# Patient Record
Sex: FEMALE | Race: WHITE | NOT HISPANIC OR LATINO | Employment: OTHER | URBAN - METROPOLITAN AREA
[De-identification: names, ages, dates, MRNs, and addresses within clinical notes are randomized per-mention and may not be internally consistent; named-entity substitution may affect disease eponyms.]

---

## 2017-02-10 ENCOUNTER — LAB (OUTPATIENT)
Dept: LAB | Facility: HOSPITAL | Age: 52
End: 2017-02-10
Attending: OTOLARYNGOLOGY

## 2017-02-10 ENCOUNTER — TRANSCRIBE ORDERS (OUTPATIENT)
Dept: LAB | Facility: HOSPITAL | Age: 52
End: 2017-02-10

## 2017-02-10 DIAGNOSIS — E03.9 HYPOTHYROIDISM, UNSPECIFIED TYPE: ICD-10-CM

## 2017-02-10 DIAGNOSIS — E03.9 HYPOTHYROIDISM, UNSPECIFIED TYPE: Primary | ICD-10-CM

## 2017-02-10 LAB — TSH SERPL DL<=0.05 MIU/L-ACNC: 1.94 MIU/ML (ref 0.27–4.2)

## 2017-02-10 PROCEDURE — 36415 COLL VENOUS BLD VENIPUNCTURE: CPT

## 2017-02-10 PROCEDURE — 84443 ASSAY THYROID STIM HORMONE: CPT

## 2017-07-07 ENCOUNTER — LAB (OUTPATIENT)
Dept: LAB | Facility: HOSPITAL | Age: 52
End: 2017-07-07
Attending: SURGERY

## 2017-07-07 ENCOUNTER — TRANSCRIBE ORDERS (OUTPATIENT)
Dept: ADMINISTRATIVE | Facility: HOSPITAL | Age: 52
End: 2017-07-07

## 2017-07-07 DIAGNOSIS — E03.9 HYPOTHYROIDISM, UNSPECIFIED TYPE: ICD-10-CM

## 2017-07-07 DIAGNOSIS — E03.9 HYPOTHYROIDISM, UNSPECIFIED TYPE: Primary | ICD-10-CM

## 2017-07-07 LAB — TSH SERPL DL<=0.05 MIU/L-ACNC: 0.69 MIU/ML (ref 0.27–4.2)

## 2017-07-07 PROCEDURE — 36415 COLL VENOUS BLD VENIPUNCTURE: CPT

## 2017-07-07 PROCEDURE — 84443 ASSAY THYROID STIM HORMONE: CPT

## 2017-08-28 ENCOUNTER — TRANSCRIBE ORDERS (OUTPATIENT)
Dept: ADMINISTRATIVE | Facility: HOSPITAL | Age: 52
End: 2017-08-28

## 2017-08-28 ENCOUNTER — LAB (OUTPATIENT)
Dept: LAB | Facility: HOSPITAL | Age: 52
End: 2017-08-28
Attending: OTOLARYNGOLOGY

## 2017-08-28 DIAGNOSIS — Z80.8 FHX: THYROID CANCER: Primary | ICD-10-CM

## 2017-08-28 DIAGNOSIS — Z80.8 FHX: THYROID CANCER: ICD-10-CM

## 2017-08-28 LAB — TSH SERPL DL<=0.05 MIU/L-ACNC: 1.14 MIU/ML (ref 0.27–4.2)

## 2017-08-28 PROCEDURE — 86800 THYROGLOBULIN ANTIBODY: CPT

## 2017-08-28 PROCEDURE — 84443 ASSAY THYROID STIM HORMONE: CPT

## 2017-08-28 PROCEDURE — 36415 COLL VENOUS BLD VENIPUNCTURE: CPT

## 2017-09-05 LAB
SPECIMEN STATUS: NORMAL
THYROGLOB AB SERPL-ACNC: <1 IU/ML (ref 0–0.9)

## 2017-09-15 ENCOUNTER — LAB (OUTPATIENT)
Dept: LAB | Facility: HOSPITAL | Age: 52
End: 2017-09-15
Attending: OTOLARYNGOLOGY

## 2017-09-15 ENCOUNTER — TRANSCRIBE ORDERS (OUTPATIENT)
Dept: ADMINISTRATIVE | Facility: HOSPITAL | Age: 52
End: 2017-09-15

## 2017-09-15 DIAGNOSIS — C73 THYROID CANCER (HCC): Primary | ICD-10-CM

## 2017-09-15 DIAGNOSIS — C73 THYROID CANCER (HCC): ICD-10-CM

## 2017-09-15 PROCEDURE — 36415 COLL VENOUS BLD VENIPUNCTURE: CPT

## 2017-09-15 PROCEDURE — 84432 ASSAY OF THYROGLOBULIN: CPT

## 2017-09-20 LAB — THYROGLOBULIN (TG-RIA): <2 NG/ML

## 2017-10-24 ENCOUNTER — OFFICE VISIT (OUTPATIENT)
Dept: ENDOCRINOLOGY | Age: 52
End: 2017-10-24

## 2017-10-24 VITALS
BODY MASS INDEX: 38.22 KG/M2 | DIASTOLIC BLOOD PRESSURE: 82 MMHG | HEART RATE: 98 BPM | SYSTOLIC BLOOD PRESSURE: 128 MMHG | OXYGEN SATURATION: 96 % | WEIGHT: 244 LBS

## 2017-10-24 DIAGNOSIS — C73 THYROID CANCER (HCC): Primary | ICD-10-CM

## 2017-10-24 DIAGNOSIS — E21.0 PRIMARY HYPERPARATHYROIDISM (HCC): ICD-10-CM

## 2017-10-24 DIAGNOSIS — E89.0 POSTSURGICAL HYPOTHYROIDISM: ICD-10-CM

## 2017-10-24 PROCEDURE — 99205 OFFICE O/P NEW HI 60 MIN: CPT | Performed by: INTERNAL MEDICINE

## 2017-10-24 RX ORDER — PRAZOSIN HYDROCHLORIDE 2 MG/1
CAPSULE ORAL
Refills: 0 | COMMUNITY
Start: 2017-10-10 | End: 2020-07-17 | Stop reason: HOSPADM

## 2017-10-24 RX ORDER — SUMATRIPTAN SUCC/NAPROXEN SOD 85MG-500MG
1 TABLET ORAL ONCE AS NEEDED
Refills: 3 | COMMUNITY
Start: 2017-09-26 | End: 2020-07-17 | Stop reason: HOSPADM

## 2017-10-24 RX ORDER — TOPIRAMATE 100 MG/1
TABLET, FILM COATED ORAL
Refills: 1 | COMMUNITY
Start: 2017-10-09 | End: 2019-07-29

## 2017-10-24 NOTE — PROGRESS NOTES
Chief Complaint   Patient presents with   • Thyroid Problem   New Patient Appointment/ H/O Thyroid Cancer Thyroidectomy    HPI  Urvashi Ornelas,52 y.o. white female is here as a new patient for the management of metastatic papillary thyroid cancer status post total thyroidectomy and radioactive iodine ablation.  Consulted by Dr. Ashby.    Most of the information is obtained from prior medical records and also from the patient.According to the patient she was diagnosed with hyperparathyroidism about 9 years ago due to the diagnosis of elevated calcium levels on her routine blood work up.  She underwent right inferior parathyroidectomy.  Work up during that time also revealed a left thyroid lobe she subsequently underwent thyroid biopsy which revealed papillary thyroid cancer.  On 2/5/2016 patient underwent total thyroidectomy with the resection of the paratracheal lymph nodes and parathyroid exploration.  The surgical pathology was consistent with multifocal papillary carcinoma follicular variant with infiltration.  The largest nodule on the left measured 13 x 8 x 9 mm.  Carcinoma was present at the peripheral margin.  There was no evidence of angiolymphatic invasion.  3 lymph nodes were identified which had metastasis less than one millimeters in size.  Along with right superior parathyroid adenoma.  Postoperative ultrasound showed residual level III lymph nodes in the left neck measuring 2.2 x 0.8 x 1.3 cm in size.  Ultrasound-guided biopsy showed pathology consistent with papillary thyroid cancer.  Subsequently patient underwent left neck dissection in April 2016 by Dr. Ashby.  Surgical pathology was consistent with metastatic papillary carcinoma involving 6 out of the 19 lymph nodes.  There was no evidence of extracapsular extension.  Patient was being followed by Dr. Carney from radiation oncology for radioactive iodine ablation and whole-body scan.    In June 2016 patient received Thyrogen stimulated 125 mCi of I  131.  She had whole-body scan the week after and then another whole-body scan on January 27, 2017.     WBS -   06/15/2016 - Residual activity of the thyroid resection bed represents residual functional thyroid tissue or malignancy   1/27/17 -    1. Unremarkable whole-body thyroid scintigraphy. No evidence of residual or recurrent disease.    2. Activity on both sides of the neck base likely representing contamination.    Labs at the time of the scans - Tg levels - 0.8 and less than 0.1    Today in the clinic patient also reports that she had another thyroid ultrasound performed at [facility].    Patient is currently on levothyroxine 200 µg oral daily, dose was changed in the last few months from 175 µg oral daily.  She does complain of feeling tired at times, weight has been relatively stable, complains of dry skin, complains of cold intolerance, no tremors, racing of heart.  No history of radiation to the neck as a kid and no family history of thyroid cancer.      Hyperparathyroidism-patient had right superior and inferior parathyroidectomy.  Currently not on any calcium supplementation.      I have reviewed the patient's allergies, medicines, past medical hx, family hx and social hx in detail.    Past Medical History:   Diagnosis Date   • Anxiety    • Arthritis    • Asthma    • Cancer     THYROID   • Depression    • Disease of thyroid gland     CANCER   • H/O degenerative disc disease    • History of hallucinations    • Hypertension    • Joint pain    • Lymph node cancer    • Migraine    • Postmenopausal        No family history on file.    Social History     Social History   • Marital status:      Spouse name: N/A   • Number of children: N/A   • Years of education: N/A     Occupational History   • Not on file.     Social History Main Topics   • Smoking status: Former Smoker   • Smokeless tobacco: Never Used   • Alcohol use No   • Drug use: No   • Sexual activity: Not on file     Other Topics Concern    • Not on file     Social History Narrative       Allergies   Allergen Reactions   • Depakote Er [Divalproex Sodium Er] Other (See Comments)     HAIR FALLS OUT   • Lamictal [Lamotrigine] Other (See Comments)     TONGUE SWELLS AND PEELS   • Risperidone And Related Other (See Comments)     PREGNANCY SYMPTOMS   • Erythromycin Rash   • Penicillins Rash   • Toradol [Ketorolac Tromethamine] Rash         Current Outpatient Prescriptions:   •  albuterol (PROVENTIL HFA;VENTOLIN HFA) 108 (90 BASE) MCG/ACT inhaler, Inhale 2 puffs every 4 (four) hours as needed for wheezing., Disp: , Rfl:   •  atorvastatin (LIPITOR) 20 MG tablet, Take 20 mg by mouth every morning., Disp: , Rfl:   •  BREO ELLIPTA 200-25 MCG/INH aerosol powder , INHALE 1 PUFF INTO THE LUNGS D, Disp: , Rfl: 2  •  clozapine (CLOZARIL) 100 MG tablet, Take 100 mg by mouth every evening., Disp: , Rfl:   •  FLUoxetine (PROzac) 40 MG capsule, Take 40 mg by mouth every morning., Disp: , Rfl:   •  levothyroxine (SYNTHROID, LEVOTHROID) 112 MCG tablet, Take 112 mcg by mouth every morning., Disp: , Rfl:   •  ondansetron (ZOFRAN) 8 MG tablet, Take  by mouth every 8 (eight) hours as needed for nausea or vomiting., Disp: , Rfl:   •  prazosin (MINIPRESS) 2 MG capsule, TK TWO CAPSULES PO EVERY NIGHT AT BEDTIME, Disp: , Rfl: 0  •  topiramate (TOPAMAX) 100 MG tablet, TK 1 TABLET PO BID, Disp: , Rfl: 1  •  TREXIMET  MG per tablet, , Disp: , Rfl: 3     Review of Systems   Constitutional: Positive for fatigue. Negative for fever.   HENT: Positive for voice change. Negative for facial swelling, nosebleeds and trouble swallowing.    Eyes: Negative for pain and redness.   Respiratory: Positive for shortness of breath. Negative for wheezing.    Cardiovascular: Negative for palpitations and leg swelling.   Gastrointestinal: Negative for abdominal pain, diarrhea and vomiting.   Endocrine: Negative for polydipsia and polyuria.   Genitourinary: Negative for decreased urine volume and  frequency.   Musculoskeletal: Negative for joint swelling and neck pain.   Skin: Negative for rash.   Allergic/Immunologic: Negative for immunocompromised state.   Neurological: Positive for headaches. Negative for dizziness, seizures, facial asymmetry and light-headedness.   Hematological: Does not bruise/bleed easily.   Psychiatric/Behavioral: Positive for agitation. Negative for confusion.       Objective:    /82  Pulse 98  Wt 244 lb (111 kg)  SpO2 96%  BMI 38.22 kg/m2    Physical Exam   Constitutional: She is oriented to person, place, and time. She appears well-nourished.   Obese female   HENT:   Head: Normocephalic and atraumatic.   No chovsteks   Eyes: Conjunctivae and EOM are normal. No scleral icterus.   Neck: Normal range of motion. Neck supple.   Thyroid bed empty   Cardiovascular: Normal rate and normal heart sounds.  Exam reveals no friction rub.    No murmur heard.  Pulmonary/Chest: Effort normal and breath sounds normal. No stridor. She has no wheezes. She has no rales.   Abdominal: Soft. Bowel sounds are normal. She exhibits no distension. There is no tenderness.   Musculoskeletal: She exhibits no edema or tenderness.   Lymphadenopathy:     She has no cervical adenopathy.   Neurological: She is alert and oriented to person, place, and time.   No tetany   Skin: Skin is warm and dry. She is not diaphoretic.   Psychiatric: She has a normal mood and affect.   Vitals reviewed.      Results Review:    I reviewed the patient's new clinical results.    Lab on 09/15/2017   Component Date Value Ref Range Status   • Thyroglobulin (TG-JOSE DAVID) 09/15/2017 <2.0  ng/mL Final    Reference Range:  Pubertal Children  and Adults: <40  According to the National Academy of Clinical Biochemistry,  the reference interval for Thyroglobulin (TG) should be  related to euthyroid patients and not for patients who  underwent thyroidectomy.  TG reference intervals for these  patients depend on the residual mass of the  thyroid tissue  left after surgery.  Establishing a post-operative baseline  is recommended.  The assay quantitation limit is 2.0 ng/mL.       Urvashi was seen today for thyroid problem.    Diagnoses and all orders for this visit:    Thyroid cancer  -     TSH  -     T4, Free  -     Thyroid Peroxidase Antibody  -     Vitamin D 25 Hydroxy  -     PTH, Intact & Calcium  -     Comprehensive Metabolic Panel  -     TSH; Future  -     T4, Free; Future  -     Lipid Panel; Future  -     TSH; Future  -     Vitamin B12 & Folate; Future  -     Vitamin D 25 Hydroxy; Future    Primary hyperparathyroidism  -     TSH  -     T4, Free  -     Thyroid Peroxidase Antibody  -     Vitamin D 25 Hydroxy  -     PTH, Intact & Calcium  -     Comprehensive Metabolic Panel  -     TSH; Future  -     T4, Free; Future  -     Lipid Panel; Future  -     TSH; Future  -     Vitamin B12 & Folate; Future  -     Vitamin D 25 Hydroxy; Future    Postsurgical hypothyroidism  -     TSH  -     T4, Free  -     Thyroid Peroxidase Antibody  -     Vitamin D 25 Hydroxy  -     PTH, Intact & Calcium  -     Comprehensive Metabolic Panel  -     TSH; Future  -     T4, Free; Future  -     Lipid Panel; Future  -     TSH; Future  -     Vitamin B12 & Folate; Future  -     Vitamin D 25 Hydroxy; Future    Metastatic papillary thyroid cancer- Stage 3  Status post radioactive iodine ablation and body scan which was negative for residual or recurrence of thyroid malignancy.  Recent thyroid ultrasound performed at [facility], will try to obtain those records.  Will continue to monitor with serial thyroid ultrasound's at least once a year.    Hypothyroidism secondary to total thyroidectomy  Due to the cancer history will try to keep the TSH between 0.5-1  Continue levothyroxine 200 µg oral daily.  Will adjust the dosage of the levothyroxine based on her blood work up today    History of hyperparathyroidism status post right superior and inferior parathyroidectomy  Will check  PTH, calcium, vitamin D levels.  During next visit would consider DEXA scan.    Thank you for asking me to see your patient Urvashi Ornelas in consultation.    40 minutes out of 80 minutes face to face spent in counseling the patient extensively on medical records reviewed from [facility], obtaining the data in discussing with the patient about treatment plan.    Bakari Mora MD  10/24/17

## 2017-10-25 LAB
25(OH)D3+25(OH)D2 SERPL-MCNC: 19.4 NG/ML (ref 30–100)
ALBUMIN SERPL-MCNC: 4.4 G/DL (ref 3.5–5.2)
ALBUMIN/GLOB SERPL: 1.6 G/DL
ALP SERPL-CCNC: 99 U/L (ref 39–117)
ALT SERPL-CCNC: 30 U/L (ref 1–33)
AST SERPL-CCNC: 21 U/L (ref 1–32)
BILIRUB SERPL-MCNC: 0.2 MG/DL (ref 0.1–1.2)
BUN SERPL-MCNC: 11 MG/DL (ref 6–20)
BUN/CREAT SERPL: 10.5 (ref 7–25)
CALCIUM SERPL-MCNC: 9.2 MG/DL (ref 8.6–10.5)
CHLORIDE SERPL-SCNC: 104 MMOL/L (ref 98–107)
CO2 SERPL-SCNC: 20.3 MMOL/L (ref 22–29)
CREAT SERPL-MCNC: 1.05 MG/DL (ref 0.57–1)
GFR SERPLBLD CREATININE-BSD FMLA CKD-EPI: 55 ML/MIN/1.73
GFR SERPLBLD CREATININE-BSD FMLA CKD-EPI: 67 ML/MIN/1.73
GLOBULIN SER CALC-MCNC: 2.8 GM/DL
GLUCOSE SERPL-MCNC: 117 MG/DL (ref 65–99)
INTACT PTH: NORMAL
POTASSIUM SERPL-SCNC: 4.2 MMOL/L (ref 3.5–5.2)
PROT SERPL-MCNC: 7.2 G/DL (ref 6–8.5)
PTH-INTACT SERPL-MCNC: 20 PG/ML (ref 15–65)
SODIUM SERPL-SCNC: 141 MMOL/L (ref 136–145)
T4 FREE SERPL-MCNC: 1.49 NG/DL (ref 0.93–1.7)
THYROPEROXIDASE AB SERPL-ACNC: 17 IU/ML (ref 0–34)
TSH SERPL DL<=0.005 MIU/L-ACNC: 0.98 MIU/ML (ref 0.27–4.2)

## 2017-10-25 RX ORDER — ERGOCALCIFEROL 1.25 MG/1
50000 CAPSULE ORAL WEEKLY
Qty: 30 CAPSULE | Refills: 11 | Status: SHIPPED | OUTPATIENT
Start: 2017-10-25 | End: 2020-03-05

## 2017-11-29 ENCOUNTER — LAB REQUISITION (OUTPATIENT)
Dept: LAB | Facility: HOSPITAL | Age: 52
End: 2017-11-29

## 2017-11-29 DIAGNOSIS — Z00.00 ENCOUNTER FOR GENERAL ADULT MEDICAL EXAMINATION WITHOUT ABNORMAL FINDINGS: ICD-10-CM

## 2017-11-29 PROCEDURE — 87070 CULTURE OTHR SPECIMN AEROBIC: CPT | Performed by: OTOLARYNGOLOGY

## 2017-11-29 PROCEDURE — 87147 CULTURE TYPE IMMUNOLOGIC: CPT | Performed by: OTOLARYNGOLOGY

## 2017-11-29 PROCEDURE — 87205 SMEAR GRAM STAIN: CPT | Performed by: OTOLARYNGOLOGY

## 2017-12-01 LAB
BACTERIA SPEC AEROBE CULT: ABNORMAL
BACTERIA SPEC AEROBE CULT: ABNORMAL
GRAM STN SPEC: ABNORMAL
GRAM STN SPEC: ABNORMAL

## 2018-01-20 LAB
25(OH)D3+25(OH)D2 SERPL-MCNC: 32 NG/ML (ref 30–100)
CHOLEST SERPL-MCNC: 155 MG/DL (ref 0–200)
FOLATE SERPL-MCNC: 3.09 NG/ML (ref 4.78–24.2)
HDLC SERPL-MCNC: 48 MG/DL (ref 40–60)
INTERPRETATION: NORMAL
LDLC SERPL CALC-MCNC: 59 MG/DL (ref 0–100)
T4 FREE SERPL-MCNC: 1.54 NG/DL (ref 0.93–1.7)
TRIGL SERPL-MCNC: 241 MG/DL (ref 0–150)
TSH SERPL DL<=0.005 MIU/L-ACNC: 1.18 MIU/ML (ref 0.27–4.2)
VIT B12 SERPL-MCNC: 471 PG/ML (ref 211–946)
VLDLC SERPL CALC-MCNC: 48.2 MG/DL (ref 5–40)

## 2018-01-22 ENCOUNTER — RESULTS ENCOUNTER (OUTPATIENT)
Dept: ENDOCRINOLOGY | Age: 53
End: 2018-01-22

## 2018-01-22 DIAGNOSIS — E89.0 POSTSURGICAL HYPOTHYROIDISM: ICD-10-CM

## 2018-01-22 DIAGNOSIS — E21.0 PRIMARY HYPERPARATHYROIDISM (HCC): ICD-10-CM

## 2018-01-22 DIAGNOSIS — C73 THYROID CANCER (HCC): ICD-10-CM

## 2018-01-29 ENCOUNTER — OFFICE VISIT (OUTPATIENT)
Dept: ENDOCRINOLOGY | Age: 53
End: 2018-01-29

## 2018-01-29 VITALS
WEIGHT: 258 LBS | SYSTOLIC BLOOD PRESSURE: 120 MMHG | DIASTOLIC BLOOD PRESSURE: 72 MMHG | HEART RATE: 105 BPM | OXYGEN SATURATION: 97 % | HEIGHT: 67 IN | BODY MASS INDEX: 40.49 KG/M2

## 2018-01-29 DIAGNOSIS — E89.0 POSTSURGICAL HYPOTHYROIDISM: ICD-10-CM

## 2018-01-29 DIAGNOSIS — C73 THYROID CANCER (HCC): Primary | ICD-10-CM

## 2018-01-29 PROCEDURE — 99214 OFFICE O/P EST MOD 30 MIN: CPT | Performed by: INTERNAL MEDICINE

## 2018-01-29 RX ORDER — LITHIUM CARBONATE 450 MG
TABLET, EXTENDED RELEASE ORAL
Refills: 1 | COMMUNITY
Start: 2018-01-19 | End: 2018-05-14 | Stop reason: ALTCHOICE

## 2018-01-29 RX ORDER — MONTELUKAST SODIUM 10 MG/1
TABLET ORAL
Refills: 5 | COMMUNITY
Start: 2018-01-26 | End: 2019-11-20 | Stop reason: SDUPTHER

## 2018-01-29 RX ORDER — FOLIC ACID 1 MG/1
1 TABLET ORAL DAILY
Qty: 30 TABLET | Refills: 11 | Status: SHIPPED | OUTPATIENT
Start: 2018-01-29 | End: 2018-05-14 | Stop reason: SDUPTHER

## 2018-01-29 RX ORDER — LEVOTHYROXINE SODIUM 0.03 MG/1
25 TABLET ORAL DAILY
Qty: 30 TABLET | Refills: 11 | Status: SHIPPED | OUTPATIENT
Start: 2018-01-29 | End: 2018-04-26 | Stop reason: SDUPTHER

## 2018-01-29 RX ORDER — BUSPIRONE HYDROCHLORIDE 30 MG/1
30 TABLET ORAL 2 TIMES DAILY PRN
Refills: 0 | COMMUNITY
Start: 2018-01-03

## 2018-01-29 RX ORDER — LEVOTHYROXINE SODIUM 0.2 MG/1
200 TABLET ORAL EVERY MORNING
Qty: 30 TABLET | Refills: 11 | Status: SHIPPED | OUTPATIENT
Start: 2018-01-29 | End: 2019-01-08 | Stop reason: SDUPTHER

## 2018-01-29 RX ORDER — BUPROPION HYDROCHLORIDE 300 MG/1
TABLET ORAL
Refills: 1 | COMMUNITY
Start: 2018-01-23 | End: 2020-07-17 | Stop reason: HOSPADM

## 2018-01-29 NOTE — PROGRESS NOTES
52 y.o.    Patient Care Team:  LALO Syed as PCP - General (Family Medicine)    Chief Complaint:    3 MONTH FOLLOW UP/THYROID CANCER  Subjective     HPI    Urvashi Ornelas,52 y.o. white female is here as a new patient for the management of metastatic papillary thyroid cancer status post total thyroidectomy and radioactive iodine ablation.  Consulted by Dr. Ashby.     Most of the information is obtained from prior medical records and also from the patient. According to the patient she was diagnosed with hyperparathyroidism about 9 years ago due to the diagnosis of elevated calcium levels on her routine blood work up.  She underwent right inferior parathyroidectomy.  Work up during that time also revealed a left thyroid lobe she subsequently underwent thyroid biopsy which revealed papillary thyroid cancer.  On 2/5/2016 patient underwent total thyroidectomy with the resection of the paratracheal lymph nodes and parathyroid exploration.  The surgical pathology was consistent with multifocal papillary carcinoma follicular variant with infiltration.  The largest nodule on the left measured 13 x 8 x 9 mm.  Carcinoma was present at the peripheral margin.  There was no evidence of angiolymphatic invasion.  3 lymph nodes were identified which had metastasis less than one millimeters in size.  Along with right superior parathyroid adenoma.  Postoperative ultrasound showed residual level III lymph nodes in the left neck measuring 2.2 x 0.8 x 1.3 cm in size.  Ultrasound-guided biopsy showed pathology consistent with papillary thyroid cancer. Subsequently patient underwent left neck dissection in April 2016 by Dr. Ashby.  Surgical pathology was consistent with metastatic papillary carcinoma involving 6 out of the 19 lymph nodes.  There was no evidence of extracapsular extension.  Patient was being followed by Dr. Carney from radiation oncology for radioactive iodine ablation and whole-body scan.     In June 2016 patient  received Thyrogen stimulated 125 mCi of I 131.  She had whole-body scan the week after and then another whole-body scan on January 27, 2017.      WBS -   06/15/2016 - Residual activity of the thyroid resection bed represents residual functional thyroid tissue or malignancy   1/27/17 -    1. Unremarkable whole-body thyroid scintigraphy. No evidence of residual or recurrent disease.    2. Activity on both sides of the neck base likely representing contamination.     Labs at the time of the scans - Tg levels - 0.8 and less than 0.1    Today in the clinic patient reports that she could not get the thyroid ultrasound report from [facility] but she was communicated by them that they did not find anything concerning features.  She is currently on levothyroxine 200 µg oral daily.  She does report of feeling tired and that reports that this is due to her bipolar disease and is also extremely concerned about her father's health status, she reports that her weight is stable, complains of cold intolerance, normal bowel movements, sleep is disturbed due to various thoughts.  Complains of dry skin and some hair loss.  No tremors, racing of heart or eye symptoms.  No shortness of breath, difficulty in swallowing or change in voice.       Hyperparathyroidism-patient had right superior and inferior parathyroidectomy.  Currently not on any calcium supplementation.  Thyroid levels in calcium levels during last blood work up has been within normal limits.    Interval History      The following portions of the patient's history were reviewed and updated as appropriate: allergies, current medications, past family history, past medical history, past social history, past surgical history and problem list.    Past Medical History:   Diagnosis Date   • Anxiety    • Arthritis    • Asthma    • Cancer     THYROID   • Depression    • Disease of thyroid gland     CANCER   • H/O degenerative disc disease    • History of hallucinations    •  Hypertension    • Joint pain    • Lymph node cancer    • Migraine    • Postmenopausal      No family history on file.  Social History     Social History   • Marital status:      Spouse name: N/A   • Number of children: N/A   • Years of education: N/A     Occupational History   • Not on file.     Social History Main Topics   • Smoking status: Former Smoker   • Smokeless tobacco: Never Used   • Alcohol use No   • Drug use: No   • Sexual activity: Not on file     Other Topics Concern   • Not on file     Social History Narrative     Allergies   Allergen Reactions   • Depakote Er [Divalproex Sodium Er] Other (See Comments)     HAIR FALLS OUT   • Lamictal [Lamotrigine] Other (See Comments)     TONGUE SWELLS AND PEELS   • Risperidone And Related Other (See Comments)     PREGNANCY SYMPTOMS   • Erythromycin Rash   • Penicillins Rash   • Toradol [Ketorolac Tromethamine] Rash       Current Outpatient Prescriptions:   •  albuterol (PROVENTIL HFA;VENTOLIN HFA) 108 (90 BASE) MCG/ACT inhaler, Inhale 2 puffs every 4 (four) hours as needed for wheezing., Disp: , Rfl:   •  atorvastatin (LIPITOR) 20 MG tablet, Take 40 mg by mouth Every Morning., Disp: , Rfl:   •  BREO ELLIPTA 200-25 MCG/INH aerosol powder , INHALE 1 PUFF INTO THE LUNGS D, Disp: , Rfl: 2  •  buPROPion XL (WELLBUTRIN XL) 300 MG 24 hr tablet, TK 1 T PO  QAM, Disp: , Rfl: 1  •  busPIRone (BUSPAR) 10 MG tablet, TK 1 T PO  TID, Disp: , Rfl: 0  •  clozapine (CLOZARIL) 100 MG tablet, Take 100 mg by mouth every evening., Disp: , Rfl:   •  levothyroxine (SYNTHROID, LEVOTHROID) 200 MCG tablet, Take 1 tablet by mouth Every Morning., Disp: 30 tablet, Rfl: 11  •  lithium (ESKALITH) 450 MG CR tablet, TK 2 TS PO HS, Disp: , Rfl: 1  •  montelukast (SINGULAIR) 10 MG tablet, TK 1 T PO  QHS, Disp: , Rfl: 5  •  prazosin (MINIPRESS) 2 MG capsule, TK TWO CAPSULES PO EVERY NIGHT AT BEDTIME, Disp: , Rfl: 0  •  topiramate (TOPAMAX) 100 MG tablet, TK 1 TABLET PO BID, Disp: , Rfl: 1  •   "TREXIMET  MG per tablet, , Disp: , Rfl: 3  •  vitamin D (ERGOCALCIFEROL) 23664 units capsule capsule, Take 1 capsule by mouth 1 (One) Time Per Week., Disp: 30 capsule, Rfl: 11  •  folic acid (FOLVITE) 1 MG tablet, Take 1 tablet by mouth Daily., Disp: 30 tablet, Rfl: 11  •  levothyroxine (SYNTHROID, LEVOTHROID) 25 MCG tablet, Take 1 tablet by mouth Daily., Disp: 30 tablet, Rfl: 11  •  ondansetron (ZOFRAN) 8 MG tablet, Take  by mouth every 8 (eight) hours as needed for nausea or vomiting., Disp: , Rfl:         Review of Systems   Constitutional: Negative for fever.   HENT: Positive for voice change. Negative for facial swelling, nosebleeds and trouble swallowing.    Eyes: Negative for pain and redness.   Respiratory: Negative for shortness of breath and wheezing.    Cardiovascular: Negative for palpitations and leg swelling.   Gastrointestinal: Negative for abdominal pain, diarrhea and vomiting.   Endocrine: Negative for cold intolerance, heat intolerance, polydipsia and polyuria.   Genitourinary: Negative for decreased urine volume and frequency.   Musculoskeletal: Negative for joint swelling and neck pain.   Skin: Negative for rash.   Allergic/Immunologic: Negative for immunocompromised state.   Neurological: Positive for headaches. Negative for seizures and facial asymmetry.   Hematological: Does not bruise/bleed easily.   Psychiatric/Behavioral: Positive for agitation. Negative for confusion.       Objective       Vitals:    01/29/18 1303   BP: 120/72   Pulse: 105   SpO2: 97%   Weight: 117 kg (258 lb)   Height: 170.2 cm (67\")     Body mass index is 40.41 kg/(m^2).      Physical Exam   Gen exam - alert and oriented x 3, obese female, not in distress.   HEENT - thyroid bed empty  Resp - Clear to auscultation.   CVS - S1,S2 heard and no murmurs.   Abd - Non tender, BS heard.   Ext - No edema and intact pin prick and proprioception.     Results Review:     I reviewed the patient's new clinical " results.    Medical records reviewed  Summary: done      Results Encounter on 01/22/2018   Component Date Value Ref Range Status   • TSH 01/19/2018 1.180  0.270 - 4.200 mIU/mL Final   • Free T4 01/19/2018 1.54  0.93 - 1.70 ng/dL Final   • Total Cholesterol 01/19/2018 155  0 - 200 mg/dL Final   • Triglycerides 01/19/2018 241* 0 - 150 mg/dL Final   • HDL Cholesterol 01/19/2018 48  40 - 60 mg/dL Final   • VLDL Cholesterol 01/19/2018 48.2* 5 - 40 mg/dL Final   • LDL Cholesterol  01/19/2018 59  0 - 100 mg/dL Final   • Vitamin B-12 01/19/2018 471  211 - 946 pg/mL Final   • Folate 01/19/2018 3.09* 4.78 - 24.20 ng/mL Final   • 25 Hydroxy, Vitamin D 01/19/2018 32.0  30.0 - 100.0 ng/mL Final    Comment: Reference Range for Total Vitamin D 25(OH)  Deficiency    <20.0 ng/mL  Insufficiency 21-29 ng/mL  Sufficiency    ng/mL  Toxicity      >100 ng/ml        • Interpretation 01/19/2018 Note   Final    Supplemental report is available.     No results found for: HGBA1C  Lab Results   Component Value Date    CREATININE 1.05 (H) 10/24/2017     Imaging Results (most recent)     None                Assessment and Plan:    Urvashi was seen today for thyroid problem.    Diagnoses and all orders for this visit:    Thyroid cancer  -     T4, Free; Future  -     TSH; Future  -     Basic Metabolic Panel; Future  -     Vitamin B12 & Folate; Future  -     Lipid Panel; Future  -     Phosphorus; Future  -     PTH, Intact & Calcium; Future  -     US Thyroid; Future    Postsurgical hypothyroidism  -     T4, Free; Future  -     TSH; Future  -     Basic Metabolic Panel; Future  -     Vitamin B12 & Folate; Future  -     Lipid Panel; Future  -     Phosphorus; Future  -     PTH, Intact & Calcium; Future  -     US Thyroid; Future    Other orders  -     levothyroxine (SYNTHROID, LEVOTHROID) 200 MCG tablet; Take 1 tablet by mouth Every Morning.  -     levothyroxine (SYNTHROID, LEVOTHROID) 25 MCG tablet; Take 1 tablet by mouth Daily.  -     folic acid  (FOLVITE) 1 MG tablet; Take 1 tablet by mouth Daily.    Metastatic papillary thyroid cancer-stage III  Whole-body scan was negative after the radioactive iodine ablation.  Will repeat a thyroid ultrasound with lymph node mapping.  Will try to suppress the TSH is at around 0.5-1.  For at least the next 5 years.    Postsurgical hypothyroidism  Will increase levothyroxin to 225 µg oral daily.  Advised the patient to take the medication on empty stomach every single day.    Hyperparathyroidism status post parathyroidectomy  Will continue to monitor PTH, calcium levels  Will check phosphorus levels.    Reviewed Lab results with the patient.     14 minutes out of 25 minutes face to face spent in counseling the patient extensively on diagnostic imaging necessity and dosage changes.

## 2018-02-12 ENCOUNTER — APPOINTMENT (OUTPATIENT)
Dept: ULTRASOUND IMAGING | Facility: HOSPITAL | Age: 53
End: 2018-02-12
Attending: INTERNAL MEDICINE

## 2018-04-26 RX ORDER — LEVOTHYROXINE SODIUM 0.03 MG/1
TABLET ORAL
Qty: 90 TABLET | Refills: 3 | Status: SHIPPED | OUTPATIENT
Start: 2018-04-26 | End: 2019-02-23 | Stop reason: SDUPTHER

## 2018-05-01 ENCOUNTER — LAB (OUTPATIENT)
Dept: ENDOCRINOLOGY | Age: 53
End: 2018-05-01

## 2018-05-01 DIAGNOSIS — C73 THYROID CANCER (HCC): ICD-10-CM

## 2018-05-01 DIAGNOSIS — E89.0 POSTSURGICAL HYPOTHYROIDISM: ICD-10-CM

## 2018-05-02 LAB
BUN SERPL-MCNC: 9 MG/DL (ref 6–20)
BUN/CREAT SERPL: 8.5 (ref 7–25)
CALCIUM SERPL-MCNC: 8.8 MG/DL (ref 8.6–10.5)
CHLORIDE SERPL-SCNC: 105 MMOL/L (ref 98–107)
CHOLEST SERPL-MCNC: 165 MG/DL (ref 0–200)
CO2 SERPL-SCNC: 24.2 MMOL/L (ref 22–29)
CREAT SERPL-MCNC: 1.06 MG/DL (ref 0.57–1)
FOLATE SERPL-MCNC: 4.63 NG/ML (ref 4.78–24.2)
GFR SERPLBLD CREATININE-BSD FMLA CKD-EPI: 55 ML/MIN/1.73
GFR SERPLBLD CREATININE-BSD FMLA CKD-EPI: 67 ML/MIN/1.73
GLUCOSE SERPL-MCNC: 89 MG/DL (ref 65–99)
HDLC SERPL-MCNC: 47 MG/DL (ref 40–60)
INTACT PTH: NORMAL
INTERPRETATION: NORMAL
LDLC SERPL CALC-MCNC: 72 MG/DL (ref 0–100)
PHOSPHATE SERPL-MCNC: 3.5 MG/DL (ref 2.5–4.5)
POTASSIUM SERPL-SCNC: 4.5 MMOL/L (ref 3.5–5.2)
PTH-INTACT SERPL-MCNC: 30 PG/ML (ref 15–65)
SODIUM SERPL-SCNC: 142 MMOL/L (ref 136–145)
T4 FREE SERPL-MCNC: 1.58 NG/DL (ref 0.93–1.7)
TRIGL SERPL-MCNC: 230 MG/DL (ref 0–150)
TSH SERPL DL<=0.005 MIU/L-ACNC: 0.34 MIU/ML (ref 0.27–4.2)
VIT B12 SERPL-MCNC: 379 PG/ML (ref 211–946)
VLDLC SERPL CALC-MCNC: 46 MG/DL (ref 5–40)

## 2018-05-14 ENCOUNTER — OFFICE VISIT (OUTPATIENT)
Dept: ENDOCRINOLOGY | Age: 53
End: 2018-05-14

## 2018-05-14 VITALS
WEIGHT: 259 LBS | OXYGEN SATURATION: 96 % | HEART RATE: 82 BPM | BODY MASS INDEX: 40.65 KG/M2 | SYSTOLIC BLOOD PRESSURE: 124 MMHG | HEIGHT: 67 IN | DIASTOLIC BLOOD PRESSURE: 67 MMHG

## 2018-05-14 DIAGNOSIS — C73 THYROID CANCER (HCC): Primary | ICD-10-CM

## 2018-05-14 DIAGNOSIS — R73.03 PRE-DIABETES: ICD-10-CM

## 2018-05-14 DIAGNOSIS — E21.0 PRIMARY HYPERPARATHYROIDISM (HCC): ICD-10-CM

## 2018-05-14 DIAGNOSIS — E89.0 POSTSURGICAL HYPOTHYROIDISM: ICD-10-CM

## 2018-05-14 PROCEDURE — 99214 OFFICE O/P EST MOD 30 MIN: CPT | Performed by: INTERNAL MEDICINE

## 2018-05-14 RX ORDER — FOLIC ACID 1 MG/1
1 TABLET ORAL DAILY
Qty: 30 TABLET | Refills: 11 | Status: SHIPPED | OUTPATIENT
Start: 2018-05-14 | End: 2018-09-17 | Stop reason: SDUPTHER

## 2018-05-14 NOTE — PROGRESS NOTES
52 y.o.    Patient Care Team:  LALO Syed as PCP - General (Family Medicine)    Chief Complaint:    4 MONTH FOLLOW UP/ THYROID CANCER  Subjective     HPI    Urvashi Ornelas,52 y.o. white female is here as a new patient for the management of metastatic papillary thyroid cancer status post total thyroidectomy and radioactive iodine ablation.  Consulted by Dr. Ashby.     Most of the information is obtained from prior medical records and also from the patient. According to the patient she was diagnosed with hyperparathyroidism about 9 years ago due to the diagnosis of elevated calcium levels on her routine blood work up.  She underwent right inferior parathyroidectomy.  Work up during that time also revealed a left thyroid lobe she subsequently underwent thyroid biopsy which revealed papillary thyroid cancer.  On 2/5/2016 patient underwent total thyroidectomy with the resection of the paratracheal lymph nodes and parathyroid exploration.  The surgical pathology was consistent with multifocal papillary carcinoma follicular variant with infiltration.  The largest nodule on the left measured 13 x 8 x 9 mm.  Carcinoma was present at the peripheral margin.  There was no evidence of angiolymphatic invasion.  3 lymph nodes were identified which had metastasis less than one millimeters in size.  Along with right superior parathyroid adenoma.  Postoperative ultrasound showed residual level III lymph nodes in the left neck measuring 2.2 x 0.8 x 1.3 cm in size.  Ultrasound-guided biopsy showed pathology consistent with papillary thyroid cancer. Subsequently patient underwent left neck dissection in April 2016 by Dr. Ashby.  Surgical pathology was consistent with metastatic papillary carcinoma involving 6 out of the 19 lymph nodes.  There was no evidence of extracapsular extension.  Patient was being followed by Dr. Carney from radiation oncology for radioactive iodine ablation and whole-body scan.     In June 2016 patient  received Thyrogen stimulated 125 mCi of I 131.  She had whole-body scan the week after and then another whole-body scan on January 27, 2017.      WBS -   06/15/2016 - Residual activity of the thyroid resection bed represents residual functional thyroid tissue or malignancy   1/27/17 -    1. Unremarkable whole-body thyroid scintigraphy. No evidence of residual or recurrent disease.    2. Activity on both sides of the neck base likely representing contamination.  Labs at the time of the scans - Tg levels - 0.8 and less than 0.1    Last thyroid ultrasound from October 2017 performed at [facility] was within normal limits with no concerning features.  I do not have the physical report and we tried to get the report from [facility] and it was not sent to us.    Today in the clinic patient reports that she is on levothyroxin to 25 µg oral daily.  She is compliant with the medication and takes it on empty stomach every single day.  She does report that she is tired at times but this has significantly improved over the last few months, weight has been stable, complains of cold intolerance, normal bowel movements, sleep is disturbed at times and that is secondary to her bipolar disorder.  Complains of dry skin at times and some hair loss.  No tremors, racing of heart or eye symptoms.  No shortness of breath, difficulty in swallowing or change in voice.    Hyperparathyroidism-patient had right superior and inferior parathyroidectomy.  Currently not on any calcium supplementation.      The following portions of the patient's history were reviewed and updated as appropriate: allergies, current medications, past family history, past medical history, past social history, past surgical history and problem list.    Past Medical History:   Diagnosis Date   • Anxiety    • Arthritis    • Asthma    • Cancer     THYROID   • Depression    • Disease of thyroid gland     CANCER   • H/O degenerative disc disease    • History of  hallucinations    • Hypertension    • Joint pain    • Lymph node cancer    • Migraine    • Postmenopausal      No family history on file.  Social History     Social History   • Marital status:      Spouse name: N/A   • Number of children: N/A   • Years of education: N/A     Occupational History   • Not on file.     Social History Main Topics   • Smoking status: Former Smoker   • Smokeless tobacco: Never Used   • Alcohol use No   • Drug use: No   • Sexual activity: Not on file     Other Topics Concern   • Not on file     Social History Narrative   • No narrative on file     Allergies   Allergen Reactions   • Depakote Er [Divalproex Sodium Er] Other (See Comments)     HAIR FALLS OUT   • Divalproex Sodium Unknown (See Comments)   • Lamictal [Lamotrigine] Other (See Comments) and Swelling     TONGUE SWELLS AND PEELS   • Risperidone Unknown (See Comments)   • Risperidone And Related Other (See Comments)     PREGNANCY SYMPTOMS   • Erythromycin Rash   • Penicillins Rash   • Toradol [Ketorolac Tromethamine] Rash       Current Outpatient Prescriptions:   •  albuterol (PROVENTIL HFA;VENTOLIN HFA) 108 (90 BASE) MCG/ACT inhaler, Inhale 2 puffs every 4 (four) hours as needed for wheezing., Disp: , Rfl:   •  atorvastatin (LIPITOR) 20 MG tablet, Take 40 mg by mouth Every Morning., Disp: , Rfl:   •  buPROPion XL (WELLBUTRIN XL) 300 MG 24 hr tablet, TK 1 T PO  QAM, Disp: , Rfl: 1  •  busPIRone (BUSPAR) 10 MG tablet, TK 1 T PO  TID, Disp: , Rfl: 0  •  clozapine (CLOZARIL) 100 MG tablet, Take 100 mg by mouth every evening., Disp: , Rfl:   •  folic acid (FOLVITE) 1 MG tablet, Take 1 tablet by mouth Daily., Disp: 30 tablet, Rfl: 11  •  levothyroxine (SYNTHROID, LEVOTHROID) 200 MCG tablet, Take 1 tablet by mouth Every Morning., Disp: 30 tablet, Rfl: 11  •  levothyroxine (SYNTHROID, LEVOTHROID) 25 MCG tablet, TAKE 1 TABLET BY MOUTH EVERY DAY, Disp: 90 tablet, Rfl: 3  •  montelukast (SINGULAIR) 10 MG tablet, TK 1 T PO  QHS, Disp: ,  "Rfl: 5  •  ondansetron (ZOFRAN) 8 MG tablet, Take  by mouth every 8 (eight) hours as needed for nausea or vomiting., Disp: , Rfl:   •  prazosin (MINIPRESS) 2 MG capsule, TK TWO CAPSULES PO EVERY NIGHT AT BEDTIME, Disp: , Rfl: 0  •  topiramate (TOPAMAX) 100 MG tablet, TK 1 TABLET PO BID, Disp: , Rfl: 1  •  TREXIMET  MG per tablet, , Disp: , Rfl: 3  •  vitamin D (ERGOCALCIFEROL) 49874 units capsule capsule, Take 1 capsule by mouth 1 (One) Time Per Week., Disp: 30 capsule, Rfl: 11        Review of Systems   Constitutional: Positive for fatigue. Negative for appetite change and fever.   Eyes: Negative for visual disturbance.   Respiratory: Positive for shortness of breath.    Cardiovascular: Negative for palpitations and leg swelling.   Gastrointestinal: Negative for abdominal pain and vomiting.   Endocrine: Negative for polydipsia and polyuria.   Musculoskeletal: Negative for joint swelling and neck pain.   Skin: Negative for rash.   Neurological: Negative for weakness and numbness.   Psychiatric/Behavioral: Negative for behavioral problems.       Objective       Vitals:    05/14/18 1234   BP: 124/67   Pulse: 82   SpO2: 96%   Weight: 117 kg (259 lb)   Height: 170.2 cm (67\")     Body mass index is 40.57 kg/m².      Physical Exam   Constitutional: She is oriented to person, place, and time. She appears well-nourished.   HENT:   Head: Normocephalic and atraumatic.   Eyes: Conjunctivae and EOM are normal. No scleral icterus.   Neck: Normal range of motion. Neck supple.   Empty thyroid bed   Cardiovascular: Normal rate and normal heart sounds.  Exam reveals no friction rub.    No murmur heard.  Pulmonary/Chest: Effort normal and breath sounds normal. No stridor. She has no wheezes. She has no rales.   Abdominal: Soft. Bowel sounds are normal. She exhibits no distension. There is no tenderness.   Central obesity   Musculoskeletal: She exhibits no edema or tenderness.   Lymphadenopathy:     She has no cervical " adenopathy.   Neurological: She is alert and oriented to person, place, and time.   Skin: Skin is warm and dry. She is not diaphoretic.   Psychiatric: She has a normal mood and affect.   Vitals reviewed.    Results Review:     I reviewed the patient's new clinical results.    Medical records reviewed  Summary: done      Lab on 05/01/2018   Component Date Value Ref Range Status   • Free T4 05/02/2018 1.58  0.93 - 1.70 ng/dL Final   • TSH 05/02/2018 0.342  0.270 - 4.200 mIU/mL Final   • Glucose 05/02/2018 89  65 - 99 mg/dL Final   • BUN 05/02/2018 9  6 - 20 mg/dL Final   • Creatinine 05/02/2018 1.06* 0.57 - 1.00 mg/dL Final   • eGFR Non African Am 05/02/2018 55* >60 mL/min/1.73 Final   • eGFR African Am 05/02/2018 67  >60 mL/min/1.73 Final   • BUN/Creatinine Ratio 05/02/2018 8.5  7.0 - 25.0 Final   • Sodium 05/02/2018 142  136 - 145 mmol/L Final   • Potassium 05/02/2018 4.5  3.5 - 5.2 mmol/L Final   • Chloride 05/02/2018 105  98 - 107 mmol/L Final   • Total CO2 05/02/2018 24.2  22.0 - 29.0 mmol/L Final   • Calcium 05/02/2018 8.8  8.6 - 10.5 mg/dL Final   • Vitamin B-12 05/02/2018 379  211 - 946 pg/mL Final   • Folate 05/02/2018 4.63* 4.78 - 24.20 ng/mL Final   • Total Cholesterol 05/02/2018 165  0 - 200 mg/dL Final   • Triglycerides 05/02/2018 230* 0 - 150 mg/dL Final   • HDL Cholesterol 05/02/2018 47  40 - 60 mg/dL Final   • VLDL Cholesterol 05/02/2018 46* 5 - 40 mg/dL Final   • LDL Cholesterol  05/02/2018 72  0 - 100 mg/dL Final   • Phosphorus 05/02/2018 3.5  2.5 - 4.5 mg/dL Final   • PTH, Intact 05/02/2018 30  15 - 65 pg/mL Final   • PTH, Intact 05/02/2018 Comment   Final    Comment: Interpretation                 Intact PTH    Calcium                                  (pg/mL)      (mg/dL)  Normal                          15 - 65     8.6 - 10.2  Primary Hyperparathyroidism         >65          >10.2  Secondary Hyperparathyroidism       >65          <10.2  Non-Parathyroid Hypercalcemia       <65           ">10.2  Hypoparathyroidism                  <15          < 8.6  Non-Parathyroid Hypocalcemia    15 - 65          < 8.6     • Interpretation 05/02/2018 Note   Final     No results found for: HGBA1C  Lab Results   Component Value Date    CREATININE 1.06 (H) 05/01/2018     Imaging Results (most recent)     None                Assessment and Plan:    Urvashi was seen today for thyroid problem.    Diagnoses and all orders for this visit:    Thyroid cancer  -     TSH; Future  -     T4, Free; Future  -     Hemoglobin A1c; Future    Postsurgical hypothyroidism  -     TSH; Future  -     T4, Free; Future  -     Hemoglobin A1c; Future    Primary hyperparathyroidism  -     TSH; Future  -     T4, Free; Future  -     Hemoglobin A1c; Future    Pre-diabetes  -     Hemoglobin A1c; Future    Pre-diabetes   -     Hemoglobin A1c; Future    Other orders  -     folic acid (FOLVITE) 1 MG tablet; Take 1 tablet by mouth Daily.        Papillary thyroid cancer- stage III  Status post radioactive iodine therapy  Whole-body scans have been within normal limits with no residual disease recurrence.  Patient is getting repeat thyroid ultrasound's under the care of Dr. Ashby.  She is scheduled to get another thyroid ultrasound in October 2018.    Postsurgical hypothyroidism  Goal TSH-0.5-1 and the current TSH is at this range.  Continue levothyroxine 225 µg oral daily.    Prediabetes  Counseled the patient on diet restrictions and exercise.    Hyperparathyroidism status post parathyroidectomy  Calcium levels are within normal limits    Hyperlipidemia  Continue Lipitor 20 mg oral daily.    Folate deficiency  Will start the patient on folic acid supplementation.    Reviewed Lab results with the patient.           Bakari Mora MD  05/14/18    EMR Dragon / transcription disclaimer:     \"Dictated utilizing Dragon dictation\".  "

## 2018-05-31 ENCOUNTER — TRANSCRIBE ORDERS (OUTPATIENT)
Dept: ADMINISTRATIVE | Facility: HOSPITAL | Age: 53
End: 2018-05-31

## 2018-05-31 DIAGNOSIS — R59.9 ADENOPATHY: Primary | ICD-10-CM

## 2018-05-31 DIAGNOSIS — J98.6 PARALYZED HEMIDIAPHRAGM: ICD-10-CM

## 2018-07-23 ENCOUNTER — HOSPITAL ENCOUNTER (OUTPATIENT)
Dept: CT IMAGING | Facility: HOSPITAL | Age: 53
Discharge: HOME | End: 2018-07-23
Attending: INTERNAL MEDICINE

## 2018-07-23 ENCOUNTER — HOSPITAL ENCOUNTER (OUTPATIENT)
Dept: GENERAL RADIOLOGY | Facility: HOSPITAL | Age: 53
Discharge: HOME | End: 2018-07-23
Attending: INTERNAL MEDICINE | Admitting: INTERNAL MEDICINE

## 2018-07-23 DIAGNOSIS — J98.6 PARALYZED HEMIDIAPHRAGM: ICD-10-CM

## 2018-07-23 DIAGNOSIS — R59.9 ADENOPATHY: ICD-10-CM

## 2018-07-23 LAB — CREAT BLDA-MCNC: 1.1 MG/DL (ref 0.6–1.3)

## 2018-07-23 PROCEDURE — 82565 ASSAY OF CREATININE: CPT

## 2018-07-23 PROCEDURE — 25010000002 IOPAMIDOL 61 % SOLUTION: Performed by: INTERNAL MEDICINE

## 2018-07-23 PROCEDURE — 70491 CT SOFT TISSUE NECK W/DYE: CPT

## 2018-07-23 PROCEDURE — 76000 FLUOROSCOPY <1 HR PHYS/QHP: CPT

## 2018-07-23 PROCEDURE — 71260 CT THORAX DX C+: CPT

## 2018-07-23 RX ADMIN — IOPAMIDOL 75 ML: 612 INJECTION, SOLUTION INTRAVENOUS at 10:43

## 2018-08-12 ENCOUNTER — RESULTS ENCOUNTER (OUTPATIENT)
Dept: ENDOCRINOLOGY | Age: 53
End: 2018-08-12

## 2018-08-12 DIAGNOSIS — E21.0 PRIMARY HYPERPARATHYROIDISM (HCC): ICD-10-CM

## 2018-08-12 DIAGNOSIS — C73 THYROID CANCER (HCC): ICD-10-CM

## 2018-08-12 DIAGNOSIS — E89.0 POSTSURGICAL HYPOTHYROIDISM: ICD-10-CM

## 2018-08-12 DIAGNOSIS — R73.03 PRE-DIABETES: ICD-10-CM

## 2018-09-04 ENCOUNTER — TELEPHONE (OUTPATIENT)
Dept: ENDOCRINOLOGY | Age: 53
End: 2018-09-04

## 2018-09-04 ENCOUNTER — LAB (OUTPATIENT)
Dept: ENDOCRINOLOGY | Age: 53
End: 2018-09-04

## 2018-09-04 ENCOUNTER — RESULTS ENCOUNTER (OUTPATIENT)
Dept: ENDOCRINOLOGY | Age: 53
End: 2018-09-04

## 2018-09-04 DIAGNOSIS — R73.03 PRE-DIABETES: ICD-10-CM

## 2018-09-04 DIAGNOSIS — R94.6 NONSPECIFIC ABNORMAL RESULTS OF THYROID FUNCTION STUDY: ICD-10-CM

## 2018-09-04 DIAGNOSIS — R94.6 NONSPECIFIC ABNORMAL RESULTS OF THYROID FUNCTION STUDY: Primary | ICD-10-CM

## 2018-09-04 DIAGNOSIS — C73 THYROID CANCER (HCC): ICD-10-CM

## 2018-09-04 DIAGNOSIS — E89.0 POSTSURGICAL HYPOTHYROIDISM: ICD-10-CM

## 2018-09-04 NOTE — TELEPHONE ENCOUNTER
SPOKE WITH  LALO ANGELES . PATIENT IS COMING IN FOR LABS TODAY MS ANGELES WANTED TO KNOW IF WE COULD ADD A BMP FOR PATIENT SINCE SHE WAS COMING IN FOR LABS SHE WAS CONCERN ABOUT HER SODIUM AND CREATINE THEY WERE ELEVATED. PLACED ORDER FOR BMP WILL SEND RESULTS  TO MAUREEN MAY        ----- Message from Irene Armenta sent at 9/4/2018  9:16 AM EDT -----  Contact: [employer] IS REQUESTING THAT BMP BE ADDED TO TODAY;S LABS,    Sodium 150  Creatinine 1.16  ON 8/31/2018    PLEASE CALL MAUREEN ANGELES -[phone], TO LET HER KNOW IF THIS CAN BE DONE OR IF YOU PREFER SHE RUN THESE LABS.

## 2018-09-05 LAB
BUN SERPL-MCNC: 11 MG/DL (ref 6–20)
BUN/CREAT SERPL: 10.1 (ref 7–25)
CALCIUM SERPL-MCNC: 8.8 MG/DL (ref 8.6–10.5)
CHLORIDE SERPL-SCNC: 107 MMOL/L (ref 98–107)
CO2 SERPL-SCNC: 27.3 MMOL/L (ref 22–29)
CREAT SERPL-MCNC: 1.09 MG/DL (ref 0.57–1)
GLUCOSE SERPL-MCNC: 90 MG/DL (ref 65–99)
HBA1C MFR BLD: 6.1 % (ref 4.8–5.6)
POTASSIUM SERPL-SCNC: 4.1 MMOL/L (ref 3.5–5.2)
SODIUM SERPL-SCNC: 143 MMOL/L (ref 136–145)
T4 FREE SERPL-MCNC: 1.36 NG/DL (ref 0.93–1.7)
TSH SERPL DL<=0.005 MIU/L-ACNC: 0.44 MIU/ML (ref 0.27–4.2)

## 2018-09-17 ENCOUNTER — OFFICE VISIT (OUTPATIENT)
Dept: ENDOCRINOLOGY | Age: 53
End: 2018-09-17

## 2018-09-17 VITALS
DIASTOLIC BLOOD PRESSURE: 70 MMHG | OXYGEN SATURATION: 96 % | BODY MASS INDEX: 41.28 KG/M2 | HEART RATE: 95 BPM | HEIGHT: 67 IN | WEIGHT: 263 LBS | SYSTOLIC BLOOD PRESSURE: 122 MMHG

## 2018-09-17 DIAGNOSIS — R73.03 PRE-DIABETES: ICD-10-CM

## 2018-09-17 DIAGNOSIS — C73 THYROID CANCER (HCC): Primary | ICD-10-CM

## 2018-09-17 DIAGNOSIS — E89.0 POSTSURGICAL HYPOTHYROIDISM: ICD-10-CM

## 2018-09-17 PROCEDURE — 99214 OFFICE O/P EST MOD 30 MIN: CPT | Performed by: INTERNAL MEDICINE

## 2018-09-17 RX ORDER — FOLIC ACID 1 MG/1
1 TABLET ORAL DAILY
Qty: 30 TABLET | Refills: 11 | Status: SHIPPED | OUTPATIENT
Start: 2018-09-17 | End: 2020-03-05

## 2018-09-17 NOTE — PROGRESS NOTES
53 y.o.    Patient Care Team:  Rosio Keith APRN as PCP - General (Family Medicine)  Rosio Keith APRN as PCP - Claims Attributed    Chief Complaint:    4 MONTH FOLLOW UP/ THYROID CANCER  Subjective     HPI     Urvashi Ornelas,52 y.o. white female is here as a new patient for the management of metastatic papillary thyroid cancer status post total thyroidectomy and radioactive iodine ablation.  Consulted by Dr. Ashby.     Most of the information is obtained from prior medical records and also from the patient. According to the patient she was diagnosed with hyperparathyroidism about 9 years ago due to the diagnosis of elevated calcium levels on her routine blood work up.  She underwent right inferior parathyroidectomy.  Work up during that time also revealed a left thyroid lobe she subsequently underwent thyroid biopsy which revealed papillary thyroid cancer.  On 2/5/2016 patient underwent total thyroidectomy with the resection of the paratracheal lymph nodes and parathyroid exploration.  The surgical pathology was consistent with multifocal papillary carcinoma follicular variant with infiltration.  The largest nodule on the left measured 13 x 8 x 9 mm.  Carcinoma was present at the peripheral margin.  There was no evidence of angiolymphatic invasion.  3 lymph nodes were identified which had metastasis less than one millimeters in size.  Along with right superior parathyroid adenoma.  Postoperative ultrasound showed residual level III lymph nodes in the left neck measuring 2.2 x 0.8 x 1.3 cm in size.  Ultrasound-guided biopsy showed pathology consistent with papillary thyroid cancer. Subsequently patient underwent left neck dissection in April 2016 by Dr. Ashby.  Surgical pathology was consistent with metastatic papillary carcinoma involving 6 out of the 19 lymph nodes.  There was no evidence of extracapsular extension.  Patient was being followed by Dr. Carney from radiation oncology for radioactive iodine  ablation and whole-body scan.     In June 2016 patient received Thyrogen stimulated 125 mCi of I 131.  She had whole-body scan the week after and then another whole-body scan on January 27, 2017.      WBS -   06/15/2016 - Residual activity of the thyroid resection bed represents residual functional thyroid tissue or malignancy   1/27/17 -    1. Unremarkable whole-body thyroid scintigraphy. No evidence of residual or recurrent disease.    2. Activity on both sides of the neck base likely representing contamination.  Labs at the time of the scans - Tg levels - 0.8 and less than 0.1     Last thyroid ultrasound from October 2017 performed at [facility] was within normal limits with no concerning features.  I do not have the physical report and we tried to get the report from [facility] and it was not sent to us.  She did get an neck CT scan - 07/2018 - no concerning lymph node noted in her neck.   She reports that she is scheduled for another Thyroid US through  in the next few months.     Today in clinic she is on levothyroxine 225 mcg oral daily. Compliant with her medication.   She c/o feeling tired, her therapist leaving, and she is under some depression.   Weight has been stable. Normal BM, sleep is disturbed at times and that is due to the CPAP adjustments.   She does have sleep apnea - she is on CPAP machine and she did figure out the adjustments. No c/o cold intolerance.   No dry skin and some hair loss. She does report hot flashes.   No tremors, racing of heart or eye symptoms.  No shortness of breath, difficulty in swallowing or change in voice.     Hyperparathyroidism-patient had right superior and inferior parathyroidectomy.  Currently not on any calcium supplementation.       Reviewed primary care physician's/consulting physician documentation and lab results :     Interval History      The following portions of the patient's history were reviewed and updated as appropriate: allergies,  current medications, past family history, past medical history, past social history, past surgical history and problem list.    Past Medical History:   Diagnosis Date   • Anxiety    • Arthritis    • Asthma    • Cancer (CMS/HCC)     THYROID   • Depression    • Disease of thyroid gland     CANCER   • H/O degenerative disc disease    • History of hallucinations    • Hypertension    • Joint pain    • Lymph node cancer (CMS/HCC)    • Migraine    • Postmenopausal    • Sleep apnea      No family history on file.  Social History     Social History   • Marital status:      Spouse name: N/A   • Number of children: N/A   • Years of education: N/A     Occupational History   • Not on file.     Social History Main Topics   • Smoking status: Former Smoker   • Smokeless tobacco: Never Used   • Alcohol use No   • Drug use: No   • Sexual activity: Not on file     Other Topics Concern   • Not on file     Social History Narrative   • No narrative on file     Allergies   Allergen Reactions   • Depakote Er [Divalproex Sodium Er] Other (See Comments)     HAIR FALLS OUT   • Divalproex Sodium Unknown (See Comments)   • Lamictal [Lamotrigine] Other (See Comments) and Swelling     TONGUE SWELLS AND PEELS   • Risperidone Unknown (See Comments)   • Risperidone And Related Other (See Comments)     PREGNANCY SYMPTOMS   • Erythromycin Rash   • Penicillins Rash   • Toradol [Ketorolac Tromethamine] Rash       Current Outpatient Prescriptions:   •  albuterol (PROVENTIL HFA;VENTOLIN HFA) 108 (90 BASE) MCG/ACT inhaler, Inhale 2 puffs every 4 (four) hours as needed for wheezing., Disp: , Rfl:   •  atorvastatin (LIPITOR) 20 MG tablet, Take 40 mg by mouth Every Morning., Disp: , Rfl:   •  buPROPion XL (WELLBUTRIN XL) 300 MG 24 hr tablet, TK 1 T PO  QAM, Disp: , Rfl: 1  •  busPIRone (BUSPAR) 10 MG tablet, TK 1 T PO  TID, Disp: , Rfl: 0  •  clozapine (CLOZARIL) 100 MG tablet, Take 100 mg by mouth every evening., Disp: , Rfl:   •  levothyroxine  "(SYNTHROID, LEVOTHROID) 200 MCG tablet, Take 1 tablet by mouth Every Morning., Disp: 30 tablet, Rfl: 11  •  levothyroxine (SYNTHROID, LEVOTHROID) 25 MCG tablet, TAKE 1 TABLET BY MOUTH EVERY DAY, Disp: 90 tablet, Rfl: 3  •  montelukast (SINGULAIR) 10 MG tablet, TK 1 T PO  QHS, Disp: , Rfl: 5  •  ondansetron (ZOFRAN) 8 MG tablet, Take  by mouth every 8 (eight) hours as needed for nausea or vomiting., Disp: , Rfl:   •  prazosin (MINIPRESS) 2 MG capsule, TK TWO CAPSULES PO EVERY NIGHT AT BEDTIME, Disp: , Rfl: 0  •  topiramate (TOPAMAX) 100 MG tablet, TK 1 TABLET PO BID, Disp: , Rfl: 1  •  TREXIMET  MG per tablet, , Disp: , Rfl: 3  •  vitamin D (ERGOCALCIFEROL) 36494 units capsule capsule, Take 1 capsule by mouth 1 (One) Time Per Week., Disp: 30 capsule, Rfl: 11  •  folic acid (FOLVITE) 1 MG tablet, Take 1 tablet by mouth Daily., Disp: 30 tablet, Rfl: 11        Review of Systems   Constitutional: Positive for appetite change (decrease) and fatigue. Negative for fever.   HENT: Negative for trouble swallowing.    Eyes: Negative for visual disturbance.   Respiratory: Negative for shortness of breath.    Cardiovascular: Negative for palpitations and leg swelling.   Gastrointestinal: Negative for abdominal pain and vomiting.   Endocrine: Negative for polydipsia and polyuria.   Musculoskeletal: Negative for joint swelling and neck pain.   Skin: Negative for rash.   Neurological: Negative for weakness and numbness.   Psychiatric/Behavioral: Negative for behavioral problems.       Objective       Vitals:    09/17/18 1243   BP: 122/70   Pulse: 95   SpO2: 96%   Weight: 119 kg (263 lb)   Height: 170.2 cm (67\")     Body mass index is 41.19 kg/m².      Physical Exam   Constitutional: She is oriented to person, place, and time. She appears well-nourished.   HENT:   Head: Normocephalic and atraumatic.   Eyes: Conjunctivae and EOM are normal. No scleral icterus.   Neck: Normal range of motion. Neck supple.   Empty thyroid bed "   Cardiovascular: Normal rate and normal heart sounds.  Exam reveals no friction rub.    No murmur heard.  Pulmonary/Chest: Effort normal and breath sounds normal. No stridor. She has no wheezes. She has no rales.   Abdominal: Soft. Bowel sounds are normal. She exhibits no distension. There is no tenderness.   Musculoskeletal: She exhibits no edema or tenderness.   Lymphadenopathy:     She has no cervical adenopathy.   Neurological: She is alert and oriented to person, place, and time.   Skin: Skin is warm and dry. She is not diaphoretic.   Psychiatric: She has a normal mood and affect.   Vitals reviewed.    Results Review:     I reviewed the patient's new clinical results and mentioned them above in HPI and in plan as well.    Medical records reviewed  Summary: done      Results Encounter on 08/12/2018   Component Date Value Ref Range Status   • TSH 09/04/2018 0.442  0.270 - 4.200 mIU/mL Final   • Free T4 09/04/2018 1.36  0.93 - 1.70 ng/dL Final   • Hemoglobin A1C 09/04/2018 6.10* 4.80 - 5.60 % Final    Comment: Hemoglobin A1C Ranges:  Increased Risk for Diabetes  5.7% to 6.4%  Diabetes                     >= 6.5%  Diabetic Goal                < 7.0%     • Glucose 09/04/2018 90  65 - 99 mg/dL Final   • BUN 09/04/2018 11  6 - 20 mg/dL Final   • Creatinine 09/04/2018 1.09* 0.57 - 1.00 mg/dL Final   • eGFR Non African Am 09/04/2018 53* >60 mL/min/1.73 Final   • eGFR African Am 09/04/2018 64  >60 mL/min/1.73 Final   • BUN/Creatinine Ratio 09/04/2018 10.1  7.0 - 25.0 Final   • Sodium 09/04/2018 143  136 - 145 mmol/L Final   • Potassium 09/04/2018 4.1  3.5 - 5.2 mmol/L Final   • Chloride 09/04/2018 107  98 - 107 mmol/L Final   • Total CO2 09/04/2018 27.3  22.0 - 29.0 mmol/L Final   • Calcium 09/04/2018 8.8  8.6 - 10.5 mg/dL Final     Lab Results   Component Value Date    HGBA1C 6.10 (H) 09/04/2018     Lab Results   Component Value Date    CREATININE 1.09 (H) 09/04/2018     Imaging Results (most recent)     None     "            Assessment and Plan:    Urvashi was seen today for thyroid problem.    Diagnoses and all orders for this visit:    Thyroid cancer (CMS/HCC)  -     TSH; Future  -     T4, Free; Future  -     Hemoglobin A1c; Future  -     Basic Metabolic Panel; Future  -     Vitamin B12 & Folate; Future  -     Lipid Panel; Future    Postsurgical hypothyroidism  -     TSH; Future  -     T4, Free; Future  -     Hemoglobin A1c; Future  -     Basic Metabolic Panel; Future  -     Vitamin B12 & Folate; Future  -     Lipid Panel; Future    Pre-diabetes  -     TSH; Future  -     T4, Free; Future  -     Hemoglobin A1c; Future  -     Basic Metabolic Panel; Future  -     Vitamin B12 & Folate; Future  -     Lipid Panel; Future    Other orders  -     folic acid (FOLVITE) 1 MG tablet; Take 1 tablet by mouth Daily.      multifocal papillary carcinoma follicular variant with infiltration.   Status post radioactive iodine radiation, whole-body scan showed no residual thyroid activity.  Repeat thyroid ultrasound from October 2017 showed no residual deficits.  Patient reports that Dr. Ashby is planning to schedule another thyroid ultrasound in the next few months.    Postsurgical hypothyroidism - active issue  Thyroid levels are suppressed  Since patient is within the 5 year window of thyroid cancer recurrence Will continue to suppress her TSH levels.  Continue levothyroxine 225 µg oral daily.    Pre-diabetes  Counseled the patient on diet and exercise.    Reviewed Lab results with the patient.             Bakari Mora MD  09/17/18    EMR Dragon / transcription disclaimer:     \"Dictated utilizing Dragon dictation\".  "

## 2018-12-16 ENCOUNTER — RESULTS ENCOUNTER (OUTPATIENT)
Dept: ENDOCRINOLOGY | Age: 53
End: 2018-12-16

## 2018-12-16 DIAGNOSIS — R73.03 PRE-DIABETES: ICD-10-CM

## 2018-12-16 DIAGNOSIS — E89.0 POSTSURGICAL HYPOTHYROIDISM: ICD-10-CM

## 2018-12-16 DIAGNOSIS — C73 THYROID CANCER (HCC): ICD-10-CM

## 2019-01-10 RX ORDER — LEVOTHYROXINE SODIUM 0.2 MG/1
200 TABLET ORAL EVERY MORNING
Qty: 90 TABLET | Refills: 3 | Status: SHIPPED | OUTPATIENT
Start: 2019-01-10 | End: 2019-07-29

## 2019-02-05 LAB
BUN SERPL-MCNC: 11 MG/DL (ref 6–20)
BUN/CREAT SERPL: 10.5 (ref 7–25)
CALCIUM SERPL-MCNC: 8.8 MG/DL (ref 8.6–10.5)
CHLORIDE SERPL-SCNC: 109 MMOL/L (ref 98–107)
CHOLEST SERPL-MCNC: 156 MG/DL (ref 0–200)
CO2 SERPL-SCNC: 22.7 MMOL/L (ref 22–29)
CREAT SERPL-MCNC: 1.05 MG/DL (ref 0.57–1)
FOLATE SERPL-MCNC: 19.09 NG/ML (ref 4.78–24.2)
GLUCOSE SERPL-MCNC: 96 MG/DL (ref 65–99)
HBA1C MFR BLD: 6.1 % (ref 4.8–5.6)
HDLC SERPL-MCNC: 42 MG/DL (ref 40–60)
INTERPRETATION: NORMAL
LDLC SERPL CALC-MCNC: 82 MG/DL (ref 0–100)
Lab: NORMAL
POTASSIUM SERPL-SCNC: 4.2 MMOL/L (ref 3.5–5.2)
SODIUM SERPL-SCNC: 141 MMOL/L (ref 136–145)
T4 FREE SERPL-MCNC: 1.56 NG/DL (ref 0.93–1.7)
TRIGL SERPL-MCNC: 162 MG/DL (ref 0–150)
TSH SERPL DL<=0.005 MIU/L-ACNC: 0.16 MIU/ML (ref 0.27–4.2)
VIT B12 SERPL-MCNC: 470 PG/ML (ref 211–946)
VLDLC SERPL CALC-MCNC: 32.4 MG/DL (ref 5–40)

## 2019-02-18 ENCOUNTER — OFFICE VISIT (OUTPATIENT)
Dept: ENDOCRINOLOGY | Age: 54
End: 2019-02-18

## 2019-02-18 VITALS
SYSTOLIC BLOOD PRESSURE: 134 MMHG | BODY MASS INDEX: 42.12 KG/M2 | HEIGHT: 67 IN | DIASTOLIC BLOOD PRESSURE: 80 MMHG | WEIGHT: 268.4 LBS | HEART RATE: 103 BPM

## 2019-02-18 DIAGNOSIS — C73 THYROID CANCER (HCC): Primary | ICD-10-CM

## 2019-02-18 DIAGNOSIS — R73.03 PRE-DIABETES: ICD-10-CM

## 2019-02-18 DIAGNOSIS — E89.0 POSTSURGICAL HYPOTHYROIDISM: ICD-10-CM

## 2019-02-18 PROCEDURE — 99214 OFFICE O/P EST MOD 30 MIN: CPT | Performed by: INTERNAL MEDICINE

## 2019-02-18 RX ORDER — HEPATITIS A VACCINE 1440 [IU]/ML
INJECTION, SUSPENSION INTRAMUSCULAR
Refills: 0 | COMMUNITY
Start: 2018-11-15 | End: 2019-07-29

## 2019-02-18 RX ORDER — ERENUMAB-AOOE 70 MG/ML
INJECTION SUBCUTANEOUS
Refills: 5 | COMMUNITY
Start: 2019-02-03 | End: 2020-07-17 | Stop reason: HOSPADM

## 2019-02-18 RX ORDER — INDOMETHACIN 25 MG/1
CAPSULE ORAL
Refills: 5 | COMMUNITY
Start: 2019-01-25 | End: 2020-07-17 | Stop reason: HOSPADM

## 2019-02-18 NOTE — PROGRESS NOTES
53 y.o.    Patient Care Team:  Rosio Keith APRN as PCP - General (Family Medicine)  Jessika Burr APRN as PCP - Claims Attributed    Chief Complaint:        4 MONTH FOLLOW UP/ THYROID CANCER  HERE TO DISCUSS LAB RESULTS.  Subjective     HPI     Urvashi Ornelas,52 y.o. white female is here as a new patient for the management of metastatic papillary thyroid cancer status post total thyroidectomy and radioactive iodine ablation.  Consulted by Dr. Ashby.     Most of the information is obtained from prior medical records and also from the patient. According to the patient she was diagnosed with hyperparathyroidism about 9 years ago due to the diagnosis of elevated calcium levels on her routine blood work up.  She underwent right inferior parathyroidectomy.  Work up during that time also revealed a left thyroid lobe she subsequently underwent thyroid biopsy which revealed papillary thyroid cancer.  On 2/5/2016 patient underwent total thyroidectomy with the resection of the paratracheal lymph nodes and parathyroid exploration.  The surgical pathology was consistent with multifocal papillary carcinoma follicular variant with infiltration.  The largest nodule on the left measured 13 x 8 x 9 mm.  Carcinoma was present at the peripheral margin.  There was no evidence of angiolymphatic invasion.  3 lymph nodes were identified which had metastasis less than one millimeters in size.  Along with right superior parathyroid adenoma.  Postoperative ultrasound showed residual level III lymph nodes in the left neck measuring 2.2 x 0.8 x 1.3 cm in size.  Ultrasound-guided biopsy showed pathology consistent with papillary thyroid cancer. Subsequently patient underwent left neck dissection in April 2016 by Dr. Ashby.  Surgical pathology was consistent with metastatic papillary carcinoma involving 6 out of the 19 lymph nodes.  There was no evidence of extracapsular extension.  Patient was being followed by Dr. Carney from radiation  oncology for radioactive iodine ablation and whole-body scan.     In June 2016 patient received Thyrogen stimulated 125 mCi of I 131.  She had whole-body scan the week after and then another whole-body scan on January 27, 2017.      WBS -   06/15/2016 - Residual activity of the thyroid resection bed represents residual functional thyroid tissue or malignancy   1/27/17 -    1. Unremarkable whole-body thyroid scintigraphy. No evidence of residual or recurrent disease.    2. Activity on both sides of the neck base likely representing contamination.  Labs at the time of the scans - Tg levels - 0.8 and less than 0.1     Last thyroid ultrasound from October 2017 performed at [facility] was within normal limits with no concerning features.  I do not have the physical report and we tried to get the report from [facility] and it was not sent to us.  She did get an neck CT scan - 07/2018 - no concerning lymph node noted in her neck.   She had repeat thyroid U/S on Oct 2018, through  and was told that her thyroid bed was empty with no concerning lymph nodes.   ( I dont have this report)    Today in clinic she is on levothyroxine 225 mcg oral daily. Compliant with her medication.     She still c/o feeing tired, weight has been stable, normal bm, sleep is better now on the CPAP but even that her energy didn't improve.   No dry skin, no hair loss. Improved hot flashes.   No tremors, racing of heart or eye symptoms.  No shortness of breath, difficulty in swallowing or change in voice.     Hyperparathyroidism-patient had right superior and inferior parathyroidectomy.  Currently not on any calcium supplementation.          Reviewed primary care physician's/consulting physician documentation and lab results :     Interval History      The following portions of the patient's history were reviewed and updated as appropriate: allergies, current medications, past family history, past medical history, past social  history, past surgical history and problem list.    Past Medical History:   Diagnosis Date   • Anxiety    • Arthritis    • Asthma    • Cancer (CMS/HCC)     THYROID   • Depression    • Disease of thyroid gland     CANCER   • H/O degenerative disc disease    • History of hallucinations    • Hypertension    • Joint pain    • Lymph node cancer (CMS/HCC)    • Migraine    • Postmenopausal    • Sleep apnea      History reviewed. No pertinent family history.  Social History     Socioeconomic History   • Marital status:      Spouse name: Not on file   • Number of children: Not on file   • Years of education: Not on file   • Highest education level: Not on file   Social Needs   • Financial resource strain: Not on file   • Food insecurity - worry: Not on file   • Food insecurity - inability: Not on file   • Transportation needs - medical: Not on file   • Transportation needs - non-medical: Not on file   Occupational History   • Not on file   Tobacco Use   • Smoking status: Former Smoker   • Smokeless tobacco: Never Used   Substance and Sexual Activity   • Alcohol use: No   • Drug use: No   • Sexual activity: Not on file   Other Topics Concern   • Not on file   Social History Narrative   • Not on file     Allergies   Allergen Reactions   • Depakote Er [Divalproex Sodium Er] Other (See Comments)     HAIR FALLS OUT   • Divalproex Sodium Unknown (See Comments)   • Lamictal [Lamotrigine] Other (See Comments) and Swelling     TONGUE SWELLS AND PEELS   • Risperidone Unknown (See Comments)   • Risperidone And Related Other (See Comments)     PREGNANCY SYMPTOMS   • Valproic Acid Unknown (See Comments)     Other reaction(s): Unknown (See Comments)     • Erythromycin Rash   • Penicillins Rash   • Toradol [Ketorolac Tromethamine] Rash       Current Outpatient Medications:   •  albuterol (PROVENTIL HFA;VENTOLIN HFA) 108 (90 BASE) MCG/ACT inhaler, Inhale 2 puffs every 4 (four) hours as needed for wheezing., Disp: , Rfl:   •   "atorvastatin (LIPITOR) 20 MG tablet, Take 40 mg by mouth Every Morning., Disp: , Rfl:   •  buPROPion XL (WELLBUTRIN XL) 300 MG 24 hr tablet, TK 1 T PO  QAM, Disp: , Rfl: 1  •  busPIRone (BUSPAR) 10 MG tablet, TK 1 T PO  TID, Disp: , Rfl: 0  •  clozapine (CLOZARIL) 100 MG tablet, Take 100 mg by mouth every evening., Disp: , Rfl:   •  folic acid (FOLVITE) 1 MG tablet, Take 1 tablet by mouth Daily., Disp: 30 tablet, Rfl: 11  •  levothyroxine (SYNTHROID, LEVOTHROID) 200 MCG tablet, TAKE 1 TABLET BY MOUTH EVERY MORNING, Disp: 90 tablet, Rfl: 3  •  levothyroxine (SYNTHROID, LEVOTHROID) 25 MCG tablet, TAKE 1 TABLET BY MOUTH EVERY DAY, Disp: 90 tablet, Rfl: 3  •  montelukast (SINGULAIR) 10 MG tablet, TK 1 T PO  QHS, Disp: , Rfl: 5  •  ondansetron (ZOFRAN) 8 MG tablet, Take  by mouth every 8 (eight) hours as needed for nausea or vomiting., Disp: , Rfl:   •  prazosin (MINIPRESS) 2 MG capsule, TK TWO CAPSULES PO EVERY NIGHT AT BEDTIME, Disp: , Rfl: 0  •  topiramate (TOPAMAX) 100 MG tablet, TK 1 TABLET PO BID, Disp: , Rfl: 1  •  TREXIMET  MG per tablet, , Disp: , Rfl: 3  •  vitamin D (ERGOCALCIFEROL) 01943 units capsule capsule, Take 1 capsule by mouth 1 (One) Time Per Week., Disp: 30 capsule, Rfl: 11  •  AIMOVIG 70 MG/ML prefilled syringe, INJECT 1 ML INTO THE SKIN Q 30 DAYS UTD, Disp: , Rfl: 5  •  HAVRIX 1440 EL U/ML vaccine, ADM 1ML IM UTD, Disp: , Rfl: 0  •  indomethacin (INDOCIN) 25 MG capsule, TK ONE C PO  TID WITH MEALS ONLY WHEN HAVING EXCISIONAL HEMICRANIA, Disp: , Rfl: 5        Review of Systems   Constitutional: Positive for fatigue. Negative for chills and fever.   Cardiovascular: Negative for chest pain and palpitations.   Gastrointestinal: Negative for abdominal pain, constipation, diarrhea, nausea and vomiting.   Endocrine: Negative for cold intolerance and heat intolerance.       Objective       Vitals:    02/18/19 1254   BP: 134/80   Pulse: 103   Weight: 122 kg (268 lb 6.4 oz)   Height: 170.2 cm (67\") "     Body mass index is 42.04 kg/m².      Physical Exam   Constitutional: She is oriented to person, place, and time. She appears well-nourished.   HENT:   Head: Normocephalic and atraumatic.   Eyes: Conjunctivae and EOM are normal. No scleral icterus.   Neck: Normal range of motion. Neck supple.   Thyroid bed empty.   Cardiovascular: Normal rate and normal heart sounds. Exam reveals no friction rub.   No murmur heard.  Pulmonary/Chest: Effort normal and breath sounds normal. No stridor. She has no wheezes. She has no rales.   Abdominal: Soft. Bowel sounds are normal. She exhibits no distension. There is no tenderness.   Central obesity.   Musculoskeletal: She exhibits no edema or tenderness.   Lymphadenopathy:     She has no cervical adenopathy.   Neurological: She is alert and oriented to person, place, and time.   Skin: Skin is warm and dry. She is not diaphoretic.   Psychiatric: She has a normal mood and affect.   Vitals reviewed.    Results Review:     I reviewed the patient's new clinical results and mentioned them above in HPI and in plan as well.    Medical records reviewed  Summary:done        Results Encounter on 12/16/2018   Component Date Value Ref Range Status   • TSH 02/04/2019 0.160* 0.270 - 4.200 mIU/mL Final   • Free T4 02/04/2019 1.56  0.93 - 1.70 ng/dL Final   • Hemoglobin A1C 02/04/2019 6.10* 4.80 - 5.60 % Final    Comment: Hemoglobin A1C Ranges:  Increased Risk for Diabetes  5.7% to 6.4%  Diabetes                     >= 6.5%  Diabetic Goal                < 7.0%     • Glucose 02/04/2019 96  65 - 99 mg/dL Final   • BUN 02/04/2019 11  6 - 20 mg/dL Final   • Creatinine 02/04/2019 1.05* 0.57 - 1.00 mg/dL Final   • eGFR Non African Am 02/04/2019 55* >60 mL/min/1.73 Final   • eGFR African Am 02/04/2019 67  >60 mL/min/1.73 Final   • BUN/Creatinine Ratio 02/04/2019 10.5  7.0 - 25.0 Final   • Sodium 02/04/2019 141  136 - 145 mmol/L Final   • Potassium 02/04/2019 4.2  3.5 - 5.2 mmol/L Final   • Chloride  02/04/2019 109* 98 - 107 mmol/L Final   • Total CO2 02/04/2019 22.7  22.0 - 29.0 mmol/L Final   • Calcium 02/04/2019 8.8  8.6 - 10.5 mg/dL Final   • Vitamin B-12 02/04/2019 470  211 - 946 pg/mL Final   • Folate 02/04/2019 19.09  4.78 - 24.20 ng/mL Final   • Total Cholesterol 02/04/2019 156  0 - 200 mg/dL Final   • Triglycerides 02/04/2019 162* 0 - 150 mg/dL Final   • HDL Cholesterol 02/04/2019 42  40 - 60 mg/dL Final   • VLDL Cholesterol 02/04/2019 32.4  5 - 40 mg/dL Final   • LDL Cholesterol  02/04/2019 82  0 - 100 mg/dL Final   • Interpretation 02/04/2019 Note   Final    Supplemental report is available.   • PDF Image 02/04/2019 Not applicable   Final     Lab Results   Component Value Date    HGBA1C 6.10 (H) 02/04/2019    HGBA1C 6.10 (H) 09/04/2018     Lab Results   Component Value Date    CREATININE 1.05 (H) 02/04/2019     Imaging Results (most recent)     None                Assessment and Plan:    Urvashi was seen today for hypothyroidism and thyroid cancer.    Diagnoses and all orders for this visit:    Thyroid cancer (CMS/HCC)  -     TSH; Future  -     T4, Free; Future  -     T4, Free; Future  -     Hemoglobin A1c; Future  -     Basic Metabolic Panel; Future  -     Lipid Panel; Future  -     TSH; Future  -     Vitamin B12 & Folate; Future    Postsurgical hypothyroidism  -     TSH; Future  -     T4, Free; Future  -     T4, Free; Future  -     Hemoglobin A1c; Future  -     Basic Metabolic Panel; Future  -     Lipid Panel; Future  -     TSH; Future  -     Vitamin B12 & Folate; Future    Pre-diabetes  -     TSH; Future  -     T4, Free; Future  -     T4, Free; Future  -     Hemoglobin A1c; Future  -     Basic Metabolic Panel; Future  -     Lipid Panel; Future  -     TSH; Future  -     Vitamin B12 & Folate; Future      Metastatic papillary thyroid cancer status post total thyroidectomy  We will proceed with thyroid ultrasound-neck ultrasound along with lymph node mapping in August 2019.  Will get the records from  "patient's ENT-Dr. Flynnor about her latest ultrasound which was performed in October.    Hypothyroidism  Will want to have her TSH levels between 0.5-1 within the 5 years of the cancer diagnosis.  We will have to adjust the dosage of the levothyroxine as her thyroid levels are slightly overactive.  Continue levothyroxine 200+12.5 mcg Monday to Friday and 225 mcg Saturday and Sunday  We will repeat the thyroid panel in 6 weeks from now.    Prediabetes  Advised the patient to follow diet restrictions and exercise.    Reviewed Lab results with the patient.             Bakari Mora MD  02/18/19    EMR Dragon / transcription disclaimer:     \"Dictated utilizing Dragon dictation\".  "

## 2019-03-01 RX ORDER — LEVOTHYROXINE SODIUM 0.2 MG/1
200 TABLET ORAL EVERY MORNING
Qty: 30 TABLET | Refills: 0 | Status: SHIPPED | OUTPATIENT
Start: 2019-03-01 | End: 2019-07-29

## 2019-03-01 RX ORDER — LEVOTHYROXINE SODIUM 0.03 MG/1
TABLET ORAL
Qty: 90 TABLET | Refills: 0 | Status: SHIPPED | OUTPATIENT
Start: 2019-03-01 | End: 2019-07-29

## 2019-03-04 ENCOUNTER — TELEPHONE (OUTPATIENT)
Dept: ENDOCRINOLOGY | Age: 54
End: 2019-03-04

## 2019-03-04 RX ORDER — LEVOTHYROXINE SODIUM 0.2 MG/1
200 TABLET ORAL EVERY MORNING
Qty: 90 TABLET | Refills: 0 | Status: SHIPPED | OUTPATIENT
Start: 2019-03-04 | End: 2020-04-23

## 2019-03-04 RX ORDER — LEVOTHYROXINE SODIUM 0.03 MG/1
TABLET ORAL
Qty: 90 TABLET | Refills: 0 | Status: SHIPPED | OUTPATIENT
Start: 2019-03-04 | End: 2020-02-03

## 2019-04-01 ENCOUNTER — RESULTS ENCOUNTER (OUTPATIENT)
Dept: ENDOCRINOLOGY | Age: 54
End: 2019-04-01

## 2019-04-01 DIAGNOSIS — R73.03 PRE-DIABETES: ICD-10-CM

## 2019-04-01 DIAGNOSIS — E89.0 POSTSURGICAL HYPOTHYROIDISM: ICD-10-CM

## 2019-04-01 DIAGNOSIS — C73 THYROID CANCER (HCC): ICD-10-CM

## 2019-04-02 LAB
T4 FREE SERPL-MCNC: 1.54 NG/DL (ref 0.93–1.7)
TSH SERPL DL<=0.005 MIU/L-ACNC: 0.45 MIU/ML (ref 0.27–4.2)

## 2019-04-04 NOTE — PROGRESS NOTES
Mail results to pt, no treatment changes at this time. Thyroid levels normalized, no changes at this time

## 2019-04-12 ENCOUNTER — TELEPHONE (OUTPATIENT)
Dept: ENDOCRINOLOGY | Age: 54
End: 2019-04-12

## 2019-04-12 NOTE — TELEPHONE ENCOUNTER
Spoke with patient about medication claification  Patient voice understanding    ----- Message from Bakari Mora MD sent at 4/12/2019 12:50 PM EDT -----  Contact: PT  Continue levothyroxine 200+12.5 mcg Monday to Friday and 225 mcg Saturday and Sunday  ----- Message -----  From: Laila Douglas MA  Sent: 4/12/2019  11:50 AM  To: Bakari Mora MD        ----- Message -----  From: Lanie Sagastume  Sent: 4/12/2019  11:22 AM  To: Laila Douglas MA    Ordered blood work on 4.1 and had labs drawn. Question: Should she keep taking 1/2 of the ncg levothyroxineon. She was taking half of that during week and the entire amount on the weekend.     She needs clarification of how she is supposed to take the medicine.  [phone]    Ok to leave voicemail

## 2019-07-10 ENCOUNTER — OFFICE VISIT (OUTPATIENT)
Dept: FAMILY MEDICINE CLINIC | Facility: CLINIC | Age: 54
End: 2019-07-10

## 2019-07-10 VITALS
SYSTOLIC BLOOD PRESSURE: 144 MMHG | WEIGHT: 271 LBS | OXYGEN SATURATION: 98 % | TEMPERATURE: 99.3 F | HEIGHT: 67 IN | BODY MASS INDEX: 42.53 KG/M2 | HEART RATE: 91 BPM | DIASTOLIC BLOOD PRESSURE: 80 MMHG

## 2019-07-10 DIAGNOSIS — M25.562 ACUTE PAIN OF LEFT KNEE: Primary | ICD-10-CM

## 2019-07-10 PROCEDURE — 99213 OFFICE O/P EST LOW 20 MIN: CPT | Performed by: NURSE PRACTITIONER

## 2019-07-10 RX ORDER — LEVOCETIRIZINE DIHYDROCHLORIDE 5 MG/1
5 TABLET, FILM COATED ORAL DAILY
COMMUNITY
End: 2019-08-19 | Stop reason: SDUPTHER

## 2019-07-10 RX ORDER — AZELASTINE HYDROCHLORIDE 0.5 MG/ML
SOLUTION/ DROPS OPHTHALMIC
Refills: 2 | COMMUNITY
Start: 2019-06-14 | End: 2019-08-19

## 2019-07-10 NOTE — PROGRESS NOTES
Subjective   Urvashi Ornelas is a 54 y.o. female.     Chief Complaint   Patient presents with   • Knee Pain     hospital follow up       Knee Pain    There was no injury mechanism. The pain is present in the left knee. The quality of the pain is described as aching. The pain is at a severity of 7/10. Pertinent negatives include no inability to bear weight. Exacerbated by: walking    Patient was evaluated at [facility] on 19. Patient requests ortho referral at this time.     The following portions of the patient's history were reviewed and updated as appropriate: allergies, current medications, past family history, past medical history, past social history, past surgical history and problem list.    Past Medical History:   Diagnosis Date   • Anxiety    • Arthritis    • Asthma    • Cancer (CMS/HCC)     THYROID   • Depression    • Disease of thyroid gland     CANCER   • H/O degenerative disc disease    • History of hallucinations    • Hypertension    • Joint pain    • Lymph node cancer (CMS/HCC)    • Migraine    • Postmenopausal    • Sleep apnea        Past Surgical History:   Procedure Laterality Date   •  SECTION      X2   • CHOLECYSTECTOMY     • HAND SURGERY Bilateral     WRISTS PLATE PLACEMENT   • HEMATOMA EVACUATION HEAD/NECK Left 2016    Procedure: HEMATOMA EVACUATION NECK;  Surgeon: Dayo Ashby MD;  [place] [facility] [place] [facility] [place]:    • HYSTERECTOMY     • KNEE SURGERY Bilateral     6YO PIGEON TOE SX   • LUNG SURGERY Left     LEFT LOWER LOBE   • NECK DISSECTION Left 2016    Procedure: LEFT MODIFIED RADICAL NECK DISSECTION;  Surgeon: Dayo Ashby MD;  [place] [facility] [place] [facility] [place]  Service:    • NEPHRECTOMY PARTIAL Left    • PARATHYROID GLAND SURGERY         • TOTAL THYROIDECTOMY      PARATHYROID 2016       History reviewed. No pertinent family history.    Social History     Socioeconomic History   • Marital status:      Spouse name: Not on file   • Number of  children: Not on file   • Years of education: Not on file   • Highest education level: Not on file   Tobacco Use   • Smoking status: Former Smoker   • Smokeless tobacco: Never Used   Substance and Sexual Activity   • Alcohol use: No   • Drug use: No       Review of Systems   Constitutional: Negative for fever.   Musculoskeletal:        Left knee pain        Objective   Vitals:    07/10/19 1510   BP: 144/80   Pulse: 91   Temp: 99.3 °F (37.4 °C)   SpO2: 98%     Body mass index is 42.44 kg/m².  Physical Exam   Constitutional: She appears well-developed and well-nourished.   Cardiovascular: Normal rate, regular rhythm and normal heart sounds.   Pulmonary/Chest: Effort normal and breath sounds normal.   Musculoskeletal: She exhibits no edema.        Left knee: She exhibits normal range of motion and no swelling. Tenderness found. Lateral joint line tenderness noted.   Psychiatric: She has a normal mood and affect.   Nursing note and vitals reviewed.        Assessment/Plan   Urvashi was seen today for knee pain.    Diagnoses and all orders for this visit:    Acute pain of left knee  -     Ambulatory Referral to Orthopedic Surgery

## 2019-07-29 ENCOUNTER — OFFICE VISIT (OUTPATIENT)
Dept: ORTHOPEDIC SURGERY | Facility: CLINIC | Age: 54
End: 2019-07-29

## 2019-07-29 VITALS — BODY MASS INDEX: 42.94 KG/M2 | WEIGHT: 273.6 LBS | HEIGHT: 67 IN | TEMPERATURE: 99.5 F

## 2019-07-29 DIAGNOSIS — M17.12 ARTHRITIS OF LEFT KNEE: ICD-10-CM

## 2019-07-29 DIAGNOSIS — M25.562 ACUTE PAIN OF LEFT KNEE: Primary | ICD-10-CM

## 2019-07-29 PROCEDURE — 99204 OFFICE O/P NEW MOD 45 MIN: CPT | Performed by: ORTHOPAEDIC SURGERY

## 2019-07-29 PROCEDURE — 73562 X-RAY EXAM OF KNEE 3: CPT | Performed by: ORTHOPAEDIC SURGERY

## 2019-07-29 RX ORDER — BUDESONIDE AND FORMOTEROL FUMARATE DIHYDRATE 160; 4.5 UG/1; UG/1
AEROSOL RESPIRATORY (INHALATION)
Refills: 5 | COMMUNITY
Start: 2019-07-14 | End: 2019-09-25 | Stop reason: CLARIF

## 2019-07-29 RX ORDER — CARIPRAZINE 3 MG/1
CAPSULE, GELATIN COATED ORAL
Refills: 2 | COMMUNITY
Start: 2019-07-24 | End: 2020-03-05

## 2019-07-29 RX ORDER — GABAPENTIN 100 MG/1
CAPSULE ORAL 3 TIMES DAILY
Refills: 2 | COMMUNITY
Start: 2019-07-17 | End: 2020-07-17 | Stop reason: HOSPADM

## 2019-07-29 NOTE — PROGRESS NOTES
"Patient Name: Urvashi Ornelas   YOB: 1965  Referring Primary Care Physician: Rosio Palmer APRN  BMI: Body mass index is 42.85 kg/m².    Chief Complaint:    Chief Complaint   Patient presents with   • Left Knee - Establish Care, Pain        HPI:     Urvashi Ornelas is a 54 y.o. female who presents today for evaluation of   Chief Complaint   Patient presents with   • Left Knee - Establish Care, Pain   .  Patient is seen today complaining of about a month history of atraumatic left knee pain.  It swelling it bothers her posteriorly.  She was seen at [facility] where apparently they did ultrasounds and told her there were \"no clots\" its intermittent and the severity can range from mild to fairly significant depending on what she is doing it radiates above and below the knee but not down the leg.  She is been taking Aleve up to 2 every 4 hours.  She is told not to take that many explained proper dosing etc. and I told her this multiple times.  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    This problem is new to this examiner.     Subjective   Medications:   Home Medications:  Current Outpatient Medications on File Prior to Visit   Medication Sig   • AIMOVIG 70 MG/ML prefilled syringe INJECT 1 ML INTO THE SKIN Q 30 DAYS UTD   • albuterol (PROVENTIL HFA;VENTOLIN HFA) 108 (90 BASE) MCG/ACT inhaler Inhale 2 puffs every 4 (four) hours as needed for wheezing.   • atorvastatin (LIPITOR) 20 MG tablet Take 40 mg by mouth Every Morning.   • azelastine (OPTIVAR) 0.05 % ophthalmic solution INT 1 GTT IN EACH EYE BID PRN   • buPROPion XL (WELLBUTRIN XL) 300 MG 24 hr tablet TK 1 T PO  QAM   • busPIRone (BUSPAR) 15 MG tablet TK 1 T PO  TID   • clozapine (CLOZARIL) 100 MG tablet Take 100 mg by mouth every evening.   • folic acid (FOLVITE) 1 MG tablet Take 1 tablet by mouth Daily.   • gabapentin (NEURONTIN) 300 MG capsule TK 1 TO 2 CS PO HS   • indomethacin (INDOCIN) 25 MG capsule TK ONE C PO "  TID WITH MEALS ONLY WHEN HAVING EXCISIONAL HEMICRANIA   • levocetirizine (XYZAL) 5 MG tablet Take 5 mg by mouth Daily.   • levothyroxine (SYNTHROID, LEVOTHROID) 200 MCG tablet TAKE 1 TABLET BY MOUTH EVERY MORNING   • levothyroxine (SYNTHROID, LEVOTHROID) 25 MCG tablet TAKE 1 TABLET BY MOUTH DAILY   • montelukast (SINGULAIR) 10 MG tablet TK 1 T PO  QHS   • ondansetron (ZOFRAN) 8 MG tablet Take  by mouth every 8 (eight) hours as needed for nausea or vomiting.   • prazosin (MINIPRESS) 2 MG capsule TK TWO CAPSULES PO EVERY NIGHT AT BEDTIME   • SYMBICORT 160-4.5 MCG/ACT inhaler INHALE 2 PUFFS PO BID   • topiramate (TOPAMAX) 200 MG tablet Take 200 mg by mouth 2 (Two) Times a Day.   • TREXIMET  MG per tablet    • vitamin D (ERGOCALCIFEROL) 37404 units capsule capsule Take 1 capsule by mouth 1 (One) Time Per Week.   • VRAYLAR 3 MG capsule TK 1 C PO QAM   • [DISCONTINUED] HAVRIX 1440 EL U/ML vaccine ADM 1ML IM UTD   • [DISCONTINUED] levothyroxine (SYNTHROID, LEVOTHROID) 200 MCG tablet TAKE 1 TABLET BY MOUTH EVERY MORNING   • [DISCONTINUED] levothyroxine (SYNTHROID, LEVOTHROID) 200 MCG tablet TAKE 1 TABLET BY MOUTH EVERY MORNING   • [DISCONTINUED] levothyroxine (SYNTHROID, LEVOTHROID) 25 MCG tablet TAKE 1 TABLET BY MOUTH DAILY   • [DISCONTINUED] topiramate (TOPAMAX) 100 MG tablet TK 1 TABLET PO BID     No current facility-administered medications on file prior to visit.      Current Medications:  Scheduled Meds:  Continuous Infusions:  No current facility-administered medications for this visit.   PRN Meds:.    I have reviewed the patient's medical history in detail and updated the computerized patient record.  Review and summarization of old records includes:    Past Medical History:   Diagnosis Date   • Anxiety    • Arthritis    • Asthma    • Cancer (CMS/HCC)     THYROID   • Depression    • Disease of thyroid gland     CANCER   • H/O degenerative disc disease    • History of hallucinations    • Hypertension    •  Joint pain    • Lymph node cancer (CMS/HCC)    • Migraine    • Postmenopausal    • Sleep apnea         Past Surgical History:   Procedure Laterality Date   •  SECTION      X2   • CHOLECYSTECTOMY     • HAND SURGERY Bilateral     WRISTS PLATE PLACEMENT   • HEMATOMA EVACUATION HEAD/NECK Left 2016    Procedure: HEMATOMA EVACUATION NECK;  Surgeon: Dayo Ashby MD;  [place] [facility] [place] [facility] [place]  Service:    • HYSTERECTOMY     • KNEE SURGERY Bilateral     6YO PIGEON TOE SX   • LUNG SURGERY Left     LEFT LOWER LOBE   • NECK DISSECTION Left 2016    Procedure: LEFT MODIFIED RADICAL NECK DISSECTION;  Surgeon: Dayo Ashby MD;  [place] [facility] [place] [facility] [place]  Service:    • NEPHRECTOMY PARTIAL Left    • PARATHYROID GLAND SURGERY         • TOTAL THYROIDECTOMY      PARATHYROID 2016        Social History     Occupational History   • Not on file   Tobacco Use   • Smoking status: Former Smoker   • Smokeless tobacco: Never Used   Substance and Sexual Activity   • Alcohol use: No   • Drug use: No   • Sexual activity: Not on file      Social History     Social History Narrative   • Not on file      History reviewed. No pertinent family history.    ROS: 14 point review of systems was performed and all other systems were reviewed and are negative except for documented findings in HPI and today's encounter.     Allergies:   Allergies   Allergen Reactions   • Depakote Er [Divalproex Sodium Er] Other (See Comments)     HAIR FALLS OUT   • Divalproex Sodium Unknown (See Comments)   • Lamictal [Lamotrigine] Other (See Comments) and Swelling     TONGUE SWELLS AND PEELS   • Risperidone Unknown (See Comments)   • Risperidone And Related Other (See Comments)     PREGNANCY SYMPTOMS   • Valproic Acid Unknown (See Comments)     Other reaction(s): Unknown (See Comments)     • Erythromycin Rash   • Penicillins Rash   • Toradol [Ketorolac Tromethamine] Rash     Constitutional:  Denies fever, shaking or chills   Eyes:  Denies  "change in visual acuity   HENT:  Denies nasal congestion or sore throat   Respiratory:  Denies cough or shortness of breath   Cardiovascular:  Denies chest pain or severe LE edema   GI:  Denies abdominal pain, nausea, vomiting, bloody stools or diarrhea   Musculoskeletal:  Numbness, tingling, pain, or loss of motor function only as noted above in history of present illness.  : Denies painful urination or hematuria  Integument:  Denies rash, lesion or ulceration   Neurologic:  Denies headache or focal weakness  Endocrine:  Denies lymphadenopathy  Psych:  Denies confusion or change in mental status   Hem:  Denies active bleeding    OBJECTIVE:  Physical Exam:   Temp 99.5 °F (37.5 °C) (Temporal)   Ht 170.2 cm (67\")   Wt 124 kg (273 lb 9.6 oz)   BMI 42.85 kg/m²     General Appearance:    Alert, cooperative, in no acute distress                  Eyes: conjunctiva clear  ENT: external ears and nose atraumatic  CV: no peripheral edema  Resp: normal respiratory effort  Skin: no rashes or wounds; normal turgor  Psych: mood and affect appropriate  Lymph: no nodes appreciated  Neuro: gross sensation intact  Vascular:  Palpable peripheral pulse in noted extremity  Musculoskeletal Extremities: Examination today shows some effusion in the knee a Baker's cyst suprapatellar effusion as well joint line tenderness medially and laterally and diffusely posteriorly and she has a slight start up limp when she walks.  Richa's is negative although she has crepitation in the knee    Radiology:   AP lateral 40 degree PA left knee taken the office today for pain without comparison views available show arthritis of the knee    Assessment:     ICD-10-CM ICD-9-CM   1. Acute pain of left knee M25.562 719.46   2. Arthritis of left knee M17.12 716.96   3. BMI 40.0-44.9, adult (CMS/[facility]) Z68.41 V85.41        Procedures       Plan: Biomechanics of pertinent body area discussed.  Risks, benefits, alternatives, comparisons, and complications of " accepted medicines, injections, recommendations, surgical procedures, and therapies explained and education provided in laymen's terms. Natural history and expected course of this patient's diagnosis discussed along with evaluation of therapies. Questions answered. When appropriate I also discussed proper use of cane, walker, trekking poles.   BMI:  The concept of BMI body mass index and its importance and implications discussed.  BMI suggested to be < 40 or as low as possible. Lifestyle measures for weight loss and how this affects orthopedic condition.  EXERCISES:  Advice on benefits of, and types of regular/moderate exercise including biomechanical forces involved as it pertains to this complaint.  MEDICATIONS:  Prescription, OTC and Monitoring of Medications per orders to address ortho complaints; Evaluation and discussion of safety, precautions, side effects, and warnings given especially of long term NSAID or steroid therapy.    RICE: Rest, ice, compression, and elevation therapy, Cryotherapy/brachy therapy, and or OTC linaments as indicated with instructions.   Cortisone Injection for DIAGNOSTIC and THERAPUTIC purposes.  PT referral offered and declined.      7/29/2019    Much of this encounter note is an electronic transcription/translation of spoken language to printed text. The electronic translation of spoken language may permit erroneous, or at times, nonsensical words or phrases to be inadvertently transcribed; Although I have reviewed the note for such errors, some may still exist

## 2019-08-05 ENCOUNTER — LAB (OUTPATIENT)
Dept: ENDOCRINOLOGY | Age: 54
End: 2019-08-05

## 2019-08-05 DIAGNOSIS — R73.03 PRE-DIABETES: ICD-10-CM

## 2019-08-05 DIAGNOSIS — E89.0 POSTSURGICAL HYPOTHYROIDISM: ICD-10-CM

## 2019-08-05 DIAGNOSIS — C73 THYROID CANCER (HCC): ICD-10-CM

## 2019-08-05 LAB
BUN SERPL-MCNC: 13 MG/DL (ref 6–20)
BUN/CREAT SERPL: 14.3 (ref 7–25)
CALCIUM SERPL-MCNC: 8.6 MG/DL (ref 8.6–10.5)
CHLORIDE SERPL-SCNC: 107 MMOL/L (ref 98–107)
CHOLEST SERPL-MCNC: 142 MG/DL (ref 0–200)
CO2 SERPL-SCNC: 22.1 MMOL/L (ref 22–29)
CREAT SERPL-MCNC: 0.91 MG/DL (ref 0.57–1)
FOLATE SERPL-MCNC: >20 NG/ML (ref 4.78–24.2)
GLUCOSE SERPL-MCNC: 93 MG/DL (ref 65–99)
HBA1C MFR BLD: 6 % (ref 4.8–5.6)
HDLC SERPL-MCNC: 41 MG/DL (ref 40–60)
INTERPRETATION: NORMAL
LDLC SERPL CALC-MCNC: 66 MG/DL (ref 0–100)
Lab: NORMAL
POTASSIUM SERPL-SCNC: 4.1 MMOL/L (ref 3.5–5.2)
SODIUM SERPL-SCNC: 140 MMOL/L (ref 136–145)
T4 FREE SERPL-MCNC: 1.64 NG/DL (ref 0.93–1.7)
TRIGL SERPL-MCNC: 177 MG/DL (ref 0–150)
TSH SERPL DL<=0.005 MIU/L-ACNC: 0.71 MIU/ML (ref 0.27–4.2)
VIT B12 SERPL-MCNC: 713 PG/ML (ref 211–946)
VLDLC SERPL CALC-MCNC: 35.4 MG/DL

## 2019-08-09 ENCOUNTER — LAB (OUTPATIENT)
Dept: LAB | Facility: HOSPITAL | Age: 54
End: 2019-08-09

## 2019-08-09 ENCOUNTER — TRANSCRIBE ORDERS (OUTPATIENT)
Dept: ADMINISTRATIVE | Facility: HOSPITAL | Age: 54
End: 2019-08-09

## 2019-08-09 DIAGNOSIS — Z08 ENCOUNTER FOR FOLLOW-UP SURVEILLANCE OF THYROID CANCER: Primary | ICD-10-CM

## 2019-08-09 DIAGNOSIS — Z85.850 ENCOUNTER FOR FOLLOW-UP SURVEILLANCE OF THYROID CANCER: ICD-10-CM

## 2019-08-09 DIAGNOSIS — Z08 ENCOUNTER FOR FOLLOW-UP SURVEILLANCE OF THYROID CANCER: ICD-10-CM

## 2019-08-09 DIAGNOSIS — Z85.850 ENCOUNTER FOR FOLLOW-UP SURVEILLANCE OF THYROID CANCER: Primary | ICD-10-CM

## 2019-08-09 PROCEDURE — 36415 COLL VENOUS BLD VENIPUNCTURE: CPT

## 2019-08-09 PROCEDURE — 84432 ASSAY OF THYROGLOBULIN: CPT

## 2019-08-15 LAB — THYROGLOBULIN (TG-RIA): <2 NG/ML

## 2019-08-19 ENCOUNTER — OFFICE VISIT (OUTPATIENT)
Dept: ENDOCRINOLOGY | Age: 54
End: 2019-08-19

## 2019-08-19 VITALS
SYSTOLIC BLOOD PRESSURE: 126 MMHG | BODY MASS INDEX: 41.75 KG/M2 | WEIGHT: 266 LBS | DIASTOLIC BLOOD PRESSURE: 82 MMHG | OXYGEN SATURATION: 97 % | HEART RATE: 79 BPM | HEIGHT: 67 IN

## 2019-08-19 DIAGNOSIS — E55.9 VITAMIN D DEFICIENCY: ICD-10-CM

## 2019-08-19 DIAGNOSIS — R73.03 PRE-DIABETES: ICD-10-CM

## 2019-08-19 DIAGNOSIS — E89.0 POSTSURGICAL HYPOTHYROIDISM: ICD-10-CM

## 2019-08-19 DIAGNOSIS — C73 THYROID CANCER (HCC): Primary | ICD-10-CM

## 2019-08-19 PROCEDURE — 99214 OFFICE O/P EST MOD 30 MIN: CPT | Performed by: INTERNAL MEDICINE

## 2019-08-19 RX ORDER — LEVOCETIRIZINE DIHYDROCHLORIDE 5 MG/1
5 TABLET, FILM COATED ORAL DAILY
Qty: 30 TABLET | Refills: 2 | Status: SHIPPED | OUTPATIENT
Start: 2019-08-19 | End: 2019-11-20 | Stop reason: SDUPTHER

## 2019-08-19 NOTE — PROGRESS NOTES
54 y.o.    Patient Care Team:  Rosio Palmer APRN as PCP - General (Family Medicine)  Jessika Burr APRN as PCP - Claims Attributed    Chief Complaint:    FOLLOW UP/ THYROID CANCER  Subjective     HPI       Urvashi Ornelas,52 y.o. white female is here as a new patient for the management of metastatic papillary thyroid cancer status post total thyroidectomy and radioactive iodine ablation.  Consulted by Dr. Ashby.     Most of the information is obtained from prior medical records and also from the patient. According to the patient she was diagnosed with hyperparathyroidism about 9 years ago due to the diagnosis of elevated calcium levels on her routine blood work up.  She underwent right inferior parathyroidectomy.  Work up during that time also revealed a left thyroid lobe she subsequently underwent thyroid biopsy which revealed papillary thyroid cancer.  On 2/5/2016 patient underwent total thyroidectomy with the resection of the paratracheal lymph nodes and parathyroid exploration.  The surgical pathology was consistent with multifocal papillary carcinoma follicular variant with infiltration.  The largest nodule on the left measured 13 x 8 x 9 mm.  Carcinoma was present at the peripheral margin.  There was no evidence of angiolymphatic invasion.  3 lymph nodes were identified which had metastasis less than one millimeters in size.  Along with right superior parathyroid adenoma.  Postoperative ultrasound showed residual level III lymph nodes in the left neck measuring 2.2 x 0.8 x 1.3 cm in size.  Ultrasound-guided biopsy showed pathology consistent with papillary thyroid cancer. Subsequently patient underwent left neck dissection in April 2016 by Dr. Ashby.  Surgical pathology was consistent with metastatic papillary carcinoma involving 6 out of the 19 lymph nodes.  There was no evidence of extracapsular extension.  Patient was being followed by Dr. Carney from radiation oncology for radioactive iodine ablation  and whole-body scan.     In June 2016 patient received Thyrogen stimulated 125 mCi of I 131.  She had whole-body scan the week after and then another whole-body scan on January 27, 2017.      WBS -   06/15/2016 - Residual activity of the thyroid resection bed represents residual functional thyroid tissue or malignancy   1/27/17 -    1. Unremarkable whole-body thyroid scintigraphy. No evidence of residual or recurrent disease.    2. Activity on both sides of the neck base likely representing contamination.  Labs at the time of the scans - Tg levels - 0.8 and less than 0.1     Last thyroid ultrasound from October 2017 performed at [facility] was within normal limits with no concerning features.  I do not have the physical report and we tried to get the report from [facility] and it was not sent to us.  She did get an neck CT scan - 07/2018 - no concerning lymph node noted in her neck.   She had repeat thyroid U/S on Oct 2018, through  and was told that her thyroid bed was empty with no concerning lymph nodes.   ( I dont have this report)  Patient is supposed to get a repeat thyroid ultrasound in October 2019 through Dr. Ashby.    Today in the clinic patient reports that she is on levothyroxine 212.5 mcg oral daily Monday to Friday and on Saturday and Sunday she is on 225 mcg.  She is compliant with this regimen and her TSH has been at goal given her recent history of thyroid cancer.    She complains that her energy levels are fair, weight has been stable, normal bowel movements and sleep is relatively okay on CPAP.  No complaints of dry skin, hair loss.  No hyperthyroid symptoms.  No shortness of breath, difficulty in swallowing or change in voice.     Hyperparathyroidism-patient had right superior and inferior parathyroidectomy.  Currently not on any calcium supplementation.      Prediabetes  Currently on diabetic diet.    Reviewed primary care physician's/consulting physician documentation and lab  results :     Interval History      The following portions of the patient's history were reviewed and updated as appropriate: allergies, current medications, past family history, past medical history, past social history, past surgical history and problem list.    Past Medical History:   Diagnosis Date   • Anxiety    • Arthritis    • Asthma    • Cancer (CMS/HCC)     THYROID   • Depression    • Disease of thyroid gland     CANCER   • H/O degenerative disc disease    • History of hallucinations    • Hypertension    • Joint pain    • Lymph node cancer (CMS/HCC)    • Migraine    • Postmenopausal    • Sleep apnea      History reviewed. No pertinent family history.  Social History     Socioeconomic History   • Marital status:      Spouse name: Not on file   • Number of children: Not on file   • Years of education: Not on file   • Highest education level: Not on file   Tobacco Use   • Smoking status: Former Smoker   • Smokeless tobacco: Never Used   Substance and Sexual Activity   • Alcohol use: No   • Drug use: No     Allergies   Allergen Reactions   • Depakote Er [Divalproex Sodium Er] Other (See Comments)     HAIR FALLS OUT   • Divalproex Sodium Unknown (See Comments)   • Lamictal [Lamotrigine] Other (See Comments) and Swelling     TONGUE SWELLS AND PEELS   • Risperidone Unknown (See Comments)   • Risperidone And Related Other (See Comments)     PREGNANCY SYMPTOMS   • Valproic Acid Unknown (See Comments)     Other reaction(s): Unknown (See Comments)     • Erythromycin Rash   • Penicillins Rash   • Toradol [Ketorolac Tromethamine] Rash       Current Outpatient Medications:   •  AIMOVIG 70 MG/ML prefilled syringe, INJECT 1 ML INTO THE SKIN Q 30 DAYS UTD, Disp: , Rfl: 5  •  albuterol (PROVENTIL HFA;VENTOLIN HFA) 108 (90 BASE) MCG/ACT inhaler, Inhale 2 puffs every 4 (four) hours as needed for wheezing., Disp: , Rfl:   •  atorvastatin (LIPITOR) 20 MG tablet, Take 40 mg by mouth Every Morning., Disp: , Rfl:   •   buPROPion XL (WELLBUTRIN XL) 150 MG 24 hr tablet, TK 1 T PO  QAM, Disp: , Rfl: 1  •  busPIRone (BUSPAR) 30 MG tablet, TK 1 T PO  bid, Disp: , Rfl: 0  •  gabapentin (NEURONTIN) 300 MG capsule, TK 1 TO 2 CS PO HS, Disp: , Rfl: 2  •  levocetirizine (XYZAL) 5 MG tablet, Take 5 mg by mouth Daily., Disp: , Rfl:   •  levothyroxine (SYNTHROID, LEVOTHROID) 200 MCG tablet, TAKE 1 TABLET BY MOUTH EVERY MORNING, Disp: 90 tablet, Rfl: 0  •  levothyroxine (SYNTHROID, LEVOTHROID) 25 MCG tablet, TAKE 1 TABLET BY MOUTH DAILY, Disp: 90 tablet, Rfl: 0  •  montelukast (SINGULAIR) 10 MG tablet, TK 1 T PO  QHS, Disp: , Rfl: 5  •  ondansetron (ZOFRAN) 8 MG tablet, Take  by mouth every 8 (eight) hours as needed for nausea or vomiting., Disp: , Rfl:   •  prazosin (MINIPRESS) 2 MG capsule, TK TWO CAPSULES PO EVERY NIGHT AT BEDTIME, Disp: , Rfl: 0  •  SYMBICORT 160-4.5 MCG/ACT inhaler, INHALE 2 PUFFS PO BID, Disp: , Rfl: 5  •  topiramate (TOPAMAX) 200 MG tablet, Take 200 mg by mouth 2 (Two) Times a Day., Disp: , Rfl: 2  •  TREXIMET  MG per tablet, , Disp: , Rfl: 3  •  VRAYLAR 3 MG capsule, TK 1 C PO QAM, Disp: , Rfl: 2  •  folic acid (FOLVITE) 1 MG tablet, Take 1 tablet by mouth Daily., Disp: 30 tablet, Rfl: 11  •  indomethacin (INDOCIN) 25 MG capsule, TK ONE C PO  TID WITH MEALS ONLY WHEN HAVING EXCISIONAL HEMICRANIA, Disp: , Rfl: 5  •  vitamin D (ERGOCALCIFEROL) 56145 units capsule capsule, Take 1 capsule by mouth 1 (One) Time Per Week., Disp: 30 capsule, Rfl: 11        Review of Systems   Constitutional: Negative for appetite change, fatigue and fever.   Eyes: Negative for visual disturbance.   Respiratory: Negative for shortness of breath.    Cardiovascular: Negative for palpitations and leg swelling.   Gastrointestinal: Negative for abdominal pain and vomiting.   Endocrine: Negative for polydipsia and polyuria.   Musculoskeletal: Negative for joint swelling and neck pain.   Skin: Negative for rash.   Neurological: Negative for  "weakness and numbness.   Psychiatric/Behavioral: Negative for behavioral problems.       Objective       Vitals:    08/19/19 1143   BP: 126/82   Pulse: 79   SpO2: 97%   Weight: 121 kg (266 lb)   Height: 170.2 cm (67\")     Body mass index is 41.66 kg/m².      Physical Exam   Constitutional: She is oriented to person, place, and time. She appears well-nourished.   Obese     HENT:   Head: Normocephalic and atraumatic.   Wide neck   Eyes: Conjunctivae and EOM are normal. No scleral icterus.   Neck: Normal range of motion. Neck supple. No thyromegaly present.   Empty thyroid bed, scar noted   Cardiovascular: Normal rate and normal heart sounds.   Pulmonary/Chest: Effort normal and breath sounds normal. No stridor. She has no wheezes.   Abdominal: Soft. Bowel sounds are normal. She exhibits no distension. There is no tenderness.   Central obesity   Musculoskeletal: She exhibits no edema or tenderness.   Neurological: She is alert and oriented to person, place, and time.   Skin: Skin is warm and dry. She is not diaphoretic.   Psychiatric: She has a normal mood and affect.   Vitals reviewed.    Results Review:     I reviewed the patient's new clinical results and mentioned them above in HPI and in plan as well.    Medical records reviewed  Summary:DONE      Lab on 08/09/2019   Component Date Value Ref Range Status   • Thyroglobulin (TG-JOSE DAVID) 08/09/2019 <2.0  ng/mL Final    Reference Range:  Pubertal Children  and Adults: <40  According to the National Academy of Clinical Biochemistry,  the reference interval for Thyroglobulin (TG) should be  related to euthyroid patients and not for patients who  underwent thyroidectomy.  TG reference intervals for these  patients depend on the residual mass of the thyroid tissue  left after surgery.  Establishing a post-operative baseline  is recommended.  The assay quantitation limit is 2.0 ng/mL.     Lab Results   Component Value Date    HGBA1C 6.00 (H) 08/05/2019    HGBA1C 6.10 (H) " 02/04/2019    HGBA1C 6.10 (H) 09/04/2018     Lab Results   Component Value Date    CREATININE 0.91 08/05/2019     Imaging Results (most recent)     None                Assessment and Plan:    Urvashi was seen today for thyroid cancer.    Diagnoses and all orders for this visit:    Thyroid cancer (CMS/HCC)  -     TSH; Future  -     T4, Free; Future  -     Thyroglobulin; Future  -     Thyroglobulin Antibody; Future  -     Hemoglobin A1c; Future  -     Comprehensive Metabolic Panel; Future  -     Lipid Panel; Future  -     Microalbumin / Creatinine Urine Ratio - Urine, Clean Catch; Future  -     Vitamin B12 & Folate; Future  -     Vitamin D 25 Hydroxy; Future    Postsurgical hypothyroidism  -     TSH; Future  -     T4, Free; Future  -     Thyroglobulin; Future  -     Thyroglobulin Antibody; Future  -     Hemoglobin A1c; Future  -     Comprehensive Metabolic Panel; Future  -     Lipid Panel; Future  -     Microalbumin / Creatinine Urine Ratio - Urine, Clean Catch; Future  -     Vitamin B12 & Folate; Future  -     Vitamin D 25 Hydroxy; Future    Pre-diabetes  -     TSH; Future  -     T4, Free; Future  -     Thyroglobulin; Future  -     Thyroglobulin Antibody; Future  -     Hemoglobin A1c; Future  -     Comprehensive Metabolic Panel; Future  -     Lipid Panel; Future  -     Microalbumin / Creatinine Urine Ratio - Urine, Clean Catch; Future  -     Vitamin B12 & Folate; Future  -     Vitamin D 25 Hydroxy; Future    Vitamin D deficiency   -     Vitamin D 25 Hydroxy; Future        History of thyroid cancer  Pending thyroid ultrasound for lymph node mapping in October 2019 performed through Dr. Ashby  Explained to the patient that her thyroglobulin and thyroglobulin antibody levels are within normal limits for somebody who had a total thyroidectomy  However these levels cannot be taken entirely into consideration as patient did not receive thyrotropin injections prior to the measurement of these levels or she has not been  "withdrawn for levothyroxine for 4 to 6 weeks prior to the assessment of these levels.    Hypothyroidism  Would have her TSH levels suppressed between 0.5-1 given the high risk of her thyroid cancer  Continue levothyroxine at 212.5 for 5 days a week and to 25 for 2 days a week.    Prediabetes  Emphasized on the diet and exercise for the management of her prediabetes.    Hyperlipidemia  Continue Lipitor 20 mg oral daily.    Reviewed Lab results with the patient.             Bakari Mora MD  08/19/19    EM[facility] Dragon / transcription disclaimer:     \"Dictated utilizing Dragon dictation\".  "

## 2019-08-23 RX ORDER — ATORVASTATIN CALCIUM 40 MG/1
40 TABLET, FILM COATED ORAL EVERY MORNING
Qty: 30 TABLET | Refills: 1 | Status: SHIPPED | OUTPATIENT
Start: 2019-08-23 | End: 2019-10-15 | Stop reason: SDUPTHER

## 2019-09-19 ENCOUNTER — OFFICE VISIT (OUTPATIENT)
Dept: FAMILY MEDICINE CLINIC | Facility: CLINIC | Age: 54
End: 2019-09-19

## 2019-09-19 VITALS
DIASTOLIC BLOOD PRESSURE: 82 MMHG | WEIGHT: 263 LBS | HEART RATE: 88 BPM | SYSTOLIC BLOOD PRESSURE: 134 MMHG | BODY MASS INDEX: 41.28 KG/M2 | HEIGHT: 67 IN | TEMPERATURE: 98.3 F | OXYGEN SATURATION: 98 %

## 2019-09-19 DIAGNOSIS — J20.9 ACUTE BRONCHITIS, UNSPECIFIED ORGANISM: Primary | ICD-10-CM

## 2019-09-19 DIAGNOSIS — M25.512 ACUTE PAIN OF LEFT SHOULDER: ICD-10-CM

## 2019-09-19 DIAGNOSIS — J45.901 ASTHMA WITH ACUTE EXACERBATION, UNSPECIFIED ASTHMA SEVERITY, UNSPECIFIED WHETHER PERSISTENT: ICD-10-CM

## 2019-09-19 PROBLEM — J45.909 ASTHMA: Status: ACTIVE | Noted: 2019-09-19

## 2019-09-19 PROCEDURE — 99214 OFFICE O/P EST MOD 30 MIN: CPT | Performed by: NURSE PRACTITIONER

## 2019-09-19 RX ORDER — PREDNISONE 20 MG/1
TABLET ORAL
Qty: 23 TABLET | Refills: 0 | Status: SHIPPED | OUTPATIENT
Start: 2019-09-19 | End: 2019-11-20

## 2019-09-19 RX ORDER — DOXYCYCLINE 100 MG/1
100 CAPSULE ORAL 2 TIMES DAILY
Qty: 14 CAPSULE | Refills: 0 | Status: SHIPPED | OUTPATIENT
Start: 2019-09-19 | End: 2019-09-26

## 2019-09-19 NOTE — PROGRESS NOTES
Subjective   Urvashi Ornelas is a 54 y.o. female.     Chief Complaint   Patient presents with   • Shoulder Pain     left   • URI       Cough   The current episode started in the past 7 days. The cough is productive of sputum. Associated symptoms include ear pain (bilateral), postnasal drip, rhinorrhea and shortness of breath. Pertinent negatives include no fever or sore throat. She has tried a beta-agonist inhaler and leukotriene antagonists for the symptoms. The treatment provided no relief. Her past medical history is significant for asthma.      Shoulder pain: Patient complains of left shoulder pain starting about 1 week ago. Patient describes pain as aching, 8/10, that radiates to upper arm. Pain is worse with movement. Patient denies any weakness. Patient has tried ice, heat and ibuprofen with only mild relief.    The following portions of the patient's history were reviewed and updated as appropriate: allergies, current medications, past family history, past medical history, past social history, past surgical history and problem list.    Past Medical History:   Diagnosis Date   • Abnormal urinalysis    • Acute bronchitis    • Acute frontal sinusitis    • Acute maxillary sinusitis    • Acute pain of right shoulder    • Acute sinusitis    • Allergic rhinitis    • Anemia    • Anxiety    • Arthritis    • Asthma    • Asthma exacerbation    • BMI 40.0-44.9, adult (CMS/[facility])    • Cancer (CMS/HCC)     THYROID   • Constipation    • Cough    • Depression    • Diaphragm paralysis    • Disease of thyroid gland     CANCER   • Dizziness    • Drug-induced nausea and vomiting    • Dyspnea    • Dysuria    • Fatigue    • Fatty liver    • H/O degenerative disc disease    • History of hallucinations    • Hypercalcemia    • Hyperlipidemia    • Hypertension    • Hypertensive kidney disease with chronic kidney disease stage III (CMS/[facility])    • Hypertrophic toenail    • Hypokalemia    • Hypothyroidism    • Joint pain    • Lumbago    •  Lymph node cancer (CMS/HCC)    • Manic episode without psychotic symptoms (CMS/HCC)    • Medicare annual wellness visit, subsequent    • Migraine    • Migraine with aura    • Nausea    • Osteopenia of lumbar spine    • Overweight    • Pleurisy    • Pneumonia    • Post traumatic stress disorder    • Postmenopausal    • Rheumatoid arthritis (CMS/HCC)    • Right knee pain    • Shortness of breath    • Shoulder tendinitis    • Sleep apnea    • Thyroid disease    • Urinary frequency        Past Surgical History:   Procedure Laterality Date   •  SECTION      X2   • CHOLECYSTECTOMY     • HAND SURGERY Bilateral     WRISTS PLATE PLACEMENT   • HEMATOMA EVACUATION HEAD/NECK Left 2016    Procedure: HEMATOMA EVACUATION NECK;  Surgeon: Dayo Ashby MD;  Location: [facility] [place] [facility];  Service:    • HYSTERECTOMY     • KNEE SURGERY Bilateral     6YO PIGEON TOE SX   • LUNG SURGERY Left     LEFT LOWER LOBE   • NECK DISSECTION Left 2016    Procedure: LEFT MODIFIED RADICAL NECK DISSECTION;  Surgeon: Dayo Ashby MD;  Location: [facility] OR [facility];  Service:    • NEPHRECTOMY PARTIAL Left    • PARATHYROID GLAND SURGERY         • TOTAL THYROIDECTOMY      PARATHYROID 2016       Family History   Problem Relation Age of Onset   • Hyperlipidemia Mother    • Hypertension Mother    • Osteoarthritis Mother    • Arthritis Mother         rheumatoid   • Other Mother         acute cutaneous lupus erythematosus   • Bipolar disorder Father    • Hypertension Father    • Bipolar disorder Brother    • HIV Brother    • Alcohol abuse Paternal Grandfather    • Hypertension Paternal Grandfather        Social History     Socioeconomic History   • Marital status:      Spouse name: Not on file   • Number of children: Not on file   • Years of education: Not on file   • Highest education level: Not on file   Tobacco Use   • Smoking status: Former Smoker   • Smokeless tobacco: Never Used   Substance and Sexual Activity   • Alcohol  "use: No   • Drug use: No       Review of Systems   Constitutional: Negative for fever.   HENT: Positive for ear pain (bilateral), postnasal drip and rhinorrhea. Negative for sore throat.    Respiratory: Positive for cough and shortness of breath.    Musculoskeletal:        See HPI        Objective   Vitals:    09/19/19 1602   BP: 134/82   BP Location: Left arm   Patient Position: Sitting   Pulse: 88   Temp: 98.3 °F (36.8 °C)   SpO2: 98%   Weight: 119 kg (263 lb)   Height: 170.2 cm (67.01\")      Body mass index is 41.18 kg/m².  Physical Exam   Constitutional: She appears well-developed and well-nourished.   HENT:   Head: Normocephalic and atraumatic.   Right Ear: Tympanic membrane and ear canal normal.   Left Ear: Tympanic membrane and ear canal normal.   Nose: Nose normal.   Mouth/Throat: Oropharynx is clear and moist.   Cardiovascular: Normal rate, regular rhythm and normal heart sounds.   Pulmonary/Chest: Effort normal and breath sounds normal.   Musculoskeletal:        Left shoulder: She exhibits decreased range of motion and pain. She exhibits normal strength.   Psychiatric: She has a normal mood and affect.   Nursing note and vitals reviewed.        Assessment/Plan   Urvashi was seen today for shoulder pain and uri.    Diagnoses and all orders for this visit:    Acute bronchitis, unspecified organism  -     doxycycline (MONODOX) 100 MG capsule; Take 1 capsule by mouth 2 (Two) Times a Day for 7 days.    Asthma with acute exacerbation, unspecified asthma severity, unspecified whether persistent  -     predniSONE (DELTASONE) 20 MG tablet; TAKE 3 TABLETS DAILY X 5 DAYS, THEN TAKE 2 TABLETS DAILY X 3 DAYS, THEN TAKE 1 TABLET DAILY X 2 DAYS    Acute pain of left shoulder  -     predniSONE (DELTASONE) 20 MG tablet; TAKE 3 TABLETS DAILY X 5 DAYS, THEN TAKE 2 TABLETS DAILY X 3 DAYS, THEN TAKE 1 TABLET DAILY X 2 DAYS    Follow-up if symptoms persist or worsen.            "

## 2019-09-24 ENCOUNTER — TELEPHONE (OUTPATIENT)
Dept: FAMILY MEDICINE CLINIC | Facility: CLINIC | Age: 54
End: 2019-09-24

## 2019-09-24 NOTE — TELEPHONE ENCOUNTER
Please let patient know that I can send in Tessalon Perles, but if she has worsened then would actually recommend a follow-up visit so chest xray can be completed.

## 2019-09-24 NOTE — TELEPHONE ENCOUNTER
Pt called and requested you please send in a cough medicine to her pharm in [facility], also she wanted you to know that she can not lay on her left side, when she does she is wheezing really bad.

## 2019-09-25 ENCOUNTER — OFFICE VISIT (OUTPATIENT)
Dept: FAMILY MEDICINE CLINIC | Facility: CLINIC | Age: 54
End: 2019-09-25

## 2019-09-25 VITALS
WEIGHT: 263 LBS | BODY MASS INDEX: 41.28 KG/M2 | TEMPERATURE: 95.5 F | OXYGEN SATURATION: 95 % | DIASTOLIC BLOOD PRESSURE: 80 MMHG | HEIGHT: 67 IN | HEART RATE: 109 BPM | SYSTOLIC BLOOD PRESSURE: 132 MMHG

## 2019-09-25 DIAGNOSIS — J45.901 ASTHMA WITH ACUTE EXACERBATION, UNSPECIFIED ASTHMA SEVERITY, UNSPECIFIED WHETHER PERSISTENT: ICD-10-CM

## 2019-09-25 DIAGNOSIS — J20.9 ACUTE BRONCHITIS, UNSPECIFIED ORGANISM: Primary | ICD-10-CM

## 2019-09-25 PROCEDURE — 99213 OFFICE O/P EST LOW 20 MIN: CPT | Performed by: NURSE PRACTITIONER

## 2019-09-25 PROCEDURE — 71046 X-RAY EXAM CHEST 2 VIEWS: CPT | Performed by: NURSE PRACTITIONER

## 2019-09-25 RX ORDER — BENZONATATE 200 MG/1
200 CAPSULE ORAL 3 TIMES DAILY PRN
Qty: 90 CAPSULE | Refills: 0 | Status: SHIPPED | OUTPATIENT
Start: 2019-09-25 | End: 2021-03-25

## 2019-09-25 NOTE — PROGRESS NOTES
Subjective   Urvashi Ornelas is a 54 y.o. female.     Chief Complaint   Patient presents with   • Cough       History of Present Illness   Acute bronchitis: Last seen on 9/19/2019 by me.  Treatment included doxycycline and prednisone.  Patient reports symptoms are unchanged.    The following portions of the patient's history were reviewed and updated as appropriate: allergies, current medications, past family history, past medical history, past social history, past surgical history and problem list.    Past Medical History:   Diagnosis Date   • Abnormal urinalysis    • Acute bronchitis    • Acute frontal sinusitis    • Acute maxillary sinusitis    • Acute pain of right shoulder    • Acute sinusitis    • Allergic rhinitis    • Anemia    • Anxiety    • Arthritis    • Asthma    • Asthma exacerbation    • BMI 40.0-44.9, adult (CMS/[facility])    • Cancer (CMS/[facility])     THYROID   • Constipation    • Cough    • Depression    • Diaphragm paralysis    • Disease of thyroid gland     CANCER   • Dizziness    • Drug-induced nausea and vomiting    • Dyspnea    • Dysuria    • Fatigue    • Fatty liver    • H/O degenerative disc disease    • History of hallucinations    • Hypercalcemia    • Hyperlipidemia    • Hypertension    • Hypertensive kidney disease with chronic kidney disease stage III (CMS/[facility])    • Hypertrophic toenail    • Hypokalemia    • Hypothyroidism    • Joint pain    • Lumbago    • Lymph node cancer (CMS/HCC)    • Manic episode without psychotic symptoms (CMS/[facility])    • Medicare annual wellness visit, subsequent    • Migraine    • Migraine with aura    • Nausea    • Osteopenia of lumbar spine    • Overweight    • Pleurisy    • Pneumonia    • Post traumatic stress disorder    • Postmenopausal    • Rheumatoid arthritis (CMS/HCC)    • Right knee pain    • Shortness of breath    • Shoulder tendinitis    • Sleep apnea    • Thyroid disease    • Urinary frequency        Past Surgical History:   Procedure Laterality Date   •  " SECTION      X2   • CHOLECYSTECTOMY     • HAND SURGERY Bilateral     WRISTS PLATE PLACEMENT   • HEMATOMA EVACUATION HEAD/NECK Left 2016    Procedure: HEMATOMA EVACUATION NECK;  Surgeon: Dayo Ashby MD;  Location: [facility];  Service:    • HYSTERECTOMY     • KNEE SURGERY Bilateral     4YO PIGEON TOE SX   • LUNG SURGERY Left     LEFT LOWER LOBE   • NECK DISSECTION Left 2016    Procedure: LEFT MODIFIED RADICAL NECK DISSECTION;  Surgeon: Dayo Ashby MD;  Location: [facility];  Service:    • NEPHRECTOMY PARTIAL Left    • PARATHYROID GLAND SURGERY         • TOTAL THYROIDECTOMY      PARATHYROID 2016       Family History   Problem Relation Age of Onset   • Hyperlipidemia Mother    • Hypertension Mother    • Osteoarthritis Mother    • Arthritis Mother         rheumatoid   • Other Mother         acute cutaneous lupus erythematosus   • Bipolar disorder Father    • Hypertension Father    • Bipolar disorder Brother    • HIV Brother    • Alcohol abuse Paternal Grandfather    • Hypertension Paternal Grandfather        Social History     Socioeconomic History   • Marital status:      Spouse name: Not on file   • Number of children: Not on file   • Years of education: Not on file   • Highest education level: Not on file   Tobacco Use   • Smoking status: Former Smoker   • Smokeless tobacco: Never Used   Substance and Sexual Activity   • Alcohol use: No   • Drug use: No       Review of Systems   Constitutional: Negative for fever.   Respiratory: Positive for cough and shortness of breath.        Objective   Vitals:    19 1428   BP: 132/80   BP Location: Right arm   Patient Position: Sitting   Pulse: 109   Temp: 95.5 °F (35.3 °C)   SpO2: 95%   Weight: 119 kg (263 lb)   Height: 170.2 cm (67.01\")      Body mass index is 41.18 kg/m².  Physical Exam   Constitutional: She appears well-developed and well-nourished.   HENT:   Head: Normocephalic and atraumatic.   Right Ear: Tympanic " membrane and ear canal normal.   Left Ear: Tympanic membrane and ear canal normal.   Nose: Nose normal. Right sinus exhibits no maxillary sinus tenderness and no frontal sinus tenderness. Left sinus exhibits no maxillary sinus tenderness and no frontal sinus tenderness.   Mouth/Throat: Oropharynx is clear and moist.   Cardiovascular: Normal rate, regular rhythm and normal heart sounds.   Pulmonary/Chest: Effort normal and breath sounds normal.   Psychiatric: She has a normal mood and affect.   Nursing note and vitals reviewed.        Assessment/Plan   Urvashi was seen today for cough.    Diagnoses and all orders for this visit:    Acute bronchitis, unspecified organism  -     XR Chest PA & Lateral (In Office)  -     benzonatate (TESSALON) 200 MG capsule; Take 1 capsule by mouth 3 (Three) Times a Day As Needed for Cough.    Asthma with acute exacerbation, unspecified asthma severity, unspecified whether persistent  -     Fluticasone Furoate-Vilanterol (BREO ELLIPTA) 200-25 MCG/INH inhaler; Inhale 1 puff Daily.    Asthma with acute exacerbation:  - Will discontinue Symbicort and switch to Breo.

## 2019-09-27 ENCOUNTER — TELEPHONE (OUTPATIENT)
Dept: FAMILY MEDICINE CLINIC | Facility: CLINIC | Age: 54
End: 2019-09-27

## 2019-09-27 RX ORDER — ALBUTEROL SULFATE 2.5 MG/3ML
2.5 SOLUTION RESPIRATORY (INHALATION) EVERY 6 HOURS PRN
Qty: 25 VIAL | Refills: 0 | Status: SHIPPED | OUTPATIENT
Start: 2019-09-27 | End: 2023-01-10

## 2019-09-27 NOTE — TELEPHONE ENCOUNTER
Patient aware of results. Patient instructed to use nebulizer. Albuterol has been sent to local pharmacy. Seek immediate medical attention if symptoms worsen.

## 2019-09-27 NOTE — TELEPHONE ENCOUNTER
Patient would like the results of her X-Ray that was done Wednesday here in the office. Patient is feeling no better

## 2019-10-15 RX ORDER — ATORVASTATIN CALCIUM 40 MG/1
40 TABLET, FILM COATED ORAL EVERY MORNING
Qty: 30 TABLET | Refills: 5 | Status: SHIPPED | OUTPATIENT
Start: 2019-10-15 | End: 2020-04-06 | Stop reason: SDUPTHER

## 2019-11-20 ENCOUNTER — OFFICE VISIT (OUTPATIENT)
Dept: FAMILY MEDICINE CLINIC | Facility: CLINIC | Age: 54
End: 2019-11-20

## 2019-11-20 VITALS
OXYGEN SATURATION: 96 % | WEIGHT: 269 LBS | HEIGHT: 67 IN | HEART RATE: 101 BPM | DIASTOLIC BLOOD PRESSURE: 80 MMHG | SYSTOLIC BLOOD PRESSURE: 124 MMHG | TEMPERATURE: 98.6 F | BODY MASS INDEX: 42.22 KG/M2

## 2019-11-20 DIAGNOSIS — S39.012A STRAIN OF LUMBAR REGION, INITIAL ENCOUNTER: Primary | ICD-10-CM

## 2019-11-20 LAB
BILIRUB BLD-MCNC: NEGATIVE MG/DL
CLARITY, POC: CLEAR
COLOR UR: YELLOW
GLUCOSE UR STRIP-MCNC: NEGATIVE MG/DL
KETONES UR QL: NEGATIVE
LEUKOCYTE EST, POC: NEGATIVE
NITRITE UR-MCNC: NEGATIVE MG/ML
PH UR: 6 [PH] (ref 5–8)
PROT UR STRIP-MCNC: NEGATIVE MG/DL
RBC # UR STRIP: NEGATIVE /UL
SP GR UR: 1.01 (ref 1–1.03)
UROBILINOGEN UR QL: NORMAL

## 2019-11-20 PROCEDURE — 99214 OFFICE O/P EST MOD 30 MIN: CPT | Performed by: NURSE PRACTITIONER

## 2019-11-20 PROCEDURE — 81003 URINALYSIS AUTO W/O SCOPE: CPT | Performed by: NURSE PRACTITIONER

## 2019-11-20 RX ORDER — BUPROPION HYDROCHLORIDE 150 MG/1
150 TABLET ORAL DAILY
COMMUNITY
End: 2021-07-23

## 2019-11-20 RX ORDER — PREDNISONE 20 MG/1
40 TABLET ORAL DAILY
Qty: 10 TABLET | Refills: 0 | Status: SHIPPED | OUTPATIENT
Start: 2019-11-20 | End: 2019-11-25

## 2019-11-20 RX ORDER — MONTELUKAST SODIUM 10 MG/1
10 TABLET ORAL NIGHTLY
Qty: 30 TABLET | Refills: 5 | Status: SHIPPED | OUTPATIENT
Start: 2019-11-20 | End: 2020-05-08 | Stop reason: SDUPTHER

## 2019-11-20 RX ORDER — LEVOCETIRIZINE DIHYDROCHLORIDE 5 MG/1
5 TABLET, FILM COATED ORAL DAILY
Qty: 30 TABLET | Refills: 5 | Status: SHIPPED | OUTPATIENT
Start: 2019-11-20 | End: 2020-05-11 | Stop reason: SDUPTHER

## 2019-11-20 NOTE — PROGRESS NOTES
Subjective   Urvashi Ornelas is a 54 y.o. female.     Chief Complaint   Patient presents with   • Back Pain       Back Pain   The current episode started in the past 7 days. The pain is present in the lumbar spine. The quality of the pain is described as aching. The pain does not radiate. The pain is at a severity of 8/10. The symptoms are aggravated by sitting. Pertinent negatives include no dysuria, fever, numbness or weakness. She has tried NSAIDs for the symptoms. The treatment provided mild relief.          The following portions of the patient's history were reviewed and updated as appropriate: allergies, current medications, past family history, past medical history, past social history, past surgical history and problem list.    Past Medical History:   Diagnosis Date   • Abnormal urinalysis    • Acute bronchitis    • Acute frontal sinusitis    • Acute maxillary sinusitis    • Acute pain of right shoulder    • Acute sinusitis    • Allergic rhinitis    • Anemia    • Anxiety    • Arthritis    • Asthma    • Asthma exacerbation    • BMI 40.0-44.9, adult (CMS/[facility])    • Cancer (CMS/[facility])     THYROID   • Constipation    • Cough    • Depression    • Diaphragm paralysis    • Disease of thyroid gland     CANCER   • Dizziness    • Drug-induced nausea and vomiting    • Dyspnea    • Dysuria    • Fatigue    • Fatty liver    • H/O degenerative disc disease    • History of hallucinations    • Hypercalcemia    • Hyperlipidemia    • Hypertension    • Hypertensive kidney disease with chronic kidney disease stage III (CMS/[facility])    • Hypertrophic toenail    • Hypokalemia    • Hypothyroidism    • Joint pain    • Lumbago    • Lymph node cancer (CMS/[facility])    • Manic episode without psychotic symptoms (CMS/[facility])    • Medicare annual wellness visit, subsequent    • Migraine    • Migraine with aura    • Nausea    • Osteopenia of lumbar spine    • Overweight    • Pleurisy    • Pneumonia    • Post traumatic stress disorder    •  Postmenopausal    • Rheumatoid arthritis (CMS/HCC)    • Right knee pain    • Shortness of breath    • Shoulder tendinitis    • Sleep apnea    • Thyroid disease    • Urinary frequency        Past Surgical History:   Procedure Laterality Date   •  SECTION      X2   • CHOLECYSTECTOMY     • HAND SURGERY Bilateral     WRISTS PLATE PLACEMENT   • HEMATOMA EVACUATION HEAD/NECK Left 2016    Procedure: HEMATOMA EVACUATION NECK;  Surgeon: Dayo Ashby MD;  Location: [facility];  Service:    • HYSTERECTOMY     • KNEE SURGERY Bilateral     6YO PIGEON TOE SX   • LUNG SURGERY Left     LEFT LOWER LOBE   • NECK DISSECTION Left 2016    Procedure: LEFT MODIFIED RADICAL NECK DISSECTION;  Surgeon: Dayo Ashby MD;  Location: [facility];  Service:    • NEPHRECTOMY PARTIAL Left    • PARATHYROID GLAND SURGERY         • TOTAL THYROIDECTOMY      PARATHYROID 2016       Family History   Problem Relation Age of Onset   • Hyperlipidemia Mother    • Hypertension Mother    • Osteoarthritis Mother    • Arthritis Mother         rheumatoid   • Other Mother         acute cutaneous lupus erythematosus   • Bipolar disorder Father    • Hypertension Father    • Bipolar disorder Brother    • HIV Brother    • Alcohol abuse Paternal Grandfather    • Hypertension Paternal Grandfather        Social History     Socioeconomic History   • Marital status:      Spouse name: Not on file   • Number of children: Not on file   • Years of education: Not on file   • Highest education level: Not on file   Tobacco Use   • Smoking status: Former Smoker   • Smokeless tobacco: Never Used   Substance and Sexual Activity   • Alcohol use: No   • Drug use: No       Review of Systems   Constitutional: Negative for fever.   Genitourinary: Negative for dysuria.   Musculoskeletal: Positive for back pain.   Neurological: Negative for weakness and numbness.       Objective   Vitals:    19 1411   BP: 124/80   BP Location: Right arm  "  Patient Position: Sitting   Pulse: 101   Temp: 98.6 °F (37 °C)   SpO2: 96%   Weight: 122 kg (269 lb)   Height: 170.2 cm (67.01\")      Body mass index is 42.12 kg/m².  Physical Exam   Constitutional: She appears well-developed and well-nourished.   Cardiovascular: Normal rate, regular rhythm and normal heart sounds.   Pulmonary/Chest: Effort normal and breath sounds normal.   Abdominal: Soft. Bowel sounds are normal. There is no tenderness.   Musculoskeletal:        Lumbar back: She exhibits decreased range of motion and pain.   Neurological: She has normal strength.   Skin: Skin is warm and dry.   Psychiatric: She has a normal mood and affect.   Nursing note and vitals reviewed.        Assessment/Plan   Urvashi was seen today for back pain.    Diagnoses and all orders for this visit:    Strain of lumbar region, initial encounter  -     predniSONE (DELTASONE) 20 MG tablet; Take 2 tablets by mouth Daily for 5 days.  -     POCT urinalysis dipstick, automated    Other orders  -     levocetirizine (XYZAL) 5 MG tablet; Take 1 tablet by mouth Daily.  -     montelukast (SINGULAIR) 10 MG tablet; Take 1 tablet by mouth Every Night.               "

## 2019-12-18 ENCOUNTER — OFFICE VISIT (OUTPATIENT)
Dept: FAMILY MEDICINE CLINIC | Facility: CLINIC | Age: 54
End: 2019-12-18

## 2019-12-18 VITALS
HEIGHT: 67 IN | BODY MASS INDEX: 41.28 KG/M2 | SYSTOLIC BLOOD PRESSURE: 134 MMHG | DIASTOLIC BLOOD PRESSURE: 80 MMHG | HEART RATE: 99 BPM | WEIGHT: 263 LBS | OXYGEN SATURATION: 97 % | TEMPERATURE: 97.9 F

## 2019-12-18 DIAGNOSIS — Z12.12 SCREENING FOR COLORECTAL CANCER: ICD-10-CM

## 2019-12-18 DIAGNOSIS — Z00.00 MEDICARE ANNUAL WELLNESS VISIT, SUBSEQUENT: Primary | ICD-10-CM

## 2019-12-18 DIAGNOSIS — Z12.11 SCREENING FOR COLORECTAL CANCER: ICD-10-CM

## 2019-12-18 DIAGNOSIS — Z13.820 SCREENING FOR OSTEOPOROSIS: ICD-10-CM

## 2019-12-18 DIAGNOSIS — M85.80 OSTEOPENIA, UNSPECIFIED LOCATION: ICD-10-CM

## 2019-12-18 PROBLEM — G44.039 EPISODIC PAROXYSMAL HEMICRANIA, NOT INTRACTABLE: Status: ACTIVE | Noted: 2018-10-31

## 2019-12-18 PROBLEM — E03.9 HYPOTHYROIDISM: Status: ACTIVE | Noted: 2019-12-18

## 2019-12-18 PROCEDURE — G0439 PPPS, SUBSEQ VISIT: HCPCS | Performed by: NURSE PRACTITIONER

## 2019-12-18 RX ORDER — BUDESONIDE AND FORMOTEROL FUMARATE DIHYDRATE 80; 4.5 UG/1; UG/1
2 AEROSOL RESPIRATORY (INHALATION)
COMMUNITY
End: 2022-03-03

## 2019-12-18 RX ORDER — CLOZAPINE 100 MG/1
150 TABLET ORAL NIGHTLY
COMMUNITY
End: 2021-05-04

## 2019-12-18 NOTE — PROGRESS NOTES
The ABCs of the Annual Wellness Visit  Subsequent Medicare Wellness Visit    Chief Complaint   Patient presents with   • Medicare Wellness-subsequent       Subjective   History of Present Illness:  Urvashi Ornelas is a 54 y.o. female who presents for a Subsequent Medicare Wellness Visit.    HEALTH RISK ASSESSMENT    Recent Hospitalizations:  No hospitalization(s) within the last year.    Current Medical Providers:  Patient Care Team:  Rosio Palmer APRN as PCP - General (Family Medicine)  Jessika Burr APRN as PCP - Claims Attributed    Smoking Status:  Social History     Tobacco Use   Smoking Status Former Smoker   Smokeless Tobacco Never Used       Alcohol Consumption:  Social History     Substance and Sexual Activity   Alcohol Use No       Depression Screen:   PHQ-2/PHQ-9 Depression Screening 12/18/2019   Little interest or pleasure in doing things 1   Feeling down, depressed, or hopeless 1   Trouble falling or staying asleep, or sleeping too much 0   Feeling tired or having little energy 2   Poor appetite or overeating 2   Feeling bad about yourself - or that you are a failure or have let yourself or your family down 0   Trouble concentrating on things, such as reading the newspaper or watching television 1   Moving or speaking so slowly that other people could have noticed. Or the opposite - being so fidgety or restless that you have been moving around a lot more than usual 0   Thoughts that you would be better off dead, or of hurting yourself in some way 0   Total Score 7   If you checked off any problems, how difficult have these problems made it for you to do your work, take care of things at home, or get along with other people? Somewhat difficult       Fall Risk Screen:  STEADI Fall Risk Assessment has not been completed.    Health Habits and Functional and Cognitive Screening:  Functional & Cognitive Status 12/18/2019   Do you have difficulty preparing food and eating? No   Do you have difficulty  bathing yourself, getting dressed or grooming yourself? No   Do you have difficulty using the toilet? No   Do you have difficulty moving around from place to place? No   Do you have trouble with steps or getting out of a bed or a chair? No   Current Diet Well Balanced Diet   Dental Exam Up to date   Eye Exam Up to date   Exercise (times per week) 2 times per week   Current Exercise Activities Include Walking   Do you need help using the phone?  No   Are you deaf or do you have serious difficulty hearing?  Yes   Do you need help with transportation? No   Do you need help shopping? No   Do you need help preparing meals?  No   Do you need help with housework?  No   Do you need help with laundry? No   Do you need help taking your medications? No   Do you need help managing money? No   Do you ever drive or ride in a car without wearing a seat belt? No   Have you felt unusual stress, anger or loneliness in the last month? Yes   Who do you live with? Alone   If you need help, do you have trouble finding someone available to you? Yes   Have you been bothered in the last four weeks by sexual problems? No   Do you have difficulty concentrating, remembering or making decisions? Yes         Does the patient have evidence of cognitive impairment? No    Asprin use counseling:Does not need ASA (and currently is not on it)    Age-appropriate Screening Schedule:  Refer to the list below for future screening recommendations based on patient's age, sex and/or medical conditions. Orders for these recommended tests are listed in the plan section. The patient has been provided with a written plan.    Health Maintenance   Topic Date Due   • COLONOSCOPY  09/20/2017   • DXA SCAN  12/18/2019   • ZOSTER VACCINE (1 of 2) 11/28/2020 (Originally 6/27/2017)   • LIPID PANEL  08/05/2020   • MAMMOGRAM  04/19/2021   • TDAP/TD VACCINES (2 - Td) 05/20/2029   • INFLUENZA VACCINE  Completed   • PAP SMEAR  Discontinued          The following portions of  the patient's history were reviewed and updated as appropriate: allergies, current medications, past family history, past medical history, past social history, past surgical history and problem list.    Outpatient Medications Prior to Visit   Medication Sig Dispense Refill   • AIMOVIG 70 MG/ML prefilled syringe INJECT 1 ML INTO THE SKIN Q 30 DAYS UTD  5   • albuterol (PROVENTIL HFA;VENTOLIN HFA) 108 (90 BASE) MCG/ACT inhaler Inhale 2 puffs every 4 (four) hours as needed for wheezing.     • albuterol (PROVENTIL) (2.5 MG/3ML) 0.083% nebulizer solution Take 2.5 mg by nebulization Every 6 (Six) Hours As Needed for Wheezing or Shortness of Air. 25 vial 0   • atorvastatin (LIPITOR) 40 MG tablet Take 1 tablet by mouth Every Morning. 30 tablet 5   • benzonatate (TESSALON) 200 MG capsule Take 1 capsule by mouth 3 (Three) Times a Day As Needed for Cough. 90 capsule 0   • budesonide-formoterol (SYMBICORT) 80-4.5 MCG/ACT inhaler Inhale 2 puffs 2 (Two) Times a Day.     • buPROPion XL (WELLBUTRIN XL) 150 MG 24 hr tablet Take 150 mg by mouth Daily.     • buPROPion XL (WELLBUTRIN XL) 300 MG 24 hr tablet TK 1 T PO  QAM  1   • busPIRone (BUSPAR) 30 MG tablet TK 1 T PO  bid  0   • cloZAPine (CLOZARIL) 100 MG tablet Take 100 mg by mouth Every Night.     • folic acid (FOLVITE) 1 MG tablet Take 1 tablet by mouth Daily. 30 tablet 11   • gabapentin (NEURONTIN) 100 MG capsule 3 (Three) Times a Day.  2   • indomethacin (INDOCIN) 25 MG capsule TK ONE C PO  TID WITH MEALS ONLY WHEN HAVING EXCISIONAL HEMICRANIA  5   • levocetirizine (XYZAL) 5 MG tablet Take 1 tablet by mouth Daily. 30 tablet 5   • levothyroxine (SYNTHROID, LEVOTHROID) 200 MCG tablet TAKE 1 TABLET BY MOUTH EVERY MORNING 90 tablet 0   • levothyroxine (SYNTHROID, LEVOTHROID) 25 MCG tablet TAKE 1 TABLET BY MOUTH DAILY 90 tablet 0   • montelukast (SINGULAIR) 10 MG tablet Take 1 tablet by mouth Every Night. 30 tablet 5   • ondansetron (ZOFRAN) 8 MG tablet Take  by mouth every 8  (eight) hours as needed for nausea or vomiting.     • prazosin (MINIPRESS) 2 MG capsule TK TWO CAPSULES PO EVERY NIGHT AT BEDTIME  0   • topiramate (TOPAMAX) 200 MG tablet Take 200 mg by mouth 2 (Two) Times a Day.  2   • TREXIMET  MG per tablet 1 tablet 1 (One) Time As Needed.  3   • vitamin D (ERGOCALCIFEROL) 94369 units capsule capsule Take 1 capsule by mouth 1 (One) Time Per Week. 30 capsule 11   • VRAYLAR 3 MG capsule   2   • Fluticasone Furoate-Vilanterol (BREO ELLIPTA) 200-25 MCG/INH inhaler Inhale 1 puff Daily. 1 inhaler 1     No facility-administered medications prior to visit.        Patient Active Problem List   Diagnosis   • Thyroid cancer (CMS/HCC)   • Asthma   • Acute bronchitis   • Vitamin D deficiency   • Trigger thumb of left hand   • Tardive dyskinesia   • Syrinx of spinal cord (CMS/HCC)   • Schizoaffective disorder (CMS/[facility])   • PTSD (post-traumatic stress disorder)   • Osteopenia   • Hypothyroidism   • Episodic paroxysmal hemicrania, not intractable   • Medicare annual wellness visit, subsequent       Advanced Care Planning:  Patient does not have an advance directive - information provided to the patient today    Review of Systems   Constitutional: Negative for fever.   HENT: Negative for ear pain, rhinorrhea and sore throat.    Eyes: Negative for visual disturbance.   Respiratory: Negative for cough and shortness of breath.    Cardiovascular: Negative for chest pain and leg swelling.   Gastrointestinal: Negative for abdominal pain, diarrhea, nausea and vomiting.   Genitourinary: Negative.    Musculoskeletal: Positive for back pain.   Skin: Negative for rash.   Neurological: Negative for dizziness and headaches.   Psychiatric/Behavioral: Negative for confusion.       Compared to one year ago, the patient feels her physical health is the same.  Compared to one year ago, the patient feels her mental health is the same.    Reviewed chart for potential of high risk medication in the elderly:  "yes  Reviewed chart for potential of harmful drug interactions in the elderly:yes    Objective         Vitals:    12/18/19 1350   BP: 134/80   BP Location: Left arm   Patient Position: Sitting   Pulse: 99   Temp: 97.9 °F (36.6 °C)   SpO2: 97%   Weight: 119 kg (263 lb)   Height: 170.2 cm (67.01\")       Body mass index is 41.18 kg/m².  Discussed the patient's BMI with her. The BMI is above average; BMI management plan is completed.    Physical Exam   Constitutional: She is oriented to person, place, and time. She appears well-developed and well-nourished.   HENT:   Head: Normocephalic and atraumatic.   Right Ear: Tympanic membrane and ear canal normal.   Left Ear: Tympanic membrane and ear canal normal.   Nose: Nose normal.   Mouth/Throat: Oropharynx is clear and moist.   Eyes: Pupils are equal, round, and reactive to light. Conjunctivae are normal.   Neck: Neck supple.   Cardiovascular: Normal rate, regular rhythm and normal heart sounds.   Pulmonary/Chest: Effort normal and breath sounds normal.   Abdominal: Soft. Bowel sounds are normal.   Genitourinary:   Genitourinary Comments: Declined    Musculoskeletal: Normal range of motion.   Neurological: She is alert and oriented to person, place, and time.   Skin: Skin is warm and dry.   Psychiatric: She has a normal mood and affect.   Nursing note and vitals reviewed.            Assessment/Plan   Medicare Risks and Personalized Health Plan  CMS Preventative Services Quick Reference  Advance Directive Discussion  Colon Cancer Screening  Obesity/Overweight   Osteoprorosis Risk    The above risks/problems have been discussed with the patient.  Pertinent information has been shared with the patient in the After Visit Summary.  Follow up plans and orders are seen below in the Assessment/Plan Section.    Diagnoses and all orders for this visit:    1. Medicare annual wellness visit, subsequent (Primary)    2. Screening for colorectal cancer  -     Ambulatory Referral to " Gastroenterology    3. BMI 40.0-44.9, adult (CMS/[facility])    4. Screening for osteoporosis  -     DEXA Bone Density Axial; Future    5. Osteopenia, unspecified location  -     DEXA Bone Density Axial; Future      Follow Up:  Return in about 1 year (around 12/18/2020) for Medicare Wellness.     An After Visit Summary and PPPS were given to the patient.     Patient's Body mass index is 41.18 kg/m². BMI is above normal parameters. Recommendations include: educational material and exercise counseling.    Advance Care Planning   Advance Care Planning Discussion: Patient given [facility] living will packet.

## 2020-02-03 RX ORDER — LEVOTHYROXINE SODIUM 0.03 MG/1
TABLET ORAL
Qty: 90 TABLET | Refills: 0 | Status: SHIPPED | OUTPATIENT
Start: 2020-02-03 | End: 2020-04-23

## 2020-02-07 ENCOUNTER — LAB (OUTPATIENT)
Dept: ENDOCRINOLOGY | Age: 55
End: 2020-02-07

## 2020-02-07 DIAGNOSIS — E89.0 POSTSURGICAL HYPOTHYROIDISM: ICD-10-CM

## 2020-02-07 DIAGNOSIS — C73 THYROID CANCER (HCC): ICD-10-CM

## 2020-02-07 DIAGNOSIS — R73.03 PRE-DIABETES: ICD-10-CM

## 2020-02-07 DIAGNOSIS — E55.9 VITAMIN D DEFICIENCY: ICD-10-CM

## 2020-02-13 LAB
25(OH)D3+25(OH)D2 SERPL-MCNC: 30.2 NG/ML (ref 30–100)
ALBUMIN SERPL-MCNC: 4.1 G/DL (ref 3.5–5.2)
ALBUMIN/CREAT UR: 33 MG/G CREAT (ref 0–29)
ALBUMIN/GLOB SERPL: 1.6 G/DL
ALP SERPL-CCNC: 92 U/L (ref 39–117)
ALT SERPL-CCNC: 27 U/L (ref 1–33)
AST SERPL-CCNC: 16 U/L (ref 1–32)
BILIRUB SERPL-MCNC: 0.2 MG/DL (ref 0.2–1.2)
BUN SERPL-MCNC: 13 MG/DL (ref 6–20)
BUN/CREAT SERPL: 12.3 (ref 7–25)
CALCIUM SERPL-MCNC: 8.8 MG/DL (ref 8.6–10.5)
CHLORIDE SERPL-SCNC: 107 MMOL/L (ref 98–107)
CHOLEST SERPL-MCNC: 141 MG/DL (ref 0–200)
CO2 SERPL-SCNC: 23 MMOL/L (ref 22–29)
CREAT SERPL-MCNC: 1.06 MG/DL (ref 0.57–1)
CREAT UR-MCNC: 35.9 MG/DL
FOLATE SERPL-MCNC: >20 NG/ML (ref 4.78–24.2)
GLOBULIN SER CALC-MCNC: 2.5 GM/DL
GLUCOSE SERPL-MCNC: 123 MG/DL (ref 65–99)
HBA1C MFR BLD: 6.1 % (ref 4.8–5.6)
HDLC SERPL-MCNC: 42 MG/DL (ref 40–60)
INTERPRETATION: NORMAL
LDLC SERPL CALC-MCNC: 64 MG/DL (ref 0–100)
Lab: NORMAL
MICROALBUMIN UR-MCNC: 11.8 UG/ML
POTASSIUM SERPL-SCNC: 3.6 MMOL/L (ref 3.5–5.2)
PROT SERPL-MCNC: 6.6 G/DL (ref 6–8.5)
SODIUM SERPL-SCNC: 144 MMOL/L (ref 136–145)
T4 FREE SERPL-MCNC: 1.4 NG/DL (ref 0.93–1.7)
THYROGLOB SERPL-MCNC: <2 NG/ML
TRIGL SERPL-MCNC: 176 MG/DL (ref 0–150)
TSH SERPL DL<=0.005 MIU/L-ACNC: 1.22 UIU/ML (ref 0.27–4.2)
VIT B12 SERPL-MCNC: 848 PG/ML (ref 211–946)
VLDLC SERPL CALC-MCNC: 35.2 MG/DL (ref 5–40)

## 2020-02-21 ENCOUNTER — OFFICE VISIT (OUTPATIENT)
Dept: ENDOCRINOLOGY | Age: 55
End: 2020-02-21

## 2020-02-21 VITALS
OXYGEN SATURATION: 97 % | HEART RATE: 87 BPM | WEIGHT: 271.6 LBS | DIASTOLIC BLOOD PRESSURE: 84 MMHG | SYSTOLIC BLOOD PRESSURE: 126 MMHG | BODY MASS INDEX: 42.63 KG/M2 | HEIGHT: 67 IN

## 2020-02-21 DIAGNOSIS — R73.03 PRE-DIABETES: ICD-10-CM

## 2020-02-21 DIAGNOSIS — E89.0 POSTSURGICAL HYPOTHYROIDISM: ICD-10-CM

## 2020-02-21 DIAGNOSIS — C73 THYROID CANCER (HCC): Primary | ICD-10-CM

## 2020-02-21 DIAGNOSIS — E55.9 VITAMIN D DEFICIENCY: ICD-10-CM

## 2020-02-21 PROCEDURE — 99214 OFFICE O/P EST MOD 30 MIN: CPT | Performed by: INTERNAL MEDICINE

## 2020-02-21 NOTE — PROGRESS NOTES
54 y.o.    Patient Care Team:  Rosio Palmer APRN as PCP - General (Family Medicine)    Chief Complaint:    FOLLOW UP/ THYROID CANCER  Subjective     HPI    Urvashi Ornelas,52 y.o. white female is here as a new patient for the management of metastatic papillary thyroid cancer status post total thyroidectomy and radioactive iodine ablation.  Consulted by Dr. Ashby.     Most of the information is obtained from prior medical records and also from the patient. According to the patient she was diagnosed with hyperparathyroidism about 9 years ago due to the diagnosis of elevated calcium levels on her routine blood work up.  She underwent right inferior parathyroidectomy.  Work up during that time also revealed a left thyroid lobe she subsequently underwent thyroid biopsy which revealed papillary thyroid cancer.  On 2/5/2016 patient underwent total thyroidectomy with the resection of the paratracheal lymph nodes and parathyroid exploration.  The surgical pathology was consistent with multifocal papillary carcinoma follicular variant with infiltration.  The largest nodule on the left measured 13 x 8 x 9 mm.  Carcinoma was present at the peripheral margin.  There was no evidence of angiolymphatic invasion.  3 lymph nodes were identified which had metastasis less than one millimeters in size.  Along with right superior parathyroid adenoma.  Postoperative ultrasound showed residual level III lymph nodes in the left neck measuring 2.2 x 0.8 x 1.3 cm in size.  Ultrasound-guided biopsy showed pathology consistent with papillary thyroid cancer. Subsequently patient underwent left neck dissection in April 2016 by Dr. Ashby.  Surgical pathology was consistent with metastatic papillary carcinoma involving 6 out of the 19 lymph nodes.  There was no evidence of extracapsular extension.  Patient was being followed by Dr. Carney from radiation oncology for radioactive iodine ablation and whole-body scan.     In June 2016 patient  received Thyrogen stimulated 125 mCi of I 131.  She had whole-body scan the week after and then another whole-body scan on January 27, 2017.      WBS -   06/15/2016 - Residual activity of the thyroid resection bed represents residual functional thyroid tissue or malignancy   1/27/17 -    1. Unremarkable whole-body thyroid scintigraphy. No evidence of residual or recurrent disease.    2. Activity on both sides of the neck base likely representing contamination.  Labs at the time of the scans - Tg levels - 0.8 and less than 0.1     Last thyroid ultrasound from October 2017 performed at [facility] was within normal limits with no concerning features.  I do not have the physical report and we tried to get the report from [facility] and it was not sent to us.  She did get an neck CT scan - 07/2018 - no concerning lymph node noted in her neck.   She had repeat thyroid U/S on Oct 2018, through  and was told that her thyroid bed was empty with no concerning lymph nodes.   ( I dont have this report)  Latest thyroid ultrasound from November 2019 performed at [facility].  We do not have the report but per patient everything was within normal limits.    Today in clinic patient reports that she is on 212.5 mcg oral daily of levothyroxine Monday to Friday and to 25 mcg on Saturday and Sunday.    She does complain of her energy levels being fair, weight has been stable.  Normal bowel movements.  No significant hyper or hypothyroid symptoms.     Hyperparathyroidism-patient had right superior and inferior parathyroidectomy.  Currently not on any calcium supplementation.       Prediabetes  Currently on diabetic diet.    Reviewed primary care physician's/consulting physician documentation and lab results :     Interval History      The following portions of the patient's history were reviewed and updated as appropriate: allergies, current medications, past family history, past medical history, past social history,  past surgical history and problem list.    Past Medical History:   Diagnosis Date   • Abnormal urinalysis    • Acute bronchitis    • Acute frontal sinusitis    • Acute maxillary sinusitis    • Acute pain of right shoulder    • Acute sinusitis    • Allergic rhinitis    • Anemia    • Anxiety    • Arthritis    • Asthma    • Asthma exacerbation    • BMI 40.0-44.9, adult (CMS/[facility])    • Cancer (CMS/[facility])     THYROID   • Constipation    • Cough    • Depression    • Diaphragm paralysis    • Disease of thyroid gland     CANCER   • Dizziness    • Drug-induced nausea and vomiting    • Dyspnea    • Dysuria    • Fatigue    • Fatty liver    • H/O degenerative disc disease    • History of hallucinations    • Hypercalcemia    • Hyperlipidemia    • Hypertension    • Hypertensive kidney disease with chronic kidney disease stage III (CMS/[facility])    • Hypertrophic toenail    • Hypokalemia    • Hypothyroidism    • Joint pain    • Lumbago    • Lymph node cancer (CMS/HCC)    • Manic episode without psychotic symptoms (CMS/HCC)    • Medicare annual wellness visit, subsequent    • Migraine    • Migraine with aura    • Nausea    • Osteopenia of lumbar spine    • Overweight    • Pleurisy    • Pneumonia    • Post traumatic stress disorder    • Postmenopausal    • Rheumatoid arthritis (CMS/[facility])    • Right knee pain    • Shortness of breath    • Shoulder tendinitis    • Sleep apnea    • Thyroid disease    • Urinary frequency      Family History   Problem Relation Age of Onset   • Hyperlipidemia Mother    • Hypertension Mother    • Osteoarthritis Mother    • Arthritis Mother         rheumatoid   • Other Mother         acute cutaneous lupus erythematosus   • Bipolar disorder Father    • Hypertension Father    • Bipolar disorder Brother    • HIV Brother    • Alcohol abuse Paternal Grandfather    • Hypertension Paternal Grandfather      Social History     Socioeconomic History   • Marital status:      Spouse name: Not on file   • Number of  children: Not on file   • Years of education: Not on file   • Highest education level: Not on file   Tobacco Use   • Smoking status: Former Smoker   • Smokeless tobacco: Never Used   Substance and Sexual Activity   • Alcohol use: No   • Drug use: No     Allergies   Allergen Reactions   • Depakote Er [Divalproex Sodium Er] Other (See Comments)     HAIR FALLS OUT   • Divalproex Sodium Unknown (See Comments)   • Lamictal [Lamotrigine] Other (See Comments) and Swelling     TONGUE SWELLS AND PEELS   • Risperidone Unknown (See Comments)   • Risperidone And Related Other (See Comments)     PREGNANCY SYMPTOMS   • Valproic Acid Unknown (See Comments)     Other reaction(s): Unknown (See Comments)     • Erythromycin Rash   • Penicillins Rash   • Toradol [Ketorolac Tromethamine] Rash       Current Outpatient Medications:   •  albuterol (PROVENTIL HFA;VENTOLIN HFA) 108 (90 BASE) MCG/ACT inhaler, Inhale 2 puffs every 4 (four) hours as needed for wheezing., Disp: , Rfl:   •  albuterol (PROVENTIL) (2.5 MG/3ML) 0.083% nebulizer solution, Take 2.5 mg by nebulization Every 6 (Six) Hours As Needed for Wheezing or Shortness of Air., Disp: 25 vial, Rfl: 0  •  atorvastatin (LIPITOR) 40 MG tablet, Take 1 tablet by mouth Every Morning., Disp: 30 tablet, Rfl: 5  •  Brexpiprazole (REXULTI) 2 MG tablet, Daily., Disp: , Rfl:   •  budesonide-formoterol (SYMBICORT) 80-4.5 MCG/ACT inhaler, Inhale 2 puffs 2 (Two) Times a Day., Disp: , Rfl:   •  buPROPion XL (WELLBUTRIN XL) 150 MG 24 hr tablet, Take 150 mg by mouth Daily., Disp: , Rfl:   •  buPROPion XL (WELLBUTRIN XL) 300 MG 24 hr tablet, TK 1 T PO  QAM, Disp: , Rfl: 1  •  busPIRone (BUSPAR) 30 MG tablet, TK 1 T PO  bid, Disp: , Rfl: 0  •  cloZAPine (CLOZARIL) 100 MG tablet, Take 100 mg by mouth Every Night., Disp: , Rfl:   •  folic acid (FOLVITE) 1 MG tablet, Take 1 tablet by mouth Daily., Disp: 30 tablet, Rfl: 11  •  gabapentin (NEURONTIN) 100 MG capsule, 3 (Three) Times a Day., Disp: , Rfl: 2  •   levocetirizine (XYZAL) 5 MG tablet, Take 1 tablet by mouth Daily., Disp: 30 tablet, Rfl: 5  •  levothyroxine (SYNTHROID, LEVOTHROID) 200 MCG tablet, TAKE 1 TABLET BY MOUTH EVERY MORNING, Disp: 90 tablet, Rfl: 0  •  levothyroxine (SYNTHROID, LEVOTHROID) 25 MCG tablet, TAKE 1 TABLET BY MOUTH EVERY MORNING, Disp: 90 tablet, Rfl: 0  •  montelukast (SINGULAIR) 10 MG tablet, Take 1 tablet by mouth Every Night., Disp: 30 tablet, Rfl: 5  •  Multiple Vitamins-Minerals (MULTIVITAMIN WOMEN 50+) tablet, Take 1 tablet/day by mouth., Disp: , Rfl:   •  prazosin (MINIPRESS) 2 MG capsule, TK TWO CAPSULES PO EVERY NIGHT AT BEDTIME, Disp: , Rfl: 0  •  topiramate (TOPAMAX) 200 MG tablet, Take 200 mg by mouth 2 (Two) Times a Day., Disp: , Rfl: 2  •  TREXIMET  MG per tablet, 1 tablet 1 (One) Time As Needed., Disp: , Rfl: 3  •  AIMOVIG 70 MG/ML prefilled syringe, INJECT 1 ML INTO THE SKIN Q 30 DAYS UTD, Disp: , Rfl: 5  •  benzonatate (TESSALON) 200 MG capsule, Take 1 capsule by mouth 3 (Three) Times a Day As Needed for Cough., Disp: 90 capsule, Rfl: 0  •  indomethacin (INDOCIN) 25 MG capsule, TK ONE C PO  TID WITH MEALS ONLY WHEN HAVING EXCISIONAL HEMICRANIA, Disp: , Rfl: 5  •  ondansetron (ZOFRAN) 8 MG tablet, Take  by mouth every 8 (eight) hours as needed for nausea or vomiting., Disp: , Rfl:   •  vitamin D (ERGOCALCIFEROL) 19801 units capsule capsule, Take 1 capsule by mouth 1 (One) Time Per Week., Disp: 30 capsule, Rfl: 11  •  VRAYLAR 3 MG capsule, , Disp: , Rfl: 2        Review of Systems   Constitutional: Negative for appetite change, fatigue and fever.   Eyes: Negative for visual disturbance.   Respiratory: Negative for shortness of breath.    Cardiovascular: Negative for palpitations and leg swelling.   Gastrointestinal: Negative for abdominal pain and vomiting.   Endocrine: Negative for polydipsia and polyuria.   Musculoskeletal: Negative for joint swelling and neck pain.   Skin: Negative for rash.   Neurological: Negative  "for weakness and numbness.   Psychiatric/Behavioral: Negative for behavioral problems.     I have reviewed the ROS as documented by the MA; Bakari Mora MD.      Objective       Vitals:    02/21/20 1313   BP: 126/84   BP Location: Left arm   Patient Position: Sitting   Cuff Size: Large Adult   Pulse: 87   SpO2: 97%   Weight: 123 kg (271 lb 9.6 oz)   Height: 170.2 cm (67.01\")     Body mass index is 42.53 kg/m².      Physical Exam   Constitutional: She is oriented to person, place, and time. She appears well-nourished.   Obese     HENT:   Head: Normocephalic and atraumatic.   Wide neck   Eyes: Conjunctivae and EOM are normal. No scleral icterus.   Neck: Normal range of motion. Neck supple. No thyromegaly present.   Empty thyroid bed   Cardiovascular: Normal rate and normal heart sounds.   Pulmonary/Chest: Effort normal and breath sounds normal. No stridor. She has no wheezes.   Abdominal: Soft. Bowel sounds are normal. She exhibits no distension. There is no tenderness.   Central obesity   Musculoskeletal: She exhibits no edema or tenderness.   Neurological: She is alert and oriented to person, place, and time.   Skin: Skin is warm and dry. She is not diaphoretic.   Psychiatric: She has a normal mood and affect.   Vitals reviewed.    Results Review:     I reviewed the patient's new clinical results and mentioned them above in HPI and in plan as well.    Medical records reviewed  Summary:Done      Lab on 02/07/2020   Component Date Value Ref Range Status   • 25 Hydroxy, Vitamin D 02/07/2020 30.2  30.0 - 100.0 ng/ml Final    Comment: Results may be falsely increased if patient taking Biotin.  Reference Range for Total Vitamin D 25(OH)  Deficiency <20.0 ng/mL  Insufficiency 21-29 ng/mL  Sufficiency  ng/mL  Toxicity >100 ng/ml     • Vitamin B-12 02/07/2020 848  211 - 946 pg/mL Final    Results may be falsely increased if patient taking Biotin.   • Folate 02/07/2020 >20.00  4.78 - 24.20 ng/mL Final    Results may " be falsely increased if patient taking Biotin.   • Creatinine, Urine 02/07/2020 35.9  Not Estab. mg/dL Final   • Microalbumin, Urine 02/07/2020 11.8  Not Estab. ug/mL Final   • Microalbumin/Creatinine Ratio 02/07/2020 33* 0 - 29 mg/g creat Final    Comment:                        Normal:                0 -  29                         Moderately increased: 30 - 300                         Severely increased:       >300                **Please note reference interval change**     • Total Cholesterol 02/07/2020 141  0 - 200 mg/dL Final   • Triglycerides 02/07/2020 176* 0 - 150 mg/dL Final   • HDL Cholesterol 02/07/2020 42  40 - 60 mg/dL Final   • VLDL Cholesterol 02/07/2020 35.2  5 - 40 mg/dL Final   • LDL Cholesterol  02/07/2020 64  0 - 100 mg/dL Final   • Glucose 02/07/2020 123* 65 - 99 mg/dL Final   • BUN 02/07/2020 13  6 - 20 mg/dL Final   • Creatinine 02/07/2020 1.06* 0.57 - 1.00 mg/dL Final   • eGFR Non African Am 02/07/2020 54* >60 mL/min/1.73 Final   • eGFR African Am 02/07/2020 66  >60 mL/min/1.73 Final   • BUN/Creatinine Ratio 02/07/2020 12.3  7.0 - 25.0 Final   • Sodium 02/07/2020 144  136 - 145 mmol/L Final   • Potassium 02/07/2020 3.6  3.5 - 5.2 mmol/L Final   • Chloride 02/07/2020 107  98 - 107 mmol/L Final   • Total CO2 02/07/2020 23.0  22.0 - 29.0 mmol/L Final   • Calcium 02/07/2020 8.8  8.6 - 10.5 mg/dL Final   • Total Protein 02/07/2020 6.6  6.0 - 8.5 g/dL Final   • Albumin 02/07/2020 4.10  3.50 - 5.20 g/dL Final   • Globulin 02/07/2020 2.5  gm/dL Final   • A/G Ratio 02/07/2020 1.6  g/dL Final   • Total Bilirubin 02/07/2020 0.2  0.2 - 1.2 mg/dL Final   • Alkaline Phosphatase 02/07/2020 92  39 - 117 U/L Final   • AST (SGOT) 02/07/2020 16  1 - 32 U/L Final   • ALT (SGPT) 02/07/2020 27  1 - 33 U/L Final   • Hemoglobin A1C 02/07/2020 6.10* 4.80 - 5.60 % Final    Comment: Hemoglobin A1C Ranges:  Increased Risk for Diabetes  5.7% to 6.4%  Diabetes                     >= 6.5%  Diabetic Goal                <  7.0%     • Thyroglobulin (TG-JOSE DAVID) 02/07/2020 <2.0  ng/mL Final    Comment: Reference Range:  Pubertal Children  and Adults: <40  According to the National Academy of Clinical Biochemistry,  the reference interval for Thyroglobulin (TG) should be  related to euthyroid patients and not for patients who  underwent thyroidectomy.  TG reference intervals for these  patients depend on the residual mass of the thyroid tissue  left after surgery.  Establishing a post-operative baseline  is recommended.  The assay quantitation limit is 2.0 ng/mL.     • Free T4 02/07/2020 1.40  0.93 - 1.70 ng/dL Final    Results may be falsely increased if patient taking Biotin.   • TSH 02/07/2020 1.220  0.270 - 4.200 uIU/mL Final   • Interpretation 02/07/2020 Note   Final    Supplemental report is available.   • PDF Image 02/07/2020 Not applicable   Final     Lab Results   Component Value Date    HGBA1C 6.10 (H) 02/07/2020    HGBA1C 6.00 (H) 08/05/2019    HGBA1C 6.10 (H) 02/04/2019     Lab Results   Component Value Date    MICROALBUR 11.8 02/07/2020    CREATININE 1.06 (H) 02/07/2020     Imaging Results (Most Recent)     None                Assessment and Plan:    Urvashi was seen today for thyroid cancer.    Diagnoses and all orders for this visit:    Thyroid cancer (CMS/[facility])  -     TSH; Future  -     Thyroglobulin; Future  -     T3, Free; Future  -     T4, Free; Future  -     Hemoglobin A1c; Future  -     Basic Metabolic Panel; Future  -     Lipid Panel; Future  -     Vitamin B12 & Folate; Future  -     Vitamin D 25 Hydroxy; Future    Vitamin D deficiency   -     TSH; Future  -     Thyroglobulin; Future  -     T3, Free; Future  -     T4, Free; Future  -     Hemoglobin A1c; Future  -     Basic Metabolic Panel; Future  -     Lipid Panel; Future  -     Vitamin B12 & Folate; Future  -     Vitamin D 25 Hydroxy; Future    Postsurgical hypothyroidism  -     TSH; Future  -     Thyroglobulin; Future  -     T3, Free; Future  -     T4, Free; Future  -   "   Hemoglobin A1c; Future  -     Basic Metabolic Panel; Future  -     Lipid Panel; Future  -     Vitamin B12 & Folate; Future  -     Vitamin D 25 Hydroxy; Future    Pre-diabetes  -     TSH; Future  -     Thyroglobulin; Future  -     T3, Free; Future  -     T4, Free; Future  -     Hemoglobin A1c; Future  -     Basic Metabolic Panel; Future  -     Lipid Panel; Future  -     Vitamin B12 & Folate; Future  -     Vitamin D 25 Hydroxy; Future        History of thyroid cancer  We will try to get the thyroid ultrasound records from [facility].    Postoperative hypothyroidism  TSH levels are not at goal given her thyroid cancer  Adjust levothyroxine to 225 mcg oral daily  Repeat thyroid panel in 6 weeks from now.    Prediabetes  A1c is slightly getting worse  Emphasized the importance of diet control in the blood sugar management.    Obesity  Discussed calorie counting - limit to 1500 justus per day and exercising 2-3 times a week.    Reviewed Lab results with the patient.             Bakari Mora MD  02/21/20    EMR Dragon / transcription disclaimer:     \"Dictated utilizing Dragon dictation\".  "

## 2020-03-05 ENCOUNTER — OFFICE VISIT (OUTPATIENT)
Dept: FAMILY MEDICINE CLINIC | Facility: CLINIC | Age: 55
End: 2020-03-05

## 2020-03-05 VITALS
BODY MASS INDEX: 42.94 KG/M2 | TEMPERATURE: 99.3 F | HEART RATE: 96 BPM | SYSTOLIC BLOOD PRESSURE: 110 MMHG | HEIGHT: 67 IN | OXYGEN SATURATION: 96 % | WEIGHT: 273.6 LBS | DIASTOLIC BLOOD PRESSURE: 82 MMHG

## 2020-03-05 DIAGNOSIS — J01.10 ACUTE FRONTAL SINUSITIS, RECURRENCE NOT SPECIFIED: Primary | ICD-10-CM

## 2020-03-05 PROCEDURE — 99213 OFFICE O/P EST LOW 20 MIN: CPT | Performed by: NURSE PRACTITIONER

## 2020-03-05 RX ORDER — GABAPENTIN 300 MG/1
300 CAPSULE ORAL NIGHTLY
COMMUNITY
End: 2020-07-17 | Stop reason: HOSPADM

## 2020-03-05 RX ORDER — DOXYCYCLINE 100 MG/1
100 CAPSULE ORAL 2 TIMES DAILY
Qty: 20 CAPSULE | Refills: 0 | Status: SHIPPED | OUTPATIENT
Start: 2020-03-05 | End: 2020-03-15

## 2020-03-05 NOTE — PROGRESS NOTES
Subjective   Urvashi Ornelas is a 54 y.o. female.     Chief Complaint   Patient presents with   • Sinusitis     pain, pressure face   • Headache   • Nasal Congestion       Sinusitis   This is a new problem. The current episode started 1 to 4 weeks ago. There has been no fever. Associated symptoms include congestion and sinus pressure. Pertinent negatives include no coughing, ear pain or sore throat. Past treatments include acetaminophen. The treatment provided no relief.          The following portions of the patient's history were reviewed and updated as appropriate: allergies, current medications, past family history, past medical history, past social history, past surgical history and problem list.    Past Medical History:   Diagnosis Date   • Abnormal urinalysis    • Acute bronchitis    • Acute frontal sinusitis    • Acute maxillary sinusitis    • Acute pain of right shoulder    • Acute sinusitis    • Allergic rhinitis    • Anemia    • Anxiety    • Arthritis    • Asthma    • Asthma exacerbation    • BMI 40.0-44.9, adult (CMS/[facility])    • Cancer (CMS/[facility])     THYROID   • Constipation    • Cough    • Depression    • Diaphragm paralysis    • Disease of thyroid gland     CANCER   • Dizziness    • Drug-induced nausea and vomiting    • Dyspnea    • Dysuria    • Fatigue    • Fatty liver    • H/O degenerative disc disease    • History of hallucinations    • Hypercalcemia    • Hyperlipidemia    • Hypertension    • Hypertensive kidney disease with chronic kidney disease stage III (CMS/[facility])    • Hypertrophic toenail    • Hypokalemia    • Hypothyroidism    • Joint pain    • Lumbago    • Lymph node cancer (CMS/[facility])    • Manic episode without psychotic symptoms (CMS/[facility])    • Medicare annual wellness visit, subsequent    • Migraine    • Migraine with aura    • Nausea    • Osteopenia of lumbar spine    • Overweight    • Pleurisy    • Pneumonia    • Post traumatic stress disorder    • Postmenopausal    • Rheumatoid arthritis  (CMS/HCC)    • Right knee pain    • Shortness of breath    • Shoulder tendinitis    • Sleep apnea    • Thyroid disease    • Urinary frequency        Past Surgical History:   Procedure Laterality Date   •  SECTION      X2   • CHOLECYSTECTOMY     • HAND SURGERY Bilateral     WRISTS PLATE PLACEMENT   • HEMATOMA EVACUATION HEAD/NECK Left 2016    Procedure: HEMATOMA EVACUATION NECK;  Surgeon: Dayo Ashby MD;  Location: [facility] [place];  Service:    • HYSTERECTOMY     • KNEE SURGERY Bilateral     4YO PIGEON TOE SX   • LUNG SURGERY Left     LEFT LOWER LOBE   • NECK DISSECTION Left 2016    Procedure: LEFT MODIFIED RADICAL NECK DISSECTION;  Surgeon: Dayo Ashby MD;  Location: [facility] [place], operated by [facility];  Service:    • NEPHRECTOMY PARTIAL Left    • PARATHYROID GLAND SURGERY         • TOTAL THYROIDECTOMY      PARATHYROID 2016       Family History   Problem Relation Age of Onset   • Hyperlipidemia Mother    • Hypertension Mother    • Osteoarthritis Mother    • Arthritis Mother         rheumatoid   • Other Mother         acute cutaneous lupus erythematosus   • Bipolar disorder Father    • Hypertension Father    • Bipolar disorder Brother    • HIV Brother    • Alcohol abuse Paternal Grandfather    • Hypertension Paternal Grandfather        Social History     Socioeconomic History   • Marital status:      Spouse name: Not on file   • Number of children: Not on file   • Years of education: Not on file   • Highest education level: Not on file   Tobacco Use   • Smoking status: Former Smoker   • Smokeless tobacco: Never Used   Substance and Sexual Activity   • Alcohol use: No   • Drug use: No       Review of Systems   Constitutional: Negative for fever.   HENT: Positive for congestion and sinus pressure. Negative for ear pain and sore throat.    Respiratory: Negative for cough.        Objective   Vitals:    20 1344   BP: 110/82   BP Location: Left arm   Patient Position: Sitting   Cuff Size: Adult  "  Pulse: 96   Temp: 99.3 °F (37.4 °C)   TempSrc: Temporal   SpO2: 96%   Weight: 124 kg (273 lb 9.6 oz)   Height: 170.2 cm (67\")      Body mass index is 42.85 kg/m².  Physical Exam   Constitutional: She is oriented to person, place, and time. She appears well-developed and well-nourished.   HENT:   Head: Normocephalic and atraumatic.   Right Ear: Tympanic membrane and ear canal normal.   Left Ear: Tympanic membrane and ear canal normal.   Nose: Right sinus exhibits frontal sinus tenderness. Left sinus exhibits frontal sinus tenderness.   Mouth/Throat: Oropharynx is clear and moist.   Cardiovascular: Normal rate, regular rhythm and normal heart sounds.   Pulmonary/Chest: Effort normal and breath sounds normal.   Neurological: She is alert and oriented to person, place, and time.   Skin: Skin is warm and dry.   Psychiatric: She has a normal mood and affect.   Nursing note and vitals reviewed.        Assessment/Plan   Urvashi was seen today for sinusitis, headache and nasal congestion.    Diagnoses and all orders for this visit:    Acute frontal sinusitis, recurrence not specified  -     doxycycline (MONODOX) 100 MG capsule; Take 1 capsule by mouth 2 (Two) Times a Day for 10 days.    Return if symptoms worsen or fail to improve.             "

## 2020-04-06 RX ORDER — ATORVASTATIN CALCIUM 40 MG/1
40 TABLET, FILM COATED ORAL EVERY MORNING
Qty: 90 TABLET | Refills: 0 | Status: SHIPPED | OUTPATIENT
Start: 2020-04-06 | End: 2020-07-02 | Stop reason: SDUPTHER

## 2020-04-06 RX ORDER — ATORVASTATIN CALCIUM 40 MG/1
40 TABLET, FILM COATED ORAL EVERY MORNING
Qty: 30 TABLET | Refills: 5 | Status: CANCELLED | OUTPATIENT
Start: 2020-04-06

## 2020-04-23 RX ORDER — LEVOTHYROXINE SODIUM 0.03 MG/1
TABLET ORAL
Qty: 90 TABLET | Refills: 0 | Status: SHIPPED | OUTPATIENT
Start: 2020-04-23 | End: 2020-07-22

## 2020-04-23 RX ORDER — LEVOTHYROXINE SODIUM 0.2 MG/1
200 TABLET ORAL EVERY MORNING
Qty: 90 TABLET | Refills: 0 | Status: SHIPPED | OUTPATIENT
Start: 2020-04-23 | End: 2020-07-22

## 2020-05-08 RX ORDER — MONTELUKAST SODIUM 10 MG/1
10 TABLET ORAL NIGHTLY
Qty: 30 TABLET | Refills: 2 | Status: SHIPPED | OUTPATIENT
Start: 2020-05-08 | End: 2020-07-17 | Stop reason: HOSPADM

## 2020-05-11 RX ORDER — LEVOCETIRIZINE DIHYDROCHLORIDE 5 MG/1
5 TABLET, FILM COATED ORAL DAILY
Qty: 30 TABLET | Refills: 5 | Status: SHIPPED | OUTPATIENT
Start: 2020-05-11 | End: 2020-07-17 | Stop reason: HOSPADM

## 2020-06-30 ENCOUNTER — OFFICE VISIT (OUTPATIENT)
Dept: FAMILY MEDICINE CLINIC | Facility: CLINIC | Age: 55
End: 2020-06-30

## 2020-06-30 VITALS
TEMPERATURE: 97.3 F | OXYGEN SATURATION: 94 % | DIASTOLIC BLOOD PRESSURE: 72 MMHG | WEIGHT: 268 LBS | HEART RATE: 101 BPM | BODY MASS INDEX: 42.06 KG/M2 | HEIGHT: 67 IN | SYSTOLIC BLOOD PRESSURE: 116 MMHG

## 2020-06-30 DIAGNOSIS — Z12.11 SCREEN FOR COLON CANCER: Primary | ICD-10-CM

## 2020-06-30 DIAGNOSIS — J45.901 ASTHMA WITH ACUTE EXACERBATION, UNSPECIFIED ASTHMA SEVERITY, UNSPECIFIED WHETHER PERSISTENT: ICD-10-CM

## 2020-06-30 PROCEDURE — 99213 OFFICE O/P EST LOW 20 MIN: CPT | Performed by: FAMILY MEDICINE

## 2020-06-30 RX ORDER — ASENAPINE MALEATE 5 MG/1
TABLET SUBLINGUAL 2 TIMES DAILY
COMMUNITY
Start: 2020-05-28 | End: 2021-08-11 | Stop reason: ALTCHOICE

## 2020-06-30 RX ORDER — PREDNISONE 1 MG/1
TABLET ORAL
Qty: 21 TABLET | Refills: 0 | Status: SHIPPED | OUTPATIENT
Start: 2020-06-30 | End: 2020-07-17 | Stop reason: HOSPADM

## 2020-06-30 NOTE — PROGRESS NOTES
Chief Complaint   Patient presents with   • Asthma     Pt states asthma has gotten worse. She is more short of breath than usual. No cough or fever.        Subjective   Urvashi Ornelas is a 55 y.o. female.     History of Present Illness   PT has had asthma acting up since Saturday and has been painful to breath.  No fever or chills or cough. She has some wheezing present. She is on symbicort and proair for this and does help some.   The following portions of the patient's history were reviewed and updated as appropriate: allergies, current medications, past family history, past medical history, past social history, past surgical history and problem list.    Review of Systems   Constitutional: Positive for fatigue.   Eyes: Negative for blurred vision.   Respiratory: Positive for shortness of breath. Negative for cough and chest tightness.    Cardiovascular: Negative for chest pain, palpitations and leg swelling.   Neurological: Negative for dizziness, light-headedness and headache.       Patient Active Problem List   Diagnosis   • Thyroid cancer (CMS/HCC)   • Asthma   • Acute bronchitis   • Vitamin D deficiency   • Trigger thumb of left hand   • Tardive dyskinesia   • Syrinx of spinal cord (CMS/HCC)   • Schizoaffective disorder (CMS/HCC)   • PTSD (post-traumatic stress disorder)   • Osteopenia   • Hypothyroidism   • Episodic paroxysmal hemicrania, not intractable   • Medicare annual wellness visit, subsequent       Allergies   Allergen Reactions   • Depakote Er [Divalproex Sodium Er] Other (See Comments)     HAIR FALLS OUT   • Divalproex Sodium Unknown (See Comments)   • Lamictal [Lamotrigine] Other (See Comments) and Swelling     TONGUE SWELLS AND PEELS   • Risperidone Unknown (See Comments)   • Risperidone And Related Other (See Comments)     PREGNANCY SYMPTOMS   • Valproic Acid Unknown (See Comments)     Other reaction(s): Unknown (See Comments)     • Erythromycin Rash   • Penicillins Rash   • Toradol [Ketorolac  Tromethamine] Rash         Current Outpatient Medications:   •  AIMOVIG 70 MG/ML prefilled syringe, INJECT 1 ML INTO THE SKIN Q 30 DAYS UTD, Disp: , Rfl: 5  •  albuterol (PROVENTIL HFA;VENTOLIN HFA) 108 (90 BASE) MCG/ACT inhaler, Inhale 2 puffs every 4 (four) hours as needed for wheezing., Disp: , Rfl:   •  albuterol (PROVENTIL) (2.5 MG/3ML) 0.083% nebulizer solution, Take 2.5 mg by nebulization Every 6 (Six) Hours As Needed for Wheezing or Shortness of Air., Disp: 25 vial, Rfl: 0  •  atorvastatin (LIPITOR) 40 MG tablet, Take 1 tablet by mouth Every Morning., Disp: 90 tablet, Rfl: 0  •  benzonatate (TESSALON) 200 MG capsule, Take 1 capsule by mouth 3 (Three) Times a Day As Needed for Cough., Disp: 90 capsule, Rfl: 0  •  Brexpiprazole (REXULTI) 2 MG tablet, Daily., Disp: , Rfl:   •  budesonide-formoterol (SYMBICORT) 80-4.5 MCG/ACT inhaler, Inhale 2 puffs 2 (Two) Times a Day., Disp: , Rfl:   •  buPROPion XL (WELLBUTRIN XL) 150 MG 24 hr tablet, Take 150 mg by mouth Daily., Disp: , Rfl:   •  buPROPion XL (WELLBUTRIN XL) 300 MG 24 hr tablet, TK 1 T PO  QAM, Disp: , Rfl: 1  •  busPIRone (BUSPAR) 30 MG tablet, TK 1 T PO  bid, Disp: , Rfl: 0  •  cloZAPine (CLOZARIL) 100 MG tablet, Take 100 mg by mouth Every Night., Disp: , Rfl:   •  gabapentin (NEURONTIN) 100 MG capsule, 3 (Three) Times a Day., Disp: , Rfl: 2  •  gabapentin (NEURONTIN) 300 MG capsule, Take 300 mg by mouth Every Night., Disp: , Rfl:   •  indomethacin (INDOCIN) 25 MG capsule, TK ONE C PO  TID WITH MEALS ONLY WHEN HAVING EXCISIONAL HEMICRANIA, Disp: , Rfl: 5  •  levocetirizine (XYZAL) 5 MG tablet, Take 1 tablet by mouth Daily., Disp: 30 tablet, Rfl: 5  •  levothyroxine (SYNTHROID, LEVOTHROID) 200 MCG tablet, TAKE 1 TABLET BY MOUTH EVERY MORNING, Disp: 90 tablet, Rfl: 0  •  levothyroxine (SYNTHROID, LEVOTHROID) 25 MCG tablet, TAKE 1 TABLET BY MOUTH EVERY MORNING, Disp: 90 tablet, Rfl: 0  •  montelukast (SINGULAIR) 10 MG tablet, Take 1 tablet by mouth Every  Night., Disp: 30 tablet, Rfl: 2  •  Multiple Vitamins-Minerals (MULTIVITAMIN WOMEN 50+) tablet, Take 1 tablet/day by mouth., Disp: , Rfl:   •  ondansetron (ZOFRAN) 8 MG tablet, Take  by mouth every 8 (eight) hours as needed for nausea or vomiting., Disp: , Rfl:   •  prazosin (MINIPRESS) 2 MG capsule, TK TWO CAPSULES PO EVERY NIGHT AT BEDTIME, Disp: , Rfl: 0  •  topiramate (TOPAMAX) 200 MG tablet, Take 200 mg by mouth 2 (Two) Times a Day., Disp: , Rfl: 2  •  TREXIMET  MG per tablet, 1 tablet 1 (One) Time As Needed., Disp: , Rfl: 3  •  predniSONE (DELTASONE) 5 MG tablet, 6 pills day 1 5 pills day 2 4 pills day 3 3 pills day 4 2 pills day 5 1 pill day 6, Disp: 21 tablet, Rfl: 0  •  SAPHRIS 5 MG sublingual tablet sublingual tablet, , Disp: , Rfl:     Past Medical History:   Diagnosis Date   • Abnormal urinalysis    • Acute bronchitis    • Acute frontal sinusitis    • Acute maxillary sinusitis    • Acute pain of right shoulder    • Acute sinusitis    • Allergic rhinitis    • Anemia    • Anxiety    • Arthritis    • Asthma    • Asthma exacerbation    • BMI 40.0-44.9, adult (CMS/[facility])    • Cancer (CMS/[facility])     THYROID   • Constipation    • Cough    • Depression    • Diaphragm paralysis    • Disease of thyroid gland     CANCER   • Dizziness    • Drug-induced nausea and vomiting    • Dyspnea    • Dysuria    • Fatigue    • Fatty liver    • H/O degenerative disc disease    • History of hallucinations    • Hypercalcemia    • Hyperlipidemia    • Hypertension    • Hypertensive kidney disease with chronic kidney disease stage III (CMS/[facility])    • Hypertrophic toenail    • Hypokalemia    • Hypothyroidism    • Joint pain    • Lumbago    • Lymph node cancer (CMS/[facility])    • Manic episode without psychotic symptoms (CMS/[facility])    • Medicare annual wellness visit, subsequent    • Migraine    • Migraine with aura    • Nausea    • Osteopenia of lumbar spine    • Overweight    • Pleurisy    • Pneumonia    • Post traumatic stress disorder   "  • Postmenopausal    • Rheumatoid arthritis (CMS/HCC)    • Right knee pain    • Shortness of breath    • Shoulder tendinitis    • Sleep apnea    • Thyroid disease    • Urinary frequency        Past Surgical History:   Procedure Laterality Date   •  SECTION      X2   • CHOLECYSTECTOMY     • HAND SURGERY Bilateral     WRISTS PLATE PLACEMENT   • HEMATOMA EVACUATION HEAD/NECK Left 2016    Procedure: HEMATOMA EVACUATION NECK;  Surgeon: Dayo Ashby MD;  Location: [facility] OR [facility] [place];  Service:    • HYSTERECTOMY     • KNEE SURGERY Bilateral     4YO PIGEON TOE SX   • LUNG SURGERY Left     LEFT LOWER LOBE   • NECK DISSECTION Left 2016    Procedure: LEFT MODIFIED RADICAL NECK DISSECTION;  Surgeon: Dayo Ashby MD;  Location: [facility] OR [facility] [place];  Service:    • NEPHRECTOMY PARTIAL Left    • PARATHYROID GLAND SURGERY         • TOTAL THYROIDECTOMY      PARATHYROID 2016       Family History   Problem Relation Age of Onset   • Hyperlipidemia Mother    • Hypertension Mother    • Osteoarthritis Mother    • Arthritis Mother         rheumatoid   • Other Mother         acute cutaneous lupus erythematosus   • Bipolar disorder Father    • Hypertension Father    • Bipolar disorder Brother    • HIV Brother    • Alcohol abuse Paternal Grandfather    • Hypertension Paternal Grandfather        Social History     Tobacco Use   • Smoking status: Former Smoker   • Smokeless tobacco: Never Used   Substance Use Topics   • Alcohol use: No            Objective     Visit Vitals  /72   Pulse 101   Temp 97.3 °F (36.3 °C)   Ht 170.2 cm (67\")   Wt 122 kg (268 lb)   SpO2 94%   BMI 41.97 kg/m²       Physical Exam   Constitutional: She appears well-developed. No distress.   Eyes: Conjunctivae and lids are normal.   Neck: Trachea normal and normal range of motion. Carotid bruit is not present. No thyroid mass present.   Cardiovascular: Normal rate, regular rhythm and normal heart sounds. Exam reveals no friction rub.   No " murmur heard.  Pulmonary/Chest: Effort normal. No stridor. No respiratory distress. She has wheezes. She has no rales.   Neurological: She is alert. She is not disoriented.   Skin: Skin is warm and dry.   Psychiatric: She has a normal mood and affect. Her speech is normal and behavior is normal. She is attentive.   Nursing note and vitals reviewed.      Lab Results   Component Value Date    GLUCOSE 74 04/14/2016    BUN 13 02/07/2020    CREATININE 1.06 (H) 02/07/2020    EGFRIFNONA 54 (L) 02/07/2020    EGFRIFAFRI 66 02/07/2020    BCR 12.3 02/07/2020    K 3.6 02/07/2020    CO2 23.0 02/07/2020    CALCIUM 8.8 02/07/2020    PROTENTOTREF 6.6 02/07/2020    ALBUMIN 4.10 02/07/2020    LABIL2 1.6 02/07/2020    AST 16 02/07/2020    ALT 27 02/07/2020       WBC   Date Value Ref Range Status   11/18/2016 5.72 3.50 - 10.80 10*3/mm3 Final     RBC   Date Value Ref Range Status   11/18/2016 4.96 3.89 - 5.14 10*6/mm3 Final     Hemoglobin   Date Value Ref Range Status   11/18/2016 14.4 11.5 - 15.5 g/dL Final     Hematocrit   Date Value Ref Range Status   11/18/2016 45.8 (H) 34.5 - 44.0 % Final     MCV   Date Value Ref Range Status   11/18/2016 92.3 80.0 - 99.0 fL Final     MCH   Date Value Ref Range Status   11/18/2016 29.0 27.0 - 31.0 pg Final     MCHC   Date Value Ref Range Status   11/18/2016 31.4 (L) 32.0 - 36.0 g/dL Final     RDW   Date Value Ref Range Status   11/18/2016 13.5 11.3 - 14.5 % Final     RDW-SD   Date Value Ref Range Status   11/18/2016 45.3 37.0 - 54.0 fl Final     MPV   Date Value Ref Range Status   11/18/2016 12.7 (H) 6.0 - 12.0 fL Final     Platelets   Date Value Ref Range Status   11/18/2016 176 150 - 450 10*3/mm3 Final     Neutrophil %   Date Value Ref Range Status   11/18/2016 63.2 41.0 - 71.0 % Final     Lymphocyte %   Date Value Ref Range Status   11/18/2016 21.9 (L) 24.0 - 44.0 % Final     Monocyte %   Date Value Ref Range Status   11/18/2016 12.1 (H) 0.0 - 12.0 % Final     Eosinophil %   Date Value Ref Range  Status   11/18/2016 2.4 0.0 - 3.0 % Final     Basophil %   Date Value Ref Range Status   11/18/2016 0.2 0.0 - 1.0 % Final     Immature Grans %   Date Value Ref Range Status   11/18/2016 0.2 0.0 - 0.6 % Final     Neutrophils, Absolute   Date Value Ref Range Status   11/18/2016 3.62 1.50 - 8.30 10*3/mm3 Final     Lymphocytes, Absolute   Date Value Ref Range Status   11/18/2016 1.25 0.60 - 4.80 10*3/mm3 Final     Monocytes, Absolute   Date Value Ref Range Status   11/18/2016 0.69 0.00 - 1.00 10*3/mm3 Final     Eosinophils, Absolute   Date Value Ref Range Status   11/18/2016 0.14 0.10 - 0.30 10*3/mm3 Final     Basophils, Absolute   Date Value Ref Range Status   11/18/2016 0.01 0.00 - 0.20 10*3/mm3 Final     Immature Grans, Absolute   Date Value Ref Range Status   11/18/2016 0.01 0.00 - 0.03 10*3/mm3 Final     nRBC   Date Value Ref Range Status   11/18/2016 0.0 0.0 - 0.0 /100 WBC Final       Lab Results   Component Value Date    HGBA1C 6.10 (H) 02/07/2020       Lab Results   Component Value Date    CCUHLSAU18 848 02/07/2020       TSH   Date Value Ref Range Status   02/07/2020 1.220 0.270 - 4.200 uIU/mL Final   08/28/2017 1.140 0.270 - 4.200 mIU/mL Final       No results found for: CHOL  Lab Results   Component Value Date    TRIG 176 (H) 02/07/2020     Lab Results   Component Value Date    HDL 42 02/07/2020     Lab Results   Component Value Date    LDL 64 02/07/2020     Lab Results   Component Value Date    VLDL 35.2 02/07/2020     No results found for: LDLHDL      Procedures    Assessment/Plan   Problems Addressed this Visit        Respiratory    Asthma    Relevant Medications    predniSONE (DELTASONE) 5 MG tablet      Other Visit Diagnoses     Screen for colon cancer    -  Primary    Relevant Orders    Amb referral for Screening Colonoscopy          Orders Placed This Encounter   Procedures   • Amb referral for Screening Colonoscopy     Referral Priority:   Routine     Referral Type:   Diagnostic Medical     Referral  Reason:   Specialty Services Required     Number of Visits Requested:   1       Current Outpatient Medications   Medication Sig Dispense Refill   • AIMOVIG 70 MG/ML prefilled syringe INJECT 1 ML INTO THE SKIN Q 30 DAYS UTD  5   • albuterol (PROVENTIL HFA;VENTOLIN HFA) 108 (90 BASE) MCG/ACT inhaler Inhale 2 puffs every 4 (four) hours as needed for wheezing.     • albuterol (PROVENTIL) (2.5 MG/3ML) 0.083% nebulizer solution Take 2.5 mg by nebulization Every 6 (Six) Hours As Needed for Wheezing or Shortness of Air. 25 vial 0   • atorvastatin (LIPITOR) 40 MG tablet Take 1 tablet by mouth Every Morning. 90 tablet 0   • benzonatate (TESSALON) 200 MG capsule Take 1 capsule by mouth 3 (Three) Times a Day As Needed for Cough. 90 capsule 0   • Brexpiprazole (REXULTI) 2 MG tablet Daily.     • budesonide-formoterol (SYMBICORT) 80-4.5 MCG/ACT inhaler Inhale 2 puffs 2 (Two) Times a Day.     • buPROPion XL (WELLBUTRIN XL) 150 MG 24 hr tablet Take 150 mg by mouth Daily.     • buPROPion XL (WELLBUTRIN XL) 300 MG 24 hr tablet TK 1 T PO  QAM  1   • busPIRone (BUSPAR) 30 MG tablet TK 1 T PO  bid  0   • cloZAPine (CLOZARIL) 100 MG tablet Take 100 mg by mouth Every Night.     • gabapentin (NEURONTIN) 100 MG capsule 3 (Three) Times a Day.  2   • gabapentin (NEURONTIN) 300 MG capsule Take 300 mg by mouth Every Night.     • indomethacin (INDOCIN) 25 MG capsule TK ONE C PO  TID WITH MEALS ONLY WHEN HAVING EXCISIONAL HEMICRANIA  5   • levocetirizine (XYZAL) 5 MG tablet Take 1 tablet by mouth Daily. 30 tablet 5   • levothyroxine (SYNTHROID, LEVOTHROID) 200 MCG tablet TAKE 1 TABLET BY MOUTH EVERY MORNING 90 tablet 0   • levothyroxine (SYNTHROID, LEVOTHROID) 25 MCG tablet TAKE 1 TABLET BY MOUTH EVERY MORNING 90 tablet 0   • montelukast (SINGULAIR) 10 MG tablet Take 1 tablet by mouth Every Night. 30 tablet 2   • Multiple Vitamins-Minerals (MULTIVITAMIN WOMEN 50+) tablet Take 1 tablet/day by mouth.     • ondansetron (ZOFRAN) 8 MG tablet Take  by  mouth every 8 (eight) hours as needed for nausea or vomiting.     • prazosin (MINIPRESS) 2 MG capsule TK TWO CAPSULES PO EVERY NIGHT AT BEDTIME  0   • topiramate (TOPAMAX) 200 MG tablet Take 200 mg by mouth 2 (Two) Times a Day.  2   • TREXIMET  MG per tablet 1 tablet 1 (One) Time As Needed.  3   • predniSONE (DELTASONE) 5 MG tablet 6 pills day 1 5 pills day 2 4 pills day 3 3 pills day 4 2 pills day 5 1 pill day 6 21 tablet 0   • SAPHRIS 5 MG sublingual tablet sublingual tablet        No current facility-administered medications for this visit.      Will start dose amrimar and follow up if no better.  Continue symbicort and proair.  Warning signs discussed.  Will schedule a cscope.  No follow-ups on file.    Patient Instructions   Work on diet and exercise

## 2020-07-02 RX ORDER — ATORVASTATIN CALCIUM 40 MG/1
40 TABLET, FILM COATED ORAL EVERY MORNING
Qty: 90 TABLET | Refills: 0 | Status: SHIPPED | OUTPATIENT
Start: 2020-07-02 | End: 2020-09-30 | Stop reason: SDUPTHER

## 2020-07-07 ENCOUNTER — HOSPITAL ENCOUNTER (OUTPATIENT)
Dept: GENERAL RADIOLOGY | Facility: HOSPITAL | Age: 55
Discharge: HOME | End: 2020-07-07
Admitting: FAMILY MEDICINE

## 2020-07-07 ENCOUNTER — TELEMEDICINE (OUTPATIENT)
Dept: FAMILY MEDICINE CLINIC | Facility: CLINIC | Age: 55
End: 2020-07-07

## 2020-07-07 DIAGNOSIS — J45.901 ASTHMA WITH ACUTE EXACERBATION, UNSPECIFIED ASTHMA SEVERITY, UNSPECIFIED WHETHER PERSISTENT: Primary | ICD-10-CM

## 2020-07-07 DIAGNOSIS — J20.9 ACUTE BRONCHITIS, UNSPECIFIED ORGANISM: ICD-10-CM

## 2020-07-07 PROCEDURE — 99213 OFFICE O/P EST LOW 20 MIN: CPT | Performed by: FAMILY MEDICINE

## 2020-07-07 PROCEDURE — 71046 X-RAY EXAM CHEST 2 VIEWS: CPT

## 2020-07-07 RX ORDER — LEVOFLOXACIN 750 MG/1
750 TABLET ORAL DAILY
Qty: 5 TABLET | Refills: 0 | Status: SHIPPED | OUTPATIENT
Start: 2020-07-07 | End: 2020-07-17 | Stop reason: HOSPADM

## 2020-07-07 NOTE — PROGRESS NOTES
Chief Complaint   Patient presents with   • Asthma       Subjective   Urvashi Ornelas is a 55 y.o. female.     History of Present Illness   The patient presents today for follow-up via a video visit due to the COVID-19 pandemic for  Asthma exacerbation that is not getting much better and she still has some wheezing and sob.  She was seen on 6-30 and was given a dose marimar and was told to continue symbicort and albuterol which she has been doing.  She is coughing dark yellow stuff.  No fever or chills.              The following portions of the patient's history were reviewed and updated as appropriate: allergies, current medications, past family history, past medical history, past social history, past surgical history and problem list.    Review of Systems   Constitutional: Negative for fatigue.   Eyes: Negative for blurred vision.   Respiratory: Positive for cough and wheezing. Negative for chest tightness and shortness of breath.    Cardiovascular: Negative for chest pain, palpitations and leg swelling.   Neurological: Negative for dizziness, light-headedness and headache.       Patient Active Problem List   Diagnosis   • Thyroid cancer (CMS/HCC)   • Asthma   • Acute bronchitis   • Vitamin D deficiency   • Trigger thumb of left hand   • Tardive dyskinesia   • Syrinx of spinal cord (CMS/HCC)   • Schizoaffective disorder (CMS/HCC)   • PTSD (post-traumatic stress disorder)   • Osteopenia   • Hypothyroidism   • Episodic paroxysmal hemicrania, not intractable   • Medicare annual wellness visit, subsequent       Allergies   Allergen Reactions   • Depakote Er [Divalproex Sodium Er] Other (See Comments)     HAIR FALLS OUT   • Divalproex Sodium Unknown (See Comments)   • Lamictal [Lamotrigine] Other (See Comments) and Swelling     TONGUE SWELLS AND PEELS   • Risperidone Unknown (See Comments)   • Risperidone And Related Other (See Comments)     PREGNANCY SYMPTOMS   • Valproic Acid Unknown (See Comments)     Other  reaction(s): Unknown (See Comments)     • Erythromycin Rash   • Penicillins Rash   • Toradol [Ketorolac Tromethamine] Rash         Current Outpatient Medications:   •  AIMOVIG 70 MG/ML prefilled syringe, INJECT 1 ML INTO THE SKIN Q 30 DAYS UTD, Disp: , Rfl: 5  •  albuterol (PROVENTIL HFA;VENTOLIN HFA) 108 (90 BASE) MCG/ACT inhaler, Inhale 2 puffs every 4 (four) hours as needed for wheezing., Disp: , Rfl:   •  albuterol (PROVENTIL) (2.5 MG/3ML) 0.083% nebulizer solution, Take 2.5 mg by nebulization Every 6 (Six) Hours As Needed for Wheezing or Shortness of Air., Disp: 25 vial, Rfl: 0  •  atorvastatin (LIPITOR) 40 MG tablet, Take 1 tablet by mouth Every Morning., Disp: 90 tablet, Rfl: 0  •  benzonatate (TESSALON) 200 MG capsule, Take 1 capsule by mouth 3 (Three) Times a Day As Needed for Cough., Disp: 90 capsule, Rfl: 0  •  Brexpiprazole (REXULTI) 2 MG tablet, Daily., Disp: , Rfl:   •  budesonide-formoterol (SYMBICORT) 80-4.5 MCG/ACT inhaler, Inhale 2 puffs 2 (Two) Times a Day., Disp: , Rfl:   •  buPROPion XL (WELLBUTRIN XL) 150 MG 24 hr tablet, Take 150 mg by mouth Daily., Disp: , Rfl:   •  buPROPion XL (WELLBUTRIN XL) 300 MG 24 hr tablet, TK 1 T PO  QAM, Disp: , Rfl: 1  •  busPIRone (BUSPAR) 30 MG tablet, TK 1 T PO  bid, Disp: , Rfl: 0  •  cloZAPine (CLOZARIL) 100 MG tablet, Take 100 mg by mouth Every Night., Disp: , Rfl:   •  gabapentin (NEURONTIN) 100 MG capsule, 3 (Three) Times a Day., Disp: , Rfl: 2  •  gabapentin (NEURONTIN) 300 MG capsule, Take 300 mg by mouth Every Night., Disp: , Rfl:   •  indomethacin (INDOCIN) 25 MG capsule, TK ONE C PO  TID WITH MEALS ONLY WHEN HAVING EXCISIONAL HEMICRANIA, Disp: , Rfl: 5  •  levocetirizine (XYZAL) 5 MG tablet, Take 1 tablet by mouth Daily., Disp: 30 tablet, Rfl: 5  •  levoFLOXacin (Levaquin) 750 MG tablet, Take 1 tablet by mouth Daily., Disp: 5 tablet, Rfl: 0  •  levothyroxine (SYNTHROID, LEVOTHROID) 200 MCG tablet, TAKE 1 TABLET BY MOUTH EVERY MORNING, Disp: 90 tablet,  Rfl: 0  •  levothyroxine (SYNTHROID, LEVOTHROID) 25 MCG tablet, TAKE 1 TABLET BY MOUTH EVERY MORNING, Disp: 90 tablet, Rfl: 0  •  montelukast (SINGULAIR) 10 MG tablet, Take 1 tablet by mouth Every Night., Disp: 30 tablet, Rfl: 2  •  Multiple Vitamins-Minerals (MULTIVITAMIN WOMEN 50+) tablet, Take 1 tablet/day by mouth., Disp: , Rfl:   •  ondansetron (ZOFRAN) 8 MG tablet, Take  by mouth every 8 (eight) hours as needed for nausea or vomiting., Disp: , Rfl:   •  prazosin (MINIPRESS) 2 MG capsule, TK TWO CAPSULES PO EVERY NIGHT AT BEDTIME, Disp: , Rfl: 0  •  predniSONE (DELTASONE) 5 MG tablet, 6 pills day 1 5 pills day 2 4 pills day 3 3 pills day 4 2 pills day 5 1 pill day 6, Disp: 21 tablet, Rfl: 0  •  SAPHRIS 5 MG sublingual tablet sublingual tablet, , Disp: , Rfl:   •  topiramate (TOPAMAX) 200 MG tablet, Take 200 mg by mouth 2 (Two) Times a Day., Disp: , Rfl: 2  •  TREXIMET  MG per tablet, 1 tablet 1 (One) Time As Needed., Disp: , Rfl: 3    Past Medical History:   Diagnosis Date   • Abnormal urinalysis    • Acute bronchitis    • Acute frontal sinusitis    • Acute maxillary sinusitis    • Acute pain of right shoulder    • Acute sinusitis    • Allergic rhinitis    • Anemia    • Anxiety    • Arthritis    • Asthma    • Asthma exacerbation    • BMI 40.0-44.9, adult (CMS/[facility])    • Cancer (CMS/[facility])     THYROID   • Constipation    • Cough    • Depression    • Diaphragm paralysis    • Disease of thyroid gland     CANCER   • Dizziness    • Drug-induced nausea and vomiting    • Dyspnea    • Dysuria    • Fatigue    • Fatty liver    • H/O degenerative disc disease    • History of hallucinations    • Hypercalcemia    • Hyperlipidemia    • Hypertension    • Hypertensive kidney disease with chronic kidney disease stage III (CMS/[facility])    • Hypertrophic toenail    • Hypokalemia    • Hypothyroidism    • Joint pain    • Lumbago    • Lymph node cancer (CMS/[facility])    • Manic episode without psychotic symptoms (CMS/[facility])    • Medicare  annual wellness visit, subsequent    • Migraine    • Migraine with aura    • Nausea    • Osteopenia of lumbar spine    • Overweight    • Pleurisy    • Pneumonia    • Post traumatic stress disorder    • Postmenopausal    • Rheumatoid arthritis (CMS/HCC)    • Right knee pain    • Shortness of breath    • Shoulder tendinitis    • Sleep apnea    • Thyroid disease    • Urinary frequency        Past Surgical History:   Procedure Laterality Date   •  SECTION      X2   • CHOLECYSTECTOMY     • HAND SURGERY Bilateral     WRISTS PLATE PLACEMENT   • HEMATOMA EVACUATION HEAD/NECK Left 2016    Procedure: HEMATOMA EVACUATION NECK;  Surgeon: Dayo Ashby MD;  [place] [facility] [place] [facility] [place]  Service:    • HYSTERECTOMY     • KNEE SURGERY Bilateral     6YO PIGEON TOE SX   • LUNG SURGERY Left     LEFT LOWER LOBE   • NECK DISSECTION Left 2016    Procedure: LEFT MODIFIED RADICAL NECK DISSECTION;  Surgeon: Dayo Ashby MD;  [place] [facility] [place] [facility] [place]  Service:    • NEPHRECTOMY PARTIAL Left    • PARATHYROID GLAND SURGERY         • TOTAL THYROIDECTOMY      PARATHYROID 2016       Family History   Problem Relation Age of Onset   • Hyperlipidemia Mother    • Hypertension Mother    • Osteoarthritis Mother    • Arthritis Mother         rheumatoid   • Other Mother         acute cutaneous lupus erythematosus   • Bipolar disorder Father    • Hypertension Father    • Bipolar disorder Brother    • HIV Brother    • Alcohol abuse Paternal Grandfather    • Hypertension Paternal Grandfather        Social History     Tobacco Use   • Smoking status: Former Smoker   • Smokeless tobacco: Never Used   Substance Use Topics   • Alcohol use: No            Objective     There were no vitals taken for this visit. Video Visit    Physical Exam  NAD  AAOx3  No labored breathing.    Lab Results   Component Value Date    GLUCOSE 74 2016    BUN 13 2020    CREATININE 1.06 (H) 2020    EGFRIFNONA 54 (L) 2020    EGFRIFAFRI  66 02/07/2020    BCR 12.3 02/07/2020    K 3.6 02/07/2020    CO2 23.0 02/07/2020    CALCIUM 8.8 02/07/2020    PROTENTOTREF 6.6 02/07/2020    ALBUMIN 4.10 02/07/2020    LABIL2 1.6 02/07/2020    AST 16 02/07/2020    ALT 27 02/07/2020       WBC   Date Value Ref Range Status   11/18/2016 5.72 3.50 - 10.80 10*3/mm3 Final     RBC   Date Value Ref Range Status   11/18/2016 4.96 3.89 - 5.14 10*6/mm3 Final     Hemoglobin   Date Value Ref Range Status   11/18/2016 14.4 11.5 - 15.5 g/dL Final     Hematocrit   Date Value Ref Range Status   11/18/2016 45.8 (H) 34.5 - 44.0 % Final     MCV   Date Value Ref Range Status   11/18/2016 92.3 80.0 - 99.0 fL Final     MCH   Date Value Ref Range Status   11/18/2016 29.0 27.0 - 31.0 pg Final     MCHC   Date Value Ref Range Status   11/18/2016 31.4 (L) 32.0 - 36.0 g/dL Final     RDW   Date Value Ref Range Status   11/18/2016 13.5 11.3 - 14.5 % Final     RDW-SD   Date Value Ref Range Status   11/18/2016 45.3 37.0 - 54.0 fl Final     MPV   Date Value Ref Range Status   11/18/2016 12.7 (H) 6.0 - 12.0 fL Final     Platelets   Date Value Ref Range Status   11/18/2016 176 150 - 450 10*3/mm3 Final     Neutrophil %   Date Value Ref Range Status   11/18/2016 63.2 41.0 - 71.0 % Final     Lymphocyte %   Date Value Ref Range Status   11/18/2016 21.9 (L) 24.0 - 44.0 % Final     Monocyte %   Date Value Ref Range Status   11/18/2016 12.1 (H) 0.0 - 12.0 % Final     Eosinophil %   Date Value Ref Range Status   11/18/2016 2.4 0.0 - 3.0 % Final     Basophil %   Date Value Ref Range Status   11/18/2016 0.2 0.0 - 1.0 % Final     Immature Grans %   Date Value Ref Range Status   11/18/2016 0.2 0.0 - 0.6 % Final     Neutrophils, Absolute   Date Value Ref Range Status   11/18/2016 3.62 1.50 - 8.30 10*3/mm3 Final     Lymphocytes, Absolute   Date Value Ref Range Status   11/18/2016 1.25 0.60 - 4.80 10*3/mm3 Final     Monocytes, Absolute   Date Value Ref Range Status   11/18/2016 0.69 0.00 - 1.00 10*3/mm3 Final      Eosinophils, Absolute   Date Value Ref Range Status   11/18/2016 0.14 0.10 - 0.30 10*3/mm3 Final     Basophils, Absolute   Date Value Ref Range Status   11/18/2016 0.01 0.00 - 0.20 10*3/mm3 Final     Immature Grans, Absolute   Date Value Ref Range Status   11/18/2016 0.01 0.00 - 0.03 10*3/mm3 Final     nRBC   Date Value Ref Range Status   11/18/2016 0.0 0.0 - 0.0 /100 WBC Final       Lab Results   Component Value Date    HGBA1C 6.10 (H) 02/07/2020       Lab Results   Component Value Date    PMIQVOOO90 848 02/07/2020       TSH   Date Value Ref Range Status   02/07/2020 1.220 0.270 - 4.200 uIU/mL Final   08/28/2017 1.140 0.270 - 4.200 mIU/mL Final       No results found for: CHOL  Lab Results   Component Value Date    TRIG 176 (H) 02/07/2020     Lab Results   Component Value Date    HDL 42 02/07/2020     Lab Results   Component Value Date    LDL 64 02/07/2020     Lab Results   Component Value Date    VLDL 35.2 02/07/2020     No results found for: LDLHDL      Procedures    Assessment/Plan   Problems Addressed this Visit        Respiratory    Asthma - Primary    Relevant Orders    XR Chest 2 View    Acute bronchitis    Relevant Medications    levoFLOXacin (Levaquin) 750 MG tablet          Orders Placed This Encounter   Procedures   • XR Chest 2 View     Order Specific Question:   Reason for Exam:     Answer:   wheezing and asthma, sob       Current Outpatient Medications   Medication Sig Dispense Refill   • AIMOVIG 70 MG/ML prefilled syringe INJECT 1 ML INTO THE SKIN Q 30 DAYS UTD  5   • albuterol (PROVENTIL HFA;VENTOLIN HFA) 108 (90 BASE) MCG/ACT inhaler Inhale 2 puffs every 4 (four) hours as needed for wheezing.     • albuterol (PROVENTIL) (2.5 MG/3ML) 0.083% nebulizer solution Take 2.5 mg by nebulization Every 6 (Six) Hours As Needed for Wheezing or Shortness of Air. 25 vial 0   • atorvastatin (LIPITOR) 40 MG tablet Take 1 tablet by mouth Every Morning. 90 tablet 0   • benzonatate (TESSALON) 200 MG capsule Take 1  capsule by mouth 3 (Three) Times a Day As Needed for Cough. 90 capsule 0   • Brexpiprazole (REXULTI) 2 MG tablet Daily.     • budesonide-formoterol (SYMBICORT) 80-4.5 MCG/ACT inhaler Inhale 2 puffs 2 (Two) Times a Day.     • buPROPion XL (WELLBUTRIN XL) 150 MG 24 hr tablet Take 150 mg by mouth Daily.     • buPROPion XL (WELLBUTRIN XL) 300 MG 24 hr tablet TK 1 T PO  QAM  1   • busPIRone (BUSPAR) 30 MG tablet TK 1 T PO  bid  0   • cloZAPine (CLOZARIL) 100 MG tablet Take 100 mg by mouth Every Night.     • gabapentin (NEURONTIN) 100 MG capsule 3 (Three) Times a Day.  2   • gabapentin (NEURONTIN) 300 MG capsule Take 300 mg by mouth Every Night.     • indomethacin (INDOCIN) 25 MG capsule TK ONE C PO  TID WITH MEALS ONLY WHEN HAVING EXCISIONAL HEMICRANIA  5   • levocetirizine (XYZAL) 5 MG tablet Take 1 tablet by mouth Daily. 30 tablet 5   • levoFLOXacin (Levaquin) 750 MG tablet Take 1 tablet by mouth Daily. 5 tablet 0   • levothyroxine (SYNTHROID, LEVOTHROID) 200 MCG tablet TAKE 1 TABLET BY MOUTH EVERY MORNING 90 tablet 0   • levothyroxine (SYNTHROID, LEVOTHROID) 25 MCG tablet TAKE 1 TABLET BY MOUTH EVERY MORNING 90 tablet 0   • montelukast (SINGULAIR) 10 MG tablet Take 1 tablet by mouth Every Night. 30 tablet 2   • Multiple Vitamins-Minerals (MULTIVITAMIN WOMEN 50+) tablet Take 1 tablet/day by mouth.     • ondansetron (ZOFRAN) 8 MG tablet Take  by mouth every 8 (eight) hours as needed for nausea or vomiting.     • prazosin (MINIPRESS) 2 MG capsule TK TWO CAPSULES PO EVERY NIGHT AT BEDTIME  0   • predniSONE (DELTASONE) 5 MG tablet 6 pills day 1 5 pills day 2 4 pills day 3 3 pills day 4 2 pills day 5 1 pill day 6 21 tablet 0   • SAPHRIS 5 MG sublingual tablet sublingual tablet      • topiramate (TOPAMAX) 200 MG tablet Take 200 mg by mouth 2 (Two) Times a Day.  2   • TREXIMET  MG per tablet 1 tablet 1 (One) Time As Needed.  3     No current facility-administered medications for this visit.        No follow-ups on  file.    There are no Patient Instructions on file for this visit.     will get cxr and will start abx.    Time spent on visit:  12   minutes.  This patient has consented to a telehealth visit via video. The visit was scheduled as a video visit to comply with patient safety concerns in accordance with CDC recommendations.  All vitals recorded within this visit are reported by the patient.

## 2020-07-08 ENCOUNTER — TELEPHONE (OUTPATIENT)
Dept: FAMILY MEDICINE CLINIC | Facility: CLINIC | Age: 55
End: 2020-07-08

## 2020-07-08 NOTE — TELEPHONE ENCOUNTER
Patient states she got a phone call this morning saying with a message saying that Dr King was calling in some more levofloxacin in.  She says [facility] didn't have anything so she wanted to verify.  Her phone number is [phone].

## 2020-07-11 ENCOUNTER — HOSPITAL ENCOUNTER (INPATIENT)
Facility: HOSPITAL | Age: 55
LOS: 6 days | Discharge: HOME | End: 2020-07-17
Attending: HOSPITALIST | Admitting: HOSPITALIST

## 2020-07-11 DIAGNOSIS — J45.901 ASTHMA WITH ACUTE EXACERBATION, UNSPECIFIED ASTHMA SEVERITY, UNSPECIFIED WHETHER PERSISTENT: ICD-10-CM

## 2020-07-11 PROBLEM — J18.9 PNEUMONIA: Status: ACTIVE | Noted: 2020-07-11

## 2020-07-11 RX ORDER — NITROGLYCERIN 0.4 MG/1
0.4 TABLET SUBLINGUAL
Status: DISCONTINUED | OUTPATIENT
Start: 2020-07-11 | End: 2020-07-17 | Stop reason: HOSPADM

## 2020-07-11 RX ORDER — SODIUM CHLORIDE 0.9 % (FLUSH) 0.9 %
10 SYRINGE (ML) INJECTION AS NEEDED
Status: DISCONTINUED | OUTPATIENT
Start: 2020-07-11 | End: 2020-07-17 | Stop reason: HOSPADM

## 2020-07-11 RX ORDER — BISACODYL 5 MG/1
5 TABLET, DELAYED RELEASE ORAL DAILY PRN
Status: DISCONTINUED | OUTPATIENT
Start: 2020-07-11 | End: 2020-07-17 | Stop reason: HOSPADM

## 2020-07-11 RX ORDER — IPRATROPIUM BROMIDE AND ALBUTEROL SULFATE 2.5; .5 MG/3ML; MG/3ML
3 SOLUTION RESPIRATORY (INHALATION) EVERY 4 HOURS PRN
Status: DISCONTINUED | OUTPATIENT
Start: 2020-07-11 | End: 2020-07-11

## 2020-07-11 RX ORDER — POTASSIUM CHLORIDE 750 MG/1
40 CAPSULE, EXTENDED RELEASE ORAL AS NEEDED
Status: DISCONTINUED | OUTPATIENT
Start: 2020-07-11 | End: 2020-07-17 | Stop reason: HOSPADM

## 2020-07-11 RX ORDER — BISACODYL 10 MG
10 SUPPOSITORY, RECTAL RECTAL DAILY PRN
Status: DISCONTINUED | OUTPATIENT
Start: 2020-07-11 | End: 2020-07-17 | Stop reason: HOSPADM

## 2020-07-11 RX ORDER — ONDANSETRON 2 MG/ML
4 INJECTION INTRAMUSCULAR; INTRAVENOUS EVERY 6 HOURS PRN
Status: DISCONTINUED | OUTPATIENT
Start: 2020-07-11 | End: 2020-07-17 | Stop reason: HOSPADM

## 2020-07-11 RX ORDER — ALUMINA, MAGNESIA, AND SIMETHICONE 2400; 2400; 240 MG/30ML; MG/30ML; MG/30ML
15 SUSPENSION ORAL EVERY 6 HOURS PRN
Status: DISCONTINUED | OUTPATIENT
Start: 2020-07-11 | End: 2020-07-17 | Stop reason: HOSPADM

## 2020-07-11 RX ORDER — POTASSIUM CHLORIDE 7.45 MG/ML
10 INJECTION INTRAVENOUS
Status: DISCONTINUED | OUTPATIENT
Start: 2020-07-11 | End: 2020-07-17 | Stop reason: HOSPADM

## 2020-07-11 RX ORDER — POTASSIUM CHLORIDE 1.5 G/1.77G
40 POWDER, FOR SOLUTION ORAL AS NEEDED
Status: DISCONTINUED | OUTPATIENT
Start: 2020-07-11 | End: 2020-07-17 | Stop reason: HOSPADM

## 2020-07-11 RX ORDER — ONDANSETRON 4 MG/1
4 TABLET, FILM COATED ORAL EVERY 6 HOURS PRN
Status: DISCONTINUED | OUTPATIENT
Start: 2020-07-11 | End: 2020-07-17 | Stop reason: HOSPADM

## 2020-07-12 ENCOUNTER — APPOINTMENT (OUTPATIENT)
Dept: CT IMAGING | Facility: HOSPITAL | Age: 55
End: 2020-07-12

## 2020-07-12 PROBLEM — J91.8 PARAPNEUMONIC EFFUSION: Status: ACTIVE | Noted: 2020-07-12

## 2020-07-12 PROBLEM — E66.01 OBESITY, CLASS III, BMI 40-49.9 (MORBID OBESITY): Status: ACTIVE | Noted: 2020-07-12

## 2020-07-12 PROBLEM — J18.9 PARAPNEUMONIC EFFUSION: Status: ACTIVE | Noted: 2020-07-12

## 2020-07-12 PROBLEM — E87.6 HYPOKALEMIA: Status: ACTIVE | Noted: 2020-07-12

## 2020-07-12 PROBLEM — F41.9 ANXIETY: Status: ACTIVE | Noted: 2020-07-12

## 2020-07-12 PROBLEM — G43.909 MIGRAINES: Status: ACTIVE | Noted: 2020-07-12

## 2020-07-12 PROBLEM — N18.30 CKD (CHRONIC KIDNEY DISEASE) STAGE 3, GFR 30-59 ML/MIN: Status: ACTIVE | Noted: 2020-07-12

## 2020-07-12 LAB
ALBUMIN SERPL-MCNC: 2.2 G/DL (ref 3.5–5.2)
ALBUMIN SERPL-MCNC: 2.3 G/DL (ref 3.5–5.2)
ALBUMIN/GLOB SERPL: 0.6 G/DL
ALBUMIN/GLOB SERPL: 0.6 G/DL
ALP SERPL-CCNC: 93 U/L (ref 39–117)
ALP SERPL-CCNC: 93 U/L (ref 39–117)
ALT SERPL W P-5'-P-CCNC: 96 U/L (ref 1–33)
ALT SERPL W P-5'-P-CCNC: 98 U/L (ref 1–33)
ANION GAP SERPL CALCULATED.3IONS-SCNC: 10 MMOL/L (ref 5–15)
ANION GAP SERPL CALCULATED.3IONS-SCNC: 9.8 MMOL/L (ref 5–15)
AST SERPL-CCNC: 102 U/L (ref 1–32)
AST SERPL-CCNC: 97 U/L (ref 1–32)
B PARAPERT DNA SPEC QL NAA+PROBE: NOT DETECTED
B PERT DNA SPEC QL NAA+PROBE: NOT DETECTED
BASOPHILS # BLD AUTO: 0.04 10*3/MM3 (ref 0–0.2)
BASOPHILS NFR BLD AUTO: 0.3 % (ref 0–1.5)
BILIRUB SERPL-MCNC: 0.2 MG/DL (ref 0–1.2)
BILIRUB SERPL-MCNC: 0.3 MG/DL (ref 0–1.2)
BUN SERPL-MCNC: 7 MG/DL (ref 6–20)
BUN SERPL-MCNC: 7 MG/DL (ref 6–20)
BUN/CREAT SERPL: 6.9 (ref 7–25)
BUN/CREAT SERPL: 6.9 (ref 7–25)
C PNEUM DNA NPH QL NAA+NON-PROBE: NOT DETECTED
CALCIUM SPEC-SCNC: 8.1 MG/DL (ref 8.6–10.5)
CALCIUM SPEC-SCNC: 8.2 MG/DL (ref 8.6–10.5)
CHLORIDE SERPL-SCNC: 107 MMOL/L (ref 98–107)
CHLORIDE SERPL-SCNC: 108 MMOL/L (ref 98–107)
CO2 SERPL-SCNC: 22.2 MMOL/L (ref 22–29)
CO2 SERPL-SCNC: 23 MMOL/L (ref 22–29)
CREAT SERPL-MCNC: 1.01 MG/DL (ref 0.57–1)
CREAT SERPL-MCNC: 1.02 MG/DL (ref 0.57–1)
D-LACTATE SERPL-SCNC: 0.8 MMOL/L (ref 0.5–2)
DEPRECATED RDW RBC AUTO: 38.5 FL (ref 37–54)
DEPRECATED RDW RBC AUTO: 40.1 FL (ref 37–54)
EOSINOPHIL # BLD AUTO: 0.07 10*3/MM3 (ref 0–0.4)
EOSINOPHIL NFR BLD AUTO: 0.4 % (ref 0.3–6.2)
ERYTHROCYTE [DISTWIDTH] IN BLOOD BY AUTOMATED COUNT: 12.8 % (ref 12.3–15.4)
ERYTHROCYTE [DISTWIDTH] IN BLOOD BY AUTOMATED COUNT: 13 % (ref 12.3–15.4)
FLUAV H1 2009 PAND RNA NPH QL NAA+PROBE: NOT DETECTED
FLUAV H1 HA GENE NPH QL NAA+PROBE: NOT DETECTED
FLUAV H3 RNA NPH QL NAA+PROBE: NOT DETECTED
FLUAV SUBTYP SPEC NAA+PROBE: NOT DETECTED
FLUBV RNA ISLT QL NAA+PROBE: NOT DETECTED
GFR SERPL CREATININE-BSD FRML MDRD: 57 ML/MIN/1.73
GFR SERPL CREATININE-BSD FRML MDRD: 57 ML/MIN/1.73
GLOBULIN UR ELPH-MCNC: 3.9 GM/DL
GLOBULIN UR ELPH-MCNC: 3.9 GM/DL
GLUCOSE SERPL-MCNC: 132 MG/DL (ref 65–99)
GLUCOSE SERPL-MCNC: 148 MG/DL (ref 65–99)
HADV DNA SPEC NAA+PROBE: NOT DETECTED
HBA1C MFR BLD: 6.3 % (ref 4.8–5.6)
HCOV 229E RNA SPEC QL NAA+PROBE: NOT DETECTED
HCOV HKU1 RNA SPEC QL NAA+PROBE: NOT DETECTED
HCOV NL63 RNA SPEC QL NAA+PROBE: NOT DETECTED
HCOV OC43 RNA SPEC QL NAA+PROBE: NOT DETECTED
HCT VFR BLD AUTO: 32.3 % (ref 34–46.6)
HCT VFR BLD AUTO: 32.7 % (ref 34–46.6)
HGB BLD-MCNC: 11 G/DL (ref 12–15.9)
HGB BLD-MCNC: 11 G/DL (ref 12–15.9)
HMPV RNA NPH QL NAA+NON-PROBE: NOT DETECTED
HPIV1 RNA SPEC QL NAA+PROBE: NOT DETECTED
HPIV2 RNA SPEC QL NAA+PROBE: NOT DETECTED
HPIV3 RNA NPH QL NAA+PROBE: NOT DETECTED
HPIV4 P GENE NPH QL NAA+PROBE: NOT DETECTED
IMM GRANULOCYTES # BLD AUTO: 0.21 10*3/MM3 (ref 0–0.05)
IMM GRANULOCYTES NFR BLD AUTO: 1.3 % (ref 0–0.5)
INR PPP: 1.96 (ref 0.9–1.1)
L PNEUMO1 AG UR QL IA: NEGATIVE
LYMPHOCYTES # BLD AUTO: 0.61 10*3/MM3 (ref 0.7–3.1)
LYMPHOCYTES NFR BLD AUTO: 3.9 % (ref 19.6–45.3)
M PNEUMO IGG SER IA-ACNC: NOT DETECTED
MCH RBC QN AUTO: 28.6 PG (ref 26.6–33)
MCH RBC QN AUTO: 28.6 PG (ref 26.6–33)
MCHC RBC AUTO-ENTMCNC: 33.6 G/DL (ref 31.5–35.7)
MCHC RBC AUTO-ENTMCNC: 34.1 G/DL (ref 31.5–35.7)
MCV RBC AUTO: 84.1 FL (ref 79–97)
MCV RBC AUTO: 85.2 FL (ref 79–97)
MONOCYTES # BLD AUTO: 1.34 10*3/MM3 (ref 0.1–0.9)
MONOCYTES NFR BLD AUTO: 8.5 % (ref 5–12)
MRSA DNA SPEC QL NAA+PROBE: NORMAL
NEUTROPHILS NFR BLD AUTO: 13.47 10*3/MM3 (ref 1.7–7)
NEUTROPHILS NFR BLD AUTO: 85.6 % (ref 42.7–76)
NRBC BLD AUTO-RTO: 0 /100 WBC (ref 0–0.2)
PLATELET # BLD AUTO: 317 10*3/MM3 (ref 140–450)
PLATELET # BLD AUTO: 332 10*3/MM3 (ref 140–450)
PMV BLD AUTO: 9.5 FL (ref 6–12)
PMV BLD AUTO: 9.8 FL (ref 6–12)
POTASSIUM SERPL-SCNC: 2.9 MMOL/L (ref 3.5–5.2)
POTASSIUM SERPL-SCNC: 3.2 MMOL/L (ref 3.5–5.2)
POTASSIUM SERPL-SCNC: 3.4 MMOL/L (ref 3.5–5.2)
PROCALCITONIN SERPL-MCNC: 1.65 NG/ML (ref 0–0.25)
PROT SERPL-MCNC: 6.1 G/DL (ref 6–8.5)
PROT SERPL-MCNC: 6.2 G/DL (ref 6–8.5)
PROTHROMBIN TIME: 21.8 SECONDS (ref 11.7–14.2)
RBC # BLD AUTO: 3.84 10*6/MM3 (ref 3.77–5.28)
RBC # BLD AUTO: 3.84 10*6/MM3 (ref 3.77–5.28)
RHINOVIRUS RNA SPEC NAA+PROBE: NOT DETECTED
RSV RNA NPH QL NAA+NON-PROBE: NOT DETECTED
S PNEUM AG SPEC QL LA: NEGATIVE
SARS-COV-2 RNA RESP QL NAA+PROBE: NOT DETECTED
SODIUM SERPL-SCNC: 139 MMOL/L (ref 136–145)
SODIUM SERPL-SCNC: 141 MMOL/L (ref 136–145)
TROPONIN T SERPL-MCNC: <0.01 NG/ML (ref 0–0.03)
VANCOMYCIN SERPL-MCNC: 12.2 MCG/ML (ref 5–40)
WBC # BLD AUTO: 14.57 10*3/MM3 (ref 3.4–10.8)
WBC # BLD AUTO: 15.74 10*3/MM3 (ref 3.4–10.8)

## 2020-07-12 PROCEDURE — 0W9B3ZZ DRAINAGE OF LEFT PLEURAL CAVITY, PERCUTANEOUS APPROACH: ICD-10-PCS | Performed by: RADIOLOGY

## 2020-07-12 PROCEDURE — 84132 ASSAY OF SERUM POTASSIUM: CPT | Performed by: HOSPITALIST

## 2020-07-12 PROCEDURE — 85025 COMPLETE CBC W/AUTO DIFF WBC: CPT | Performed by: NURSE PRACTITIONER

## 2020-07-12 PROCEDURE — 87070 CULTURE OTHR SPECIMN AEROBIC: CPT | Performed by: NURSE PRACTITIONER

## 2020-07-12 PROCEDURE — 94667 MNPJ CHEST WALL 1ST: CPT

## 2020-07-12 PROCEDURE — 80202 ASSAY OF VANCOMYCIN: CPT | Performed by: NURSE PRACTITIONER

## 2020-07-12 PROCEDURE — 25010000003 POTASSIUM CHLORIDE 10 MEQ/100ML SOLUTION: Performed by: NURSE PRACTITIONER

## 2020-07-12 PROCEDURE — 83605 ASSAY OF LACTIC ACID: CPT | Performed by: NURSE PRACTITIONER

## 2020-07-12 PROCEDURE — 71250 CT THORAX DX C-: CPT

## 2020-07-12 PROCEDURE — 94799 UNLISTED PULMONARY SVC/PX: CPT

## 2020-07-12 PROCEDURE — 87899 AGENT NOS ASSAY W/OPTIC: CPT | Performed by: INTERNAL MEDICINE

## 2020-07-12 PROCEDURE — 84145 PROCALCITONIN (PCT): CPT | Performed by: NURSE PRACTITIONER

## 2020-07-12 PROCEDURE — 99223 1ST HOSP IP/OBS HIGH 75: CPT | Performed by: THORACIC SURGERY (CARDIOTHORACIC VASCULAR SURGERY)

## 2020-07-12 PROCEDURE — 85027 COMPLETE CBC AUTOMATED: CPT | Performed by: NURSE PRACTITIONER

## 2020-07-12 PROCEDURE — 87040 BLOOD CULTURE FOR BACTERIA: CPT | Performed by: NURSE PRACTITIONER

## 2020-07-12 PROCEDURE — 83036 HEMOGLOBIN GLYCOSYLATED A1C: CPT | Performed by: NURSE PRACTITIONER

## 2020-07-12 PROCEDURE — 0100U HC BIOFIRE FILMARRAY RESP PANEL 2: CPT | Performed by: INTERNAL MEDICINE

## 2020-07-12 PROCEDURE — 25010000002 CEFEPIME PER 500 MG: Performed by: NURSE PRACTITIONER

## 2020-07-12 PROCEDURE — 87641 MR-STAPH DNA AMP PROBE: CPT | Performed by: NURSE PRACTITIONER

## 2020-07-12 PROCEDURE — 25010000002 VANCOMYCIN 10 G RECONSTITUTED SOLUTION: Performed by: NURSE PRACTITIONER

## 2020-07-12 PROCEDURE — 87635 SARS-COV-2 COVID-19 AMP PRB: CPT | Performed by: NURSE PRACTITIONER

## 2020-07-12 PROCEDURE — 84484 ASSAY OF TROPONIN QUANT: CPT | Performed by: NURSE PRACTITIONER

## 2020-07-12 PROCEDURE — 94640 AIRWAY INHALATION TREATMENT: CPT

## 2020-07-12 PROCEDURE — 80053 COMPREHEN METABOLIC PANEL: CPT | Performed by: NURSE PRACTITIONER

## 2020-07-12 PROCEDURE — 85610 PROTHROMBIN TIME: CPT | Performed by: NURSE PRACTITIONER

## 2020-07-12 PROCEDURE — 87205 SMEAR GRAM STAIN: CPT | Performed by: NURSE PRACTITIONER

## 2020-07-12 RX ORDER — LEVOTHYROXINE SODIUM 0.1 MG/1
200 TABLET ORAL EVERY MORNING
Status: DISCONTINUED | OUTPATIENT
Start: 2020-07-12 | End: 2020-07-17 | Stop reason: HOSPADM

## 2020-07-12 RX ORDER — IPRATROPIUM BROMIDE AND ALBUTEROL SULFATE 2.5; .5 MG/3ML; MG/3ML
3 SOLUTION RESPIRATORY (INHALATION) EVERY 4 HOURS PRN
Status: DISCONTINUED | OUTPATIENT
Start: 2020-07-12 | End: 2020-07-17 | Stop reason: HOSPADM

## 2020-07-12 RX ORDER — BUDESONIDE AND FORMOTEROL FUMARATE DIHYDRATE 80; 4.5 UG/1; UG/1
2 AEROSOL RESPIRATORY (INHALATION)
Status: DISCONTINUED | OUTPATIENT
Start: 2020-07-12 | End: 2020-07-17 | Stop reason: HOSPADM

## 2020-07-12 RX ORDER — ACETAMINOPHEN 325 MG/1
650 TABLET ORAL ONCE
Status: COMPLETED | OUTPATIENT
Start: 2020-07-12 | End: 2020-07-12

## 2020-07-12 RX ORDER — CLOZAPINE 100 MG/1
100 TABLET ORAL NIGHTLY
Status: DISCONTINUED | OUTPATIENT
Start: 2020-07-12 | End: 2020-07-17 | Stop reason: HOSPADM

## 2020-07-12 RX ORDER — LEVOTHYROXINE SODIUM 0.03 MG/1
25 TABLET ORAL EVERY MORNING
Status: DISCONTINUED | OUTPATIENT
Start: 2020-07-12 | End: 2020-07-17 | Stop reason: HOSPADM

## 2020-07-12 RX ORDER — ACETAMINOPHEN 325 MG/1
650 TABLET ORAL EVERY 6 HOURS PRN
Status: DISCONTINUED | OUTPATIENT
Start: 2020-07-12 | End: 2020-07-17 | Stop reason: HOSPADM

## 2020-07-12 RX ORDER — BUPROPION HYDROCHLORIDE 300 MG/1
300 TABLET ORAL DAILY
Status: DISCONTINUED | OUTPATIENT
Start: 2020-07-12 | End: 2020-07-12

## 2020-07-12 RX ORDER — ATORVASTATIN CALCIUM 20 MG/1
40 TABLET, FILM COATED ORAL EVERY MORNING
Status: DISCONTINUED | OUTPATIENT
Start: 2020-07-12 | End: 2020-07-17 | Stop reason: HOSPADM

## 2020-07-12 RX ORDER — BUPROPION HYDROCHLORIDE 150 MG/1
450 TABLET ORAL DAILY
Status: DISCONTINUED | OUTPATIENT
Start: 2020-07-12 | End: 2020-07-17 | Stop reason: HOSPADM

## 2020-07-12 RX ORDER — BUSPIRONE HYDROCHLORIDE 15 MG/1
30 TABLET ORAL 2 TIMES DAILY
Status: DISCONTINUED | OUTPATIENT
Start: 2020-07-12 | End: 2020-07-17 | Stop reason: HOSPADM

## 2020-07-12 RX ORDER — TOPIRAMATE 100 MG/1
200 TABLET, FILM COATED ORAL 2 TIMES DAILY
Status: DISCONTINUED | OUTPATIENT
Start: 2020-07-12 | End: 2020-07-17 | Stop reason: HOSPADM

## 2020-07-12 RX ORDER — ASENAPINE 5 MG/1
5 TABLET SUBLINGUAL 2 TIMES DAILY
Status: DISCONTINUED | OUTPATIENT
Start: 2020-07-12 | End: 2020-07-17 | Stop reason: HOSPADM

## 2020-07-12 RX ADMIN — POTASSIUM CHLORIDE 40 MEQ: 10 CAPSULE, COATED, EXTENDED RELEASE ORAL at 05:09

## 2020-07-12 RX ADMIN — ATORVASTATIN CALCIUM 40 MG: 20 TABLET, FILM COATED ORAL at 08:11

## 2020-07-12 RX ADMIN — TOPIRAMATE 200 MG: 100 TABLET, FILM COATED ORAL at 21:04

## 2020-07-12 RX ADMIN — VANCOMYCIN HYDROCHLORIDE 1750 MG: 10 INJECTION, POWDER, LYOPHILIZED, FOR SOLUTION INTRAVENOUS at 10:49

## 2020-07-12 RX ADMIN — POTASSIUM CHLORIDE 40 MEQ: 10 CAPSULE, COATED, EXTENDED RELEASE ORAL at 17:22

## 2020-07-12 RX ADMIN — BUDESONIDE AND FORMOTEROL FUMARATE DIHYDRATE 2 PUFF: 80; 4.5 AEROSOL RESPIRATORY (INHALATION) at 19:38

## 2020-07-12 RX ADMIN — TOPIRAMATE 200 MG: 100 TABLET, FILM COATED ORAL at 10:14

## 2020-07-12 RX ADMIN — POTASSIUM CHLORIDE 40 MEQ: 10 CAPSULE, COATED, EXTENDED RELEASE ORAL at 10:14

## 2020-07-12 RX ADMIN — LEVOTHYROXINE SODIUM 200 MCG: 100 TABLET ORAL at 08:13

## 2020-07-12 RX ADMIN — ACETAMINOPHEN 650 MG: 325 TABLET, FILM COATED ORAL at 05:27

## 2020-07-12 RX ADMIN — LEVOTHYROXINE SODIUM 25 MCG: 25 TABLET ORAL at 08:11

## 2020-07-12 RX ADMIN — CEFEPIME HYDROCHLORIDE 2 G: 2 INJECTION, POWDER, FOR SOLUTION INTRAVENOUS at 17:22

## 2020-07-12 RX ADMIN — POTASSIUM CHLORIDE 40 MEQ: 10 CAPSULE, COATED, EXTENDED RELEASE ORAL at 21:41

## 2020-07-12 RX ADMIN — CEFEPIME HYDROCHLORIDE 2 G: 2 INJECTION, POWDER, FOR SOLUTION INTRAVENOUS at 08:13

## 2020-07-12 RX ADMIN — POTASSIUM CHLORIDE 40 MEQ: 10 CAPSULE, COATED, EXTENDED RELEASE ORAL at 01:04

## 2020-07-12 RX ADMIN — BUSPIRONE HYDROCHLORIDE 30 MG: 15 TABLET ORAL at 21:04

## 2020-07-12 RX ADMIN — CEFEPIME HYDROCHLORIDE 2 G: 2 INJECTION, POWDER, FOR SOLUTION INTRAVENOUS at 01:05

## 2020-07-12 RX ADMIN — CLOZAPINE 100 MG: 100 TABLET ORAL at 21:04

## 2020-07-12 RX ADMIN — ASENAPINE MALEATE 5 MG: 5 TABLET SUBLINGUAL at 21:58

## 2020-07-12 RX ADMIN — POTASSIUM CHLORIDE 10 MEQ: 7.46 INJECTION, SOLUTION INTRAVENOUS at 15:26

## 2020-07-12 RX ADMIN — BUPROPION HYDROCHLORIDE 450 MG: 150 TABLET, FILM COATED, EXTENDED RELEASE ORAL at 10:14

## 2020-07-12 RX ADMIN — ACETAMINOPHEN 650 MG: 325 TABLET, FILM COATED ORAL at 17:22

## 2020-07-12 RX ADMIN — ASENAPINE MALEATE 5 MG: 5 TABLET SUBLINGUAL at 10:14

## 2020-07-12 RX ADMIN — BUSPIRONE HYDROCHLORIDE 30 MG: 15 TABLET ORAL at 08:11

## 2020-07-12 RX ADMIN — BUDESONIDE AND FORMOTEROL FUMARATE DIHYDRATE 2 PUFF: 80; 4.5 AEROSOL RESPIRATORY (INHALATION) at 11:32

## 2020-07-12 NOTE — PLAN OF CARE
Problem: Patient Care Overview  Goal: Plan of Care Review  Outcome: Ongoing (interventions implemented as appropriate)  Flowsheets (Taken 7/12/2020 8346)  Progress: no change  Plan of Care Reviewed With: patient  Outcome Summary: plan for thoracentesis of left lung tomorrow morning, replaced potassium, iv abx, intermittent c/o headache, ct chest, nsr-st, vss, will continue to monitor  Goal: Individualization and Mutuality  Outcome: Ongoing (interventions implemented as appropriate)  Goal: Discharge Needs Assessment  Outcome: Ongoing (interventions implemented as appropriate)  Goal: Interprofessional Rounds/Family Conf  Outcome: Ongoing (interventions implemented as appropriate)     Problem: Pneumonia (Adult)  Goal: Signs and Symptoms of Listed Potential Problems Will be Absent, Minimized or Managed (Pneumonia)  Outcome: Ongoing (interventions implemented as appropriate)

## 2020-07-12 NOTE — CONSULTS
Group: [facility]         CONSULT NOTE    Patient Identification:  Urvashi Ornelas  55 y.o.  female  1965  [id number]            Requesting physician: Dr. Alonso Ibrahim    Reason for Consultation: Left pneumonia with parapneumonic effusion    CC: Left chest pain and shortness of breath    History of Present Illness:  55-year-old obese female who presents with several of weeks of shortness of breath.  She was diagnosed with pneumonia approximately 10 days ago.  She took Levaquin but her shortness of breath has not improved.  It temporarily improved.  It is now exacerbated by any exertion.  Associated with left-sided pleuritic pain when she takes a deep breath.  The pain is moderate.  Whenever she coughs she feels increased pain.  She is not producing any sputum.  She has had fever and chills.  Denies wheezing.  She quit smoking 10 years ago.  I reviewed H&P dictated by nurse practitioner Ruben.  I reviewed old records including discharge summary dictated by Dr. Ashby on April 22, 2016.  The patient does have a history of modified left radical neck dissection for thyroid cancer.    Review of Systems   Constitutional: Positive for chills, diaphoresis, fatigue and fever.   HENT: Negative for ear discharge and sore throat.    Eyes: Negative for pain and visual disturbance.   Respiratory: Positive for cough and shortness of breath.    Cardiovascular: Positive for chest pain. Negative for leg swelling.   Gastrointestinal: Negative for abdominal pain and diarrhea.   Endocrine: Negative for cold intolerance and polyuria.   Genitourinary: Negative for dysuria and hematuria.   Musculoskeletal: Negative for joint swelling and myalgias.   Skin: Negative for rash and wound.   Neurological: Negative for speech difficulty and numbness.   Hematological: Negative for adenopathy. Does not bruise/bleed easily.   Psychiatric/Behavioral: Negative for agitation and confusion.       Past Medical History:  Past Medical  History:   Diagnosis Date   • Abnormal urinalysis    • Acute bronchitis    • Acute frontal sinusitis    • Acute maxillary sinusitis    • Acute pain of right shoulder    • Acute sinusitis    • Allergic rhinitis    • Anemia    • Anxiety    • Arthritis    • Asthma    • Asthma exacerbation    • BMI 40.0-44.9, adult (CMS/[facility])    • Cancer (CMS/HCC)     THYROID   • Constipation    • Cough    • Depression    • Diaphragm paralysis    • Disease of thyroid gland     CANCER   • Dizziness    • Drug-induced nausea and vomiting    • Dyspnea    • Dysuria    • Fatigue    • Fatty liver    • H/O degenerative disc disease    • History of hallucinations    • Hypercalcemia    • Hyperlipidemia    • Hypertension    • Hypertensive kidney disease with chronic kidney disease stage III (CMS/HCC)    • Hypertrophic toenail    • Hypokalemia    • Hypothyroidism    • Joint pain    • Lumbago    • Lymph node cancer (CMS/[facility])    • Manic episode without psychotic symptoms (CMS/[facility])    • Medicare annual wellness visit, subsequent    • Migraine    • Migraine with aura    • Nausea    • Osteopenia of lumbar spine    • Overweight    • Pleurisy    • Pneumonia    • Post traumatic stress disorder    • Postmenopausal    • Rheumatoid arthritis (CMS/[facility])    • Right knee pain    • Shortness of breath    • Shoulder tendinitis    • Sleep apnea    • Thyroid disease    • Urinary frequency        Past Surgical History:  Past Surgical History:   Procedure Laterality Date   •  SECTION      X2   • CHOLECYSTECTOMY     • HAND SURGERY Bilateral     WRISTS PLATE PLACEMENT   • HEMATOMA EVACUATION HEAD/NECK Left 2016    Procedure: HEMATOMA EVACUATION NECK;  Surgeon: Dayo Ashby MD;  [place] [facility] [place] [facility] [place]:    • HYSTERECTOMY     • KNEE SURGERY Bilateral     6YO PIGEON TOE SX   • LUNG SURGERY Left     LEFT LOWER LOBE   • NECK DISSECTION Left 2016    Procedure: LEFT MODIFIED RADICAL NECK DISSECTION;  Surgeon: Dayo Ashby MD;  Location:   [facility];  Service:    • NEPHRECTOMY PARTIAL Left    • PARATHYROID GLAND SURGERY      2007   • TOTAL THYROIDECTOMY      PARATHYROID 2/2016        Home Meds:  Medications Prior to Admission   Medication Sig Dispense Refill Last Dose   • AIMOVIG 70 MG/ML prefilled syringe INJECT 1 ML INTO THE SKIN Q 30 DAYS UTD  5 Past Month at Unknown time   • albuterol (PROVENTIL HFA;VENTOLIN HFA) 108 (90 BASE) MCG/ACT inhaler Inhale 2 puffs every 4 (four) hours as needed for wheezing.   7/11/2020 at Unknown time   • atorvastatin (LIPITOR) 40 MG tablet Take 1 tablet by mouth Every Morning. 90 tablet 0 7/11/2020 at Unknown time   • benzonatate (TESSALON) 200 MG capsule Take 1 capsule by mouth 3 (Three) Times a Day As Needed for Cough. 90 capsule 0 Past Week at Unknown time   • budesonide-formoterol (SYMBICORT) 80-4.5 MCG/ACT inhaler Inhale 2 puffs 2 (Two) Times a Day.   7/11/2020 at Unknown time   • buPROPion XL (WELLBUTRIN XL) 150 MG 24 hr tablet Take 150 mg by mouth Daily.   7/11/2020 at Unknown time   • buPROPion XL (WELLBUTRIN XL) 300 MG 24 hr tablet TK 1 T PO  QAM  1 7/11/2020 at Unknown time   • busPIRone (BUSPAR) 30 MG tablet TK 1 T PO  bid  0 7/11/2020 at Unknown time   • cloZAPine (CLOZARIL) 100 MG tablet Take 100 mg by mouth Every Night.   7/10/2020 at Unknown time   • levoFLOXacin (Levaquin) 750 MG tablet Take 1 tablet by mouth Daily. 5 tablet 0 7/11/2020 at Unknown time   • levothyroxine (SYNTHROID, LEVOTHROID) 200 MCG tablet TAKE 1 TABLET BY MOUTH EVERY MORNING 90 tablet 0 7/11/2020 at Unknown time   • levothyroxine (SYNTHROID, LEVOTHROID) 25 MCG tablet TAKE 1 TABLET BY MOUTH EVERY MORNING 90 tablet 0 7/11/2020 at Unknown time   • Multiple Vitamins-Minerals (MULTIVITAMIN WOMEN 50+) tablet Take 1 tablet/day by mouth.   7/11/2020 at Unknown time   • prazosin (MINIPRESS) 2 MG capsule TK TWO CAPSULES PO EVERY NIGHT AT BEDTIME  0 7/10/2020 at Unknown time   • SAPHRIS 5 MG sublingual tablet sublingual tablet 2 (Two) Times a  Day.   7/11/2020 at Unknown time   • topiramate (TOPAMAX) 200 MG tablet Take 200 mg by mouth 2 (Two) Times a Day.  2 7/11/2020 at Unknown time   • TREXIMET  MG per tablet 1 tablet 1 (One) Time As Needed.  3 Past Month at Unknown time   • albuterol (PROVENTIL) (2.5 MG/3ML) 0.083% nebulizer solution Take 2.5 mg by nebulization Every 6 (Six) Hours As Needed for Wheezing or Shortness of Air. 25 vial 0 Taking   • Brexpiprazole (REXULTI) 2 MG tablet Daily.   Taking   • gabapentin (NEURONTIN) 100 MG capsule 3 (Three) Times a Day.  2 Taking   • gabapentin (NEURONTIN) 300 MG capsule Take 300 mg by mouth Every Night.   Taking   • indomethacin (INDOCIN) 25 MG capsule TK ONE C PO  TID WITH MEALS ONLY WHEN HAVING EXCISIONAL HEMICRANIA  5 Taking   • levocetirizine (XYZAL) 5 MG tablet Take 1 tablet by mouth Daily. 30 tablet 5 Taking   • montelukast (SINGULAIR) 10 MG tablet Take 1 tablet by mouth Every Night. 30 tablet 2 Taking   • ondansetron (ZOFRAN) 8 MG tablet Take  by mouth every 8 (eight) hours as needed for nausea or vomiting.   Taking   • predniSONE (DELTASONE) 5 MG tablet 6 pills day 1 5 pills day 2 4 pills day 3 3 pills day 4 2 pills day 5 1 pill day 6 21 tablet 0        Allergies:  Allergies   Allergen Reactions   • Depakote Er [Divalproex Sodium Er] Other (See Comments)     HAIR FALLS OUT   • Divalproex Sodium Unknown (See Comments)   • Lamictal [Lamotrigine] Other (See Comments) and Swelling     TONGUE SWELLS AND PEELS   • Risperidone Unknown (See Comments)   • Risperidone And Related Other (See Comments)     PREGNANCY SYMPTOMS   • Valproic Acid Unknown (See Comments)     Other reaction(s): Unknown (See Comments)     • Erythromycin Rash   • Penicillins Rash   • Toradol [Ketorolac Tromethamine] Rash       Social History:   Social History     Socioeconomic History   • Marital status:      Spouse name: Not on file   • Number of children: Not on file   • Years of education: Not on file   • Highest education  level: Not on file   Tobacco Use   • Smoking status: Former Smoker   • Smokeless tobacco: Never Used   Substance and Sexual Activity   • Alcohol use: No   • Drug use: No       Family History:  Family History   Problem Relation Age of Onset   • Hyperlipidemia Mother    • Hypertension Mother    • Osteoarthritis Mother    • Arthritis Mother         rheumatoid   • Other Mother         acute cutaneous lupus erythematosus   • Bipolar disorder Father    • Hypertension Father    • Bipolar disorder Brother    • HIV Brother    • Alcohol abuse Paternal Grandfather    • Hypertension Paternal Grandfather        Physical Exam:  /72 (BP Location: Right arm, Patient Position: Lying)   Pulse 93   Temp 98.9 °F (37.2 °C) (Oral)   Resp 18   Wt 119 kg (261 lb 6.4 oz)   SpO2 93%   BMI 40.94 kg/m²  Body mass index is 40.94 kg/m². 93% 119 kg (261 lb 6.4 oz)  Physical Exam   Constitutional:   Overweight female who appears acutely ill   HENT:   Right Ear: External ear normal.   Left Ear: External ear normal.   Nose: Nose normal.   Mouth/Throat: Oropharynx is clear and moist.   Eyes: Pupils are equal, round, and reactive to light. Conjunctivae and EOM are normal.   Neck: No JVD present. No tracheal deviation present. No thyromegaly present.   Cardiovascular: Normal rate, regular rhythm and normal heart sounds.   No murmur heard.  Pulmonary/Chest:   Some rhonchi on the left side.  Splinting on the left side when she is asked to take a deep breath.  Right side sounds clear.  No wheezing.  No use of accessory muscles, no dullness to percussion.     Abdominal: Soft.   Obese, rest of the exam is hampered by large body habitus   Musculoskeletal: Normal range of motion. She exhibits no deformity.   Neurological: No cranial nerve deficit or sensory deficit.   Skin: Skin is warm. No rash noted.   No palpable nodules   Psychiatric: She has a normal mood and affect. Thought content normal.       LABS:  COVID19   Date Value Ref Range Status    07/12/2020 Not Detected Not Detected - Ref. Range Final       Lab Results   Component Value Date    CALCIUM 8.2 (L) 07/12/2020     Results from last 7 days   Lab Units 07/12/20  1343 07/12/20  0502 07/12/20  0011   SODIUM mmol/L  --  141 139   POTASSIUM mmol/L 3.4* 3.2* 2.9*   CHLORIDE mmol/L  --  108* 107   CO2 mmol/L  --  23.0 22.2   BUN mg/dL  --  7 7   CREATININE mg/dL  --  1.01* 1.02*   GLUCOSE mg/dL  --  148* 132*   CALCIUM mg/dL  --  8.2* 8.1*   WBC 10*3/mm3  --  14.57* 15.74*   HEMOGLOBIN g/dL  --  11.0* 11.0*   PLATELETS 10*3/mm3  --  317 332   ALT (SGPT) U/L  --  96* 98*   AST (SGOT) U/L  --  97* 102*   PROCALCITONIN ng/mL  --   --  1.65*     Lab Results   Component Value Date    TROPONINT <0.010 07/12/2020     Results from last 7 days   Lab Units 07/12/20  0011   TROPONIN T ng/mL <0.010     Results from last 7 days   Lab Units 07/12/20  0108   RESPCX  No growth     Results from last 7 days   Lab Units 07/12/20  0502 07/12/20  0011   PROCALCITONIN ng/mL  --  1.65*   LACTATE mmol/L 0.8  --          Results from last 7 days   Lab Units 07/12/20  0028   ADENOVIRUS DETECTION BY PCR  Not Detected   CORONAVIRUS 229E  Not Detected   CORONAVIRUS HKU1  Not Detected   CORONAVIRUS NL63  Not Detected   CORONAVIRUS OC43  Not Detected   HUMAN METAPNEUMOVIRUS  Not Detected   HUMAN RHINOVIRUS/ENTEROVIRUS  Not Detected   INFLUENZA B PCR  Not Detected   PARAINFLUENZA 1  Not Detected   PARAINFLUENZA VIRUS 2  Not Detected   PARAINFLUENZA VIRUS 3  Not Detected   PARAINFLUENZA VIRUS 4  Not Detected   BORDETELLA PERTUSSIS PCR  Not Detected   BORDETELLA PARAPERTUSSIS PCR  Not Detected   [id number]  Not Detected   CHLAMYDOPHILA PNEUMONIAE PCR  Not Detected   MYCOPLAMA PNEUMO PCR  Not Detected   INFLUENZA A H3  Not Detected   INFLUENZA A H1  Not Detected   RSV, PCR  Not Detected     Results from last 7 days   Lab Units 07/12/20  0011   INR  1.96*     Results from last 7 days   Lab Units 07/12/20  0108   RESPCX  No growth     Lab  Results   Component Value Date    TSH 1.220 02/07/2020     Estimated Creatinine Clearance: 86 mL/min (A) (by C-G formula based on SCr of 1.01 mg/dL (H)).         Imaging: I personally visualized the images of CT scan of the chest showing left parapneumonic effusion and rounded atelectasis and infiltrate in the left lower lobe.      Assessment:  Pneumonia left side  Left parapneumonic effusion  Obesity  Hypokalemia  Elevated liver enzymes        Recommendations:  This patient certainly has signs and symptoms of bacterial pneumonia and pleurisy.  She has a parapneumonic effusion on CT scan of the chest.  I will order ultrasound-guided thoracentesis.  Thoracentesis should expedite the process of resolution of the left lower lobe pneumonia and atelectasis.  The amount of fluid currently seen is small.  We will give intravenous antibiotics.  We will send sputum cultures for microbiologic testing.  Location of the pneumonia suggests aspiration pneumonia.  She is on a very high dose of several CNS depressing drugs.  I wonder if at night she could be having reflux and aspiration.  I would suggest reducing the dosage of some of her CNS depressing drugs for now.  I would also suggest to avoid recumbent position for at least 4 hours after dinnertime to prevent any silent reflux.  We will monitor her clinically and see what happens within the next few days.  The patient may need repeat chest CT imaging in 48 to 72 hours.  If the area of the left lower lobe is not improving and reexpanding, she may need large bore chest tube and thoracic surgery consultation.  Continue monitoring white count, temperature curve and procalcitonin levels.    Patient was placed in face mask upon entering room and kept mask on throughout our encounter. I wore full protective equipment throughout this patient encounter including a face mask, gown and gloves. Hand hygiene was performed before donning protective equipment and after removal when leaving  the room.    Jay Dodd MD  7/12/2020  14:31      Much of this encounter note is an electronic transcription/translation of spoken language to printed text using Dragon Software.

## 2020-07-12 NOTE — PLAN OF CARE
Problem: Patient Care Overview  Goal: Plan of Care Review  Outcome: Ongoing (interventions implemented as appropriate)  Flowsheets (Taken 7/12/2020 0207)  Progress: no change  Plan of Care Reviewed With: patient  Outcome Summary: admit to floor and orient to unit. Pt COVID test negative. Denies pain/needs. c/o mild soa. RA sat 95%. VSS  Goal: Individualization and Mutuality  Outcome: Ongoing (interventions implemented as appropriate)  Goal: Discharge Needs Assessment  Outcome: Ongoing (interventions implemented as appropriate)  Goal: Interprofessional Rounds/Family Conf  Outcome: Ongoing (interventions implemented as appropriate)  Flowsheets (Taken 7/12/2020 0207)  Participants: nursing; patient     Problem: Pneumonia (Adult)  Goal: Signs and Symptoms of Listed Potential Problems Will be Absent, Minimized or Managed (Pneumonia)  Outcome: Ongoing (interventions implemented as appropriate)  Flowsheets (Taken 7/12/2020 0207)  Problems Assessed (Pneumonia): all  Problems Present (Pneumonia): fluid/electrolyte imbalance; infection progression; respiratory compromise

## 2020-07-12 NOTE — H&P
Patient Name:  Urvashi Ornleas  YOB: 1965  MRN:  [id number]  Admit Date:  7/11/2020  Patient Care Team:  Rosio Palmer APRN as PCP - General (Family Medicine)      Subjective   History Present Illness     Chief complaint: Generalized weakness, cough, shortness of breath, and recent diagnosis of pneumonia.    History of Present Illness   Ms. Ornelas is a 55 y.o. former smoker with a history of asthma, frequent bronchitis, schizoaffective disorder, PTSD, hypothyroidism, migraines, and anxiety that presents to [facility] complaining due to recent diagnosis of pneumonia.  Patient reports she was having some wheezing and shortness of air and went to see her PCP of Tuesday of last week.  She was given a dose of oral steroids for an acute asthma exacerbation in which she had completed.  After she completed the dose steroids, she did not have any improvement of her symptoms.  Monday, July 6, she called the office and a chest x-ray was ordered out patiently which showed a left lobe pneumonia.  She was given oral Levaquin for 5 days in which she completed the course.  She states her symptoms did not improve so she went to the [facility].  At the ER, she presented with dyspnea on exertion and a fever 100.6.  They were concerned for a pulmonary embolism, so a CT of her chest was performed which showed a left lobe consolidation and left parapneumonic effusion.  Labs obtained at outside facility showed a white blood cell count of 20, potassium 2.3, troponin 0.05.  She was diagnosed with sepsis, pneumonia, left pleural effusion, and hypokalemia.  She was transferred here to [facility] [place], operated by [facility] for further evaluation. 100.6    Review of Systems   Constitutional: Negative for chills and fever.   HENT: Negative for congestion and rhinorrhea.    Eyes: Negative for photophobia and visual disturbance.   Respiratory: Positive for cough and shortness of breath.    Cardiovascular: Negative  for chest pain and palpitations.   Gastrointestinal: Positive for diarrhea. Negative for nausea and vomiting.   Endocrine: Negative for cold intolerance and heat intolerance.   Genitourinary: Negative for difficulty urinating and dysuria.   Musculoskeletal: Positive for gait problem. Negative for joint swelling.   Skin: Negative for rash and wound.   Neurological: Positive for weakness. Negative for dizziness and headaches.   Psychiatric/Behavioral: Negative for sleep disturbance and suicidal ideas.        Personal History     Past Medical History:   Diagnosis Date   • Abnormal urinalysis    • Acute bronchitis    • Acute frontal sinusitis    • Acute maxillary sinusitis    • Acute pain of right shoulder    • Acute sinusitis    • Allergic rhinitis    • Anemia    • Anxiety    • Arthritis    • Asthma    • Asthma exacerbation    • BMI 40.0-44.9, adult (CMS/[facility])    • Cancer (CMS/[facility])     THYROID   • Constipation    • Cough    • Depression    • Diaphragm paralysis    • Disease of thyroid gland     CANCER   • Dizziness    • Drug-induced nausea and vomiting    • Dyspnea    • Dysuria    • Fatigue    • Fatty liver    • H/O degenerative disc disease    • History of hallucinations    • Hypercalcemia    • Hyperlipidemia    • Hypertension    • Hypertensive kidney disease with chronic kidney disease stage III (CMS/[facility])    • Hypertrophic toenail    • Hypokalemia    • Hypothyroidism    • Joint pain    • Lumbago    • Lymph node cancer (CMS/[facility])    • Manic episode without psychotic symptoms (CMS/[facility])    • Medicare annual wellness visit, subsequent    • Migraine    • Migraine with aura    • Nausea    • Osteopenia of lumbar spine    • Overweight    • Pleurisy    • Pneumonia    • Post traumatic stress disorder    • Postmenopausal    • Rheumatoid arthritis (CMS/[facility])    • Right knee pain    • Shortness of breath    • Shoulder tendinitis    • Sleep apnea    • Thyroid disease    • Urinary frequency      Past Surgical History:   Procedure  Laterality Date   •  SECTION      X2   • CHOLECYSTECTOMY     • HAND SURGERY Bilateral     WRISTS PLATE PLACEMENT   • HEMATOMA EVACUATION HEAD/NECK Left 2016    Procedure: HEMATOMA EVACUATION NECK;  Surgeon: Dayo Ashby MD;  Location: [facility];  Service:    • HYSTERECTOMY     • KNEE SURGERY Bilateral     4YO PIGEON TOE SX   • LUNG SURGERY Left     LEFT LOWER LOBE   • NECK DISSECTION Left 2016    Procedure: LEFT MODIFIED RADICAL NECK DISSECTION;  Surgeon: Dayo Ashby MD;  Location: [facility];  Service:    • NEPHRECTOMY PARTIAL Left    • PARATHYROID GLAND SURGERY         • TOTAL THYROIDECTOMY      PARATHYROID 2016     Family History   Problem Relation Age of Onset   • Hyperlipidemia Mother    • Hypertension Mother    • Osteoarthritis Mother    • Arthritis Mother         rheumatoid   • Other Mother         acute cutaneous lupus erythematosus   • Bipolar disorder Father    • Hypertension Father    • Bipolar disorder Brother    • HIV Brother    • Alcohol abuse Paternal Grandfather    • Hypertension Paternal Grandfather      Social History     Tobacco Use   • Smoking status: Former Smoker   • Smokeless tobacco: Never Used   Substance Use Topics   • Alcohol use: No   • Drug use: No     No current facility-administered medications on file prior to encounter.      Current Outpatient Medications on File Prior to Encounter   Medication Sig Dispense Refill   • AIMOVIG 70 MG/ML prefilled syringe INJECT 1 ML INTO THE SKIN Q 30 DAYS UTD  5   • albuterol (PROVENTIL HFA;VENTOLIN HFA) 108 (90 BASE) MCG/ACT inhaler Inhale 2 puffs every 4 (four) hours as needed for wheezing.     • atorvastatin (LIPITOR) 40 MG tablet Take 1 tablet by mouth Every Morning. 90 tablet 0   • benzonatate (TESSALON) 200 MG capsule Take 1 capsule by mouth 3 (Three) Times a Day As Needed for Cough. 90 capsule 0   • budesonide-formoterol (SYMBICORT) 80-4.5 MCG/ACT inhaler Inhale 2 puffs 2 (Two) Times a Day.     •  buPROPion XL (WELLBUTRIN XL) 150 MG 24 hr tablet Take 150 mg by mouth Daily.     • buPROPion XL (WELLBUTRIN XL) 300 MG 24 hr tablet TK 1 T PO  QAM  1   • busPIRone (BUSPAR) 30 MG tablet TK 1 T PO  bid  0   • cloZAPine (CLOZARIL) 100 MG tablet Take 100 mg by mouth Every Night.     • levoFLOXacin (Levaquin) 750 MG tablet Take 1 tablet by mouth Daily. 5 tablet 0   • levothyroxine (SYNTHROID, LEVOTHROID) 200 MCG tablet TAKE 1 TABLET BY MOUTH EVERY MORNING 90 tablet 0   • levothyroxine (SYNTHROID, LEVOTHROID) 25 MCG tablet TAKE 1 TABLET BY MOUTH EVERY MORNING 90 tablet 0   • Multiple Vitamins-Minerals (MULTIVITAMIN WOMEN 50+) tablet Take 1 tablet/day by mouth.     • prazosin (MINIPRESS) 2 MG capsule TK TWO CAPSULES PO EVERY NIGHT AT BEDTIME  0   • SAPHRIS 5 MG sublingual tablet sublingual tablet 2 (Two) Times a Day.     • topiramate (TOPAMAX) 200 MG tablet Take 200 mg by mouth 2 (Two) Times a Day.  2   • TREXIMET  MG per tablet 1 tablet 1 (One) Time As Needed.  3   • albuterol (PROVENTIL) (2.5 MG/3ML) 0.083% nebulizer solution Take 2.5 mg by nebulization Every 6 (Six) Hours As Needed for Wheezing or Shortness of Air. 25 vial 0   • Brexpiprazole (REXULTI) 2 MG tablet Daily.     • gabapentin (NEURONTIN) 100 MG capsule 3 (Three) Times a Day.  2   • gabapentin (NEURONTIN) 300 MG capsule Take 300 mg by mouth Every Night.     • indomethacin (INDOCIN) 25 MG capsule TK ONE C PO  TID WITH MEALS ONLY WHEN HAVING EXCISIONAL HEMICRANIA  5   • levocetirizine (XYZAL) 5 MG tablet Take 1 tablet by mouth Daily. 30 tablet 5   • montelukast (SINGULAIR) 10 MG tablet Take 1 tablet by mouth Every Night. 30 tablet 2   • ondansetron (ZOFRAN) 8 MG tablet Take  by mouth every 8 (eight) hours as needed for nausea or vomiting.     • predniSONE (DELTASONE) 5 MG tablet 6 pills day 1 5 pills day 2 4 pills day 3 3 pills day 4 2 pills day 5 1 pill day 6 21 tablet 0     Allergies   Allergen Reactions   • Depakote Er [Divalproex Sodium Er] Other  (See Comments)     HAIR FALLS OUT   • Divalproex Sodium Unknown (See Comments)   • Lamictal [Lamotrigine] Other (See Comments) and Swelling     TONGUE SWELLS AND PEELS   • Risperidone Unknown (See Comments)   • Risperidone And Related Other (See Comments)     PREGNANCY SYMPTOMS   • Valproic Acid Unknown (See Comments)     Other reaction(s): Unknown (See Comments)     • Erythromycin Rash   • Penicillins Rash   • Toradol [Ketorolac Tromethamine] Rash       Objective    Objective     Vital Signs  Temp:  [98.6 °F (37 °C)] 98.6 °F (37 °C)  Heart Rate:  [98] 98  Resp:  [18] 18  BP: (134)/(89) 134/89  SpO2:  [92 %] 92 %  on  Flow (L/min):  [2] 2;   Device (Oxygen Therapy): nasal cannula  Body mass index is 40.94 kg/m².    Physical Exam   Constitutional: She is oriented to person, place, and time. She appears well-developed and well-nourished.  Non-toxic appearance. No distress.   HENT:   Head: Normocephalic and atraumatic.   Eyes: Conjunctivae and EOM are normal. Right eye exhibits no discharge. Left eye exhibits no discharge. No scleral icterus.   Neck: Normal range of motion. Neck supple. No JVD present.   Cardiovascular: Normal rate, regular rhythm and normal heart sounds. Exam reveals no gallop and no friction rub.   No murmur heard.  Pulmonary/Chest: Effort normal and breath sounds normal. No respiratory distress. She has no wheezes. She has no rales.   Abdominal: Soft. Bowel sounds are normal. She exhibits no distension. There is no tenderness. There is no guarding.   Musculoskeletal: Normal range of motion. She exhibits no edema, tenderness or deformity.   Neurological: She is alert and oriented to person, place, and time.   Skin: Skin is warm and dry. Capillary refill takes less than 2 seconds.   Psychiatric: She has a normal mood and affect. Her behavior is normal.       Results Review:  I reviewed the patient's new clinical results.      Lab Results (last 24 hours)     Procedure Component Value Units Date/Time     CBC Auto Differential [[id number]]  (Abnormal) Collected:  07/12/20 0011    Specimen:  Blood Updated:  07/12/20 0032     WBC 15.74 10*3/mm3      RBC 3.84 10*6/mm3      Hemoglobin 11.0 g/dL      Hematocrit 32.3 %      MCV 84.1 fL      MCH 28.6 pg      MCHC 34.1 g/dL      RDW 12.8 %      RDW-SD 38.5 fl      MPV 9.8 fL      Platelets 332 10*3/mm3      Neutrophil % 85.6 %      Lymphocyte % 3.9 %      Monocyte % 8.5 %      Eosinophil % 0.4 %      Basophil % 0.3 %      Immature Grans % 1.3 %      Neutrophils, Absolute 13.47 10*3/mm3      Lymphocytes, Absolute 0.61 10*3/mm3      Monocytes, Absolute 1.34 10*3/mm3      Eosinophils, Absolute 0.07 10*3/mm3      Basophils, Absolute 0.04 10*3/mm3      Immature Grans, Absolute 0.21 10*3/mm3      nRBC 0.0 /100 WBC     Comprehensive Metabolic Panel [[id number]]  (Abnormal) Collected:  07/12/20 0011    Specimen:  Blood Updated:  07/12/20 0044     Glucose 132 mg/dL      BUN 7 mg/dL      Creatinine 1.02 mg/dL      Sodium 139 mmol/L      Potassium 2.9 mmol/L      Chloride 107 mmol/L      CO2 22.2 mmol/L      Calcium 8.1 mg/dL      Total Protein 6.1 g/dL      Albumin 2.20 g/dL      ALT (SGPT) 98 U/L      AST (SGOT) 102 U/L      Alkaline Phosphatase 93 U/L      Total Bilirubin 0.3 mg/dL      eGFR Non African Amer 57 mL/min/1.73      Globulin 3.9 gm/dL      A/G Ratio 0.6 g/dL      BUN/Creatinine Ratio 6.9     Anion Gap 9.8 mmol/L     Narrative:       GFR Normal >60  Chronic Kidney Disease <60  Kidney Failure <15      Protime-INR [[id number]]  (Abnormal) Collected:  07/12/20 0011    Specimen:  Blood from Arm, Right Updated:  07/12/20 0051     Protime 21.8 Seconds      INR 1.96    Troponin [[id number]]  (Normal) Collected:  07/12/20 0011    Specimen:  Blood Updated:  07/12/20 0044     Troponin T <0.010 ng/mL     Narrative:       Troponin T Reference Range:  <= 0.03 ng/mL-   Negative for AMI  >0.03 ng/mL-     Abnormal for myocardial necrosis.  Clinicians would have to utilize clinical acumen,  EKG, Troponin and serial changes to determine if it is an Acute Myocardial Infarction or myocardial injury due to an underlying chronic condition.       Results may be falsely decreased if patient taking Biotin.      Procalcitonin [[id number]] Collected:  07/12/20 0011    Specimen:  Blood Updated:  07/12/20 0154    COVID-19,[facility]-HOU[facility], NP SWAB IN TRANSPORT MEDIA 8-12 HR TAT - Swab, Nasopharynx [[id number]]  (Normal) Collected:  07/12/20 0028    Specimen:  Swab from Nasopharynx Updated:  07/12/20 0128     COVID19 Not Detected    Narrative:       Fact sheet for providers: https://www.fda.gov/media/002208/download     Fact sheet for patients: https://www.fda.gov/media/803345/download    Respiratory Culture - Sputum, Oropharynx [[id number]] Collected:  07/12/20 0108    Specimen:  Sputum from Oropharynx Updated:  07/12/20 0116    Blood Culture - Blood, Arm, Left [[id number]] Collected:  07/12/20 0109    Specimen:  Blood from Arm, Left Updated:  07/12/20 0116    Blood Culture - Blood, Arm, Right [[id number]] Collected:  07/12/20 0110    Specimen:  Blood from Arm, Right Updated:  07/12/20 0116          Imaging Results (Last 24 Hours)     ** No results found for the last 24 hours. **               No orders to display        Assessment/Plan     Active Hospital Problems    Diagnosis  POA   • **Pneumonia [J18.9]  Yes   • Migraines [G43.909]  Yes   • Anxiety [F41.9]  Yes   • Obesity, Class III, BMI 40-49.9 (morbid obesity) (CMS/[facility]) [E66.01]  Yes   • Parapneumonic effusion [J18.9, J91.8]  Yes   • Hypokalemia [E87.6]  Yes   • CKD (chronic kidney disease) stage 3, GFR 30-59 ml/min (CMS/[facility]) [N18.3]  Yes   • Hypothyroidism [E03.9]  Yes   • Asthma [J45.909]  Yes   • Thyroid cancer (CMS/[facility]) [C73]  Yes   • PTSD (post-traumatic stress disorder) [F43.10]  Yes   • Schizoaffective disorder (CMS/[facility]) [F25.9]  Yes      Resolved Hospital Problems   No resolved problems to display.       Ms. Ornelas is a 55 y.o. former smoker with a history  of asthma who, dyspnea on exertion, and weakness secondary to pneumonia and left parapneumonic effusion.    Left lower lobe pneumonia/left parapneumonic effusion  -Patient started on cefepime and vancomycin, will continue  -Blood cultures drawn at outside facility-pending  -Thoracic surgery consult for parapneumonic effusion  -Pulmonary consult  -Sputum cultures pending  -COVID-19 testing and respiratory viral panel pending  -Duo nebs every 4 hours as needed once COVID-19 test has resulted    Elevated troponin  -Troponin elevated at outlying hospital, patient denies chest pain on assessment   -Troponin stat    Hypokalemia  -Potassium 2.3, was treated before transfer  -Trend BMP  -Potassium protocol    Hypothyroidism/schizoaffective disorder/anxiety  -Continue home regimen      I discussed the patient's findings and my recommendations with patient and Dr. Cruz.    VTE Prophylaxis - SCDs.  Code Status - Full code.       LALO Zhu  [facility]  07/12/20  02:08

## 2020-07-12 NOTE — CONSULTS
Consults    Patient Care Team:  Rosio Palmer APRN as PCP - General (Family Medicine)    No chief complaint on file.    Pleural effusion  Left lower lobe pneumonia    Subjective     History of Present Illness     The patient is a 55-year-old  female.  She has had a productive cough and pleuritic pain for the last few days.  She became short of breath and presented to the emergency room.  X-rays showed a left lung consolidation and pleural effusion.  She has been admitted for further evaluation and treatment.    She has a cough that is productive for thick brownish sputum.  She has pain when she coughs or takes a deep breath.  She has had no fever chills or night sweats.  She has had no hemoptysis.  She reports no wheezing.  She does have shortness of breath with moderate exertion.  She is a former smoker.  She stopped smoking over 10 years ago.  She smoked less than one half a pack of cigarettes per day prior to that.  She has had surgery on her right lung for an abscess several years ago.  She has had an elevated right hemidiaphragm since the surgery for the abscess.  She is also had a thyroidectomy for thyroid cancer.  She received radioactive iodine treatments following that surgery.  She has been free of cancer since then.    Review of Systems   Respiratory: Positive for cough and shortness of breath. Negative for wheezing and stridor.    All other systems reviewed and are negative.       Patient Active Problem List   Diagnosis   • Thyroid cancer (CMS/HCC)   • Asthma   • Acute bronchitis   • Vitamin D deficiency   • Trigger thumb of left hand   • Tardive dyskinesia   • Syrinx of spinal cord (CMS/HCC)   • Schizoaffective disorder (CMS/HCC)   • PTSD (post-traumatic stress disorder)   • Osteopenia   • Hypothyroidism   • Episodic paroxysmal hemicrania, not intractable   • Medicare annual wellness visit, subsequent   • Pneumonia   • Migraines   • Anxiety   • Obesity, Class III, BMI 40-49.9 (morbid  obesity) (CMS/HCC)   • Parapneumonic effusion   • Hypokalemia   • CKD (chronic kidney disease) stage 3, GFR 30-59 ml/min (CMS/HCC)     Past Medical History:   Diagnosis Date   • Abnormal urinalysis    • Acute bronchitis    • Acute frontal sinusitis    • Acute maxillary sinusitis    • Acute pain of right shoulder    • Acute sinusitis    • Allergic rhinitis    • Anemia    • Anxiety    • Arthritis    • Asthma    • Asthma exacerbation    • BMI 40.0-44.9, adult (CMS/[facility])    • Cancer (CMS/[facility])     THYROID   • Constipation    • Cough    • Depression    • Diaphragm paralysis    • Disease of thyroid gland     CANCER   • Dizziness    • Drug-induced nausea and vomiting    • Dyspnea    • Dysuria    • Fatigue    • Fatty liver    • H/O degenerative disc disease    • History of hallucinations    • Hypercalcemia    • Hyperlipidemia    • Hypertension    • Hypertensive kidney disease with chronic kidney disease stage III (CMS/HCC)    • Hypertrophic toenail    • Hypokalemia    • Hypothyroidism    • Joint pain    • Lumbago    • Lymph node cancer (CMS/HCC)    • Manic episode without psychotic symptoms (CMS/[facility])    • Medicare annual wellness visit, subsequent    • Migraine    • Migraine with aura    • Nausea    • Osteopenia of lumbar spine    • Overweight    • Pleurisy    • Pneumonia    • Post traumatic stress disorder    • Postmenopausal    • Rheumatoid arthritis (CMS/HCC)    • Right knee pain    • Shortness of breath    • Shoulder tendinitis    • Sleep apnea    • Thyroid disease    • Urinary frequency      Past Surgical History:   Procedure Laterality Date   •  SECTION      X2   • CHOLECYSTECTOMY     • HAND SURGERY Bilateral     WRISTS PLATE PLACEMENT   • HEMATOMA EVACUATION HEAD/NECK Left 2016    Procedure: HEMATOMA EVACUATION NECK;  Surgeon: Dayo Ashby MD;  Location: [facility] OR [facility] [place];  Service:    • HYSTERECTOMY     • KNEE SURGERY Bilateral     4YO PIGEON TOE SX   • LUNG SURGERY Left     LEFT LOWER LOBE   • NECK  DISSECTION Left 4/21/2016    Procedure: LEFT MODIFIED RADICAL NECK DISSECTION;  Surgeon: Dayo Ashby MD;  [place] [facility] [place] [facility] [place]  Service:    • NEPHRECTOMY PARTIAL Left    • PARATHYROID GLAND SURGERY      2007   • TOTAL THYROIDECTOMY      PARATHYROID 2/2016     Family History   Problem Relation Age of Onset   • Hyperlipidemia Mother    • Hypertension Mother    • Osteoarthritis Mother    • Arthritis Mother         rheumatoid   • Other Mother         acute cutaneous lupus erythematosus   • Bipolar disorder Father    • Hypertension Father    • Bipolar disorder Brother    • HIV Brother    • Alcohol abuse Paternal Grandfather    • Hypertension Paternal Grandfather      Social History     Socioeconomic History   • Marital status:      Spouse name: Not on file   • Number of children: Not on file   • Years of education: Not on file   • Highest education level: Not on file   Tobacco Use   • Smoking status: Former Smoker   • Smokeless tobacco: Never Used   Substance and Sexual Activity   • Alcohol use: No   • Drug use: No     Medications Prior to Admission   Medication Sig Dispense Refill Last Dose   • AIMOVIG 70 MG/ML prefilled syringe INJECT 1 ML INTO THE SKIN Q 30 DAYS UTD  5 Past Month at Unknown time   • albuterol (PROVENTIL HFA;VENTOLIN HFA) 108 (90 BASE) MCG/ACT inhaler Inhale 2 puffs every 4 (four) hours as needed for wheezing.   7/11/2020 at Unknown time   • atorvastatin (LIPITOR) 40 MG tablet Take 1 tablet by mouth Every Morning. 90 tablet 0 7/11/2020 at Unknown time   • benzonatate (TESSALON) 200 MG capsule Take 1 capsule by mouth 3 (Three) Times a Day As Needed for Cough. 90 capsule 0 Past Week at Unknown time   • budesonide-formoterol (SYMBICORT) 80-4.5 MCG/ACT inhaler Inhale 2 puffs 2 (Two) Times a Day.   7/11/2020 at Unknown time   • buPROPion XL (WELLBUTRIN XL) 150 MG 24 hr tablet Take 150 mg by mouth Daily.   7/11/2020 at Unknown time   • buPROPion XL (WELLBUTRIN XL) 300 MG 24 hr tablet TK 1  T PO  QAM  1 7/11/2020 at Unknown time   • busPIRone (BUSPAR) 30 MG tablet TK 1 T PO  bid  0 7/11/2020 at Unknown time   • cloZAPine (CLOZARIL) 100 MG tablet Take 100 mg by mouth Every Night.   7/10/2020 at Unknown time   • levoFLOXacin (Levaquin) 750 MG tablet Take 1 tablet by mouth Daily. 5 tablet 0 7/11/2020 at Unknown time   • levothyroxine (SYNTHROID, LEVOTHROID) 200 MCG tablet TAKE 1 TABLET BY MOUTH EVERY MORNING 90 tablet 0 7/11/2020 at Unknown time   • levothyroxine (SYNTHROID, LEVOTHROID) 25 MCG tablet TAKE 1 TABLET BY MOUTH EVERY MORNING 90 tablet 0 7/11/2020 at Unknown time   • Multiple Vitamins-Minerals (MULTIVITAMIN WOMEN 50+) tablet Take 1 tablet/day by mouth.   7/11/2020 at Unknown time   • prazosin (MINIPRESS) 2 MG capsule TK TWO CAPSULES PO EVERY NIGHT AT BEDTIME  0 7/10/2020 at Unknown time   • SAPHRIS 5 MG sublingual tablet sublingual tablet 2 (Two) Times a Day.   7/11/2020 at Unknown time   • topiramate (TOPAMAX) 200 MG tablet Take 200 mg by mouth 2 (Two) Times a Day.  2 7/11/2020 at Unknown time   • TREXIMET  MG per tablet 1 tablet 1 (One) Time As Needed.  3 Past Month at Unknown time   • albuterol (PROVENTIL) (2.5 MG/3ML) 0.083% nebulizer solution Take 2.5 mg by nebulization Every 6 (Six) Hours As Needed for Wheezing or Shortness of Air. 25 vial 0 Taking   • Brexpiprazole (REXULTI) 2 MG tablet Daily.   Taking   • gabapentin (NEURONTIN) 100 MG capsule 3 (Three) Times a Day.  2 Taking   • gabapentin (NEURONTIN) 300 MG capsule Take 300 mg by mouth Every Night.   Taking   • indomethacin (INDOCIN) 25 MG capsule TK ONE C PO  TID WITH MEALS ONLY WHEN HAVING EXCISIONAL HEMICRANIA  5 Taking   • levocetirizine (XYZAL) 5 MG tablet Take 1 tablet by mouth Daily. 30 tablet 5 Taking   • montelukast (SINGULAIR) 10 MG tablet Take 1 tablet by mouth Every Night. 30 tablet 2 Taking   • ondansetron (ZOFRAN) 8 MG tablet Take  by mouth every 8 (eight) hours as needed for nausea or vomiting.   Taking   •  predniSONE (DELTASONE) 5 MG tablet 6 pills day 1 5 pills day 2 4 pills day 3 3 pills day 4 2 pills day 5 1 pill day 6 21 tablet 0      Allergies   Allergen Reactions   • Depakote Er [Divalproex Sodium Er] Other (See Comments)     HAIR FALLS OUT   • Divalproex Sodium Unknown (See Comments)   • Lamictal [Lamotrigine] Other (See Comments) and Swelling     TONGUE SWELLS AND PEELS   • Risperidone Unknown (See Comments)   • Risperidone And Related Other (See Comments)     PREGNANCY SYMPTOMS   • Valproic Acid Unknown (See Comments)     Other reaction(s): Unknown (See Comments)     • Erythromycin Rash   • Penicillins Rash   • Toradol [Ketorolac Tromethamine] Rash       Objective      Vital Signs  Temp:  [98.6 °F (37 °C)-98.9 °F (37.2 °C)] 98.9 °F (37.2 °C)  Heart Rate:  [89-98] 93  Resp:  [18] 18  BP: (109-134)/(72-89) 109/72    Intake & Output (last day)       07/11 0701 - 07/12 0700 07/12 0701 - 07/13 0700    P.O. 360 240    IV Piggyback 100     Total Intake(mL/kg) 460 (3.9) 240 (2)    Net +460 +240          Urine Unmeasured Occurrence 3 x           Physical Exam   Constitutional: She is oriented to person, place, and time. She appears well-developed and well-nourished.   HENT:   Head: Normocephalic.   Eyes: Pupils are equal, round, and reactive to light. Conjunctivae, EOM and lids are normal.   Neck: Trachea normal and normal range of motion. Neck supple. No hepatojugular reflux and no JVD present. Carotid bruit is not present. No thyroid mass and no thyromegaly present.   Cardiovascular: Normal rate, regular rhythm, S1 normal, S2 normal, normal heart sounds and normal pulses.  No extrasystoles are present. PMI is not displaced.   Pulmonary/Chest: Effort normal. She has decreased breath sounds in the right lower field and the left lower field.   Breath sounds are diminished at both bases right more than left   Abdominal: Soft. Normal appearance and bowel sounds are normal. She exhibits no mass. There is no  hepatosplenomegaly. There is no tenderness. No hernia.   Musculoskeletal: Normal range of motion.   Neurological: She is alert and oriented to person, place, and time. She has normal strength and normal reflexes. No cranial nerve deficit or sensory deficit. She displays a negative Romberg sign.   Skin: Skin is warm, dry and intact.   Psychiatric: She has a normal mood and affect. Her speech is normal and behavior is normal. Judgment and thought content normal. Cognition and memory are normal.       Results Review:    I reviewed the patient's new clinical results.  I reviewed the patient's new imaging results and agree with the interpretation.    Imaging Results (Last 24 Hours)     Procedure Component Value Units Date/Time    CT Chest Without Contrast [[id number]] Collected:  07/12/20 1023     Updated:  07/12/20 1127    Narrative:       CT CHEST WITHOUT CONTRAST     HISTORY: Productive cough. Possible left basilar pneumonia noted on  recent CT scan. Chronically elevated right hemidiaphragm.     TECHNIQUE/FINDINGS: The CT scan was performed through the chest without  contrast and compared to the 2-view chest x-ray performed 5 days ago as  well as to an earlier chest CT scan dated 07/23/2018. The following  findings are present:  1. There is prominent elevation of the right hemidiaphragm similar to  the study of 07/23/2018. There is some minimal chronic scarring and  atelectasis at the right base that is unchanged.  2. There is now a small loculated left pleural effusion at the left base  medially that extends into the major fissure and is associated with  dense rounded pneumonia in the left lower lobe posteromedially. The area  of very dense pneumonia and associated atelectasis measures up to 8 cm  in AP diameter and 6 cm in transverse diameter and 11.5 cm in height and  continued follow-up evaluation is recommended to ensure appropriate  resolution.  3. There is a calcified granuloma in the left upper lobe. The lungs  are  otherwise clear. There is no mediastinal or hilar or axillary  adenopathy. There is a tiny pericardial effusion which is unchanged from  2018.  4. The CT images through the upper liver, spleen, and both adrenal  glands are unremarkable.                 Radiation dose reduction techniques were utilized, including automated  exposure control and exposure modulation based on body size.     This report was finalized on 7/12/2020 11:24 AM by Dr. Rio Thibodeaux M.D.             Lab Results:  Lab Results (last 24 hours)     Procedure Component Value Units Date/Time    Potassium [[id number]]  (Abnormal) Collected:  07/12/20 1343    Specimen:  Blood Updated:  07/12/20 1413     Potassium 3.4 mmol/L     Respiratory Culture - Sputum, Oropharynx [[id number]] Collected:  07/12/20 0108    Specimen:  Sputum from Oropharynx Updated:  07/12/20 0939     Respiratory Culture No growth     Gram Stain Rare (1+) Gram positive bacilli      Occasional Yeast      Few (2+) WBCs per low power field      Occasional Epithelial cells per low power field    MRSA Screen, PCR (Inpatient) - Swab, Nares [[id number]]  (Normal) Collected:  07/12/20 0511    Specimen:  Swab from Nares Updated:  07/12/20 0807     MRSA PCR No MRSA Detected    Respiratory Panel, PCR - Swab, Nasopharynx [[id number]]  (Normal) Collected:  07/12/20 0028    Specimen:  Swab from Nasopharynx Updated:  07/12/20 0604     ADENOVIRUS, PCR Not Detected     Coronavirus 229E Not Detected     Coronavirus HKU1 Not Detected     Coronavirus NL63 Not Detected     Coronavirus OC43 Not Detected     Human Metapneumovirus Not Detected     Human Rhinovirus/Enterovirus Not Detected     Influenza B PCR Not Detected     Parainfluenza Virus 1 Not Detected     Parainfluenza Virus 2 Not Detected     Parainfluenza Virus 3 Not Detected     Parainfluenza Virus 4 Not Detected     Bordetella pertussis pcr Not Detected     Influenza A H1 2009 PCR Not Detected     Chlamydophila pneumoniae PCR Not Detected      Mycoplasma pneumo by PCR Not Detected     Influenza A PCR Not Detected     Influenza A H3 Not Detected     Influenza A H1 Not Detected     RSV, PCR Not Detected     Bordetella parapertussis PCR Not Detected    Narrative:       The coronavirus on the RVP is NOT COVID-19 and is NOT indicative of infection with COVID-19.     Comprehensive Metabolic Panel [[id number]]  (Abnormal) Collected:  07/12/20 0502    Specimen:  Blood Updated:  07/12/20 0551     Glucose 148 mg/dL      BUN 7 mg/dL      Creatinine 1.01 mg/dL      Sodium 141 mmol/L      Potassium 3.2 mmol/L      Chloride 108 mmol/L      CO2 23.0 mmol/L      Calcium 8.2 mg/dL      Total Protein 6.2 g/dL      Albumin 2.30 g/dL      ALT (SGPT) 96 U/L      AST (SGOT) 97 U/L      Alkaline Phosphatase 93 U/L      Total Bilirubin 0.2 mg/dL      eGFR Non African Amer 57 mL/min/1.73      Globulin 3.9 gm/dL      A/G Ratio 0.6 g/dL      BUN/Creatinine Ratio 6.9     Anion Gap 10.0 mmol/L     Narrative:       GFR Normal >60  Chronic Kidney Disease <60  Kidney Failure <15      Vancomycin, Random [[id number]]  (Normal) Collected:  07/12/20 0502    Specimen:  Blood Updated:  07/12/20 0551     Vancomycin Random 12.20 mcg/mL     Hemoglobin A1c [[id number]]  (Abnormal) Collected:  07/12/20 0502    Specimen:  Blood Updated:  07/12/20 0535     Hemoglobin A1C 6.30 %     Narrative:       Hemoglobin A1C Ranges:    Increased Risk for Diabetes  5.7% to 6.4%  Diabetes                     >= 6.5%  Diabetic Goal                < 7.0%    Lactic Acid, Plasma [[id number]]  (Normal) Collected:  07/12/20 0502    Specimen:  Blood Updated:  07/12/20 0527     Lactate 0.8 mmol/L     CBC (No Diff) [[id number]]  (Abnormal) Collected:  07/12/20 0502    Specimen:  Blood Updated:  07/12/20 0524     WBC 14.57 10*3/mm3      RBC 3.84 10*6/mm3      Hemoglobin 11.0 g/dL      Hematocrit 32.7 %      MCV 85.2 fL      MCH 28.6 pg      MCHC 33.6 g/dL      RDW 13.0 %      RDW-SD 40.1 fl      MPV 9.5 fL      Platelets 317  "10*3/mm3     Procalcitonin [[id number]]  (Abnormal) Collected:  07/12/20 0011    Specimen:  Blood Updated:  07/12/20 0221     Procalcitonin 1.65 ng/mL     Narrative:       As a Marker for Sepsis (Non-Neonates):   1. <0.5 ng/mL represents a low risk of severe sepsis and/or septic shock.  1. >2 ng/mL represents a high risk of severe sepsis and/or septic shock.    As a Marker for Lower Respiratory Tract Infections that require antibiotic therapy:  PCT on Admission     Antibiotic Therapy             6-12 Hrs later  > 0.5                Strongly Recommended            >0.25 - <0.5         Recommended  0.1 - 0.25           Discouraged                   Remeasure/reassess PCT  <0.1                 Strongly Discouraged          Remeasure/reassess PCT      As 28 day mortality risk marker: \"Change in Procalcitonin Result\" (> 80 % or <=80 %) if Day 0 (or Day 1) and Day 4 values are available. Refer to http://www.[facility]pct-calculator.com/   Change in PCT <=80 %   A decrease of PCT levels below or equal to 80 % defines a positive change in PCT test result representing a higher risk for 28-day all-cause mortality of patients diagnosed with severe sepsis or septic shock.  Change in PCT > 80 %   A decrease of PCT levels of more than 80 % defines a negative change in PCT result representing a lower risk for 28-day all-cause mortality of patients diagnosed with severe sepsis or septic shock.                Results may be falsely decreased if patient taking Biotin.     COVID-19,[facility], NP SWAB IN TRANSPORT MEDIA 8-12 HR TAT - Swab, Nasopharynx [[id number]]  (Normal) Collected:  07/12/20 0028    Specimen:  Swab from Nasopharynx Updated:  07/12/20 0128     COVID19 Not Detected    Narrative:       Fact sheet for providers: https://www.fda.gov/media/976284/download     Fact sheet for patients: https://www.fda.gov/media/253266/download    Blood Culture - Blood, Arm, Left [[id number]] Collected:  07/12/20 0109    Specimen:  Blood " from Arm, Left Updated:  07/12/20 0116    Blood Culture - Blood, Arm, Right [[id number]] Collected:  07/12/20 0110    Specimen:  Blood from Arm, Right Updated:  07/12/20 0116    Protime-INR [[id number]]  (Abnormal) Collected:  07/12/20 0011    Specimen:  Blood from Arm, Right Updated:  07/12/20 0051     Protime 21.8 Seconds      INR 1.96    Comprehensive Metabolic Panel [[id number]]  (Abnormal) Collected:  07/12/20 0011    Specimen:  Blood Updated:  07/12/20 0044     Glucose 132 mg/dL      BUN 7 mg/dL      Creatinine 1.02 mg/dL      Sodium 139 mmol/L      Potassium 2.9 mmol/L      Chloride 107 mmol/L      CO2 22.2 mmol/L      Calcium 8.1 mg/dL      Total Protein 6.1 g/dL      Albumin 2.20 g/dL      ALT (SGPT) 98 U/L      AST (SGOT) 102 U/L      Alkaline Phosphatase 93 U/L      Total Bilirubin 0.3 mg/dL      eGFR Non African Amer 57 mL/min/1.73      Globulin 3.9 gm/dL      A/G Ratio 0.6 g/dL      BUN/Creatinine Ratio 6.9     Anion Gap 9.8 mmol/L     Narrative:       GFR Normal >60  Chronic Kidney Disease <60  Kidney Failure <15      Troponin [[id number]]  (Normal) Collected:  07/12/20 0011    Specimen:  Blood Updated:  07/12/20 0044     Troponin T <0.010 ng/mL     Narrative:       Troponin T Reference Range:  <= 0.03 ng/mL-   Negative for AMI  >0.03 ng/mL-     Abnormal for myocardial necrosis.  Clinicians would have to utilize clinical acumen, EKG, Troponin and serial changes to determine if it is an Acute Myocardial Infarction or myocardial injury due to an underlying chronic condition.       Results may be falsely decreased if patient taking Biotin.      CBC Auto Differential [[id number]]  (Abnormal) Collected:  07/12/20 0011    Specimen:  Blood Updated:  07/12/20 0032     WBC 15.74 10*3/mm3      RBC 3.84 10*6/mm3      Hemoglobin 11.0 g/dL      Hematocrit 32.3 %      MCV 84.1 fL      MCH 28.6 pg      MCHC 34.1 g/dL      RDW 12.8 %      RDW-SD 38.5 fl      MPV 9.8 fL      Platelets 332 10*3/mm3      Neutrophil % 85.6 %        Lymphocyte % 3.9 %      Monocyte % 8.5 %      Eosinophil % 0.4 %      Basophil % 0.3 %      Immature Grans % 1.3 %      Neutrophils, Absolute 13.47 10*3/mm3      Lymphocytes, Absolute 0.61 10*3/mm3      Monocytes, Absolute 1.34 10*3/mm3      Eosinophils, Absolute 0.07 10*3/mm3      Basophils, Absolute 0.04 10*3/mm3      Immature Grans, Absolute 0.21 10*3/mm3      nRBC 0.0 /100 WBC               Assessment/Plan       Pneumonia    Thyroid cancer (CMS/[facility])    Asthma    Schizoaffective disorder (CMS/[facility])    PTSD (post-traumatic stress disorder)    Hypothyroidism    Migraines    Anxiety    Obesity, Class III, BMI 40-49.9 (morbid obesity) (CMS/[facility])    Parapneumonic effusion    Hypokalemia    CKD (chronic kidney disease) stage 3, GFR 30-59 ml/min (CMS/[facility])      Assessment & Plan    I discussed the patients findings and our recommendations with patient     I ordered a CT of the chest without contrast early this morning.  I have independently reviewed that exam.  There is elevation of the right hemidiaphragm.  There are no infiltrates nodules or masses in the right lung.  There is consolidation of the medial basilar segment of the left lower lobe.  There is some pleural fluid in the fissure between the upper and lower lobes on the left.  There is a small amount of fluid around the left lower lobe.  Left upper lobe appears clear.    It appears that this lady has a pneumonia of the left lower lobe of the lung.  She has a fairly benign appearing parapneumonic effusion.  Most of the effusion is in the fissure and would not be approachable by thoracentesis.  I think the fluid does not require any intervention at this time.  I would simply treat her pneumonia with antibiotics and pulmonary hygiene.  If the fluid increases or if she has any complications from the fluid we can then address the fluid.    Thank you for this consult and allowing us to participate in the care of your patient.  We will follow along with you during  this hospitalization.       John Aguilar III, MD  [facility]  07/12/20  14:25

## 2020-07-13 ENCOUNTER — APPOINTMENT (OUTPATIENT)
Dept: ULTRASOUND IMAGING | Facility: HOSPITAL | Age: 55
End: 2020-07-13

## 2020-07-13 LAB
APTT PPP: 42.7 SECONDS (ref 22.7–35.4)
D DIMER PPP FEU-MCNC: 5.12 MCGFEU/ML (ref 0–0.49)
FIBRINOGEN PPP-MCNC: 1021 MG/DL (ref 219–464)
INR PPP: 1.97 (ref 0.9–1.1)
POTASSIUM SERPL-SCNC: 3.9 MMOL/L (ref 3.5–5.2)
PROCALCITONIN SERPL-MCNC: 0.87 NG/ML (ref 0–0.25)
PROTHROMBIN TIME: 21.9 SECONDS (ref 11.7–14.2)

## 2020-07-13 PROCEDURE — 84132 ASSAY OF SERUM POTASSIUM: CPT | Performed by: HOSPITALIST

## 2020-07-13 PROCEDURE — 93010 ELECTROCARDIOGRAM REPORT: CPT | Performed by: INTERNAL MEDICINE

## 2020-07-13 PROCEDURE — 25010000002 ENOXAPARIN PER 10 MG: Performed by: INTERNAL MEDICINE

## 2020-07-13 PROCEDURE — 84145 PROCALCITONIN (PCT): CPT | Performed by: INTERNAL MEDICINE

## 2020-07-13 PROCEDURE — 94799 UNLISTED PULMONARY SVC/PX: CPT

## 2020-07-13 PROCEDURE — 85379 FIBRIN DEGRADATION QUANT: CPT | Performed by: HOSPITALIST

## 2020-07-13 PROCEDURE — 93005 ELECTROCARDIOGRAM TRACING: CPT | Performed by: HOSPITALIST

## 2020-07-13 PROCEDURE — 76942 ECHO GUIDE FOR BIOPSY: CPT

## 2020-07-13 PROCEDURE — 85610 PROTHROMBIN TIME: CPT | Performed by: INTERNAL MEDICINE

## 2020-07-13 PROCEDURE — 25010000002 CEFEPIME PER 500 MG: Performed by: NURSE PRACTITIONER

## 2020-07-13 PROCEDURE — 99232 SBSQ HOSP IP/OBS MODERATE 35: CPT | Performed by: THORACIC SURGERY (CARDIOTHORACIC VASCULAR SURGERY)

## 2020-07-13 PROCEDURE — 85384 FIBRINOGEN ACTIVITY: CPT | Performed by: HOSPITALIST

## 2020-07-13 PROCEDURE — 85730 THROMBOPLASTIN TIME PARTIAL: CPT | Performed by: HOSPITALIST

## 2020-07-13 PROCEDURE — 99223 1ST HOSP IP/OBS HIGH 75: CPT | Performed by: INTERNAL MEDICINE

## 2020-07-13 PROCEDURE — 25010000003 LIDOCAINE 1 % SOLUTION: Performed by: RADIOLOGY

## 2020-07-13 RX ORDER — LIDOCAINE HYDROCHLORIDE 10 MG/ML
20 INJECTION, SOLUTION INFILTRATION; PERINEURAL ONCE
Status: COMPLETED | OUTPATIENT
Start: 2020-07-13 | End: 2020-07-13

## 2020-07-13 RX ORDER — GUAIFENESIN/DEXTROMETHORPHAN 100-10MG/5
5 SYRUP ORAL EVERY 4 HOURS PRN
Status: DISCONTINUED | OUTPATIENT
Start: 2020-07-13 | End: 2020-07-17 | Stop reason: HOSPADM

## 2020-07-13 RX ADMIN — BUDESONIDE AND FORMOTEROL FUMARATE DIHYDRATE 2 PUFF: 80; 4.5 AEROSOL RESPIRATORY (INHALATION) at 20:13

## 2020-07-13 RX ADMIN — BUPROPION HYDROCHLORIDE 450 MG: 150 TABLET, FILM COATED, EXTENDED RELEASE ORAL at 09:10

## 2020-07-13 RX ADMIN — GUAIFENESIN AND DEXTROMETHORPHAN 5 ML: 100; 10 SYRUP ORAL at 20:36

## 2020-07-13 RX ADMIN — LEVOTHYROXINE SODIUM 200 MCG: 100 TABLET ORAL at 06:38

## 2020-07-13 RX ADMIN — ACETAMINOPHEN 650 MG: 325 TABLET, FILM COATED ORAL at 12:50

## 2020-07-13 RX ADMIN — BUDESONIDE AND FORMOTEROL FUMARATE DIHYDRATE 2 PUFF: 80; 4.5 AEROSOL RESPIRATORY (INHALATION) at 07:59

## 2020-07-13 RX ADMIN — LEVOTHYROXINE SODIUM 25 MCG: 25 TABLET ORAL at 06:40

## 2020-07-13 RX ADMIN — CEFEPIME HYDROCHLORIDE 2 G: 2 INJECTION, POWDER, FOR SOLUTION INTRAVENOUS at 09:10

## 2020-07-13 RX ADMIN — TOPIRAMATE 200 MG: 100 TABLET, FILM COATED ORAL at 20:29

## 2020-07-13 RX ADMIN — CEFEPIME HYDROCHLORIDE 2 G: 2 INJECTION, POWDER, FOR SOLUTION INTRAVENOUS at 17:29

## 2020-07-13 RX ADMIN — ENOXAPARIN SODIUM 40 MG: 40 INJECTION SUBCUTANEOUS at 20:29

## 2020-07-13 RX ADMIN — ASENAPINE MALEATE 5 MG: 5 TABLET SUBLINGUAL at 09:10

## 2020-07-13 RX ADMIN — BUSPIRONE HYDROCHLORIDE 30 MG: 15 TABLET ORAL at 20:29

## 2020-07-13 RX ADMIN — ASENAPINE MALEATE 5 MG: 5 TABLET SUBLINGUAL at 20:29

## 2020-07-13 RX ADMIN — LIDOCAINE HYDROCHLORIDE 20 ML: 10 INJECTION, SOLUTION INFILTRATION; PERINEURAL at 11:15

## 2020-07-13 RX ADMIN — BUSPIRONE HYDROCHLORIDE 30 MG: 15 TABLET ORAL at 09:10

## 2020-07-13 RX ADMIN — CEFEPIME HYDROCHLORIDE 2 G: 2 INJECTION, POWDER, FOR SOLUTION INTRAVENOUS at 00:51

## 2020-07-13 RX ADMIN — TOPIRAMATE 200 MG: 100 TABLET, FILM COATED ORAL at 09:10

## 2020-07-13 RX ADMIN — ATORVASTATIN CALCIUM 40 MG: 20 TABLET, FILM COATED ORAL at 06:40

## 2020-07-13 NOTE — CONSULTS
Subjective     REASON FOR CONSULTATION:  Provide an opinion on any further workup or treatment on:    Prolonged protime                       REQUESTING PHYSICIAN: Robert Cruz MD      HISTORY OF PRESENT ILLNESS:      Urvashi Ornelas is a 55 y.o. patient who is a former smoker who was admitted on 7/11/2020 with left lower lobe pneumonia and left parapneumonic effusion.  She has history of bronchial asthma with episodes of bronchitis.  She has schizoaffective disorder and PTSD.  She presented to the ER on 7/11/2020 due to recent diagnosis of pneumonia.  She was having shortness of breath and wheezing.  She was treated with oral steroids but did not feel better.  Chest x-ray on 7/6/2020 showed left lower lobe pneumonia.  She was placed on Levaquin for 5 days without improvement in the symptoms.  [facility].  She had a temperature of 100.6.  CT angiogram was done and showed left lower lobe consolidation and left parapneumonic effusion.  There was no evidence of pulmonary embolism.    Patient was diagnosed with pneumonia and sepsis and she was transferred to [facility].    Patient reports minimal improvement in symptoms since admission.  She continues to feel short of breath.  She complains of chest pain on the left on deep inspiration     Patient's past medical history is significant for pulmonary embolism that developed in 2004 10 days after she had surgery.  No DVT was identified.  She was anticoagulated for about 6 months.    Patient has history of thyroid cancer.  She had surgery followed by radioactive iodine in 2016.  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    Past Medical History:   Diagnosis Date   • Abnormal urinalysis    • Acute bronchitis    • Acute frontal sinusitis    • Acute maxillary sinusitis    • Acute pain of right shoulder    • Acute sinusitis    • Allergic rhinitis    • Anemia    • Anxiety    • Arthritis    • Asthma    • Asthma  exacerbation    • BMI 40.0-44.9, adult (CMS/HCC)    • Cancer (CMS/HCC)     THYROID   • Constipation    • Cough    • Depression    • Diaphragm paralysis    • Disease of thyroid gland     CANCER   • Dizziness    • Drug-induced nausea and vomiting    • Dyspnea    • Dysuria    • Fatigue    • Fatty liver    • H/O degenerative disc disease    • History of hallucinations    • Hypercalcemia    • Hyperlipidemia    • Hypertension    • Hypertensive kidney disease with chronic kidney disease stage III (CMS/HCC)    • Hypertrophic toenail    • Hypokalemia    • Hypothyroidism    • Joint pain    • Lumbago    • Lymph node cancer (CMS/HCC)    • Manic episode without psychotic symptoms (CMS/HCC)    • Medicare annual wellness visit, subsequent    • Migraine    • Migraine with aura    • Nausea    • Osteopenia of lumbar spine    • Overweight    • Pleurisy    • Pneumonia    • Post traumatic stress disorder    • Postmenopausal    • Rheumatoid arthritis (CMS/HCC)    • Right knee pain    • Shortness of breath    • Shoulder tendinitis    • Sleep apnea    • Thyroid disease    • Urinary frequency        Past Surgical History:   Procedure Laterality Date   •  SECTION      X2   • CHOLECYSTECTOMY     • HAND SURGERY Bilateral     WRISTS PLATE PLACEMENT   • HEMATOMA EVACUATION HEAD/NECK Left 2016    Procedure: HEMATOMA EVACUATION NECK;  Surgeon: Dayo Ashby MD;  Location: [facility];  Service:    • HYSTERECTOMY     • KNEE SURGERY Bilateral     4YO PIGEON TOE SX   • LUNG SURGERY Left     LEFT LOWER LOBE   • NECK DISSECTION Left 2016    Procedure: LEFT MODIFIED RADICAL NECK DISSECTION;  Surgeon: Dayo Ashby MD;  Location: [facility];  Service:    • NEPHRECTOMY PARTIAL Left    • PARATHYROID GLAND SURGERY         • TOTAL THYROIDECTOMY      PARATHYROID 2016         SCHEDULED MEDS:    asenapine maleate 5 mg Sublingual BID   atorvastatin 40 mg Oral QAM   budesonide-formoterol 2 puff Inhalation BID - RT    buPROPion  mg Oral Daily   busPIRone 30 mg Oral BID   cefepime 2 g Intravenous Q8H   cloZAPine 100 mg Oral Nightly   levothyroxine 200 mcg Oral QAM   levothyroxine 25 mcg Oral QAM   topiramate 200 mg Oral BID     INFUSIONS:    hold 1 each     PRN MEDS:  •  acetaminophen  •  aluminum-magnesium hydroxide-simethicone  •  bisacodyl  •  bisacodyl  •  guaiFENesin-dextromethorphan  •  hold  •  HYDROcodone-homatropine  •  ipratropium-albuterol  •  nitroglycerin  •  ondansetron **OR** ondansetron  •  potassium chloride **OR** potassium chloride **OR** potassium chloride  •  sodium chloride    ALLERGIES:  Allergies   Allergen Reactions   • Depakote Er [Divalproex Sodium Er] Other (See Comments)     HAIR FALLS OUT   • Divalproex Sodium Unknown (See Comments)   • Lamictal [Lamotrigine] Swelling and Other (See Comments)     TONGUE SWELLS AND PEELS   • Risperidone Unknown (See Comments)   • Risperidone And Related Other (See Comments)     PREGNANCY SYMPTOMS   • Valproic Acid Unknown (See Comments)      Unknown      • Erythromycin Rash   • Penicillins Rash   • Toradol [Ketorolac Tromethamine] Rash        Social History     Socioeconomic History   • Marital status:      Spouse name: Not on file   • Number of children: Not on file   • Years of education: Not on file   • Highest education level: Not on file   Tobacco Use   • Smoking status: Former Smoker   • Smokeless tobacco: Never Used   Substance and Sexual Activity   • Alcohol use: No   • Drug use: No       Cancer-related family history is not on file.    REVIEW OF SYSTEMS:   GENERAL: Generalized weakness.  Fever.  SKIN: Negative for skin rash or lesion.  HEME/LYMPH: Negative for easy bruisability.  EYES:  Negative for vision changes.  ENT:  Negative for mouth bleeding or epistaxis.  RESPIRATORY: Cough and shortness of breath.   CVS: Chest pain on deep inspiration..   GI: Diarrhea.   :  Negative for hematuria.   MUSCULOSKELETAL:  Negative for back  pain.  NEUROLOGICAL:  Negative for headaches.  PSYCHIATRIC: Schizoaffective disorder and PTSD.     Objective   VITAL SIGNS:  Vitals:    07/13/20 0759 07/13/20 1030 07/13/20 1129 07/13/20 1304   BP:  117/79 108/68 114/88   BP Location:  Right arm Left arm Right arm   Patient Position:  Lying Lying Lying   Pulse: 97 93 90 93   Resp: 18 16 16 16   Temp:    98.1 °F (36.7 °C)   TempSrc:    Oral   SpO2: 93% 93% 92%    Weight:       Height:           Wt Readings from Last 3 Encounters:   07/11/20 119 kg (261 lb 6.4 oz)   06/30/20 122 kg (268 lb)   03/05/20 124 kg (273 lb 9.6 oz)       PHYSICAL EXAMINATION:   GENERAL:  The patient appears weak, not in acute distress.  SKIN: Warm and dry. No skin rash. No ecchymosis.  HEAD:  Normocephalic.  EYES:  No Jaundice. No Pallor. Pupils equal. EOMI.  NECK:  Supple. No Thyromegaly. No Masses.  LYMPHATICS:  No cervical or supraclavicular lymphadenopathy.  CHEST: Normal respiratory effort.  Crepitations on the left.  Decreased breath sounds in the left lung base.  CARDIAC:  Normal S1 & S2. No murmur. No edema.  ABDOMEN:  Soft. No tenderness. No Hepatomegaly. No Splenomegaly. No masses.  EXTREMITIES:  No Calf tenderness.  NEUROLOGICAL:  No Focal neurological deficits.        RESULT REVIEW:   Results from last 7 days   Lab Units 07/12/20  0502 07/12/20  0011   WBC 10*3/mm3 14.57* 15.74*   NEUTROS ABS 10*3/mm3  --  13.47*   HEMOGLOBIN g/dL 11.0* 11.0*   HEMATOCRIT % 32.7* 32.3*   PLATELETS 10*3/mm3 317 332     Results from last 7 days   Lab Units 07/13/20  0545 07/12/20  1343 07/12/20  0502 07/12/20  0011   SODIUM mmol/L  --   --  141 139   POTASSIUM mmol/L 3.9 3.4* 3.2* 2.9*   CHLORIDE mmol/L  --   --  108* 107   CO2 mmol/L  --   --  23.0 22.2   BUN mg/dL  --   --  7 7   CREATININE mg/dL  --   --  1.01* 1.02*   CALCIUM mg/dL  --   --  8.2* 8.1*   ALBUMIN g/dL  --   --  2.30* 2.20*   BILIRUBIN mg/dL  --   --  0.2 0.3   ALK PHOS U/L  --   --  93 93   ALT (SGPT) U/L  --   --  96* 98*   AST  (SGOT) U/L  --   --  97* 102*     Component      Latest Ref Rng & Units 7/12/2020 7/13/2020   Protime      11.7 - 14.2 Seconds 21.8 (H) 21.9 (H)   INR      0.90 - 1.10 1.96 (H) 1.97 (H)   PTT      22.7 - 35.4 seconds  42.7 (H)   D-Dimer, Quant      0.00 - 0.49 MCGFEU/mL  5.12 (H)   Fibrinogen      219 - 464 mg/dL  1,021 (H)       Assessment/Plan   1.  Prolonged PT and PTT.  She has an elevated d-dimer.  Fibrinogen is significantly elevated at 1021.  On review of old labs, her coags were normal on 11/4/2015 so she does not have a hereditary coagulation factor deficiency.  The prolongation of the PT and PTT is likely secondary to the underlying infection resulting in early DIC picture.  Patient is not having problem with bleeding.    2.  Elevated d-dimer.  CT angiogram done before admission showed no evidence of pulmonary embolism.  Leg exam reveals no calf tenderness.  She has history of pulmonary embolism in 2004 10 days after she had surgery and she was anticoagulated.    3.  Normochromic normocytic anemia.  Hemoglobin is at 11.0 today.    4.  Leukocytosis and neutrophilia.  Highest WBC count was 15,000 on 7/12/2020 and it is down to 14,570 today.    PLAN:    1.  Based on the patient's elevated fibrinogen and elevated d-dimer and the patient's prior history of PE, I believe the patient is at a higher risk for thrombosis than for bleeding.  2.  I would not correct PT and PTT at this point especially that she is not going to have surgical interventions.  I recommended close monitoring as she is treated with antibiotics.  3.  Due to her increased risk for thrombosis, will start prophylactic Lovenox 40 mg subcutaneously daily.  4.  We will monitor her coags daily.    Thank you for the opportunity to participate in the care of this patient.  We will follow with you.        Jason Lund MD  07/13/20

## 2020-07-13 NOTE — PLAN OF CARE
Problem: Patient Care Overview  Goal: Plan of Care Review  Outcome: Ongoing (interventions implemented as appropriate)  Flowsheets (Taken 7/13/2020 0242)  Progress: improving  Plan of Care Reviewed With: patient  Outcome Summary: pt states feels better. Less soa. denies pain/needs. Low grade fever. VSS  Goal: Individualization and Mutuality  Outcome: Ongoing (interventions implemented as appropriate)  Goal: Discharge Needs Assessment  Outcome: Ongoing (interventions implemented as appropriate)  Goal: Interprofessional Rounds/Family Conf  Outcome: Ongoing (interventions implemented as appropriate)  Flowsheets (Taken 7/12/2020 0207)  Participants: nursing;patient     Problem: Pneumonia (Adult)  Goal: Signs and Symptoms of Listed Potential Problems Will be Absent, Minimized or Managed (Pneumonia)  Outcome: Ongoing (interventions implemented as appropriate)  Flowsheets  Taken 7/13/2020 0242  Problems Assessed (Pneumonia): all  Taken 7/12/2020 0207  Problems Present (Pneumonia): fluid/electrolyte imbalance;infection progression;respiratory compromise

## 2020-07-13 NOTE — PROGRESS NOTES
"      [facility]         Dr Hammer Sayied   LOS: 2 days   Patient Care Team:  Rosio Palmer APRN as PCP - General (Family Medicine)    Chief Complaint: Pneumonia with left parapneumonic effusion obesity elevated LFTs    Interval History: Resting comfortably came back from thoracentesis.  No fluid could be removed.    REVIEW OF SYSTEMS:   CARDIOVASCULAR: No chest pain, chest pressure or chest discomfort. No palpitations or edema.   RESPIRATORY: Shortness of breath with exertion  GASTROINTESTINAL: No anorexia, nausea, vomiting or diarrhea. No abdominal pain or blood.   HEMATOLOGIC: No bleeding or bruising.     Ventilator/Non-Invasive Ventilation Settings (From admission, onward)    None            Vital Signs  Temp:  [98.1 °F (36.7 °C)-99 °F (37.2 °C)] 98.1 °F (36.7 °C)  Heart Rate:  [90-97] 93  Resp:  [16-20] 16  BP: (108-138)/(68-89) 114/88    Intake/Output Summary (Last 24 hours) at 7/13/2020 1413  Last data filed at 7/13/2020 0910  Gross per 24 hour   Intake 560 ml   Output --   Net 560 ml     Flowsheet Rows      First Filed Value   Admission Height  170.2 cm (67\") Documented at 07/12/2020 1527   Admission Weight  119 kg (261 lb 6.4 oz) Documented at 07/11/2020 2258          Physical Exam:  Patient is examined using the personal protective equipment as per guidelines from infection control for this particular patient as enacted.  Hand hygiene was performed before and after patient interaction.   General Appearance:    Alert, cooperative, in no acute distress.  Following simple commands  Neck midline trachea no thyromegaly   Lungs:    Diminished breath sounds with rhonchi crackles left base    Heart:    Regular rhythm and normal rate, normal S1 and S2, no            murmur, no gallop, no rub, no click   Chest Wall:    No abnormalities observed   Abdomen:     Normal bowel sounds, no masses, no organomegaly, soft        non-tender, non-distended, no guarding, no rebound                tenderness "   Extremities:   Moves all extremities well, no edema, no cyanosis, no             redness  CNS no focal neurological deficits no distress  Skin no rashes no nodules  Psych oriented to time place and person normal memory  Musculoskeletal no cyanosis no clubbing normal range of motion     Results Review:        Results from last 7 days   Lab Units 07/13/20  0545 07/12/20  1343 07/12/20  0502 07/12/20  0011   SODIUM mmol/L  --   --  141 139   POTASSIUM mmol/L 3.9 3.4* 3.2* 2.9*   CHLORIDE mmol/L  --   --  108* 107   CO2 mmol/L  --   --  23.0 22.2   BUN mg/dL  --   --  7 7   CREATININE mg/dL  --   --  1.01* 1.02*   GLUCOSE mg/dL  --   --  148* 132*   CALCIUM mg/dL  --   --  8.2* 8.1*     Results from last 7 days   Lab Units 07/12/20  0011   TROPONIN T ng/mL <0.010     Results from last 7 days   Lab Units 07/12/20  0502 07/12/20  0011   WBC 10*3/mm3 14.57* 15.74*   HEMOGLOBIN g/dL 11.0* 11.0*   HEMATOCRIT % 32.7* 32.3*   PLATELETS 10*3/mm3 317 332     Results from last 7 days   Lab Units 07/13/20  0545 07/12/20  0011   INR  1.97* 1.96*   APTT seconds 42.7*  --                        I reviewed the patient's new clinical results.  I personally viewed and interpreted the patient's CXR        Medication Review:     asenapine maleate 5 mg Sublingual BID   atorvastatin 40 mg Oral QAM   budesonide-formoterol 2 puff Inhalation BID - RT   buPROPion  mg Oral Daily   busPIRone 30 mg Oral BID   cefepime 2 g Intravenous Q8H   cloZAPine 100 mg Oral Nightly   levothyroxine 200 mcg Oral QAM   levothyroxine 25 mcg Oral QAM   topiramate 200 mg Oral BID         hold 1 each       ASSESSMENT:   Pneumonia left side  Left parapneumonic effusion  Obesity  Hypokalemia  Elevated liver enzymes    PLAN:  Pneumonia with parapneumonic effusion.  Minimal amount of effusion could not be tapped by thoracentesis.  Treat pneumonia and follow-up CT chest in 6 to 8 weeks to ensure resolution of pneumonia and effusion  Aggressive pulmonary  toilet  Ambulate  Wean down oxygen as tolerated    Jaquelin Soares MD  07/13/20  14:13

## 2020-07-13 NOTE — PROGRESS NOTES
"    Chief Complaint: Shortness of breath and pneumonia  S/P: Thoracentesis      Subjective  Feeling a bit better today.  Thoracentesis attempted without fluid return.  Vital Signs:  Temp:  [98.1 °F (36.7 °C)-99 °F (37.2 °C)] 98.1 °F (36.7 °C)  Heart Rate:  [90-97] 93  Resp:  [16-20] 16  BP: (108-138)/(68-89) 114/88    Intake & Output (last day)       07/12 0701 - 07/13 0700 07/13 0701 - 07/14 0700    P.O. 840     IV Piggyback 100 100    Total Intake(mL/kg) 940 (7.9) 100 (0.8)    Net +940 +100          Urine Unmeasured Occurrence 1 x 1 x          Objective:  General Appearance:  Comfortable, well-appearing and in no acute distress.    Vital signs: (most recent): Blood pressure 114/88, pulse 93, temperature 98.1 °F (36.7 °C), temperature source Oral, resp. rate 16, height 170.2 cm (67\"), weight 119 kg (261 lb 6.4 oz), SpO2 92 %.  Vital signs are normal.    Lungs:  Normal effort and normal respiratory rate.  There are decreased breath sounds.    Heart: Normal rate.  Regular rhythm.    Abdomen: Abdomen is soft.  Bowel sounds are normal.   There is no abdominal tenderness.     Neurological: Patient is alert and oriented to person, place and time.    Skin:  Warm and dry.                Results Review:     I reviewed the patient's new clinical results.  I reviewed the patient's new imaging results and agree with the interpretation.  I reviewed the patient's other test results and agree with the interpretation  I personally viewed and interpreted the patient's EKG/Telemetry data    Imaging Results (Last 24 Hours)     Procedure Component Value Units Date/Time    US Thoracentesis [[id number]] Collected:  07/13/20 1213    Specimen:  Body Fluid Updated:  07/13/20 1417    Narrative:       US THORACENTESIS     CLINICAL INFORMATION: Pleural effusion on the left, ultrasound-guided  thoracentesis requested.     The procedure explained to the patient, informed consent obtained.  Patient understands and agrees.      images " demonstrate only a small amount of pleural fluid in the  left costophrenic sulcus.     PROCEDURE NOTE: With the patient sitting at the side of her stretcher,  ultrasound of the posterior left lobe thorax performed. A small amount  of pleural fluid was demonstrated in the costophrenic sulcus.  Appropriate prep and drape of the skin surface, 1% lidocaine for local  anesthesia. The drainage catheter could not be manipulated into the  small fluid collection. No pleural fluid aspirated.     CONCLUSION: Small amount of pleural fluid in the left costophrenic  sulcus, unfortunately a drainage catheter could not be advanced into  this location with ultrasound guidance.     This report was finalized on 7/13/2020 2:14 PM by Dr. Dann Kearns M.D.             Lab Results:     Lab Results (last 24 hours)     Procedure Component Value Units Date/Time    Respiratory Culture - Sputum, Oropharynx [[id number]] Collected:  07/12/20 0108    Specimen:  Sputum from Oropharynx Updated:  07/13/20 1241     Respiratory Culture Scant growth (1+) Normal Respiratory Susan     Gram Stain Rare (1+) Gram positive bacilli      Occasional Yeast      Few (2+) WBCs per low power field      Occasional Epithelial cells per low power field    D-dimer, Quantitative [[id number]]  (Abnormal) Collected:  07/13/20 0545    Specimen:  Blood Updated:  07/13/20 0916     D-Dimer, Quantitative 5.12 MCGFEU/mL     Narrative:       The Stago D-Dimer test used in conjunction with a clinical pretest probability (PTP) assessment model, has been approved by the FDA to rule out the presence of venous thromboembolism (VTE) in outpatients suspected of deep venous thrombosis (DVT) or pulmonary embolism (PE). The cut-off for negative predictive value is <0.50 MCGFEU/mL.    Fibrinogen [[id number]]  (Abnormal) Collected:  07/13/20 0545    Specimen:  Blood Updated:  07/13/20 0903     Fibrinogen 1,021 mg/dL     aPTT [[id number]]  (Abnormal) Collected:  07/13/20 0545    Specimen:   "Blood Updated:  07/13/20 0903     PTT 42.7 seconds     Procalcitonin [[id number]]  (Abnormal) Collected:  07/13/20 0545    Specimen:  Blood Updated:  07/13/20 0750     Procalcitonin 0.87 ng/mL     Narrative:       As a Marker for Sepsis (Non-Neonates):   1. <0.5 ng/mL represents a low risk of severe sepsis and/or septic shock.  1. >2 ng/mL represents a high risk of severe sepsis and/or septic shock.    As a Marker for Lower Respiratory Tract Infections that require antibiotic therapy:  PCT on Admission     Antibiotic Therapy             6-12 Hrs later  > 0.5                Strongly Recommended            >0.25 - <0.5         Recommended  0.1 - 0.25           Discouraged                   Remeasure/reassess PCT  <0.1                 Strongly Discouraged          Remeasure/reassess PCT      As 28 day mortality risk marker: \"Change in Procalcitonin Result\" (> 80 % or <=80 %) if Day 0 (or Day 1) and Day 4 values are available. Refer to http://www.WH[employer]s-pct-calculator.com/   Change in PCT <=80 %   A decrease of PCT levels below or equal to 80 % defines a positive change in PCT test result representing a higher risk for 28-day all-cause mortality of patients diagnosed with severe sepsis or septic shock.  Change in PCT > 80 %   A decrease of PCT levels of more than 80 % defines a negative change in PCT result representing a lower risk for 28-day all-cause mortality of patients diagnosed with severe sepsis or septic shock.                Results may be falsely decreased if patient taking Biotin.     Potassium [[id number]]  (Normal) Collected:  07/13/20 0545    Specimen:  Blood Updated:  07/13/20 0713     Potassium 3.9 mmol/L     Protime-INR [[id number]]  (Abnormal) Collected:  07/13/20 0545    Specimen:  Blood Updated:  07/13/20 0649     Protime 21.9 Seconds      INR 1.97    Blood Culture - Blood, Arm, Left [[id number]] Collected:  07/12/20 0109    Specimen:  Blood from Arm, Left Updated:  07/13/20 0130     Blood Culture No " growth at 24 hours    Blood Culture - Blood, Arm, Right [[id number]] Collected:  07/12/20 0110    Specimen:  Blood from Arm, Right Updated:  07/13/20 0130     Blood Culture No growth at 24 hours    Legionella Antigen, Urine - Urine, Urine, Clean Catch [[id number]]  (Normal) Collected:  07/12/20 1726    Specimen:  Urine, Clean Catch Updated:  07/12/20 2114     LEGIONELLA ANTIGEN, URINE Negative    S. Pneumo Ag Urine or CSF - Urine, Urine, Clean Catch [[id number]]  (Normal) Collected:  07/12/20 1726    Specimen:  Urine, Clean Catch Updated:  07/12/20 2114     Strep Pneumo Ag Negative           Assessment/Plan       Pneumonia    Thyroid cancer (CMS/[facility])    Asthma    Schizoaffective disorder (CMS/[facility])    PTSD (post-traumatic stress disorder)    Hypothyroidism    Migraines    Anxiety    Obesity, Class III, BMI 40-49.9 (morbid obesity) (CMS/[facility])    Parapneumonic effusion    Hypokalemia    CKD (chronic kidney disease) stage 3, GFR 30-59 ml/min (CMS/[facility])       Assessment & Plan  Ms. Ornelas a pleasant 55-year-old lady with a left lower lobe pneumonia and a small pleural effusion in the fissure.  She had an attempted thoracentesis which was unsuccessful.  I would not recommend any further attempt at drainage of the small amount of pleural fluid will resolve on its own.  We will plan a chest x-ray in the morning to look for pneumothorax after thoracentesis.  Would recommend continued conservative management of her pneumonia with IV antibiotics.  Urvashi Garrison MD  [facility]  07/13/20  17:00

## 2020-07-13 NOTE — PROGRESS NOTES
[facility]     LOS: 2 days     Subjective:    CC:No chief complaint on file.    DIET:  Diet Order   Procedures   • NPO Diet     Shortness of air is stable  No chest pain  No nausea or vomiting     + diarrhea       Objective:    Vital Signs:  Temp:  [98.6 °F (37 °C)-99 °F (37.2 °C)] 98.9 °F (37.2 °C)  Heart Rate:  [90-96] 90  Resp:  [18-20] 20  BP: (117-138)/(82-89) 138/87    SpO2:  [93 %-95 %] 93 %  on  Flow (L/min):  [1-2] 1;   Device (Oxygen Therapy): nasal cannula  Body mass index is 40.94 kg/m².    Physical Exam   Constitutional: She appears well-developed and well-nourished.   HENT:   Head: Normocephalic and atraumatic.   Cardiovascular: Exam reveals no friction rub.   No murmur heard.  Pulmonary/Chest: Effort normal. She has rales.   Abdominal: Soft. Bowel sounds are normal. She exhibits no distension. There is no tenderness.   Neurological: She is alert.   Skin: Skin is warm and dry.       Results Review:    Glucose   Date Value Ref Range Status   07/12/2020 148 (H) 65 - 99 mg/dL Final   07/12/2020 132 (H) 65 - 99 mg/dL Final     Results from last 7 days   Lab Units 07/12/20  0502   WBC 10*3/mm3 14.57*   HEMOGLOBIN g/dL 11.0*   HEMATOCRIT % 32.7*   PLATELETS 10*3/mm3 317     Results from last 7 days   Lab Units 07/13/20  0545  07/12/20  0502   SODIUM mmol/L  --   --  141   POTASSIUM mmol/L 3.9   < > 3.2*   CHLORIDE mmol/L  --   --  108*   CO2 mmol/L  --   --  23.0   BUN mg/dL  --   --  7   CREATININE mg/dL  --   --  1.01*   CALCIUM mg/dL  --   --  8.2*   BILIRUBIN mg/dL  --   --  0.2   ALK PHOS U/L  --   --  93   ALT (SGPT) U/L  --   --  96*   AST (SGOT) U/L  --   --  97*   GLUCOSE mg/dL  --   --  148*    < > = values in this interval not displayed.     Results from last 7 days   Lab Units 07/13/20  0545   INR  1.97*         Results from last 7 days   Lab Units 07/12/20  0011   TROPONIN T ng/mL <0.010     Cultures:  Blood Culture   Date Value Ref Range Status   07/12/2020 No  growth at 24 hours  Preliminary   07/12/2020 No growth at 24 hours  Preliminary     Respiratory Culture   Date Value Ref Range Status   07/12/2020 No growth  Preliminary       I have reviewed daily medications and changes in CPOE    Scheduled meds    asenapine maleate 5 mg Sublingual BID   atorvastatin 40 mg Oral QAM   budesonide-formoterol 2 puff Inhalation BID - RT   buPROPion  mg Oral Daily   busPIRone 30 mg Oral BID   cefepime 2 g Intravenous Q8H   cloZAPine 100 mg Oral Nightly   levothyroxine 200 mcg Oral QAM   levothyroxine 25 mcg Oral QAM   topiramate 200 mg Oral BID         hold 1 each     PRN meds  •  acetaminophen  •  aluminum-magnesium hydroxide-simethicone  •  bisacodyl  •  bisacodyl  •  hold  •  ipratropium-albuterol  •  nitroglycerin  •  ondansetron **OR** ondansetron  •  potassium chloride **OR** potassium chloride **OR** potassium chloride  •  sodium chloride        Pneumonia    Thyroid cancer (CMS/[facility])    Asthma    Schizoaffective disorder (CMS/[facility])    PTSD (post-traumatic stress disorder)    Hypothyroidism    Migraines    Anxiety    Obesity, Class III, BMI 40-49.9 (morbid obesity) (CMS/[facility])    Parapneumonic effusion    Hypokalemia    CKD (chronic kidney disease) stage 3, GFR 30-59 ml/min (CMS/[facility])        Assessment/Plan:    Ms. Onrelas is a 55 y.o. former smoker with a history of asthma who, dyspnea on exertion, and weakness secondary to pneumonia and left parapneumonic effusion.     Left lower lobe pneumonia/left parapneumonic effusion  -Patient started on cefepime   -Seen by pulmonary and cardiothoracic surgery, greatly appreciate their evaluation  -Blood cultures drawn at outside facility-pending  -Legionella negative, strep urine negative, MRSA screen negative, blood cultures negative, respiratory panel rare gram-positive's (unremarkable), respiratory pathogen panel negative  -COVID-19 testing negative  -Symbicort, patient shown CPT yesterday by respiratory therapy and will continue with  this  -Elevated procalcitonin    Elevated troponin  [facility] on assessment   -Troponin here is unremarkable   -We will check baseline EKG here    Hypokalemia  -Potassium 2.3, and potassium here is 2.9 and this morning it is improved to 3.9     Hypothyroidism/schizoaffective disorder/anxiety  -Continue home regimen  -TSH in February was 1.22    Diarrhea - check a c. diff test    Alonso Ibrahim MD  07/13/20  07:52

## 2020-07-14 ENCOUNTER — APPOINTMENT (OUTPATIENT)
Dept: GENERAL RADIOLOGY | Facility: HOSPITAL | Age: 55
End: 2020-07-14

## 2020-07-14 LAB
ALBUMIN SERPL-MCNC: 2.1 G/DL (ref 3.5–5.2)
ALBUMIN/GLOB SERPL: 0.5 G/DL
ALP SERPL-CCNC: 87 U/L (ref 39–117)
ALT SERPL W P-5'-P-CCNC: 111 U/L (ref 1–33)
ANION GAP SERPL CALCULATED.3IONS-SCNC: 11.4 MMOL/L (ref 5–15)
APTT PPP: 50 SECONDS (ref 22.7–35.4)
AST SERPL-CCNC: 92 U/L (ref 1–32)
BACTERIA SPEC RESP CULT: NORMAL
BASOPHILS # BLD AUTO: 0.04 10*3/MM3 (ref 0–0.2)
BASOPHILS NFR BLD AUTO: 0.4 % (ref 0–1.5)
BILIRUB SERPL-MCNC: 0.2 MG/DL (ref 0–1.2)
BUN SERPL-MCNC: 5 MG/DL (ref 6–20)
BUN/CREAT SERPL: 7.8 (ref 7–25)
C DIFF TOX GENS STL QL NAA+PROBE: NEGATIVE
CALCIUM SPEC-SCNC: 8.3 MG/DL (ref 8.6–10.5)
CHLORIDE SERPL-SCNC: 111 MMOL/L (ref 98–107)
CO2 SERPL-SCNC: 21.6 MMOL/L (ref 22–29)
CREAT SERPL-MCNC: 0.64 MG/DL (ref 0.57–1)
DEPRECATED RDW RBC AUTO: 40.8 FL (ref 37–54)
EOSINOPHIL # BLD AUTO: 0.23 10*3/MM3 (ref 0–0.4)
EOSINOPHIL NFR BLD AUTO: 2.1 % (ref 0.3–6.2)
ERYTHROCYTE [DISTWIDTH] IN BLOOD BY AUTOMATED COUNT: 13.1 % (ref 12.3–15.4)
FERRITIN SERPL-MCNC: 481 NG/ML (ref 13–150)
FIBRINOGEN PPP-MCNC: 1020 MG/DL (ref 219–464)
GFR SERPL CREATININE-BSD FRML MDRD: 97 ML/MIN/1.73
GLOBULIN UR ELPH-MCNC: 4 GM/DL
GLUCOSE SERPL-MCNC: 141 MG/DL (ref 65–99)
GRAM STN SPEC: NORMAL
HCT VFR BLD AUTO: 34 % (ref 34–46.6)
HGB BLD-MCNC: 11.1 G/DL (ref 12–15.9)
IMM GRANULOCYTES # BLD AUTO: 0.09 10*3/MM3 (ref 0–0.05)
IMM GRANULOCYTES NFR BLD AUTO: 0.8 % (ref 0–0.5)
INR PPP: 2.03 (ref 0.9–1.1)
IRON 24H UR-MRATE: 21 MCG/DL (ref 37–145)
IRON SATN MFR SERPL: 13 % (ref 20–50)
LYMPHOCYTES # BLD AUTO: 1.07 10*3/MM3 (ref 0.7–3.1)
LYMPHOCYTES NFR BLD AUTO: 10 % (ref 19.6–45.3)
MCH RBC QN AUTO: 28.3 PG (ref 26.6–33)
MCHC RBC AUTO-ENTMCNC: 32.6 G/DL (ref 31.5–35.7)
MCV RBC AUTO: 86.7 FL (ref 79–97)
MONOCYTES # BLD AUTO: 1.03 10*3/MM3 (ref 0.1–0.9)
MONOCYTES NFR BLD AUTO: 9.6 % (ref 5–12)
NEUTROPHILS NFR BLD AUTO: 77.1 % (ref 42.7–76)
NEUTROPHILS NFR BLD AUTO: 8.25 10*3/MM3 (ref 1.7–7)
NRBC BLD AUTO-RTO: 0 /100 WBC (ref 0–0.2)
PLATELET # BLD AUTO: 354 10*3/MM3 (ref 140–450)
PMV BLD AUTO: 9.7 FL (ref 6–12)
POTASSIUM SERPL-SCNC: 3.1 MMOL/L (ref 3.5–5.2)
POTASSIUM SERPL-SCNC: 3.6 MMOL/L (ref 3.5–5.2)
PROT SERPL-MCNC: 6.1 G/DL (ref 6–8.5)
PROTHROMBIN TIME: 22.4 SECONDS (ref 11.7–14.2)
RBC # BLD AUTO: 3.92 10*6/MM3 (ref 3.77–5.28)
SODIUM SERPL-SCNC: 144 MMOL/L (ref 136–145)
TIBC SERPL-MCNC: 165 MCG/DL (ref 298–536)
TRANSFERRIN SERPL-MCNC: 111 MG/DL (ref 200–360)
WBC # BLD AUTO: 10.71 10*3/MM3 (ref 3.4–10.8)

## 2020-07-14 PROCEDURE — 25010000002 CEFEPIME PER 500 MG: Performed by: NURSE PRACTITIONER

## 2020-07-14 PROCEDURE — 85730 THROMBOPLASTIN TIME PARTIAL: CPT | Performed by: INTERNAL MEDICINE

## 2020-07-14 PROCEDURE — 99232 SBSQ HOSP IP/OBS MODERATE 35: CPT | Performed by: NURSE PRACTITIONER

## 2020-07-14 PROCEDURE — 94799 UNLISTED PULMONARY SVC/PX: CPT

## 2020-07-14 PROCEDURE — 82728 ASSAY OF FERRITIN: CPT | Performed by: INTERNAL MEDICINE

## 2020-07-14 PROCEDURE — 85025 COMPLETE CBC W/AUTO DIFF WBC: CPT | Performed by: HOSPITALIST

## 2020-07-14 PROCEDURE — 85384 FIBRINOGEN ACTIVITY: CPT | Performed by: INTERNAL MEDICINE

## 2020-07-14 PROCEDURE — 71045 X-RAY EXAM CHEST 1 VIEW: CPT

## 2020-07-14 PROCEDURE — 80053 COMPREHEN METABOLIC PANEL: CPT | Performed by: HOSPITALIST

## 2020-07-14 PROCEDURE — 83540 ASSAY OF IRON: CPT | Performed by: INTERNAL MEDICINE

## 2020-07-14 PROCEDURE — 25010000002 ENOXAPARIN PER 10 MG: Performed by: INTERNAL MEDICINE

## 2020-07-14 PROCEDURE — 85610 PROTHROMBIN TIME: CPT | Performed by: INTERNAL MEDICINE

## 2020-07-14 PROCEDURE — 99232 SBSQ HOSP IP/OBS MODERATE 35: CPT | Performed by: INTERNAL MEDICINE

## 2020-07-14 PROCEDURE — 87493 C DIFF AMPLIFIED PROBE: CPT | Performed by: HOSPITALIST

## 2020-07-14 PROCEDURE — 84466 ASSAY OF TRANSFERRIN: CPT | Performed by: INTERNAL MEDICINE

## 2020-07-14 PROCEDURE — 84132 ASSAY OF SERUM POTASSIUM: CPT | Performed by: HOSPITALIST

## 2020-07-14 RX ADMIN — GUAIFENESIN AND DEXTROMETHORPHAN 5 ML: 100; 10 SYRUP ORAL at 15:20

## 2020-07-14 RX ADMIN — POTASSIUM CHLORIDE 40 MEQ: 10 CAPSULE, COATED, EXTENDED RELEASE ORAL at 10:36

## 2020-07-14 RX ADMIN — LEVOTHYROXINE SODIUM 25 MCG: 25 TABLET ORAL at 06:07

## 2020-07-14 RX ADMIN — BUPROPION HYDROCHLORIDE 450 MG: 150 TABLET, FILM COATED, EXTENDED RELEASE ORAL at 08:53

## 2020-07-14 RX ADMIN — ACETAMINOPHEN 650 MG: 325 TABLET, FILM COATED ORAL at 20:48

## 2020-07-14 RX ADMIN — POTASSIUM CHLORIDE 40 MEQ: 10 CAPSULE, COATED, EXTENDED RELEASE ORAL at 06:03

## 2020-07-14 RX ADMIN — ASENAPINE MALEATE 5 MG: 5 TABLET SUBLINGUAL at 08:53

## 2020-07-14 RX ADMIN — BUDESONIDE AND FORMOTEROL FUMARATE DIHYDRATE 2 PUFF: 80; 4.5 AEROSOL RESPIRATORY (INHALATION) at 07:36

## 2020-07-14 RX ADMIN — GUAIFENESIN AND DEXTROMETHORPHAN 5 ML: 100; 10 SYRUP ORAL at 19:08

## 2020-07-14 RX ADMIN — LEVOTHYROXINE SODIUM 200 MCG: 100 TABLET ORAL at 06:06

## 2020-07-14 RX ADMIN — TOPIRAMATE 200 MG: 100 TABLET, FILM COATED ORAL at 20:48

## 2020-07-14 RX ADMIN — CEFEPIME HYDROCHLORIDE 2 G: 2 INJECTION, POWDER, FOR SOLUTION INTRAVENOUS at 18:20

## 2020-07-14 RX ADMIN — BUSPIRONE HYDROCHLORIDE 30 MG: 15 TABLET ORAL at 20:48

## 2020-07-14 RX ADMIN — CEFEPIME HYDROCHLORIDE 2 G: 2 INJECTION, POWDER, FOR SOLUTION INTRAVENOUS at 01:11

## 2020-07-14 RX ADMIN — BUSPIRONE HYDROCHLORIDE 30 MG: 15 TABLET ORAL at 08:53

## 2020-07-14 RX ADMIN — BUDESONIDE AND FORMOTEROL FUMARATE DIHYDRATE 2 PUFF: 80; 4.5 AEROSOL RESPIRATORY (INHALATION) at 20:25

## 2020-07-14 RX ADMIN — ASENAPINE MALEATE 5 MG: 5 TABLET SUBLINGUAL at 20:48

## 2020-07-14 RX ADMIN — GUAIFENESIN AND DEXTROMETHORPHAN 5 ML: 100; 10 SYRUP ORAL at 06:23

## 2020-07-14 RX ADMIN — CEFEPIME HYDROCHLORIDE 2 G: 2 INJECTION, POWDER, FOR SOLUTION INTRAVENOUS at 08:52

## 2020-07-14 RX ADMIN — GUAIFENESIN AND DEXTROMETHORPHAN 5 ML: 100; 10 SYRUP ORAL at 02:33

## 2020-07-14 RX ADMIN — TOPIRAMATE 200 MG: 100 TABLET, FILM COATED ORAL at 12:18

## 2020-07-14 RX ADMIN — POTASSIUM CHLORIDE 40 MEQ: 10 CAPSULE, COATED, EXTENDED RELEASE ORAL at 15:57

## 2020-07-14 RX ADMIN — ATORVASTATIN CALCIUM 40 MG: 20 TABLET, FILM COATED ORAL at 06:06

## 2020-07-14 RX ADMIN — ENOXAPARIN SODIUM 40 MG: 40 INJECTION SUBCUTANEOUS at 18:19

## 2020-07-14 NOTE — PROGRESS NOTES
"    Chief Complaint: Shortness of breath and pneumonia  S/P: Thoracentesis      Subjective:  Symptoms:  Stable.  She reports diarrhea.  No shortness of breath.    Diet:  No nausea or vomiting.    Pain:  She reports no pain.        Vital Signs:  Temp:  [98.1 °F (36.7 °C)-99.2 °F (37.3 °C)] 98.1 °F (36.7 °C)  Heart Rate:  [90-96] 90  Resp:  [14-20] 20  BP: (110-125)/(74-82) 110/82    Intake & Output (last day)       07/13 0701 - 07/14 0700 07/14 0701 - 07/15 0700    P.O. 780     IV Piggyback 400     Total Intake(mL/kg) 1180 (9.9)     Net +1180           Urine Unmeasured Occurrence 1 x     Stool Unmeasured Occurrence  1 x          Objective:  General Appearance:  Comfortable, in no acute distress, not in pain and ill-appearing.    Vital signs: (most recent): Blood pressure 110/82, pulse 90, temperature 98.1 °F (36.7 °C), temperature source Oral, resp. rate 20, height 170.2 cm (67\"), weight 119 kg (261 lb 6.4 oz), SpO2 95 %.  Vital signs are normal.    Output: Producing stool.    Lungs:  Normal effort and normal respiratory rate.  There are decreased breath sounds.    Heart: Normal rate.  Regular rhythm.    Abdomen: Abdomen is soft.  Bowel sounds are normal.   There is no abdominal tenderness.     Neurological: Patient is alert and oriented to person, place and time.    Skin:  Warm and dry.                Results Review:     I reviewed the patient's new clinical results.  I reviewed the patient's new imaging results and agree with the interpretation.  I reviewed the patient's other test results and agree with the interpretation  Discussed with patient, RN and Dr. Garrison.    Imaging Results (Last 24 Hours)     Procedure Component Value Units Date/Time    XR Chest 1 View [[id number]] Collected:  07/14/20 0913     Updated:  07/14/20 0948    Narrative:       PORTABLE CHEST X-RAY     HISTORY: Left lower lobe pneumonia. Thoracentesis attempted yesterday.  Evaluate for pneumothorax.     TECHNIQUE: Portable chest x-ray is " provided and is correlated with the  progress note by Dr. Urvashi Garrison from 07/13/2020 at 5:00 PM, chest CT  07/12/2020, chest x-ray 07/07/2020, the reported thoracentesis performed  yesterday.     FINDINGS: There is no pneumothorax. The cardiac silhouette is unchanged.  Bibasilar opacity, most pronounced at the medial left lung base, appears  unchanged given differences in projection. Vascular volume appears  normal.       Impression:       Persistent opacity at both lung bases. No pneumothorax.     This report was finalized on 7/14/2020 9:45 AM by Dr. Robert Carl M.D.       US Thoracentesis [[id number]] Collected:  07/13/20 1213    Specimen:  Body Fluid Updated:  07/13/20 1417    Narrative:       US THORACENTESIS     CLINICAL INFORMATION: Pleural effusion on the left, ultrasound-guided  thoracentesis requested.     The procedure explained to the patient, informed consent obtained.  Patient understands and agrees.      images demonstrate only a small amount of pleural fluid in the  left costophrenic sulcus.     PROCEDURE NOTE: With the patient sitting at the side of her stretcher,  ultrasound of the posterior left lobe thorax performed. A small amount  of pleural fluid was demonstrated in the costophrenic sulcus.  Appropriate prep and drape of the skin surface, 1% lidocaine for local  anesthesia. The drainage catheter could not be manipulated into the  small fluid collection. No pleural fluid aspirated.     CONCLUSION: Small amount of pleural fluid in the left costophrenic  sulcus, unfortunately a drainage catheter could not be advanced into  this location with ultrasound guidance.     This report was finalized on 7/13/2020 2:14 PM by Dr. Dann Kearns M.D.             Lab Results:     Lab Results (last 24 hours)     Procedure Component Value Units Date/Time    Clostridium Difficile Toxin - Stool, Per Rectum [[id number]] Collected:  07/14/20 0729    Specimen:  Stool from Per Rectum Updated:  07/14/20 0892     Narrative:       The following orders were created for panel order Clostridium Difficile Toxin - Stool, Per Rectum.  Procedure                               Abnormality         Status                     ---------                               -----------         ------                     Clostridium Difficile To...[[id number]]  Normal              Final result                 Please view results for these tests on the individual orders.    Clostridium Difficile Toxin, PCR - Stool, Per Rectum [[id number]]  (Normal) Collected:  07/14/20 0729    Specimen:  Stool from Per Rectum Updated:  07/14/20 0858     C. Difficile Toxins by PCR Negative    Respiratory Culture - Sputum, Oropharynx [[id number]] Collected:  07/12/20 0108    Specimen:  Sputum from Oropharynx Updated:  07/14/20 0807     Respiratory Culture Scant growth (1+) Normal Respiratory Susan     Gram Stain Rare (1+) Gram positive bacilli      Occasional Yeast      Few (2+) WBCs per low power field      Occasional Epithelial cells per low power field    Fibrinogen [[id number]]  (Abnormal) Collected:  07/14/20 0426    Specimen:  Blood Updated:  07/14/20 0531     Fibrinogen 1,020 mg/dL     Comprehensive Metabolic Panel [[id number]]  (Abnormal) Collected:  07/14/20 0426    Specimen:  Blood Updated:  07/14/20 0528     Glucose 141 mg/dL      BUN 5 mg/dL      Creatinine 0.64 mg/dL      Sodium 144 mmol/L      Potassium 3.1 mmol/L      Chloride 111 mmol/L      CO2 21.6 mmol/L      Calcium 8.3 mg/dL      Total Protein 6.1 g/dL      Albumin 2.10 g/dL      ALT (SGPT) 111 U/L      AST (SGOT) 92 U/L      Alkaline Phosphatase 87 U/L      Total Bilirubin 0.2 mg/dL      eGFR Non African Amer 97 mL/min/1.73      Globulin 4.0 gm/dL      A/G Ratio 0.5 g/dL      BUN/Creatinine Ratio 7.8     Anion Gap 11.4 mmol/L     Narrative:       GFR Normal >60  Chronic Kidney Disease <60  Kidney Failure <15      Protime-INR [[id number]]  (Abnormal) Collected:  07/14/20 0426    Specimen:  Blood  Updated:  07/14/20 0516     Protime 22.4 Seconds      INR 2.03    aPTT [[id number]]  (Abnormal) Collected:  07/14/20 0426    Specimen:  Blood Updated:  07/14/20 0516     PTT 50.0 seconds     CBC & Differential [[id number]] Collected:  07/14/20 0426    Specimen:  Blood Updated:  07/14/20 0508    Narrative:       The following orders were created for panel order CBC & Differential.  Procedure                               Abnormality         Status                     ---------                               -----------         ------                     CBC Auto Differential[[id number]]        Abnormal            Final result                 Please view results for these tests on the individual orders.    CBC Auto Differential [[id number]]  (Abnormal) Collected:  07/14/20 0426    Specimen:  Blood Updated:  07/14/20 0508     WBC 10.71 10*3/mm3      RBC 3.92 10*6/mm3      Hemoglobin 11.1 g/dL      Hematocrit 34.0 %      MCV 86.7 fL      MCH 28.3 pg      MCHC 32.6 g/dL      RDW 13.1 %      RDW-SD 40.8 fl      MPV 9.7 fL      Platelets 354 10*3/mm3      Neutrophil % 77.1 %      Lymphocyte % 10.0 %      Monocyte % 9.6 %      Eosinophil % 2.1 %      Basophil % 0.4 %      Immature Grans % 0.8 %      Neutrophils, Absolute 8.25 10*3/mm3      Lymphocytes, Absolute 1.07 10*3/mm3      Monocytes, Absolute 1.03 10*3/mm3      Eosinophils, Absolute 0.23 10*3/mm3      Basophils, Absolute 0.04 10*3/mm3      Immature Grans, Absolute 0.09 10*3/mm3      nRBC 0.0 /100 WBC     Blood Culture - Blood, Arm, Left [[id number]] Collected:  07/12/20 0109    Specimen:  Blood from Arm, Left Updated:  07/14/20 0130     Blood Culture No growth at 2 days    Blood Culture - Blood, Arm, Right [[id number]] Collected:  07/12/20 0110    Specimen:  Blood from Arm, Right Updated:  07/14/20 0130     Blood Culture No growth at 2 days           Assessment/Plan       Pneumonia    Thyroid cancer (CMS/[facility])    Asthma    Schizoaffective disorder (CMS/[facility])    PTSD  (post-traumatic stress disorder)    Hypothyroidism    Migraines    Anxiety    Obesity, Class III, BMI 40-49.9 (morbid obesity) (CMS/[facility])    Parapneumonic effusion    Hypokalemia    CKD (chronic kidney disease) stage 3, GFR 30-59 ml/min (CMS/[facility])       Assessment & Plan     Ms. Edmondson is a pleasant 55-year-old female with a left lower lobe pneumonia and small pleural effusion in the fissure.  She underwent attempted thoracentesis yesterday, which was unsuccessful.  Reviewed this morning's chest x-ray which demonstrates no pneumothorax.  Given that of fluid in the fissure, we recommend no further attempted drainage but to allow the pleural fluid to resolve on its own.  We recommend continued conservative management of her pneumonia with IV antibiotics.    We will sign off but are available at any time if needed.  Please call our service.     LALO Tobias  [facility]  07/14/20  14:13

## 2020-07-14 NOTE — PROGRESS NOTES
[facility]     LOS: 3 days     Subjective:    CC:No chief complaint on file.    DIET:  Diet Order   Procedures   • Diet Regular; Cardiac     Shortness of air is stable  No chest pain  No nausea or vomiting     + diarrhea       Objective:    Vital Signs:  Temp:  [98.1 °F (36.7 °C)-99.2 °F (37.3 °C)] 98.1 °F (36.7 °C)  Heart Rate:  [90-96] 90  Resp:  [14-20] 20  BP: (110-125)/(74-82) 110/82    SpO2:  [93 %-97 %] 95 %  on  Flow (L/min):  [0.5] 0.5;   Device (Oxygen Therapy): nasal cannula  Body mass index is 40.94 kg/m².    Physical Exam   Constitutional: She appears well-developed and well-nourished.   HENT:   Head: Normocephalic and atraumatic.   Cardiovascular: Exam reveals no friction rub.   No murmur heard.  Pulmonary/Chest: Effort normal. She has rales.   Abdominal: Soft. Bowel sounds are normal. She exhibits no distension. There is no tenderness.   Neurological: She is alert.   Skin: Skin is warm and dry.       Results Review:    Glucose   Date Value Ref Range Status   07/14/2020 141 (H) 65 - 99 mg/dL Final   07/12/2020 148 (H) 65 - 99 mg/dL Final   07/12/2020 132 (H) 65 - 99 mg/dL Final     Results from last 7 days   Lab Units 07/14/20  0426   WBC 10*3/mm3 10.71   HEMOGLOBIN g/dL 11.1*   HEMATOCRIT % 34.0   PLATELETS 10*3/mm3 354     Results from last 7 days   Lab Units 07/14/20  0426   SODIUM mmol/L 144   POTASSIUM mmol/L 3.1*   CHLORIDE mmol/L 111*   CO2 mmol/L 21.6*   BUN mg/dL 5*   CREATININE mg/dL 0.64   CALCIUM mg/dL 8.3*   BILIRUBIN mg/dL 0.2   ALK PHOS U/L 87   ALT (SGPT) U/L 111*   AST (SGOT) U/L 92*   GLUCOSE mg/dL 141*     Results from last 7 days   Lab Units 07/14/20  0426   INR  2.03*   APTT seconds 50.0*         Results from last 7 days   Lab Units 07/12/20  0011   TROPONIN T ng/mL <0.010     Cultures:  Blood Culture   Date Value Ref Range Status   07/12/2020 No growth at 24 hours  Preliminary   07/12/2020 No growth at 24 hours  Preliminary     Respiratory Culture      Date Value Ref Range Status   07/12/2020 No growth  Preliminary       I have reviewed daily medications and changes in CPOE    Scheduled meds    asenapine maleate 5 mg Sublingual BID   atorvastatin 40 mg Oral QAM   budesonide-formoterol 2 puff Inhalation BID - RT   buPROPion  mg Oral Daily   busPIRone 30 mg Oral BID   cefepime 2 g Intravenous Q8H   cloZAPine 100 mg Oral Nightly   enoxaparin 40 mg Subcutaneous Q24H   levothyroxine 200 mcg Oral QAM   levothyroxine 25 mcg Oral QAM   topiramate 200 mg Oral BID         hold 1 each     PRN meds  •  acetaminophen  •  aluminum-magnesium hydroxide-simethicone  •  bisacodyl  •  bisacodyl  •  guaiFENesin-dextromethorphan  •  hold  •  HYDROcodone-homatropine  •  ipratropium-albuterol  •  nitroglycerin  •  ondansetron **OR** ondansetron  •  potassium chloride **OR** potassium chloride **OR** potassium chloride  •  sodium chloride        Pneumonia    Thyroid cancer (CMS/[facility])    Asthma    Schizoaffective disorder (CMS/[facility])    PTSD (post-traumatic stress disorder)    Hypothyroidism    Migraines    Anxiety    Obesity, Class III, BMI 40-49.9 (morbid obesity) (CMS/[facility])    Parapneumonic effusion    Hypokalemia    CKD (chronic kidney disease) stage 3, GFR 30-59 ml/min (CMS/[facility])        Assessment/Plan:    Ms. Ornelas is a 55 y.o. former smoker with a history of asthma who, dyspnea on exertion, and weakness secondary to pneumonia and left parapneumonic effusion.     Left lower lobe pneumonia/left parapneumonic effusion  -Patient continues on on cefepime   -Pulmonary and cardiothoracic surgery following  -Blood cultures drawn at outside facility-pending  -Legionella negative, strep urine negative, MRSA screen negative, blood cultures negative, respiratory panel rare gram-positive's (unremarkable), respiratory pathogen panel negative  -COVID-19 testing negative  -Symbicort, patient shown CPT by respiratory therapy and asked patient to perform herself  -Elevated  procalcitonin    Elevated troponin  [facility] on assessment   -Troponin here is unremarkable   -We will check baseline EKG here which is unchanged from prior    Hypokalemia  -Improved     Hypothyroidism/schizoaffective disorder/anxiety  -Continue home regimen  -TSH in February was 1.22    Diarrhea -C. difficile negative    dvt ppx: saleem, scd    Alonso Ibrahim MD  07/14/20  13:45

## 2020-07-14 NOTE — PLAN OF CARE
Pt continues to have cough.  Rn encouraged pt to get up and walk today to help ease pneumonia symptoms each times she uses the rest room. Pt is hesitant to use incentive spirometer after repeated encouragement. Pt cdiff negative.

## 2020-07-14 NOTE — PROGRESS NOTES
Subjective     CHIEF COMPLAINT:     Prolonged PT/INR    INTERVAL HISTORY:     Patient reports some improvement in shortness of breath.  She is not having problem with bleeding or bruising.    HISTORY OF PRESENT ILLNESS:  Urvashi Ornelas is a 55 y.o. patient who is a former smoker who was admitted on 7/11/2020 with left lower lobe pneumonia and left parapneumonic effusion.  She has history of bronchial asthma with episodes of bronchitis.  She has schizoaffective disorder and PTSD.  She presented to the ER on 7/11/2020 due to recent diagnosis of pneumonia.  She was having shortness of breath and wheezing.  She was treated with oral steroids but did not feel better.  Chest x-ray on 7/6/2020 showed left lower lobe pneumonia.  She was placed on Levaquin for 5 days without improvement in the symptoms.  [place] [facility] [place]  [place] had a temperature of 100.6.  CT angiogram was done and showed left lower lobe consolidation and left parapneumonic effusion.  There was no evidence of pulmonary embolism.     Patient was diagnosed with pneumonia and sepsis and she was transferred to [facility].     Patient reports minimal improvement in symptoms since admission.  She continues to feel short of breath.  She complains of chest pain on the left on deep inspiration     Patient's past medical history is significant for pulmonary embolism that developed in 2004 10 days after she had surgery.  No DVT was identified.  She was anticoagulated for about 6 months.     Patient has history of thyroid cancer.  She had surgery followed by radioactive iodine in 2016.  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        REVIEW OF SYSTEMS:  Review of systems is positive for the following.  Rest of ROS is negative.  RESP: Shortness of breath has somewhat improved.  CVS: Chest pain has somewhat improved  HEME: No bleeding/bruising.    SCHEDULED MEDS:    asenapine maleate 5 mg Sublingual BID   atorvastatin  40 mg Oral QAM   budesonide-formoterol 2 puff Inhalation BID - RT   buPROPion  mg Oral Daily   busPIRone 30 mg Oral BID   cefepime 2 g Intravenous Q8H   cloZAPine 100 mg Oral Nightly   enoxaparin 40 mg Subcutaneous Q24H   levothyroxine 200 mcg Oral QAM   levothyroxine 25 mcg Oral QAM   topiramate 200 mg Oral BID     INFUSIONS:    hold 1 each     PRN MEDS:  •  acetaminophen  •  aluminum-magnesium hydroxide-simethicone  •  bisacodyl  •  bisacodyl  •  guaiFENesin-dextromethorphan  •  hold  •  HYDROcodone-homatropine  •  ipratropium-albuterol  •  nitroglycerin  •  ondansetron **OR** ondansetron  •  potassium chloride **OR** potassium chloride **OR** potassium chloride  •  sodium chloride       Objective   VITAL SIGNS:  Vitals:    07/14/20 0736 07/14/20 0753 07/14/20 1122 07/14/20 1324   BP:  125/79  110/82   BP Location:  Right arm  Right arm   Patient Position:  Lying  Lying   Pulse: 96  91 90   Resp: 14   20   Temp:    98.1 °F (36.7 °C)   TempSrc:    Oral   SpO2: 94%  95%    Weight:       Height:             PHYSICAL EXAMINATION:  GENERAL:  The patient appears in fair general condition, not in acute distress.  SKIN: No skin rash. No ecchymosis.   HEAD:  Normocephalic.  EYES:  No Jaundice. No Pallor. Pupils equal. EOMI.  NECK:  Supple. No Masses.  CHEST: Normal respiratory effort.   CARDIAC:   No edema.  ABDOMEN:  Soft. No tenderness. No Hepatomegaly. No Splenomegaly. No masses.  EXTREMITIES:  No Calf tenderness.  NEUROLOGICAL:  No Focal neurological deficits.  PSYCH: Normal mood and affect.        RESULT REVIEW:   Results from last 7 days   Lab Units 07/14/20  0426 07/12/20  0502 07/12/20  0011   WBC 10*3/mm3 10.71 14.57* 15.74*   NEUTROS ABS 10*3/mm3 8.25*  --  13.47*   HEMOGLOBIN g/dL 11.1* 11.0* 11.0*   HEMATOCRIT % 34.0 32.7* 32.3*   PLATELETS 10*3/mm3 354 317 332     Results from last 7 days   Lab Units 07/14/20  0426 07/13/20  0545 07/12/20  1343 07/12/20  0502 07/12/20  0011   SODIUM mmol/L 144  --   --   141 139   POTASSIUM mmol/L 3.1* 3.9 3.4* 3.2* 2.9*   CHLORIDE mmol/L 111*  --   --  108* 107   CO2 mmol/L 21.6*  --   --  23.0 22.2   BUN mg/dL 5*  --   --  7 7   CREATININE mg/dL 0.64  --   --  1.01* 1.02*   CALCIUM mg/dL 8.3*  --   --  8.2* 8.1*   ALBUMIN g/dL 2.10*  --   --  2.30* 2.20*   BILIRUBIN mg/dL 0.2  --   --  0.2 0.3   ALK PHOS U/L 87  --   --  93 93   ALT (SGPT) U/L 111*  --   --  96* 98*   AST (SGOT) U/L 92*  --   --  97* 102*     Results from last 7 days   Lab Units 07/14/20  0426 07/13/20  0545 07/12/20  0011   INR  2.03* 1.97* 1.96*   APTT seconds 50.0* 42.7*  --          Assessment/Plan     1.  Prolonged PT and PTT.  She has an elevated d-dimer.  Fibrinogen is significantly elevated at 1021.  On review of old labs, her coags were normal on 11/4/2015 so she does not have a hereditary coagulation factor deficiency.  The prolongation of the PT and PTT is likely secondary to the underlying infection resulting in early DIC picture.  PT and PTT increased today.  She is not having problems with bleeding, however.     2.  Elevated d-dimer.  CT angiogram done before admission showed no evidence of pulmonary embolism.  Leg exam reveals no calf tenderness.  She has history of pulmonary embolism in 2004 10 days after she had surgery and she was anticoagulated.  Since patient is at increased risk for VTE, she was placed on Lovenox 40 mg subcu daily on 7/13/2020.     3.  Normochromic normocytic anemia.  Hemoglobin is at 11.0 today.  She had B12 and folate levels on 2/7/2020 that was normal.     4.  Leukocytosis and neutrophilia.  Highest WBC count was 15,000 on 7/12/2020 and it was down to 14,570 on 7/13/2020 and down to 10,700 today     PLAN:     -Continue to monitor.  We will not attempt to correct coags since she is not bleeding and she will need prophylactic anticoagulation.  We will repeat coags in a.m.  -Continue prophylactic Lovenox.  -Obtain iron studies.      Jason Lund MD  07/14/20

## 2020-07-14 NOTE — PLAN OF CARE
Problem: Patient Care Overview  Goal: Plan of Care Review  7/14/2020 0253 by Zulma Lancaster RN  Outcome: Ongoing (interventions implemented as appropriate)  Flowsheets (Taken 7/14/2020 0253)  Progress: no change  Plan of Care Reviewed With: patient  Outcome Summary: meds per order and request, cough cont, encourage IS, see labs and vs

## 2020-07-14 NOTE — PROGRESS NOTES
"      [facility]         Dr Hammer Sayied   LOS: 3 days   Patient Care Team:  Rosio Palmer APRN as PCP - General (Family Medicine)    Chief Complaint: Pneumonia with left parapneumonic effusion obesity elevated LFTs    Interval History: Feels her breathing is better today.  No cough or congestion no chest pain reported.  Diarrhea off and on    REVIEW OF SYSTEMS:   CARDIOVASCULAR: No chest pain, chest pressure or chest discomfort. No palpitations or edema.   RESPIRATORY: Shortness of breath with exertion  GASTROINTESTINAL: No anorexia, nausea, vomiting or diarrhea. No abdominal pain or blood.   HEMATOLOGIC: No bleeding or bruising.     Ventilator/Non-Invasive Ventilation Settings (From admission, onward)    None            Vital Signs  Temp:  [98.1 °F (36.7 °C)-99.2 °F (37.3 °C)] 98.1 °F (36.7 °C)  Heart Rate:  [90-96] 90  Resp:  [14-20] 20  BP: (110-125)/(74-82) 110/82    Intake/Output Summary (Last 24 hours) at 7/14/2020 1410  Last data filed at 7/14/2020 0700  Gross per 24 hour   Intake 1080 ml   Output --   Net 1080 ml     Flowsheet Rows      First Filed Value   Admission Height  170.2 cm (67\") Documented at 07/12/2020 1527   Admission Weight  119 kg (261 lb 6.4 oz) Documented at 07/11/2020 2258          Physical Exam:  Patient is examined using the personal protective equipment as per guidelines from infection control for this particular patient as enacted.  Hand hygiene was performed before and after patient interaction.   General Appearance:    Alert, cooperative, in no acute distress.  Following simple commands  Neck midline trachea no thyromegaly   Lungs:    Diminished breath sounds with rhonchi crackles left base    Heart:    Regular rhythm and normal rate, normal S1 and S2, no            murmur, no gallop, no rub, no click   Chest Wall:    No abnormalities observed   Abdomen:     Normal bowel sounds, no masses, no organomegaly, soft        non-tender, non-distended, no guarding, no " rebound                tenderness   Extremities:   Moves all extremities well, no edema, no cyanosis, no             redness  CNS no focal neurological deficits no distress  Skin no rashes no nodules  Psych oriented to time place and person normal memory  Musculoskeletal no cyanosis no clubbing normal range of motion     Results Review:        Results from last 7 days   Lab Units 07/14/20  0426 07/13/20  0545 07/12/20  1343 07/12/20  0502 07/12/20  0011   SODIUM mmol/L 144  --   --  141 139   POTASSIUM mmol/L 3.1* 3.9 3.4* 3.2* 2.9*   CHLORIDE mmol/L 111*  --   --  108* 107   CO2 mmol/L 21.6*  --   --  23.0 22.2   BUN mg/dL 5*  --   --  7 7   CREATININE mg/dL 0.64  --   --  1.01* 1.02*   GLUCOSE mg/dL 141*  --   --  148* 132*   CALCIUM mg/dL 8.3*  --   --  8.2* 8.1*     Results from last 7 days   Lab Units 07/12/20  0011   TROPONIN T ng/mL <0.010     Results from last 7 days   Lab Units 07/14/20  0426 07/12/20  0502 07/12/20  0011   WBC 10*3/mm3 10.71 14.57* 15.74*   HEMOGLOBIN g/dL 11.1* 11.0* 11.0*   HEMATOCRIT % 34.0 32.7* 32.3*   PLATELETS 10*3/mm3 354 317 332     Results from last 7 days   Lab Units 07/14/20  0426 07/13/20  0545 07/12/20  0011   INR  2.03* 1.97* 1.96*   APTT seconds 50.0* 42.7*  --                        I reviewed the patient's new clinical results.  I personally viewed and interpreted the patient's CXR        Medication Review:     asenapine maleate 5 mg Sublingual BID   atorvastatin 40 mg Oral QAM   budesonide-formoterol 2 puff Inhalation BID - RT   buPROPion  mg Oral Daily   busPIRone 30 mg Oral BID   cefepime 2 g Intravenous Q8H   cloZAPine 100 mg Oral Nightly   enoxaparin 40 mg Subcutaneous Q24H   levothyroxine 200 mcg Oral QAM   levothyroxine 25 mcg Oral QAM   topiramate 200 mg Oral BID         hold 1 each       ASSESSMENT:   Pneumonia left side  Left parapneumonic effusion  Obesity  Hypokalemia  Elevated liver enzymes    PLAN:  Pneumonia with parapneumonic effusion. Legionella  negative, strep urine negative, MRSA screen negative, blood cultures negative, respiratory panel rare gram-positive's (unremarkable), respiratory pathogen panel negative. Minimal amount of effusion could not be tapped by thoracentesis.  Treat pneumonia and follow-up CT chest in 6 to 8 weeks to ensure resolution of pneumonia and effusion  COVID-19 testing negative  Aggressive pulmonary toilet  Ambulate  Wean down oxygen as tolerated  No objections to discharge      Jaquelin Soares MD  07/14/20  14:10

## 2020-07-14 NOTE — PROGRESS NOTES
Discharge Planning Assessment  [facility]     Patient Name: Urvashi Ornelas  MRN: [id number]  Today's Date: 7/14/2020    Admit Date: 7/11/2020    Discharge Needs Assessment     Row Name 07/14/20 1142       Living Environment    Lives With  alone    Current Living Arrangements  home/apartment/condo ss home    Primary Care Provided by  self    Able to Return to Prior Arrangements  yes       Transition Planning    Patient/Family Anticipates Transition to  home    Patient/Family Anticipated Services at Transition  none    Transportation Anticipated  family or friend will provide       Discharge Needs Assessment    Concerns to be Addressed  adjustment to diagnosis/illness;basic needs    Equipment Currently Used at Home  respiratory supplies;nebulizer    Anticipated Changes Related to Illness  none    Equipment Needed After Discharge  none        Discharge Plan     Row Name 07/14/20 1143       Plan    Plan  Plans are home.     Patient/Family in Agreement with Plan  yes    Plan Comments  CCP spoke with pt // confirmed face sheet and primary pharmacy info.  Pt states she lives alone, but is independent with ADL's/// pt still drives.  Her only DME is nebulizer @ home, she states it is in working condition.  Plans are home, instructed that CCP would cont to follow and assist as needed with dc planning. Ирина Holguin RN        Destination      Coordination has not been started for this encounter.      Durable Medical Equipment      Coordination has not been started for this encounter.      Dialysis/Infusion      Coordination has not been started for this encounter.      Home Medical Care      Coordination has not been started for this encounter.      Therapy      Coordination has not been started for this encounter.      Community Resources      Coordination has not been started for this encounter.          Demographic Summary    No documentation.       Functional Status     Row Name 07/14/20 1142       Functional Status     Usual Activity Tolerance  good       Functional Status, IADL    Medications  independent    IADL Comments  Pt is independent with ADL's         Psychosocial    No documentation.       Abuse/Neglect    No documentation.       Legal     Row Name 07/14/20 1142       Financial/Legal    Who Manages Finances if Patient Unable  No living will or HCS        Substance Abuse    No documentation.       Patient Forms    No documentation.           Ирина Holguin RN

## 2020-07-15 ENCOUNTER — APPOINTMENT (OUTPATIENT)
Dept: CARDIOLOGY | Facility: HOSPITAL | Age: 55
End: 2020-07-15

## 2020-07-15 LAB
APTT PPP: 44.6 SECONDS (ref 22.7–35.4)
BH CV LOWER VASCULAR LEFT COMMON FEMORAL AUGMENT: NORMAL
BH CV LOWER VASCULAR LEFT COMMON FEMORAL COMPETENT: NORMAL
BH CV LOWER VASCULAR LEFT COMMON FEMORAL COMPRESS: NORMAL
BH CV LOWER VASCULAR LEFT COMMON FEMORAL PHASIC: NORMAL
BH CV LOWER VASCULAR LEFT COMMON FEMORAL SPONT: NORMAL
BH CV LOWER VASCULAR LEFT DISTAL FEMORAL COMPRESS: NORMAL
BH CV LOWER VASCULAR LEFT GASTRONEMIUS COMPRESS: NORMAL
BH CV LOWER VASCULAR LEFT GREATER SAPH AK COMPRESS: NORMAL
BH CV LOWER VASCULAR LEFT GREATER SAPH BK COMPRESS: NORMAL
BH CV LOWER VASCULAR LEFT MID FEMORAL AUGMENT: NORMAL
BH CV LOWER VASCULAR LEFT MID FEMORAL COMPETENT: NORMAL
BH CV LOWER VASCULAR LEFT MID FEMORAL COMPRESS: NORMAL
BH CV LOWER VASCULAR LEFT MID FEMORAL PHASIC: NORMAL
BH CV LOWER VASCULAR LEFT MID FEMORAL SPONT: NORMAL
BH CV LOWER VASCULAR LEFT PERONEAL COMPRESS: NORMAL
BH CV LOWER VASCULAR LEFT POPLITEAL AUGMENT: NORMAL
BH CV LOWER VASCULAR LEFT POPLITEAL COMPETENT: NORMAL
BH CV LOWER VASCULAR LEFT POPLITEAL COMPRESS: NORMAL
BH CV LOWER VASCULAR LEFT POPLITEAL PHASIC: NORMAL
BH CV LOWER VASCULAR LEFT POPLITEAL SPONT: NORMAL
BH CV LOWER VASCULAR LEFT POSTERIOR TIBIAL COMPRESS: NORMAL
BH CV LOWER VASCULAR LEFT PROFUNDA FEMORAL COMPRESS: NORMAL
BH CV LOWER VASCULAR LEFT PROXIMAL FEMORAL COMPRESS: NORMAL
BH CV LOWER VASCULAR LEFT SAPHENOFEMORAL JUNCTION COMPRESS: NORMAL
BH CV LOWER VASCULAR RIGHT COMMON FEMORAL AUGMENT: NORMAL
BH CV LOWER VASCULAR RIGHT COMMON FEMORAL COMPETENT: NORMAL
BH CV LOWER VASCULAR RIGHT COMMON FEMORAL COMPRESS: NORMAL
BH CV LOWER VASCULAR RIGHT COMMON FEMORAL PHASIC: NORMAL
BH CV LOWER VASCULAR RIGHT COMMON FEMORAL SPONT: NORMAL
BH CV LOWER VASCULAR RIGHT DISTAL FEMORAL COMPRESS: NORMAL
BH CV LOWER VASCULAR RIGHT GASTRONEMIUS COMPRESS: NORMAL
BH CV LOWER VASCULAR RIGHT GREATER SAPH AK COMPRESS: NORMAL
BH CV LOWER VASCULAR RIGHT GREATER SAPH BK COMPRESS: NORMAL
BH CV LOWER VASCULAR RIGHT MID FEMORAL AUGMENT: NORMAL
BH CV LOWER VASCULAR RIGHT MID FEMORAL COMPETENT: NORMAL
BH CV LOWER VASCULAR RIGHT MID FEMORAL COMPRESS: NORMAL
BH CV LOWER VASCULAR RIGHT MID FEMORAL PHASIC: NORMAL
BH CV LOWER VASCULAR RIGHT MID FEMORAL SPONT: NORMAL
BH CV LOWER VASCULAR RIGHT PERONEAL COMPRESS: NORMAL
BH CV LOWER VASCULAR RIGHT POPLITEAL AUGMENT: NORMAL
BH CV LOWER VASCULAR RIGHT POPLITEAL COMPETENT: NORMAL
BH CV LOWER VASCULAR RIGHT POPLITEAL COMPRESS: NORMAL
BH CV LOWER VASCULAR RIGHT POPLITEAL PHASIC: NORMAL
BH CV LOWER VASCULAR RIGHT POPLITEAL SPONT: NORMAL
BH CV LOWER VASCULAR RIGHT POSTERIOR TIBIAL COMPRESS: NORMAL
BH CV LOWER VASCULAR RIGHT PROFUNDA FEMORAL COMPRESS: NORMAL
BH CV LOWER VASCULAR RIGHT PROXIMAL FEMORAL COMPRESS: NORMAL
BH CV LOWER VASCULAR RIGHT SAPHENOFEMORAL JUNCTION COMPRESS: NORMAL
BH CV UPPER VENOUS LEFT AXILLARY AUGMENT: NORMAL
BH CV UPPER VENOUS LEFT AXILLARY COMPETENT: NORMAL
BH CV UPPER VENOUS LEFT AXILLARY COMPRESS: NORMAL
BH CV UPPER VENOUS LEFT AXILLARY PHASIC: NORMAL
BH CV UPPER VENOUS LEFT AXILLARY SPONT: NORMAL
BH CV UPPER VENOUS LEFT BASILIC FOREARM COMPRESS: NORMAL
BH CV UPPER VENOUS LEFT BASILIC UPPER COMPRESS: NORMAL
BH CV UPPER VENOUS LEFT BRACHIAL COMPRESS: NORMAL
BH CV UPPER VENOUS LEFT CEPHALIC FOREARM COMPRESS: NORMAL
BH CV UPPER VENOUS LEFT CEPHALIC UPPER COLOR: 1
BH CV UPPER VENOUS LEFT CEPHALIC UPPER COMPRESS: NORMAL
BH CV UPPER VENOUS LEFT CEPHALIC UPPER THROMBUS: NORMAL
BH CV UPPER VENOUS LEFT INTERNAL JUGULAR AUGMENT: NORMAL
BH CV UPPER VENOUS LEFT INTERNAL JUGULAR COMPETENT: NORMAL
BH CV UPPER VENOUS LEFT INTERNAL JUGULAR COMPRESS: NORMAL
BH CV UPPER VENOUS LEFT INTERNAL JUGULAR PHASIC: NORMAL
BH CV UPPER VENOUS LEFT INTERNAL JUGULAR SPONT: NORMAL
BH CV UPPER VENOUS LEFT RADIAL COMPRESS: NORMAL
BH CV UPPER VENOUS LEFT SUBCLAVIAN AUGMENT: NORMAL
BH CV UPPER VENOUS LEFT SUBCLAVIAN COMPETENT: NORMAL
BH CV UPPER VENOUS LEFT SUBCLAVIAN COMPRESS: NORMAL
BH CV UPPER VENOUS LEFT SUBCLAVIAN PHASIC: NORMAL
BH CV UPPER VENOUS LEFT SUBCLAVIAN SPONT: NORMAL
BH CV UPPER VENOUS LEFT ULNAR COMPRESS: NORMAL
BH CV UPPER VENOUS RIGHT AXILLARY AUGMENT: NORMAL
BH CV UPPER VENOUS RIGHT AXILLARY COMPETENT: NORMAL
BH CV UPPER VENOUS RIGHT AXILLARY COMPRESS: NORMAL
BH CV UPPER VENOUS RIGHT AXILLARY PHASIC: NORMAL
BH CV UPPER VENOUS RIGHT AXILLARY SPONT: NORMAL
BH CV UPPER VENOUS RIGHT BASILIC FOREARM COMPRESS: NORMAL
BH CV UPPER VENOUS RIGHT BASILIC UPPER COMPRESS: NORMAL
BH CV UPPER VENOUS RIGHT BRACHIAL COMPRESS: NORMAL
BH CV UPPER VENOUS RIGHT CEPHALIC FOREARM COMPRESS: NORMAL
BH CV UPPER VENOUS RIGHT CEPHALIC UPPER COMPRESS: NORMAL
BH CV UPPER VENOUS RIGHT INTERNAL JUGULAR AUGMENT: NORMAL
BH CV UPPER VENOUS RIGHT INTERNAL JUGULAR COMPETENT: NORMAL
BH CV UPPER VENOUS RIGHT INTERNAL JUGULAR COMPRESS: NORMAL
BH CV UPPER VENOUS RIGHT INTERNAL JUGULAR PHASIC: NORMAL
BH CV UPPER VENOUS RIGHT INTERNAL JUGULAR SPONT: NORMAL
BH CV UPPER VENOUS RIGHT RADIAL COMPRESS: NORMAL
BH CV UPPER VENOUS RIGHT SUBCLAVIAN AUGMENT: NORMAL
BH CV UPPER VENOUS RIGHT SUBCLAVIAN COMPETENT: NORMAL
BH CV UPPER VENOUS RIGHT SUBCLAVIAN COMPRESS: NORMAL
BH CV UPPER VENOUS RIGHT SUBCLAVIAN PHASIC: NORMAL
BH CV UPPER VENOUS RIGHT SUBCLAVIAN SPONT: NORMAL
BH CV UPPER VENOUS RIGHT ULNAR COMPRESS: NORMAL
FIBRINOGEN PPP-MCNC: 1021 MG/DL (ref 219–464)
INR PPP: 2.14 (ref 0.9–1.1)
PROTHROMBIN TIME: 23.4 SECONDS (ref 11.7–14.2)

## 2020-07-15 PROCEDURE — 94799 UNLISTED PULMONARY SVC/PX: CPT

## 2020-07-15 PROCEDURE — 25010000002 CEFEPIME 2 G/NS 100 ML SOLUTION: Performed by: NURSE PRACTITIONER

## 2020-07-15 PROCEDURE — 25010000002 CEFEPIME PER 500 MG: Performed by: NURSE PRACTITIONER

## 2020-07-15 PROCEDURE — 85610 PROTHROMBIN TIME: CPT | Performed by: INTERNAL MEDICINE

## 2020-07-15 PROCEDURE — 85384 FIBRINOGEN ACTIVITY: CPT | Performed by: INTERNAL MEDICINE

## 2020-07-15 PROCEDURE — 85730 THROMBOPLASTIN TIME PARTIAL: CPT | Performed by: INTERNAL MEDICINE

## 2020-07-15 PROCEDURE — 99232 SBSQ HOSP IP/OBS MODERATE 35: CPT | Performed by: INTERNAL MEDICINE

## 2020-07-15 PROCEDURE — 93970 EXTREMITY STUDY: CPT

## 2020-07-15 PROCEDURE — 25010000002 ENOXAPARIN PER 10 MG: Performed by: INTERNAL MEDICINE

## 2020-07-15 RX ORDER — CEFDINIR 300 MG/1
300 CAPSULE ORAL EVERY 12 HOURS SCHEDULED
Status: DISCONTINUED | OUTPATIENT
Start: 2020-07-15 | End: 2020-07-17 | Stop reason: HOSPADM

## 2020-07-15 RX ADMIN — CEFEPIME HYDROCHLORIDE 2 G: 2 INJECTION, POWDER, FOR SOLUTION INTRAVENOUS at 08:56

## 2020-07-15 RX ADMIN — ATORVASTATIN CALCIUM 40 MG: 20 TABLET, FILM COATED ORAL at 06:33

## 2020-07-15 RX ADMIN — ACETAMINOPHEN 650 MG: 325 TABLET, FILM COATED ORAL at 18:04

## 2020-07-15 RX ADMIN — ASENAPINE MALEATE 5 MG: 5 TABLET SUBLINGUAL at 08:51

## 2020-07-15 RX ADMIN — ENOXAPARIN SODIUM 40 MG: 40 INJECTION SUBCUTANEOUS at 18:01

## 2020-07-15 RX ADMIN — GUAIFENESIN AND DEXTROMETHORPHAN 5 ML: 100; 10 SYRUP ORAL at 18:02

## 2020-07-15 RX ADMIN — CEFEPIME HYDROCHLORIDE 2 G: 2 INJECTION, POWDER, FOR SOLUTION INTRAVENOUS at 02:24

## 2020-07-15 RX ADMIN — GUAIFENESIN AND DEXTROMETHORPHAN 5 ML: 100; 10 SYRUP ORAL at 08:51

## 2020-07-15 RX ADMIN — BUDESONIDE AND FORMOTEROL FUMARATE DIHYDRATE 2 PUFF: 80; 4.5 AEROSOL RESPIRATORY (INHALATION) at 20:21

## 2020-07-15 RX ADMIN — HYDROCODONE BITARTRATE AND HOMATROPINE METHYLBROMIDE 5 ML: 5; 1.5 SOLUTION ORAL at 20:23

## 2020-07-15 RX ADMIN — BUPROPION HYDROCHLORIDE 450 MG: 150 TABLET, FILM COATED, EXTENDED RELEASE ORAL at 08:51

## 2020-07-15 RX ADMIN — CEFDINIR 300 MG: 300 CAPSULE ORAL at 20:17

## 2020-07-15 RX ADMIN — ASENAPINE MALEATE 5 MG: 5 TABLET SUBLINGUAL at 20:17

## 2020-07-15 RX ADMIN — BUSPIRONE HYDROCHLORIDE 30 MG: 15 TABLET ORAL at 08:51

## 2020-07-15 RX ADMIN — LEVOTHYROXINE SODIUM 200 MCG: 100 TABLET ORAL at 06:33

## 2020-07-15 RX ADMIN — TOPIRAMATE 200 MG: 100 TABLET, FILM COATED ORAL at 08:50

## 2020-07-15 RX ADMIN — BUSPIRONE HYDROCHLORIDE 30 MG: 15 TABLET ORAL at 20:17

## 2020-07-15 RX ADMIN — TOPIRAMATE 200 MG: 100 TABLET, FILM COATED ORAL at 20:17

## 2020-07-15 RX ADMIN — LEVOTHYROXINE SODIUM 25 MCG: 25 TABLET ORAL at 06:34

## 2020-07-15 RX ADMIN — BUDESONIDE AND FORMOTEROL FUMARATE DIHYDRATE 2 PUFF: 80; 4.5 AEROSOL RESPIRATORY (INHALATION) at 07:58

## 2020-07-15 NOTE — PLAN OF CARE
Problem: Patient Care Overview  Goal: Plan of Care Review  Outcome: Ongoing (interventions implemented as appropriate)  Flowsheets (Taken 7/15/2020 1729)  Progress: improving  Plan of Care Reviewed With: patient  Note:   No changes over night. Patient has been resting quietly. IV abx continued. Denies any pain. Encouraging use of IS. VSS. A/o x4. Will continue to monitor.

## 2020-07-15 NOTE — PROGRESS NOTES
Subjective     CHIEF COMPLAINT:     Prolonged PT/INR    INTERVAL HISTORY:     Patient reports improvement in her breathing.  She reports improvement in chest pain.  She is not having pain in the upper or lower extremities.    HISTORY OF PRESENT ILLNESS:  Urvashi Ornelas is a 55 y.o. patient who is a former smoker who was admitted on 7/11/2020 with left lower lobe pneumonia and left parapneumonic effusion.  She has history of bronchial asthma with episodes of bronchitis.  She has schizoaffective disorder and PTSD.  She presented to the ER on 7/11/2020 due to recent diagnosis of pneumonia.  She was having shortness of breath and wheezing.  She was treated with oral steroids but did not feel better.  Chest x-ray on 7/6/2020 showed left lower lobe pneumonia.  She was placed on Levaquin for 5 days without improvement in the symptoms.  She went to [place] at [facility].  She had a temperature of 100.6.  CT angiogram was done and showed left lower lobe consolidation and left parapneumonic effusion.  There was no evidence of pulmonary embolism.     Patient was diagnosed with pneumonia and sepsis and she was transferred to [facility].     Patient reports minimal improvement in symptoms since admission.  She continues to feel short of breath.  She complains of chest pain on the left on deep inspiration     Patient's past medical history is significant for pulmonary embolism that developed in 2004 10 days after she had surgery.  No DVT was identified.  She was anticoagulated for about 6 months.     Patient has history of thyroid cancer.  She had surgery followed by radioactive iodine in 2016.  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        REVIEW OF SYSTEMS:  Review of systems is positive for the following.  Rest of ROS is negative.  RESP: Shortness of breath improved.  CVS: Chest pain continues to improve  HEME: No bleeding/bruising.    SCHEDULED MEDS:    asenapine maleate 5 mg  Sublingual BID   atorvastatin 40 mg Oral QAM   budesonide-formoterol 2 puff Inhalation BID - RT   buPROPion  mg Oral Daily   busPIRone 30 mg Oral BID   cefepime 2 g Intravenous Q8H   cloZAPine 100 mg Oral Nightly   enoxaparin 40 mg Subcutaneous Q24H   levothyroxine 200 mcg Oral QAM   levothyroxine 25 mcg Oral QAM   topiramate 200 mg Oral BID     INFUSIONS:    hold 1 each     PRN MEDS:  •  acetaminophen  •  aluminum-magnesium hydroxide-simethicone  •  bisacodyl  •  bisacodyl  •  guaiFENesin-dextromethorphan  •  hold  •  HYDROcodone-homatropine  •  ipratropium-albuterol  •  nitroglycerin  •  ondansetron **OR** ondansetron  •  potassium chloride **OR** potassium chloride **OR** potassium chloride  •  sodium chloride       Objective   VITAL SIGNS:  Vitals:    07/15/20 0031 07/15/20 0736 07/15/20 0758 07/15/20 1351   BP: 110/80 123/82  127/88   BP Location: Right arm Right arm  Right arm   Patient Position: Lying Lying  Lying   Pulse: 86 89 85 92   Resp: 16 18 22 20   Temp: 98.3 °F (36.8 °C) 98.8 °F (37.1 °C)  98.1 °F (36.7 °C)   TempSrc: Oral Oral  Oral   SpO2: 95% 95% 93% 95%   Weight:       Height:             PHYSICAL EXAMINATION:  GENERAL:  The patient appears in fair general condition, not in acute distress.  SKIN: No skin rash. No ecchymosis.   HEAD:  Normocephalic.  EYES:  No Jaundice. No Pallor. Pupils equal. EOMI.  NECK:  Supple. No Masses.  CHEST: Normal respiratory effort.   CARDIAC:   No edema.  ABDOMEN: Nondistended.  EXTREMITIES: No tenderness on exam of the left upper extremity.  No calf tenderness.  NEUROLOGICAL:  No Focal neurological deficits.  PSYCH: Normal mood and affect.        RESULT REVIEW:   Results from last 7 days   Lab Units 07/14/20  0426 07/12/20  0502 07/12/20  0011   WBC 10*3/mm3 10.71 14.57* 15.74*   NEUTROS ABS 10*3/mm3 8.25*  --  13.47*   HEMOGLOBIN g/dL 11.1* 11.0* 11.0*   HEMATOCRIT % 34.0 32.7* 32.3*   PLATELETS 10*3/mm3 354 317 332     Results from last 7 days   Lab Units  07/14/20 2002 07/14/20 0426 07/13/20  0545  07/12/20  0502 07/12/20  0011   SODIUM mmol/L  --  144  --   --  141 139   POTASSIUM mmol/L 3.6 3.1* 3.9   < > 3.2* 2.9*   CHLORIDE mmol/L  --  111*  --   --  108* 107   CO2 mmol/L  --  21.6*  --   --  23.0 22.2   BUN mg/dL  --  5*  --   --  7 7   CREATININE mg/dL  --  0.64  --   --  1.01* 1.02*   CALCIUM mg/dL  --  8.3*  --   --  8.2* 8.1*   ALBUMIN g/dL  --  2.10*  --   --  2.30* 2.20*   BILIRUBIN mg/dL  --  0.2  --   --  0.2 0.3   ALK PHOS U/L  --  87  --   --  93 93   ALT (SGPT) U/L  --  111*  --   --  96* 98*   AST (SGOT) U/L  --  92*  --   --  97* 102*   FERRITIN ng/mL  --  481.00*  --   --   --   --    IRON mcg/dL  --  21*  --   --   --   --    TIBC mcg/dL  --  165*  --   --   --   --     < > = values in this interval not displayed.     Results from last 7 days   Lab Units 07/15/20  0453 07/14/20  0426 07/13/20  0545   INR  2.14* 2.03* 1.97*   APTT seconds 44.6* 50.0* 42.7*     Venous Doppler both lower extremities on 7/15/2020:  · Normal bilateral lower extremity venous duplex scan.    Venous Doppler both upper extremities on 7/15/2020:  · Acute left upper extremity superficial thrombophlebitis noted in the cephalic (upper arm).  · Normal bilateral upper extremity venous duplex scan.    Assessment/Plan   1.  Prolonged PT and PTT.  She has an elevated d-dimer.  Fibrinogen is significantly elevated at 1021.  On review of old labs, her coags were normal on 11/4/2015 so she does not have a hereditary coagulation factor deficiency.  The prolongation of the PT and PTT is likely secondary to the underlying infection resulting in early DIC picture.  PT and PTT increased but is not having problem with bleeding.     2.  Elevated d-dimer.  CT angiogram done before admission showed no evidence of pulmonary embolism.  Leg exam reveals no calf tenderness.  She has history of pulmonary embolism in 2004 10 days after she had surgery and she was anticoagulated.  Since patient is  at increased risk for VTE, she was placed on Lovenox 40 mg subcu daily on 7/13/2020.    Patient had venous Doppler of both upper and lower extremities earlier today.  She was found to have superficial thrombophlebitis in the cephalic vein of the left arm.  She is not having symptoms from the left arm phlebitis.     3.  Normochromic normocytic anemia.  Hemoglobin is at 11.0 today.  She had B12 and folate levels on 2/7/2020 that was normal.     4.  Leukocytosis and neutrophilia.  Highest WBC count was 15,000 on 7/12/2020 and it was down to 14,570 on 7/13/2020 and down to 10,700 today     PLAN:     1.  Continue Lovenox 40 mg subcu daily.  I would not increase the dose since she is asymptomatic and since coags are abnormal.  2.  Repeat coags in cesia Lund MD  07/15/20

## 2020-07-15 NOTE — PROGRESS NOTES
[facility]     LOS: 4 days     Subjective:    CC:No chief complaint on file.    DIET:  Diet Order   Procedures   • Diet Regular; Cardiac     Shortness of air is stable, may be slightly more short of air yesterday afternoon but better today  No chest pain  No nausea or vomiting     diarrhea stable      Objective:    Vital Signs:  Temp:  [98.1 °F (36.7 °C)-98.8 °F (37.1 °C)] 98.8 °F (37.1 °C)  Heart Rate:  [85-97] 85  Resp:  [16-22] 22  BP: (110-123)/(80-92) 123/82    SpO2:  [93 %-95 %] 93 %  on  Flow (L/min):  [0.5-1] 1;   Device (Oxygen Therapy): nasal cannula  Body mass index is 40.94 kg/m².    Physical Exam   Constitutional: She appears well-developed and well-nourished.   HENT:   Head: Normocephalic and atraumatic.   Cardiovascular: Exam reveals no friction rub.   No murmur heard.  Pulmonary/Chest: Effort normal. She has rales.   Abdominal: Soft. Bowel sounds are normal. She exhibits no distension. There is no tenderness.   Neurological: She is alert.   Skin: Skin is warm and dry.       Results Review:    Glucose   Date Value Ref Range Status   07/14/2020 141 (H) 65 - 99 mg/dL Final     Results from last 7 days   Lab Units 07/14/20  0426   WBC 10*3/mm3 10.71   HEMOGLOBIN g/dL 11.1*   HEMATOCRIT % 34.0   PLATELETS 10*3/mm3 354     Results from last 7 days   Lab Units 07/14/20 2002 07/14/20  0426   SODIUM mmol/L  --  144   POTASSIUM mmol/L 3.6 3.1*   CHLORIDE mmol/L  --  111*   CO2 mmol/L  --  21.6*   BUN mg/dL  --  5*   CREATININE mg/dL  --  0.64   CALCIUM mg/dL  --  8.3*   BILIRUBIN mg/dL  --  0.2   ALK PHOS U/L  --  87   ALT (SGPT) U/L  --  111*   AST (SGOT) U/L  --  92*   GLUCOSE mg/dL  --  141*     Results from last 7 days   Lab Units 07/15/20  0453   INR  2.14*   APTT seconds 44.6*         Results from last 7 days   Lab Units 07/12/20  0011   TROPONIN T ng/mL <0.010     Cultures:  Blood Culture   Date Value Ref Range Status   07/12/2020 No growth at 24 hours  Preliminary      07/12/2020 No growth at 24 hours  Preliminary     Respiratory Culture   Date Value Ref Range Status   07/12/2020 No growth  Preliminary       I have reviewed daily medications and changes in CPOE    Scheduled meds    asenapine maleate 5 mg Sublingual BID   atorvastatin 40 mg Oral QAM   budesonide-formoterol 2 puff Inhalation BID - RT   buPROPion  mg Oral Daily   busPIRone 30 mg Oral BID   cefepime 2 g Intravenous Q8H   cloZAPine 100 mg Oral Nightly   enoxaparin 40 mg Subcutaneous Q24H   levothyroxine 200 mcg Oral QAM   levothyroxine 25 mcg Oral QAM   topiramate 200 mg Oral BID         hold 1 each     PRN meds  •  acetaminophen  •  aluminum-magnesium hydroxide-simethicone  •  bisacodyl  •  bisacodyl  •  guaiFENesin-dextromethorphan  •  hold  •  HYDROcodone-homatropine  •  ipratropium-albuterol  •  nitroglycerin  •  ondansetron **OR** ondansetron  •  potassium chloride **OR** potassium chloride **OR** potassium chloride  •  sodium chloride        Pneumonia    Thyroid cancer (CMS/[facility])    Asthma    Schizoaffective disorder (CMS/[facility])    PTSD (post-traumatic stress disorder)    Hypothyroidism    Migraines    Anxiety    Obesity, Class III, BMI 40-49.9 (morbid obesity) (CMS/[facility])    Parapneumonic effusion    Hypokalemia    CKD (chronic kidney disease) stage 3, GFR 30-59 ml/min (CMS/[facility])        Assessment/Plan:    Ms. Ornelas is a 55 y.o. former smoker with a history of asthma who, dyspnea on exertion, and weakness secondary to pneumonia and left parapneumonic effusion.     Left lower lobe pneumonia/left parapneumonic effusion  -Patient continues on on cefepime   -Pulmonary and cardiothoracic surgery following  -Blood cultures drawn at outside facility-pending  -Legionella negative, strep urine negative, MRSA screen negative, blood cultures negative, respiratory panel rare gram-positive's (unremarkable), respiratory pathogen panel negative  -COVID-19 testing negative  -Symbicort, patient shown CPT by respiratory  therapy and asked patient to perform herself  -Elevated procalcitonin    Elevated troponin  -Troponin elevated at outlying hospital, patient denies chest pain on assessment   -Troponin here is unremarkable   -We will check baseline EKG here which is unchanged from prior    Hypokalemia  -Improved     Hypothyroidism/schizoaffective disorder/anxiety  -Continue home regimen  -TSH in February was 1.22    Diarrhea -C. difficile negative    Elevated coagulation factors -hematology following  -check dopplers    dvt ppx: lovenox, scd    Alonso Ibrahim MD  07/15/20  09:12

## 2020-07-15 NOTE — PLAN OF CARE
Problem: Patient Care Overview  Goal: Plan of Care Review  Outcome: Ongoing (interventions implemented as appropriate)  Flowsheets (Taken 7/15/2020 1735)  Progress: no change  Plan of Care Reviewed With: patient  Outcome Summary: no significant changes, acute thrombus in left arm, no c/o pain or discomfort, transitioned to po abx, vss, will continue to monitor  Goal: Individualization and Mutuality  Outcome: Ongoing (interventions implemented as appropriate)  Goal: Discharge Needs Assessment  Outcome: Ongoing (interventions implemented as appropriate)  Goal: Interprofessional Rounds/Family Conf  Outcome: Ongoing (interventions implemented as appropriate)     Problem: Pneumonia (Adult)  Goal: Signs and Symptoms of Listed Potential Problems Will be Absent, Minimized or Managed (Pneumonia)  Outcome: Ongoing (interventions implemented as appropriate)     Problem: Pain, Acute (Adult)  Goal: Acceptable Pain Control/Comfort Level  Outcome: Ongoing (interventions implemented as appropriate)     Problem: Anxiety (Adult)  Goal: Identify Related Risk Factors and Signs and Symptoms  Outcome: Ongoing (interventions implemented as appropriate)  Goal: Reduction/Resolution  Outcome: Ongoing (interventions implemented as appropriate)

## 2020-07-15 NOTE — PROGRESS NOTES
"      [facility]         Dr Hammer Sayied   LOS: 4 days   Patient Care Team:  Rosio Palmer APRN as PCP - General (Family Medicine)    Chief Complaint: Pneumonia with left parapneumonic effusion obesity elevated LFTs    Interval History: Feels her breathing is better today.  Her pleuritic chest pain is much better.    REVIEW OF SYSTEMS:   CARDIOVASCULAR: No chest pain, chest pressure or chest discomfort. No palpitations or edema.   RESPIRATORY: Shortness of breath with exertion  GASTROINTESTINAL: No anorexia, nausea, vomiting or diarrhea. No abdominal pain or blood.   HEMATOLOGIC: No bleeding or bruising.     Ventilator/Non-Invasive Ventilation Settings (From admission, onward)    None            Vital Signs  Temp:  [98.1 °F (36.7 °C)-98.8 °F (37.1 °C)] 98.1 °F (36.7 °C)  Heart Rate:  [85-97] 92  Resp:  [16-22] 20  BP: (110-127)/(80-92) 127/88    Intake/Output Summary (Last 24 hours) at 7/15/2020 1528  Last data filed at 7/15/2020 0007  Gross per 24 hour   Intake 540 ml   Output --   Net 540 ml     Flowsheet Rows      First Filed Value   Admission Height  170.2 cm (67\") Documented at 07/12/2020 1527   Admission Weight  119 kg (261 lb 6.4 oz) Documented at 07/11/2020 2258          Physical Exam:  Patient is examined using the personal protective equipment as per guidelines from infection control for this particular patient as enacted.  Hand hygiene was performed before and after patient interaction.   General Appearance:    Alert, cooperative, in no acute distress.  Following simple commands  Neck midline trachea no thyromegaly   Lungs:    Diminished breath sounds with rhonchi crackles left base    Heart:    Regular rhythm and normal rate, normal S1 and S2, no            murmur, no gallop, no rub, no click   Chest Wall:    No abnormalities observed   Abdomen:     Normal bowel sounds, no masses, no organomegaly, soft        non-tender, non-distended, no guarding, no rebound                tenderness "   Extremities:   Moves all extremities well, no edema, no cyanosis, no             redness  CNS no focal neurological deficits no distress  Skin no rashes no nodules  Psych oriented to time place and person normal memory  Musculoskeletal no cyanosis no clubbing normal range of motion     Results Review:        Results from last 7 days   Lab Units 07/14/20 2002 07/14/20 0426 07/13/20  0545  07/12/20  0502 07/12/20  0011   SODIUM mmol/L  --  144  --   --  141 139   POTASSIUM mmol/L 3.6 3.1* 3.9   < > 3.2* 2.9*   CHLORIDE mmol/L  --  111*  --   --  108* 107   CO2 mmol/L  --  21.6*  --   --  23.0 22.2   BUN mg/dL  --  5*  --   --  7 7   CREATININE mg/dL  --  0.64  --   --  1.01* 1.02*   GLUCOSE mg/dL  --  141*  --   --  148* 132*   CALCIUM mg/dL  --  8.3*  --   --  8.2* 8.1*    < > = values in this interval not displayed.     Results from last 7 days   Lab Units 07/12/20  0011   TROPONIN T ng/mL <0.010     Results from last 7 days   Lab Units 07/14/20 0426 07/12/20  0502 07/12/20  0011   WBC 10*3/mm3 10.71 14.57* 15.74*   HEMOGLOBIN g/dL 11.1* 11.0* 11.0*   HEMATOCRIT % 34.0 32.7* 32.3*   PLATELETS 10*3/mm3 354 317 332     Results from last 7 days   Lab Units 07/15/20  0453 07/14/20 0426 07/13/20  0545   INR  2.14* 2.03* 1.97*   APTT seconds 44.6* 50.0* 42.7*                       I reviewed the patient's new clinical results.  I personally viewed and interpreted the patient's CXR        Medication Review:     asenapine maleate 5 mg Sublingual BID   atorvastatin 40 mg Oral QAM   budesonide-formoterol 2 puff Inhalation BID - RT   buPROPion  mg Oral Daily   busPIRone 30 mg Oral BID   cefdinir 300 mg Oral Q12H   cloZAPine 100 mg Oral Nightly   enoxaparin 40 mg Subcutaneous Q24H   levothyroxine 200 mcg Oral QAM   levothyroxine 25 mcg Oral QAM   topiramate 200 mg Oral BID         hold 1 each       ASSESSMENT:   Pneumonia left side  Left parapneumonic effusion  Obesity  Hypokalemia  Elevated liver  enzymes    PLAN:  Pneumonia with parapneumonic effusion. Legionella negative, strep urine negative, MRSA screen negative, blood cultures negative, respiratory panel rare gram-positive's (unremarkable), respiratory pathogen panel negative. Minimal amount of effusion could not be tapped by thoracentesis.  Improving clinically and improving leukocytosis.  No further fevers noted.  Would recommend transitioning to oral antibiotics.  I will discontinue cefepime and start Omnicef.  Treat pneumonia and follow-up CT chest in 6 to 8 weeks to ensure resolution of pneumonia and effusion  COVID-19 testing negative  Aggressive pulmonary toilet  Ambulate  Wean down oxygen as tolerated  No objections to discharge and follow-up as an outpatient in 6 to 8 weeks in the office      Jaquelin Soares MD  07/15/20  15:28

## 2020-07-16 LAB
APTT PPP: 48 SECONDS (ref 22.7–35.4)
BASOPHILS # BLD AUTO: 0.05 10*3/MM3 (ref 0–0.2)
BASOPHILS NFR BLD AUTO: 0.6 % (ref 0–1.5)
DEPRECATED RDW RBC AUTO: 42.3 FL (ref 37–54)
EOSINOPHIL # BLD AUTO: 0.27 10*3/MM3 (ref 0–0.4)
EOSINOPHIL NFR BLD AUTO: 3.3 % (ref 0.3–6.2)
ERYTHROCYTE [DISTWIDTH] IN BLOOD BY AUTOMATED COUNT: 13.3 % (ref 12.3–15.4)
HCT VFR BLD AUTO: 35 % (ref 34–46.6)
HGB BLD-MCNC: 11.3 G/DL (ref 12–15.9)
IMM GRANULOCYTES # BLD AUTO: 0.1 10*3/MM3 (ref 0–0.05)
IMM GRANULOCYTES NFR BLD AUTO: 1.2 % (ref 0–0.5)
INR PPP: 2.07 (ref 0.9–1.1)
LYMPHOCYTES # BLD AUTO: 1.15 10*3/MM3 (ref 0.7–3.1)
LYMPHOCYTES NFR BLD AUTO: 13.9 % (ref 19.6–45.3)
MCH RBC QN AUTO: 28.3 PG (ref 26.6–33)
MCHC RBC AUTO-ENTMCNC: 32.3 G/DL (ref 31.5–35.7)
MCV RBC AUTO: 87.5 FL (ref 79–97)
MONOCYTES # BLD AUTO: 0.71 10*3/MM3 (ref 0.1–0.9)
MONOCYTES NFR BLD AUTO: 8.6 % (ref 5–12)
NEUTROPHILS NFR BLD AUTO: 6.01 10*3/MM3 (ref 1.7–7)
NEUTROPHILS NFR BLD AUTO: 72.4 % (ref 42.7–76)
NRBC BLD AUTO-RTO: 0 /100 WBC (ref 0–0.2)
PLATELET # BLD AUTO: 353 10*3/MM3 (ref 140–450)
PMV BLD AUTO: 9.6 FL (ref 6–12)
PROTHROMBIN TIME: 22.8 SECONDS (ref 11.7–14.2)
RBC # BLD AUTO: 4 10*6/MM3 (ref 3.77–5.28)
WBC # BLD AUTO: 8.29 10*3/MM3 (ref 3.4–10.8)

## 2020-07-16 PROCEDURE — 85610 PROTHROMBIN TIME: CPT | Performed by: INTERNAL MEDICINE

## 2020-07-16 PROCEDURE — 94799 UNLISTED PULMONARY SVC/PX: CPT

## 2020-07-16 PROCEDURE — 99231 SBSQ HOSP IP/OBS SF/LOW 25: CPT | Performed by: INTERNAL MEDICINE

## 2020-07-16 PROCEDURE — 25010000002 ENOXAPARIN PER 10 MG: Performed by: INTERNAL MEDICINE

## 2020-07-16 PROCEDURE — 85730 THROMBOPLASTIN TIME PARTIAL: CPT | Performed by: INTERNAL MEDICINE

## 2020-07-16 PROCEDURE — 85025 COMPLETE CBC W/AUTO DIFF WBC: CPT | Performed by: INTERNAL MEDICINE

## 2020-07-16 RX ADMIN — BUSPIRONE HYDROCHLORIDE 30 MG: 15 TABLET ORAL at 08:47

## 2020-07-16 RX ADMIN — SODIUM CHLORIDE, PRESERVATIVE FREE 10 ML: 5 INJECTION INTRAVENOUS at 20:36

## 2020-07-16 RX ADMIN — BUPROPION HYDROCHLORIDE 450 MG: 150 TABLET, FILM COATED, EXTENDED RELEASE ORAL at 08:48

## 2020-07-16 RX ADMIN — LEVOTHYROXINE SODIUM 200 MCG: 100 TABLET ORAL at 06:23

## 2020-07-16 RX ADMIN — BUSPIRONE HYDROCHLORIDE 30 MG: 15 TABLET ORAL at 20:34

## 2020-07-16 RX ADMIN — ASENAPINE MALEATE 5 MG: 5 TABLET SUBLINGUAL at 20:35

## 2020-07-16 RX ADMIN — TOPIRAMATE 200 MG: 100 TABLET, FILM COATED ORAL at 08:47

## 2020-07-16 RX ADMIN — TOPIRAMATE 200 MG: 100 TABLET, FILM COATED ORAL at 20:35

## 2020-07-16 RX ADMIN — ASENAPINE MALEATE 5 MG: 5 TABLET SUBLINGUAL at 08:47

## 2020-07-16 RX ADMIN — LEVOTHYROXINE SODIUM 25 MCG: 25 TABLET ORAL at 06:23

## 2020-07-16 RX ADMIN — HYDROCODONE BITARTRATE AND HOMATROPINE METHYLBROMIDE 5 ML: 5; 1.5 SOLUTION ORAL at 20:35

## 2020-07-16 RX ADMIN — HYDROCODONE BITARTRATE AND HOMATROPINE METHYLBROMIDE 5 ML: 5; 1.5 SOLUTION ORAL at 10:56

## 2020-07-16 RX ADMIN — ATORVASTATIN CALCIUM 40 MG: 20 TABLET, FILM COATED ORAL at 06:23

## 2020-07-16 RX ADMIN — CEFDINIR 300 MG: 300 CAPSULE ORAL at 20:34

## 2020-07-16 RX ADMIN — HYDROCODONE BITARTRATE AND HOMATROPINE METHYLBROMIDE 5 ML: 5; 1.5 SOLUTION ORAL at 15:20

## 2020-07-16 RX ADMIN — ENOXAPARIN SODIUM 40 MG: 40 INJECTION SUBCUTANEOUS at 17:46

## 2020-07-16 RX ADMIN — BUDESONIDE AND FORMOTEROL FUMARATE DIHYDRATE 2 PUFF: 80; 4.5 AEROSOL RESPIRATORY (INHALATION) at 07:41

## 2020-07-16 RX ADMIN — CEFDINIR 300 MG: 300 CAPSULE ORAL at 08:47

## 2020-07-16 RX ADMIN — ACETAMINOPHEN 650 MG: 325 TABLET, FILM COATED ORAL at 06:23

## 2020-07-16 RX ADMIN — BUDESONIDE AND FORMOTEROL FUMARATE DIHYDRATE 2 PUFF: 80; 4.5 AEROSOL RESPIRATORY (INHALATION) at 19:06

## 2020-07-16 NOTE — PLAN OF CARE
Problem: Patient Care Overview  Goal: Plan of Care Review  Outcome: Ongoing (interventions implemented as appropriate)  Flowsheets (Taken 7/16/2020 4230)  Progress: improving  Plan of Care Reviewed With: patient  Outcome Summary: no significant changes, coughing still, intermittent c/o h/a, hopeful for d/c soon, vss, will continue to monitor  Goal: Individualization and Mutuality  Outcome: Ongoing (interventions implemented as appropriate)  Goal: Discharge Needs Assessment  Outcome: Ongoing (interventions implemented as appropriate)  Goal: Interprofessional Rounds/Family Conf  Outcome: Ongoing (interventions implemented as appropriate)     Problem: Pneumonia (Adult)  Goal: Signs and Symptoms of Listed Potential Problems Will be Absent, Minimized or Managed (Pneumonia)  Outcome: Ongoing (interventions implemented as appropriate)     Problem: Pain, Acute (Adult)  Goal: Acceptable Pain Control/Comfort Level  Outcome: Ongoing (interventions implemented as appropriate)     Problem: Anxiety (Adult)  Goal: Identify Related Risk Factors and Signs and Symptoms  Outcome: Ongoing (interventions implemented as appropriate)  Goal: Reduction/Resolution  Outcome: Ongoing (interventions implemented as appropriate)

## 2020-07-16 NOTE — PROGRESS NOTES
[facility]     LOS: 5 days     Subjective:    CC:No chief complaint on file.    DIET:  Diet Order   Procedures   • Diet Regular; Cardiac     Shortness of air is stable, may be slightly more short of air yesterday afternoon but better today  No chest pain  No nausea or vomiting     diarrhea stable      Objective:    Vital Signs:  Temp:  [97.5 °F (36.4 °C)-98.3 °F (36.8 °C)] 98.1 °F (36.7 °C)  Heart Rate:  [76-90] 87  Resp:  [16-18] 18  BP: (101-120)/(68-85) 114/79    SpO2:  [92 %-95 %] 92 %  on  Flow (L/min):  [1] 1;   Device (Oxygen Therapy): room air  Body mass index is 40.94 kg/m².    Physical Exam   Constitutional: She appears well-developed and well-nourished.   HENT:   Head: Normocephalic and atraumatic.   Cardiovascular: Exam reveals no friction rub.   No murmur heard.  Pulmonary/Chest: Effort normal. She has rales.   Abdominal: Soft. Bowel sounds are normal. She exhibits no distension. There is no tenderness.   Neurological: She is alert.   Skin: Skin is warm and dry.       Results Review:    Glucose   Date Value Ref Range Status   07/14/2020 141 (H) 65 - 99 mg/dL Final     Results from last 7 days   Lab Units 07/16/20  0547   WBC 10*3/mm3 8.29   HEMOGLOBIN g/dL 11.3*   HEMATOCRIT % 35.0   PLATELETS 10*3/mm3 353     Results from last 7 days   Lab Units 07/14/20 2002 07/14/20  0426   SODIUM mmol/L  --  144   POTASSIUM mmol/L 3.6 3.1*   CHLORIDE mmol/L  --  111*   CO2 mmol/L  --  21.6*   BUN mg/dL  --  5*   CREATININE mg/dL  --  0.64   CALCIUM mg/dL  --  8.3*   BILIRUBIN mg/dL  --  0.2   ALK PHOS U/L  --  87   ALT (SGPT) U/L  --  111*   AST (SGOT) U/L  --  92*   GLUCOSE mg/dL  --  141*     Results from last 7 days   Lab Units 07/16/20  0547   INR  2.07*   APTT seconds 48.0*         Results from last 7 days   Lab Units 07/12/20  0011   TROPONIN T ng/mL <0.010     Cultures:  Blood Culture   Date Value Ref Range Status   07/12/2020 No growth at 24 hours  Preliminary   07/12/2020 No  growth at 24 hours  Preliminary     Respiratory Culture   Date Value Ref Range Status   07/12/2020 No growth  Preliminary       I have reviewed daily medications and changes in CPOE    Scheduled meds    asenapine maleate 5 mg Sublingual BID   atorvastatin 40 mg Oral QAM   budesonide-formoterol 2 puff Inhalation BID - RT   buPROPion  mg Oral Daily   busPIRone 30 mg Oral BID   cefdinir 300 mg Oral Q12H   cloZAPine 100 mg Oral Nightly   enoxaparin 40 mg Subcutaneous Q24H   levothyroxine 200 mcg Oral QAM   levothyroxine 25 mcg Oral QAM   topiramate 200 mg Oral BID         hold 1 each     PRN meds  •  acetaminophen  •  aluminum-magnesium hydroxide-simethicone  •  bisacodyl  •  bisacodyl  •  guaiFENesin-dextromethorphan  •  hold  •  HYDROcodone-homatropine  •  ipratropium-albuterol  •  nitroglycerin  •  ondansetron **OR** ondansetron  •  potassium chloride **OR** potassium chloride **OR** potassium chloride  •  sodium chloride        Pneumonia    Thyroid cancer (CMS/[facility])    Asthma    Schizoaffective disorder (CMS/[facility])    PTSD (post-traumatic stress disorder)    Hypothyroidism    Migraines    Anxiety    Obesity, Class III, BMI 40-49.9 (morbid obesity) (CMS/[facility])    Parapneumonic effusion    Hypokalemia    CKD (chronic kidney disease) stage 3, GFR 30-59 ml/min (CMS/[facility])        Assessment/Plan:    Ms. Ornelas is a 55 y.o. former smoker with a history of asthma who, dyspnea on exertion, and weakness secondary to pneumonia and left parapneumonic effusion.     Left lower lobe pneumonia/left parapneumonic effusion  -Patient now on omnicef   -Pulmonary and cardiothoracic surgery following  -Blood cultures drawn at outside facility  -Legionella negative, strep urine negative, MRSA screen negative, blood cultures negative, respiratory panel rare gram-positive's (unremarkable), respiratory pathogen panel negative  -COVID-19 testing negative  -Symbicort, patient shown CPT by respiratory therapy and asked patient to perform  herself  -Elevated procalcitonin    Elevated troponin  -Troponin elevated at outlying hospital, patient denies chest pain on assessment   -Troponin here is unremarkable     Hypokalemia  -Improved     Hypothyroidism/schizoaffective disorder/anxiety  -Continue home regimen  -TSH in February was 1.22  -EKG no significant change    Diarrhea -C. difficile negative    Elevated coagulation factors -hematology following  -dopplers negative    dvt ppx: lovenox, scd    lAonso Ibrahim MD  07/16/20  16:38

## 2020-07-16 NOTE — PROGRESS NOTES
Subjective     CHIEF COMPLAINT:     Prolonged PT/INR    INTERVAL HISTORY:     Patient feels better today with improvement in the shortness of breath.  Chest pain is improved as well.  She has cough productive of clear to colored sputum.  No pain in the left forearm.    HISTORY OF PRESENT ILLNESS:  Urvashi Ornelas is a 55 y.o. patient who is a former smoker who was admitted on 7/11/2020 with left lower lobe pneumonia and left parapneumonic effusion.  She has history of bronchial asthma with episodes of bronchitis.  She has schizoaffective disorder and PTSD.  She presented to the ER on 7/11/2020 due to recent diagnosis of pneumonia.  She was having shortness of breath and wheezing.  She was treated with oral steroids but did not feel better.  Chest x-ray on 7/6/2020 showed left lower lobe pneumonia.  She was placed on Levaquin for 5 days without improvement in the symptoms.  She went to ER at [facility].  She had a temperature of 100.6.  CT angiogram was done and showed left lower lobe consolidation and left parapneumonic effusion.  There was no evidence of pulmonary embolism.     Patient was diagnosed with pneumonia and sepsis and she was transferred to [facility].     Patient reports minimal improvement in symptoms since admission.  She continues to feel short of breath.  She complains of chest pain on the left on deep inspiration     Patient's past medical history is significant for pulmonary embolism that developed in 2004 10 days after she had surgery.  No DVT was identified.  She was anticoagulated for about 6 months.     Patient has history of thyroid cancer.  She had surgery followed by radioactive iodine in 2016.  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        REVIEW OF SYSTEMS:  Review of systems is positive for the following.  Rest of ROS is negative.  RESP: Shortness of breath and cough are improving  CVS: Chest pain is improving.  HEME: No  bleeding/bruising.    SCHEDULED MEDS:    asenapine maleate 5 mg Sublingual BID   atorvastatin 40 mg Oral QAM   budesonide-formoterol 2 puff Inhalation BID - RT   buPROPion  mg Oral Daily   busPIRone 30 mg Oral BID   cefdinir 300 mg Oral Q12H   cloZAPine 100 mg Oral Nightly   enoxaparin 40 mg Subcutaneous Q24H   levothyroxine 200 mcg Oral QAM   levothyroxine 25 mcg Oral QAM   topiramate 200 mg Oral BID     INFUSIONS:    hold 1 each     PRN MEDS:  •  acetaminophen  •  aluminum-magnesium hydroxide-simethicone  •  bisacodyl  •  bisacodyl  •  guaiFENesin-dextromethorphan  •  hold  •  HYDROcodone-homatropine  •  ipratropium-albuterol  •  nitroglycerin  •  ondansetron **OR** ondansetron  •  potassium chloride **OR** potassium chloride **OR** potassium chloride  •  sodium chloride       Objective   VITAL SIGNS:  Vitals:    07/15/20 2329 07/16/20 0741 07/16/20 0750 07/16/20 1329   BP: 101/68  115/75 114/79   BP Location: Right arm  Right arm Right arm   Patient Position: Lying  Lying Lying   Pulse: 80 76 76 87   Resp: 18 16 18 18   Temp: 98.3 °F (36.8 °C)  98.2 °F (36.8 °C) 98.1 °F (36.7 °C)   TempSrc: Oral  Oral Oral   SpO2: 94% 93% 92% 92%   Weight:       Height:             PHYSICAL EXAMINATION:  GENERAL:  The patient appears in fair general condition, not in acute distress.  SKIN: No skin rash. No ecchymosis.   HEAD:  Normocephalic.  EYES:  No Jaundice. No Pallor. Pupils equal. EOMI.  NECK:  Supple. No Masses.  CHEST: Normal respiratory effort.  Lungs clear bilaterally.  No added sounds.  CARDIAC:   No edema.  ABDOMEN: Nondistended.  EXTREMITIES: No tenderness in the left upper extremity.  No calf tenderness.  NEUROLOGICAL:  No Focal neurological deficits.  PSYCH: Normal mood and affect.        RESULT REVIEW:   Results from last 7 days   Lab Units 07/16/20  0547 07/14/20  0426 07/12/20  0502 07/12/20  0011   WBC 10*3/mm3 8.29 10.71 14.57* 15.74*   NEUTROS ABS 10*3/mm3 6.01 8.25*  --  13.47*   HEMOGLOBIN g/dL 11.3*  11.1* 11.0* 11.0*   HEMATOCRIT % 35.0 34.0 32.7* 32.3*   PLATELETS 10*3/mm3 353 354 317 332     Results from last 7 days   Lab Units 07/14/20 2002 07/14/20  0426 07/13/20  0545  07/12/20  0502 07/12/20  0011   SODIUM mmol/L  --  144  --   --  141 139   POTASSIUM mmol/L 3.6 3.1* 3.9   < > 3.2* 2.9*   CHLORIDE mmol/L  --  111*  --   --  108* 107   CO2 mmol/L  --  21.6*  --   --  23.0 22.2   BUN mg/dL  --  5*  --   --  7 7   CREATININE mg/dL  --  0.64  --   --  1.01* 1.02*   CALCIUM mg/dL  --  8.3*  --   --  8.2* 8.1*   ALBUMIN g/dL  --  2.10*  --   --  2.30* 2.20*   BILIRUBIN mg/dL  --  0.2  --   --  0.2 0.3   ALK PHOS U/L  --  87  --   --  93 93   ALT (SGPT) U/L  --  111*  --   --  96* 98*   AST (SGOT) U/L  --  92*  --   --  97* 102*   FERRITIN ng/mL  --  481.00*  --   --   --   --    IRON mcg/dL  --  21*  --   --   --   --    TIBC mcg/dL  --  165*  --   --   --   --     < > = values in this interval not displayed.     Results from last 7 days   Lab Units 07/16/20  0547 07/15/20  0453 07/14/20  0426   INR  2.07* 2.14* 2.03*   APTT seconds 48.0* 44.6* 50.0*     Venous Doppler both lower extremities on 7/15/2020:  · Normal bilateral lower extremity venous duplex scan.    Venous Doppler both upper extremities on 7/15/2020:  · Acute left upper extremity superficial thrombophlebitis noted in the cephalic (upper arm).  · Normal bilateral upper extremity venous duplex scan.    Assessment/Plan   1.  Prolonged PT and PTT.    · She has an elevated d-dimer.    · Fibrinogen is significantly elevated at 1021.    · On review of old labs, her coags were normal on 11/4/2015 so she does not have a hereditary coagulation factor deficiency.    · The prolongation of the PT and PTT is likely secondary to the underlying infection.    · Overall, PT and PTT somewhat improved today compared to the highest levels.    · CT angiogram done before admission showed no evidence of pulmonary embolism.    · She has history of pulmonary embolism in  2004 10 days after she had surgery and she was anticoagulated.    · Since patient is at increased risk for VTE, she was placed on Lovenox 40 mg subcu daily on 7/13/2020.  · Venous Doppler of both upper and lower extremities on 7/15/2020 revealed superficial thrombophlebitis in the cephalic vein of the left arm.    · Patient is asymptomatic from the thrombophlebitis.  · Prophylactic Lovenox was continued.     2.  Normochromic normocytic anemia.  Hemoglobin is at 11.0 today.  She had B12 and folate levels on 2/7/2020 that was normal.  Iron panel is most consistent with anemia of chronic disease.  It is improving as she is recovering from the infection.  Hemoglobin improved to 11.3 today.      3.  Leukocytosis and neutrophilia.  Highest WBC count was 15,000 on 7/12/2020 and it was down to 14,570 on 7/13/2020.  WBC and neutrophil counts are normal today.     PLAN:    1.  Continue Lovenox while inpatient.  2.  I would recommend aspirin 81 mg daily x7 days after discharge.    We will sign off.  Please call if questions arise.      Jason Lund MD  07/16/20

## 2020-07-16 NOTE — PLAN OF CARE
Problem: Patient Care Overview  Goal: Plan of Care Review  Outcome: Ongoing (interventions implemented as appropriate)  Flowsheets (Taken 7/16/2020 0510)  Progress: improving  Plan of Care Reviewed With: patient  Note:   No changes over night. Patient has been resting quietly. PRN hycoden given x1 for cough. VSS. A/o x4. Will continue to monitor.

## 2020-07-16 NOTE — PROGRESS NOTES
"      [facility]         Dr Hammer Sayied   LOS: 5 days   Patient Care Team:  Rosio Palmer APRN as PCP - General (Family Medicine)    Chief Complaint: Pneumonia with left parapneumonic effusion obesity elevated LFTs    Interval History: Continues to feel better.  No cough or congestion as such reported.    REVIEW OF SYSTEMS:   CARDIOVASCULAR: No chest pain, chest pressure or chest discomfort. No palpitations or edema.   RESPIRATORY: Shortness of breath with exertion  GASTROINTESTINAL: No anorexia, nausea, vomiting or diarrhea. No abdominal pain or blood.   HEMATOLOGIC: No bleeding or bruising.     Ventilator/Non-Invasive Ventilation Settings (From admission, onward)    None            Vital Signs  Temp:  [97.5 °F (36.4 °C)-98.3 °F (36.8 °C)] 98.1 °F (36.7 °C)  Heart Rate:  [76-92] 87  Resp:  [16-20] 18  BP: (101-127)/(68-88) 114/79    Intake/Output Summary (Last 24 hours) at 7/16/2020 1347  Last data filed at 7/15/2020 2021  Gross per 24 hour   Intake 240 ml   Output --   Net 240 ml     Flowsheet Rows      First Filed Value   Admission Height  170.2 cm (67\") Documented at 07/12/2020 1527   Admission Weight  119 kg (261 lb 6.4 oz) Documented at 07/11/2020 2258          Physical Exam:  Patient is examined using the personal protective equipment as per guidelines from infection control for this particular patient as enacted.  Hand hygiene was performed before and after patient interaction.   General Appearance:    Alert, cooperative, in no acute distress.  Following simple commands  Neck midline trachea no thyromegaly   Lungs:    Diminished breath sounds with rhonchi crackles left base    Heart:    Regular rhythm and normal rate, normal S1 and S2, no            murmur, no gallop, no rub, no click   Chest Wall:    No abnormalities observed   Abdomen:     Normal bowel sounds, no masses, no organomegaly, soft        non-tender, non-distended, no guarding, no rebound                tenderness "   Extremities:   Moves all extremities well, no edema, no cyanosis, no             redness  CNS no focal neurological deficits no distress  Skin no rashes no nodules  Psych oriented to time place and person normal memory  Musculoskeletal no cyanosis no clubbing normal range of motion     Results Review:        Results from last 7 days   Lab Units 07/14/20 2002 07/14/20 0426 07/13/20  0545  07/12/20  0502 07/12/20  0011   SODIUM mmol/L  --  144  --   --  141 139   POTASSIUM mmol/L 3.6 3.1* 3.9   < > 3.2* 2.9*   CHLORIDE mmol/L  --  111*  --   --  108* 107   CO2 mmol/L  --  21.6*  --   --  23.0 22.2   BUN mg/dL  --  5*  --   --  7 7   CREATININE mg/dL  --  0.64  --   --  1.01* 1.02*   GLUCOSE mg/dL  --  141*  --   --  148* 132*   CALCIUM mg/dL  --  8.3*  --   --  8.2* 8.1*    < > = values in this interval not displayed.     Results from last 7 days   Lab Units 07/12/20  0011   TROPONIN T ng/mL <0.010     Results from last 7 days   Lab Units 07/16/20  0547 07/14/20 0426 07/12/20  0502   WBC 10*3/mm3 8.29 10.71 14.57*   HEMOGLOBIN g/dL 11.3* 11.1* 11.0*   HEMATOCRIT % 35.0 34.0 32.7*   PLATELETS 10*3/mm3 353 354 317     Results from last 7 days   Lab Units 07/16/20  0547 07/15/20  0453 07/14/20  0426   INR  2.07* 2.14* 2.03*   APTT seconds 48.0* 44.6* 50.0*                       I reviewed the patient's new clinical results.  I personally viewed and interpreted the patient's CXR        Medication Review:     asenapine maleate 5 mg Sublingual BID   atorvastatin 40 mg Oral QAM   budesonide-formoterol 2 puff Inhalation BID - RT   buPROPion  mg Oral Daily   busPIRone 30 mg Oral BID   cefdinir 300 mg Oral Q12H   cloZAPine 100 mg Oral Nightly   enoxaparin 40 mg Subcutaneous Q24H   levothyroxine 200 mcg Oral QAM   levothyroxine 25 mcg Oral QAM   topiramate 200 mg Oral BID         hold 1 each       ASSESSMENT:   Pneumonia left side  Left parapneumonic effusion  Obesity  Hypokalemia  Elevated liver  enzymes    PLAN:  Pneumonia with parapneumonic effusion. Legionella negative, strep urine negative, MRSA screen negative, blood cultures negative, respiratory panel rare gram-positive's (unremarkable), respiratory pathogen panel negative. Minimal amount of effusion could not be tapped by thoracentesis.  Improving clinically and improving leukocytosis.  No further fevers noted.  Continue oral antibiotics complete course  Treat pneumonia and follow-up CT chest in 6 to 8 weeks to ensure resolution of pneumonia and effusion  COVID-19 testing negative  Aggressive pulmonary toilet  Ambulate  Wean down oxygen as tolerated  No objections to discharge and follow-up as an outpatient in 6 to 8 weeks in the office      Jaquelin Soares MD  07/16/20  13:47

## 2020-07-16 NOTE — PROGRESS NOTES
Continued Stay Note  [facility]     Patient Name: Urvashi Ornelas  MRN: [id number]  Today's Date: 7/16/2020    Admit Date: 7/11/2020    Discharge Plan     Row Name 07/16/20 1143       Plan    Plan  Home    Patient/Family in Agreement with Plan  yes    Plan Comments  CCP spoke with pt who confirms her plan to return home at d/c and denies known d/c needs at this time. CCP to follow to assist should needs arise. Liss Cristina LCSW        Discharge Codes    No documentation.             Karyna Cristina LCSW

## 2020-07-17 ENCOUNTER — READMISSION MANAGEMENT (OUTPATIENT)
Dept: CALL CENTER | Facility: HOSPITAL | Age: 55
End: 2020-07-17

## 2020-07-17 VITALS
DIASTOLIC BLOOD PRESSURE: 84 MMHG | RESPIRATION RATE: 18 BRPM | TEMPERATURE: 98.2 F | SYSTOLIC BLOOD PRESSURE: 122 MMHG | BODY MASS INDEX: 41.03 KG/M2 | OXYGEN SATURATION: 91 % | HEART RATE: 79 BPM | HEIGHT: 67 IN | WEIGHT: 261.4 LBS

## 2020-07-17 LAB
ANION GAP SERPL CALCULATED.3IONS-SCNC: 10.6 MMOL/L (ref 5–15)
BACTERIA SPEC AEROBE CULT: NORMAL
BACTERIA SPEC AEROBE CULT: NORMAL
BASOPHILS # BLD AUTO: 0.06 10*3/MM3 (ref 0–0.2)
BASOPHILS NFR BLD AUTO: 0.6 % (ref 0–1.5)
BUN SERPL-MCNC: 8 MG/DL (ref 6–20)
BUN/CREAT SERPL: 9.5 (ref 7–25)
CALCIUM SPEC-SCNC: 8.5 MG/DL (ref 8.6–10.5)
CHLORIDE SERPL-SCNC: 105 MMOL/L (ref 98–107)
CO2 SERPL-SCNC: 24.4 MMOL/L (ref 22–29)
CREAT SERPL-MCNC: 0.84 MG/DL (ref 0.57–1)
DEPRECATED RDW RBC AUTO: 43.8 FL (ref 37–54)
EOSINOPHIL # BLD AUTO: 0.29 10*3/MM3 (ref 0–0.4)
EOSINOPHIL NFR BLD AUTO: 3 % (ref 0.3–6.2)
ERYTHROCYTE [DISTWIDTH] IN BLOOD BY AUTOMATED COUNT: 13.5 % (ref 12.3–15.4)
GFR SERPL CREATININE-BSD FRML MDRD: 71 ML/MIN/1.73
GLUCOSE SERPL-MCNC: 109 MG/DL (ref 65–99)
HCT VFR BLD AUTO: 37.2 % (ref 34–46.6)
HGB BLD-MCNC: 12 G/DL (ref 12–15.9)
IMM GRANULOCYTES # BLD AUTO: 0.13 10*3/MM3 (ref 0–0.05)
IMM GRANULOCYTES NFR BLD AUTO: 1.3 % (ref 0–0.5)
LYMPHOCYTES # BLD AUTO: 1.36 10*3/MM3 (ref 0.7–3.1)
LYMPHOCYTES NFR BLD AUTO: 14.1 % (ref 19.6–45.3)
MCH RBC QN AUTO: 28.4 PG (ref 26.6–33)
MCHC RBC AUTO-ENTMCNC: 32.3 G/DL (ref 31.5–35.7)
MCV RBC AUTO: 87.9 FL (ref 79–97)
MONOCYTES # BLD AUTO: 0.83 10*3/MM3 (ref 0.1–0.9)
MONOCYTES NFR BLD AUTO: 8.6 % (ref 5–12)
NEUTROPHILS NFR BLD AUTO: 6.99 10*3/MM3 (ref 1.7–7)
NEUTROPHILS NFR BLD AUTO: 72.4 % (ref 42.7–76)
NRBC BLD AUTO-RTO: 0.1 /100 WBC (ref 0–0.2)
PLATELET # BLD AUTO: 330 10*3/MM3 (ref 140–450)
PMV BLD AUTO: 10.3 FL (ref 6–12)
POTASSIUM SERPL-SCNC: 3.2 MMOL/L (ref 3.5–5.2)
POTASSIUM SERPL-SCNC: 3.7 MMOL/L (ref 3.5–5.2)
RBC # BLD AUTO: 4.23 10*6/MM3 (ref 3.77–5.28)
SODIUM SERPL-SCNC: 140 MMOL/L (ref 136–145)
WBC # BLD AUTO: 9.66 10*3/MM3 (ref 3.4–10.8)

## 2020-07-17 PROCEDURE — 85025 COMPLETE CBC W/AUTO DIFF WBC: CPT | Performed by: INTERNAL MEDICINE

## 2020-07-17 PROCEDURE — 80048 BASIC METABOLIC PNL TOTAL CA: CPT | Performed by: HOSPITALIST

## 2020-07-17 PROCEDURE — 94799 UNLISTED PULMONARY SVC/PX: CPT

## 2020-07-17 PROCEDURE — 84132 ASSAY OF SERUM POTASSIUM: CPT | Performed by: HOSPITALIST

## 2020-07-17 RX ORDER — CEFDINIR 300 MG/1
300 CAPSULE ORAL EVERY 12 HOURS SCHEDULED
Qty: 6 CAPSULE | Refills: 0 | Status: SHIPPED | OUTPATIENT
Start: 2020-07-17 | End: 2020-07-20

## 2020-07-17 RX ADMIN — HYDROCODONE BITARTRATE AND HOMATROPINE METHYLBROMIDE 5 ML: 5; 1.5 SOLUTION ORAL at 09:05

## 2020-07-17 RX ADMIN — ATORVASTATIN CALCIUM 40 MG: 20 TABLET, FILM COATED ORAL at 06:29

## 2020-07-17 RX ADMIN — HYDROCODONE BITARTRATE AND HOMATROPINE METHYLBROMIDE 5 ML: 5; 1.5 SOLUTION ORAL at 14:10

## 2020-07-17 RX ADMIN — TOPIRAMATE 200 MG: 100 TABLET, FILM COATED ORAL at 09:05

## 2020-07-17 RX ADMIN — BUPROPION HYDROCHLORIDE 450 MG: 150 TABLET, FILM COATED, EXTENDED RELEASE ORAL at 09:05

## 2020-07-17 RX ADMIN — LEVOTHYROXINE SODIUM 200 MCG: 100 TABLET ORAL at 06:29

## 2020-07-17 RX ADMIN — LEVOTHYROXINE SODIUM 25 MCG: 25 TABLET ORAL at 06:30

## 2020-07-17 RX ADMIN — CEFDINIR 300 MG: 300 CAPSULE ORAL at 09:05

## 2020-07-17 RX ADMIN — BUDESONIDE AND FORMOTEROL FUMARATE DIHYDRATE 2 PUFF: 80; 4.5 AEROSOL RESPIRATORY (INHALATION) at 06:50

## 2020-07-17 RX ADMIN — HYDROCODONE BITARTRATE AND HOMATROPINE METHYLBROMIDE 5 ML: 5; 1.5 SOLUTION ORAL at 03:54

## 2020-07-17 RX ADMIN — ASENAPINE MALEATE 5 MG: 5 TABLET SUBLINGUAL at 09:05

## 2020-07-17 RX ADMIN — POTASSIUM CHLORIDE 40 MEQ: 10 CAPSULE, COATED, EXTENDED RELEASE ORAL at 14:58

## 2020-07-17 RX ADMIN — BUSPIRONE HYDROCHLORIDE 30 MG: 15 TABLET ORAL at 09:05

## 2020-07-17 NOTE — PROGRESS NOTES
"      [facility]         Dr Hammer Sayied   LOS: 6 days   Patient Care Team:  Rosio Palmer APRN as PCP - General (Family Medicine)    Chief Complaint: Pneumonia with left parapneumonic effusion obesity elevated LFTs    Interval History: Continues to feel better.  No cough or congestion as such reported.  Remains on room air    REVIEW OF SYSTEMS:   CARDIOVASCULAR: No chest pain, chest pressure or chest discomfort. No palpitations or edema.   RESPIRATORY: Shortness of breath with exertion  GASTROINTESTINAL: No anorexia, nausea, vomiting or diarrhea. No abdominal pain or blood.   HEMATOLOGIC: No bleeding or bruising.     Ventilator/Non-Invasive Ventilation Settings (From admission, onward)    None            Vital Signs  Temp:  [97.9 °F (36.6 °C)-98.1 °F (36.7 °C)] 97.9 °F (36.6 °C)  Heart Rate:  [78-89] 78  Resp:  [16-18] 18  BP: (114-129)/(67-87) 115/81    Intake/Output Summary (Last 24 hours) at 7/17/2020 1253  Last data filed at 7/16/2020 2034  Gross per 24 hour   Intake 420 ml   Output --   Net 420 ml     Flowsheet Rows      First Filed Value   Admission Height  170.2 cm (67\") Documented at 07/12/2020 1527   Admission Weight  119 kg (261 lb 6.4 oz) Documented at 07/11/2020 2258          Physical Exam:  Patient is examined using the personal protective equipment as per guidelines from infection control for this particular patient as enacted.  Hand hygiene was performed before and after patient interaction.   General Appearance:    Alert, cooperative, in no acute distress.  Following simple commands  Neck midline trachea no thyromegaly   Lungs:    Diminished breath sounds with rhonchi crackles left base    Heart:    Regular rhythm and normal rate, normal S1 and S2, no            murmur, no gallop, no rub, no click   Chest Wall:    No abnormalities observed   Abdomen:     Normal bowel sounds, no masses, no organomegaly, soft        non-tender, non-distended, no guarding, no rebound                " tenderness   Extremities:   Moves all extremities well, no edema, no cyanosis, no             redness  CNS no focal neurological deficits no distress  Skin no rashes no nodules  Psych oriented to time place and person normal memory  Musculoskeletal no cyanosis no clubbing normal range of motion     Results Review:        Results from last 7 days   Lab Units 07/14/20 2002 07/14/20  0426 07/13/20  0545  07/12/20  0502 07/12/20  0011   SODIUM mmol/L  --  144  --   --  141 139   POTASSIUM mmol/L 3.6 3.1* 3.9   < > 3.2* 2.9*   CHLORIDE mmol/L  --  111*  --   --  108* 107   CO2 mmol/L  --  21.6*  --   --  23.0 22.2   BUN mg/dL  --  5*  --   --  7 7   CREATININE mg/dL  --  0.64  --   --  1.01* 1.02*   GLUCOSE mg/dL  --  141*  --   --  148* 132*   CALCIUM mg/dL  --  8.3*  --   --  8.2* 8.1*    < > = values in this interval not displayed.     Results from last 7 days   Lab Units 07/12/20  0011   TROPONIN T ng/mL <0.010     Results from last 7 days   Lab Units 07/17/20  0534 07/16/20  0547 07/14/20  0426   WBC 10*3/mm3 9.66 8.29 10.71   HEMOGLOBIN g/dL 12.0 11.3* 11.1*   HEMATOCRIT % 37.2 35.0 34.0   PLATELETS 10*3/mm3 330 353 354     Results from last 7 days   Lab Units 07/16/20  0547 07/15/20  0453 07/14/20  0426   INR  2.07* 2.14* 2.03*   APTT seconds 48.0* 44.6* 50.0*                       I reviewed the patient's new clinical results.  I personally viewed and interpreted the patient's CXR        Medication Review:     asenapine maleate 5 mg Sublingual BID   atorvastatin 40 mg Oral QAM   budesonide-formoterol 2 puff Inhalation BID - RT   buPROPion  mg Oral Daily   busPIRone 30 mg Oral BID   cefdinir 300 mg Oral Q12H   cloZAPine 100 mg Oral Nightly   enoxaparin 40 mg Subcutaneous Q24H   levothyroxine 200 mcg Oral QAM   levothyroxine 25 mcg Oral QAM   topiramate 200 mg Oral BID         hold 1 each       ASSESSMENT:   Pneumonia left side  Left parapneumonic effusion  Obesity  Hypokalemia  Elevated liver  enzymes    PLAN:  Pneumonia with parapneumonic effusion. Legionella negative, strep urine negative, MRSA screen negative, blood cultures negative, respiratory panel rare gram-positive's (unremarkable), respiratory pathogen panel negative. Minimal amount of effusion could not be tapped by thoracentesis.  Improving clinically and improving leukocytosis.  No further fevers noted.  Continue oral antibiotics complete course  Treat pneumonia and follow-up CT chest in 6 to 8 weeks to ensure resolution of pneumonia and effusion  COVID-19 testing negative  Aggressive pulmonary toilet  Ambulate  Wean down oxygen as tolerated  No objections to discharge and follow-up as an outpatient in 6 to 8 weeks in the office  We will sign off      Jaquelin Soares MD  07/17/20  12:53

## 2020-07-17 NOTE — NURSING NOTE
Walked with patient around unit 2x, patient maintained spO2 above 92%, heart rate in the 100's. MD made aware.

## 2020-07-17 NOTE — DISCHARGE SUMMARY
[facility]    ASSOCIATES  [phone]    DISCHARGE SUMMARY  [facility]    Patient Identification:  Name: Urvashi Ornelas  Age: 55 y.o.  Sex: female  :  1965  MRN: [id number]  Primary Care Physician: Rosio Palmer APRN    Admit date: 2020  Discharge date and time:      Discharge Diagnoses:  Pneumonia    Thyroid cancer (CMS/[facility])    Asthma    Schizoaffective disorder (CMS/[facility])    PTSD (post-traumatic stress disorder)    Hypothyroidism    Migraines    Anxiety    Obesity, Class III, BMI 40-49.9 (morbid obesity) (CMS/[facility])    Parapneumonic effusion    Hypokalemia    CKD (chronic kidney disease) stage 3, GFR 30-59 ml/min (CMS/[facility])       History of present illness from H&P:    Ms. Ornelas is a 55 y.o. former smoker with a history of asthma, frequent bronchitis, schizoaffective disorder, PTSD, hypothyroidism, migraines, and anxiety that presents to [facility] complaining due to recent diagnosis of pneumonia.  Patient reports she was having some wheezing and shortness of air and went to see her PCP of Tuesday of last week.  She was given a dose of oral steroids for an acute asthma exacerbation in which she had completed.  After she completed the dose steroids, she did not have any improvement of her symptoms.  , she called the office and a chest x-ray was ordered out patiently which showed a left lobe pneumonia.  She was given oral Levaquin for 5 days in which she completed the course.  She states her symptoms did not improve so she went to the ER at [facility].  At the ER, she presented with dyspnea on exertion and a fever 100.6.  They were concerned for a pulmonary embolism, so a CT of her chest was performed which showed a left lobe consolidation and left parapneumonic effusion.  Labs obtained at outside facility showed a white blood cell count of 20, potassium 2.3, troponin 0.05.  She was diagnosed with sepsis, pneumonia, left  pleural effusion, and hypokalemia.  She was transferred here to [facility] for further evaluation. 100.6       Hospital Course:     Patient was admitted to the hospital because of left lower lobe pneumonia with parapneumonic effusion.  Patient was seen by cardiothoracic surgery and pulmonary.  Thoracentesis was attempted but unable to get fluid because of location.  Patient was continued on cefepime during the hospitalization and has had a tremendous improvement.  She has been encouraged to ambulate during the hospitalization.      She is also noted to have elevated coagulation factors and was seen by hematology here in the hospital.  Regulation factors have remained stable but are still elevated this can be followed up outpatient.  Hematology recommends the patient stay on a baby aspirin for 1 week.    Patient had hypokalemia on admission which is now resolved.    Patient has a history of hypothyroidism which last TSH was in February and was 1.22.    Patient has a history of bipolar disorder and is on medication for this which is remained stable during hospitalization.    Patient has a mild elevation in her glucoses and had an A1c here that was 6.3 this will need to be followed up with endocrinology outpatient.    The patient was seen and examined on the day of discharge.  Patient is not in any distress today.  Lungs are mostly clear to auscultation.  Abdomen is soft nontender nondistended    Consults:   Consults     Date and Time Order Name Status Description    7/13/2020 0749 Hematology & Oncology Inpatient Consult Completed     7/12/2020 0218 Inpatient Pulmonology Consult Completed     7/11/2020 2340 Inpatient Thoracic Surgery Consult Completed           Results from last 7 days   Lab Units 07/17/20  0534   WBC 10*3/mm3 9.66   HEMOGLOBIN g/dL 12.0   HEMATOCRIT % 37.2   PLATELETS 10*3/mm3 330       Results from last 7 days   Lab Units 07/14/20 2002 07/14/20  0426   SODIUM mmol/L  --  144   POTASSIUM mmol/L  3.6 3.1*   CHLORIDE mmol/L  --  111*   CO2 mmol/L  --  21.6*   BUN mg/dL  --  5*   CREATININE mg/dL  --  0.64   GLUCOSE mg/dL  --  141*   CALCIUM mg/dL  --  8.3*       Significant Diagnostic Studies:   WBC   Date Value Ref Range Status   07/17/2020 9.66 3.40 - 10.80 10*3/mm3 Final     Hemoglobin   Date Value Ref Range Status   07/17/2020 12.0 12.0 - 15.9 g/dL Final     Hematocrit   Date Value Ref Range Status   07/17/2020 37.2 34.0 - 46.6 % Final     Platelets   Date Value Ref Range Status   07/17/2020 330 140 - 450 10*3/mm3 Final     Potassium   Date Value Ref Range Status   07/14/2020 3.6 3.5 - 5.2 mmol/L Final     No results found for: CALCIUM, MG, PHOS  No results found for: AST, ALT, ALKPHOS  INR   Date Value Ref Range Status   07/16/2020 2.07 (H) 0.90 - 1.10 Final     No results found for: COLORU, CLARITYU, SPECGRAV, PHUR, PROTEINUR, GLUCOSEU, KETONESU, BLOODU, NITRITE, LEUKOCYTESUR, BILIRUBINUR, UROBILINOGEN, RBCUA, WBCUA, BACTERIA, UACOMMENT  No results found for: TROPONINT, TROPONINI, BNP  No components found for: HGBA1C;2  No components found for: TSH;2    Imaging Results (All)     Procedure Component Value Units Date/Time    XR Chest 1 View [[id number]] Collected:  07/14/20 0913     Updated:  07/14/20 0948    Narrative:       PORTABLE CHEST X-RAY     HISTORY: Left lower lobe pneumonia. Thoracentesis attempted yesterday.  Evaluate for pneumothorax.     TECHNIQUE: Portable chest x-ray is provided and is correlated with the  progress note by Dr. Urvashi Garrison from 07/13/2020 at 5:00 PM, chest CT  07/12/2020, chest x-ray 07/07/2020, the reported thoracentesis performed  yesterday.     FINDINGS: There is no pneumothorax. The cardiac silhouette is unchanged.  Bibasilar opacity, most pronounced at the medial left lung base, appears  unchanged given differences in projection. Vascular volume appears  normal.       Impression:       Persistent opacity at both lung bases. No pneumothorax.     This report was  finalized on 7/14/2020 9:45 AM by Dr. Robert Carl M.D.       US Thoracentesis [[id number]] Collected:  07/13/20 1213    Specimen:  Body Fluid Updated:  07/13/20 1417    Narrative:       US THORACENTESIS     CLINICAL INFORMATION: Pleural effusion on the left, ultrasound-guided  thoracentesis requested.     The procedure explained to the patient, informed consent obtained.  Patient understands and agrees.      images demonstrate only a small amount of pleural fluid in the  left costophrenic sulcus.     PROCEDURE NOTE: With the patient sitting at the side of her stretcher,  ultrasound of the posterior left lobe thorax performed. A small amount  of pleural fluid was demonstrated in the costophrenic sulcus.  Appropriate prep and drape of the skin surface, 1% lidocaine for local  anesthesia. The drainage catheter could not be manipulated into the  small fluid collection. No pleural fluid aspirated.     CONCLUSION: Small amount of pleural fluid in the left costophrenic  sulcus, unfortunately a drainage catheter could not be advanced into  this location with ultrasound guidance.     This report was finalized on 7/13/2020 2:14 PM by Dr. Dann Kearns M.D.       CT Chest Without Contrast [[id number]] Collected:  07/12/20 1023     Updated:  07/12/20 1127    Narrative:       CT CHEST WITHOUT CONTRAST     HISTORY: Productive cough. Possible left basilar pneumonia noted on  recent CT scan. Chronically elevated right hemidiaphragm.     TECHNIQUE/FINDINGS: The CT scan was performed through the chest without  contrast and compared to the 2-view chest x-ray performed 5 days ago as  well as to an earlier chest CT scan dated 07/23/2018. The following  findings are present:  1. There is prominent elevation of the right hemidiaphragm similar to  the study of 07/23/2018. There is some minimal chronic scarring and  atelectasis at the right base that is unchanged.  2. There is now a small loculated left pleural effusion at the  left base  medially that extends into the major fissure and is associated with  dense rounded pneumonia in the left lower lobe posteromedially. The area  of very dense pneumonia and associated atelectasis measures up to 8 cm  in AP diameter and 6 cm in transverse diameter and 11.5 cm in height and  continued follow-up evaluation is recommended to ensure appropriate  resolution.  3. There is a calcified granuloma in the left upper lobe. The lungs are  otherwise clear. There is no mediastinal or hilar or axillary  adenopathy. There is a tiny pericardial effusion which is unchanged from  2018.  4. The CT images through the upper liver, spleen, and both adrenal  glands are unremarkable.                 Radiation dose reduction techniques were utilized, including automated  exposure control and exposure modulation based on body size.     This report was finalized on 7/12/2020 11:24 AM by Dr. Rio Thibodeaux M.D.         No results found for: SITE, ALLENTEST, PHART, JLH0PKT, PO2ART, RLX2VOA, BASEEXCESS, M2FOXMKQ, HGBBG, HCTABG, OXYHEMOGLOBI, METHHGBN, CARBOXYHGB, CO2CT, BAROMETRIC, MODALITY, FIO2       Discharge Medications      New Medications      Instructions Start Date   cefdinir 300 MG capsule  Commonly known as:  OMNICEF   300 mg, Oral, Every 12 Hours Scheduled         Changes to Medications      Instructions Start Date   buPROPion  MG 24 hr tablet  Commonly known as:  WELLBUTRIN XL  What changed:  Another medication with the same name was removed. Continue taking this medication, and follow the directions you see here.   150 mg, Oral, Daily         Continue These Medications      Instructions Start Date   albuterol sulfate  (90 Base) MCG/ACT inhaler  Commonly known as:  PROVENTIL HFA;VENTOLIN HFA;PROAIR HFA   2 puffs, Inhalation, Every 4 Hours PRN      albuterol (2.5 MG/3ML) 0.083% nebulizer solution  Commonly known as:  PROVENTIL   2.5 mg, Nebulization, Every 6 Hours PRN      atorvastatin 40 MG  tablet  Commonly known as:  LIPITOR   40 mg, Oral, Every Morning      benzonatate 200 MG capsule  Commonly known as:  TESSALON   200 mg, Oral, 3 Times Daily PRN      budesonide-formoterol 80-4.5 MCG/ACT inhaler  Commonly known as:  SYMBICORT   2 puffs, Inhalation, 2 Times Daily - RT      busPIRone 30 MG tablet  Commonly known as:  BUSPAR   TK 1 T PO  bid      cloZAPine 100 MG tablet  Commonly known as:  CLOZARIL   100 mg, Oral, Nightly      levothyroxine 200 MCG tablet  Commonly known as:  SYNTHROID, LEVOTHROID   200 mcg, Oral, Every Morning      levothyroxine 25 MCG tablet  Commonly known as:  SYNTHROID, LEVOTHROID   TAKE 1 TABLET BY MOUTH EVERY MORNING      Multivitamin Women 50+ tablet   1 tablet/day, Oral      Saphris 5 MG sublingual tablet sublingual tablet  Generic drug:  asenapine maleate   2 Times Daily      topiramate 200 MG tablet  Commonly known as:  TOPAMAX   200 mg, Oral, 2 Times Daily         Stop These Medications    Aimovig 70 MG/ML prefilled syringe  Generic drug:  Erenumab-aooe     gabapentin 100 MG capsule  Commonly known as:  NEURONTIN     gabapentin 300 MG capsule  Commonly known as:  NEURONTIN     indomethacin 25 MG capsule  Commonly known as:  INDOCIN     levocetirizine 5 MG tablet  Commonly known as:  XYZAL     levoFLOXacin 750 MG tablet  Commonly known as:  Levaquin     montelukast 10 MG tablet  Commonly known as:  SINGULAIR     ondansetron 8 MG tablet  Commonly known as:  ZOFRAN     prazosin 2 MG capsule  Commonly known as:  MINIPRESS     predniSONE 5 MG tablet  Commonly known as:  DELTASONE     Rexulti 2 MG tablet  Generic drug:  Brexpiprazole     Treximet  MG per tablet  Generic drug:  SUMAtriptan-naproxen              Patient Instructions:       Future Appointments   Date Time Provider Department Center   8/10/2020 11:00 AM [facility] None   8/24/2020  2:00 PM Bakari Mora MD [facility] None        Follow-up Information     Jaquelin Soares MD Follow up  in 6 week(s).    Specialties:  Pulmonary Disease, Sleep Medicine  Contact information:  [address] [facility]  [address]  [facility] [id number] [phone]             Rosio Palmer APRN Follow up in 1 week(s).    Specialty:  Family Medicine  Why:  Patient will need follow-up PT / PTT INR  Contact information:  [address] [facility]  [address]  [place] [address]  [phone]             Jason Lund MD Follow up in 1 month(s).    Specialties:  Hematology and Oncology, Oncology, Hematology  Why:  Follow-up for elevated PT/INR and fibrinogen  Contact information:  [address] [facility]  [address]  [facility] [address]  [phone]                   Discharge Order (From admission, onward)     Start     Ordered    07/17/20 1201  Discharge patient  Once     Expected Discharge Date:  07/17/20    Discharge Disposition:  Home or Self Care    Physician of Record for Attribution - Please select from Treatment Team:  ALONSO IBRAHIM [[id number]]    Review needed by CMO to determine Physician of Record:  No       Question Answer Comment   Physician of Record for Attribution - Please select from Treatment Team ALONSO IBRAHIM    Review needed by CMO to determine Physician of Record No        07/17/20 1205                Diet Order   Procedures   • Diet Regular; Cardiac       Treat pneumonia and follow-up CT chest in 6 to 8 weeks to ensure resolution of pneumonia and effusion    Total time spent discharging patient including evaluation, post hospitalization follow up,  medication and post hospitalization instructions and education, total time exceeds 30 minutes.    Signed:  Alonso Ibrahim MD  7/17/2020  12:05

## 2020-07-17 NOTE — PLAN OF CARE
Problem: Patient Care Overview  Goal: Plan of Care Review  Outcome: Ongoing (interventions implemented as appropriate)  Flowsheets (Taken 7/17/2020 9422)  Progress: no change  Plan of Care Reviewed With: patient  Outcome Summary: Pt still c/o cough and asking for Hycodan Q4H. Pt slept all night in between asking for Hycodan. Pt c/o diarrhea when she uses BR. Discussed increasing yogurt intake d/t taking abx. RA all night. Pt only had CBC drawn this AM.       4 eye assessment with Primary RN.

## 2020-07-17 NOTE — PROGRESS NOTES
Pharmacy Patient Counseling - Discharge/Antimicrobial    Ms. Ornelas is a pleasant 56 y/o F who is currently undergoing treatement for a left lower lobe pneumonia/left parapneumonic effusion. The patient is currently receiving cefdinir 300 mg q12h and will be discharged on this medication as well.     The patient voiced she did not know much about the medication and I first told her the dosage, route, frequency, and duration she would be taking the medication. I also told her she may experience some N/V/D but this was normal as long as she was taking the medication. I then told her that should she experience any severe diarrhea (I.e. To the point of dehydration, etc.) to call her doctor. The patient voiced she had already been receiving diarrhea, but she didn't feel it was too severe. The patient was attentive during the entirety of the counseling session and did not voice any further questions.     Wil Littlejohn, PharmD, BCPS  Clinical Staff Pharmacist  Ext. [phone]

## 2020-07-17 NOTE — PROGRESS NOTES
Case Management Discharge Note      Final Note: CCP spoke with pt who confirms plan to return home and denies needs. IMM letter given. Liss Cristina LCSW         Destination      No service has been selected for the patient.      Durable Medical Equipment      No service has been selected for the patient.      Dialysis/Infusion      No service has been selected for the patient.      Home Medical Care      No service has been selected for the patient.      Therapy      No service has been selected for the patient.      Community Resources      No service has been selected for the patient.        Transportation Services  Private: Car    Final Discharge Disposition Code: 01 - home or self-care

## 2020-07-18 NOTE — OUTREACH NOTE
Prep Survey      Responses   [facility] patient discharged from?  [place]   Is LACE score < 7 ?  No   Eligibility  Psychiatric   Date of Admission  07/11/20   Date of Discharge  07/17/20   Discharge Disposition  Home or Self Care   Discharge diagnosis  LL PN, sepsis   COVID-19 Test Status  Negative   Does the patient have one of the following disease processes/diagnoses(primary or secondary)?  Sepsis   Does the patient have Home health ordered?  No   Is there a DME ordered?  No   Prep survey completed?  Yes          Nadeen Tam RN

## 2020-07-19 ENCOUNTER — TRANSITIONAL CARE MANAGEMENT TELEPHONE ENCOUNTER (OUTPATIENT)
Dept: CALL CENTER | Facility: HOSPITAL | Age: 55
End: 2020-07-19

## 2020-07-19 NOTE — OUTREACH NOTE
Call Center TCM Note      Responses   [facility] [place]   COVID-19 Test Status  Negative   Does the patient have one of the following disease processes/diagnoses(primary or secondary)?  Sepsis   TCM attempt successful?  Yes   Call start time  1036   Call end time  1041   Discharge diagnosis  LL PN, sepsis   Meds reviewed with patient/caregiver?  Yes   Is the patient having any side effects they believe may be caused by any medication additions or changes?  No   Does the patient have all medications related to this admission filled (includes all antibiotics, inhalers, nebulizers,steroids,etc.)  Yes   Is the patient taking all medications as directed (includes completed medication regime)?  Yes   Does the patient have a primary care provider?   Yes   Comments regarding PCP  LALO Palmer   Does the patient have an appointment with their PCP within 7 days of discharge?  No   What is preventing the patient from scheduling follow up appointments within 7 days of discharge?  Haven't had time   Nursing Interventions  Educated patient on importance of making appointment, Advised patient to make appointment   Urgent appointment interventions  -- [offered to make appt but patient states she will call]   Has the patient kept scheduled appointments due by today?  N/A   Has home health visited the patient within 72 hours of discharge?  N/A   Pulse Ox monitoring  None   Psychosocial issues?  No   Did the patient receive a copy of their discharge instructions?  Yes   Nursing interventions  Reviewed instructions with patient   What is the patient's perception of their health status since discharge?  Improving   Nursing interventions  Nurse provided patient education   Is the patient/caregiver able to teach back Sepsis?  S - Short of breath, S - Shivering,fever or very cold, E - Extreme pain or generalized discomfort (worst ever,especially abdomen), P - Pale or discolored skin, S - Sleepy, difficult  to arouse,confused, I -   I feel like I might die-a feeling of hopelessness   Nursing interventions  Nurse provided reassurance to patient   Is patient/caregiver able to teach back steps to recovery at home?  Set small, achievable goals for return to baseline health, Rest and regain strength, Eat a balanced diet   Is the patient/caregiver able to teach back signs and symptoms of worsening condition:  Fever, Shortness of breath/rapid respiratory rate, Altered mental status(confusion/coma), Rapid heart rate (>90), Edema   Is the patient/caregiver able to teach back the hierarchy of who to call/visit for symptoms/problems? PCP, Specialist, Home health nurse, Urgent Care, ED, 911  Yes   TCM call completed?  Yes   Wrap up additional comments  Verified she has number for [facility]. Offered to make appt but she declined. States she will call Monday.           Laurel Mondragon RN    7/19/2020, 10:41

## 2020-07-22 RX ORDER — LEVOTHYROXINE SODIUM 0.2 MG/1
200 TABLET ORAL EVERY MORNING
Qty: 90 TABLET | Refills: 0 | Status: SHIPPED | OUTPATIENT
Start: 2020-07-22 | End: 2020-10-19

## 2020-07-22 RX ORDER — LEVOTHYROXINE SODIUM 0.03 MG/1
TABLET ORAL
Qty: 90 TABLET | Refills: 0 | Status: SHIPPED | OUTPATIENT
Start: 2020-07-22 | End: 2020-10-19

## 2020-07-24 ENCOUNTER — OFFICE VISIT (OUTPATIENT)
Dept: FAMILY MEDICINE CLINIC | Facility: CLINIC | Age: 55
End: 2020-07-24

## 2020-07-24 VITALS
OXYGEN SATURATION: 92 % | DIASTOLIC BLOOD PRESSURE: 73 MMHG | BODY MASS INDEX: 40.18 KG/M2 | SYSTOLIC BLOOD PRESSURE: 104 MMHG | TEMPERATURE: 98.6 F | HEART RATE: 95 BPM | HEIGHT: 67 IN | WEIGHT: 256 LBS

## 2020-07-24 DIAGNOSIS — R79.1 ELEVATED INR: ICD-10-CM

## 2020-07-24 DIAGNOSIS — R79.1 ELEVATED PARTIAL THROMBOPLASTIN TIME (PTT): ICD-10-CM

## 2020-07-24 DIAGNOSIS — J18.9 PNEUMONIA OF LEFT LOWER LOBE DUE TO INFECTIOUS ORGANISM: Primary | ICD-10-CM

## 2020-07-24 PROCEDURE — 99496 TRANSJ CARE MGMT HIGH F2F 7D: CPT | Performed by: NURSE PRACTITIONER

## 2020-07-24 RX ORDER — PRAZOSIN HYDROCHLORIDE 1 MG/1
1 CAPSULE ORAL NIGHTLY
COMMUNITY
Start: 2020-07-02 | End: 2022-09-28

## 2020-07-24 RX ORDER — ONDANSETRON 4 MG/1
4 TABLET, FILM COATED ORAL EVERY 8 HOURS PRN
Qty: 12 TABLET | Refills: 0 | Status: SHIPPED | OUTPATIENT
Start: 2020-07-24 | End: 2020-12-30 | Stop reason: SDUPTHER

## 2020-07-24 RX ORDER — ASENAPINE MALEATE 2.5 MG/1
TABLET SUBLINGUAL
COMMUNITY
Start: 2020-07-02 | End: 2021-08-11 | Stop reason: ALTCHOICE

## 2020-07-24 NOTE — PROGRESS NOTES
Transitional Care Follow Up Visit  Subjective     Urvashi Ornelas is a 55 y.o. female who presents for a transitional care management visit.    Within 48 business hours after discharge our office contacted her via telephone to coordinate her care and needs.      I reviewed and discussed the details of that call along with the discharge summary, hospital problems, inpatient lab results, inpatient diagnostic studies, and consultation reports with Urvashi.     Current outpatient and discharge medications have been reconciled for the patient.  Reviewed by: LALO Monge      Date of TCM Phone Call 7/17/2020   [facility]   Date of Admission 7/11/2020   Date of Discharge 7/17/2020   Discharge Disposition Home or Self Care     Risk for Readmission (LACE) Score: 13 (7/17/2020  6:01 AM)      History of Present Illness   Course During Hospital Stay:  Patient was admitted to the hospital because of left lower lobe pneumonia with parapneumonic effusion.  Patient was seen by cardiothoracic surgery and pulmonary.  Thoracentesis was attempted but was unsuccessful because of location.  Patient was continued on cefepime during the hospitalization and had tremendous improvement.  She was encouraged to ambulate during the hospitalization.   She was noted to have elevated coagulation factors and was seen by hematology in the hospital. Hematology recommended the patient stay on baby aspirin for 1 week. Patient had hypokalemia on admission which resolved. Patient had a mild elevation in her glucose and had an A1c during hospitalization that was 6.3. Patient was instructed to follow up with endocrinology outpatient.      The following portions of the patient's history were reviewed and updated as appropriate: allergies, current medications, past family history, past medical history, past social history, past surgical history and problem list.    Review of Systems   Constitutional: Negative for fever.    Respiratory: Negative for cough and shortness of breath.    Cardiovascular: Negative for chest pain.   Gastrointestinal: Positive for nausea.   Hematological: Does not bruise/bleed easily.       Objective   Physical Exam   Constitutional: She is oriented to person, place, and time. She appears well-developed and well-nourished.   HENT:   Head: Normocephalic and atraumatic.   Right Ear: Tympanic membrane and ear canal normal.   Left Ear: Tympanic membrane and ear canal normal.   Cardiovascular: Normal rate, regular rhythm and normal heart sounds.   Pulmonary/Chest: Effort normal and breath sounds normal.   Abdominal: Soft. Bowel sounds are normal.   Neurological: She is alert and oriented to person, place, and time.   Skin: Skin is warm and dry.   Psychiatric: She has a normal mood and affect.   Nursing note and vitals reviewed.      Assessment/Plan   Urvashi was seen today for transitional care management.    Diagnoses and all orders for this visit:    Pneumonia of left lower lobe due to infectious organism  Comments:  - Patient to schedule hospital follow-up with her pulmonologist.   - Patient aware that follow-up CT chest will need to be completed.     Elevated partial thromboplastin time (PTT)  -     APTT  -     Ambulatory Referral to Hematology    Elevated INR  -     Protime-INR  -     Ambulatory Referral to Hematology    Other orders  -     ondansetron (Zofran) 4 MG tablet; Take 1 tablet by mouth Every 8 (Eight) Hours As Needed for Nausea or Vomiting.

## 2020-07-25 LAB
APTT PPP: 31 SEC (ref 24–33)
INR PPP: 1.1 (ref 0.8–1.2)
PROTHROMBIN TIME: 11.7 SEC (ref 9.1–12)

## 2020-07-28 ENCOUNTER — READMISSION MANAGEMENT (OUTPATIENT)
Dept: CALL CENTER | Facility: HOSPITAL | Age: 55
End: 2020-07-28

## 2020-07-28 NOTE — OUTREACH NOTE
Sepsis Week 2 Survey      Responses   [facility]   COVID-19 Test Status  Negative   Does the patient have one of the following disease processes/diagnoses(primary or secondary)?  Sepsis   Week 2 attempt successful?  Yes   Call start time  1143   Call end time  1148   Discharge diagnosis  LL PN, sepsis   Meds reviewed with patient/caregiver?  Yes   Is the patient taking all medications as directed (includes completed medication regime)?  Yes   Comments regarding appointments  Endo appt 8/24/20,  Pulm appt 9/3/20   Comments regarding PCP  7/24/20   Has the patient kept scheduled appointments due by today?  Yes   Comments  Pt's BP at her was 96/55 at the lab. She denies feeling like she's going to pass out, not lightheaded, no dizziness.   What is the patient's perception of their health status since discharge?  Improving [Pt reports, I'm improving but don't have a lot of energy]   Nursing interventions  Advised patient to call provider [R/T BP yesterday]   Is patient/caregiver able to teach back steps to recovery at home?  Rest and regain strength   If the patient is a current smoker, are they able to teach back resources for cessation?  -- [Nonsmoker]   Week 2 call completed?  Yes          Melinda Salvador RN

## 2020-08-04 ENCOUNTER — READMISSION MANAGEMENT (OUTPATIENT)
Dept: CALL CENTER | Facility: HOSPITAL | Age: 55
End: 2020-08-04

## 2020-08-04 ENCOUNTER — TELEPHONE (OUTPATIENT)
Dept: FAMILY MEDICINE CLINIC | Facility: CLINIC | Age: 55
End: 2020-08-04

## 2020-08-04 NOTE — TELEPHONE ENCOUNTER
Please let patient know that because of her recent diagnosis of pneumonia I wouldn't be able to send in an antibiotic. Please ask if she has followed-up with pulmonology yet. If she has not and symptoms are worsening, then she will need to go to urgent care or ER.

## 2020-08-04 NOTE — OUTREACH NOTE
Sepsis Week 3 Survey      Responses   [facility]   COVID-19 Test Status  Negative   Does the patient have one of the following disease processes/diagnoses(primary or secondary)?  Sepsis   Week 3 attempt successful?  Yes   Call start time  1525   Call end time  1527   Discharge diagnosis  LL PN, sepsis   Meds reviewed with patient/caregiver?  Yes   Is the patient having any side effects they believe may be caused by any medication additions or changes?  No   Is the patient taking all medications as directed (includes completed medication regime)?  Yes   Has the patient kept scheduled appointments due by today?  Yes   Pulse Ox monitoring  None   Psychosocial issues?  No   Comments  Pt reports coughing again, productive yellow/brown in color. Denies fever. Pt still reports fatigue present.    What is the patient's perception of their health status since discharge?  Worsening   Is the patient/caregiver able to teach back Sepsis?  S - Shivering,fever or very cold, P - Pale or discolored skin   Is patient/caregiver able to teach back steps to recovery at home?  Set small, achievable goals for return to baseline health, Talk about feelings with family/friends, Learn about sepsis(sepsis.org)   Is the patient/caregiver able to teach back signs and symptoms of worsening condition:  Fever, Shortness of breath/rapid respiratory rate   Is the patient/caregiver able to teach back the hierarchy of who to call/visit for symptoms/problems? PCP, Specialist, Home health nurse, Urgent Care, ED, 911  Yes   Week 3 call completed?  Yes          Gogo Veloz RN

## 2020-08-04 NOTE — TELEPHONE ENCOUNTER
Patient said she is coughing up brownest yellow mucus. Wants to know if you could call her in a antibiotic.   [facility] [phone]

## 2020-08-10 DIAGNOSIS — C73 THYROID CANCER (HCC): ICD-10-CM

## 2020-08-10 DIAGNOSIS — E55.9 VITAMIN D DEFICIENCY: ICD-10-CM

## 2020-08-10 DIAGNOSIS — R73.03 PRE-DIABETES: ICD-10-CM

## 2020-08-10 DIAGNOSIS — E89.0 POSTSURGICAL HYPOTHYROIDISM: ICD-10-CM

## 2020-08-13 ENCOUNTER — READMISSION MANAGEMENT (OUTPATIENT)
Dept: CALL CENTER | Facility: HOSPITAL | Age: 55
End: 2020-08-13

## 2020-08-13 NOTE — OUTREACH NOTE
Sepsis Week 4 Survey      Responses   [facility]   COVID-19 Test Status  Negative   Does the patient have one of the following disease processes/diagnoses(primary or secondary)?  Sepsis   Week 4 attempt successful?  Yes   Call start time  1711   Call end time  1714   Discharge diagnosis  LL PN, sepsis   Meds reviewed with patient/caregiver?  Yes   Is the patient having any side effects they believe may be caused by any medication additions or changes?  No   Is the patient taking all medications as directed (includes completed medication regime)?  Yes   Has the patient kept scheduled appointments due by today?  Yes   Is the patient still receiving Home Health Services?  N/A   Pulse Ox monitoring  None   Psychosocial issues?  No   What is the patient's perception of their health status since discharge?  Improving   Nursing interventions  Nurse provided patient education   Is the patient/caregiver able to teach back Sepsis?  S - Shivering,fever or very cold, P - Pale or discolored skin   Nursing interventions  Nurse provided reassurance to patient, Nurse provided patient education, Advised patient to call provider   Is patient/caregiver able to teach back steps to recovery at home?  Set small, achievable goals for return to baseline health, Talk about feelings with family/friends, Learn about sepsis(sepsis.org), Rest and regain strength   Is the patient/caregiver able to teach back signs and symptoms of worsening condition:  Fever, Shortness of breath/rapid respiratory rate, Hyperthermia   Is the patient/caregiver able to teach back the hierarchy of who to call/visit for symptoms/problems? PCP, Specialist, Home health nurse, Urgent Care, ED, 911  Yes   Week 4 call completed?  Yes   Would the patient like one additional call?  No   Graduated  Yes          Quinton Villalobos RN

## 2020-08-15 LAB
25(OH)D3+25(OH)D2 SERPL-MCNC: 38.6 NG/ML (ref 30–100)
BUN SERPL-MCNC: 5 MG/DL (ref 6–20)
BUN/CREAT SERPL: 4.7 (ref 7–25)
CALCIUM SERPL-MCNC: 8.2 MG/DL (ref 8.6–10.5)
CHLORIDE SERPL-SCNC: 104 MMOL/L (ref 98–107)
CHOLEST SERPL-MCNC: 114 MG/DL (ref 0–200)
CO2 SERPL-SCNC: 24.1 MMOL/L (ref 22–29)
CREAT SERPL-MCNC: 1.06 MG/DL (ref 0.57–1)
FOLATE SERPL-MCNC: 14 NG/ML (ref 4.78–24.2)
GLUCOSE SERPL-MCNC: 106 MG/DL (ref 65–99)
HBA1C MFR BLD: 6 % (ref 4.8–5.6)
HDLC SERPL-MCNC: 35 MG/DL (ref 40–60)
INTERPRETATION: NORMAL
LDLC SERPL CALC-MCNC: 53 MG/DL (ref 0–100)
Lab: NORMAL
POTASSIUM SERPL-SCNC: 3.4 MMOL/L (ref 3.5–5.2)
SODIUM SERPL-SCNC: 142 MMOL/L (ref 136–145)
T3FREE SERPL-MCNC: 2.3 PG/ML (ref 2–4.4)
T4 FREE SERPL-MCNC: 1.79 NG/DL (ref 0.93–1.7)
THYROGLOB SERPL-MCNC: <2 NG/ML
TRIGL SERPL-MCNC: 131 MG/DL (ref 0–150)
TSH SERPL DL<=0.005 MIU/L-ACNC: 0.97 UIU/ML (ref 0.27–4.2)
VIT B12 SERPL-MCNC: 1084 PG/ML (ref 211–946)
VLDLC SERPL CALC-MCNC: 26.2 MG/DL

## 2020-08-24 ENCOUNTER — TELEMEDICINE (OUTPATIENT)
Dept: ENDOCRINOLOGY | Age: 55
End: 2020-08-24

## 2020-08-24 DIAGNOSIS — R73.03 PRE-DIABETES: ICD-10-CM

## 2020-08-24 DIAGNOSIS — E89.0 POSTSURGICAL HYPOTHYROIDISM: Primary | ICD-10-CM

## 2020-08-24 DIAGNOSIS — E55.9 VITAMIN D DEFICIENCY: ICD-10-CM

## 2020-08-24 DIAGNOSIS — C73 THYROID CANCER (HCC): ICD-10-CM

## 2020-08-24 PROCEDURE — 99214 OFFICE O/P EST MOD 30 MIN: CPT | Performed by: INTERNAL MEDICINE

## 2020-08-24 NOTE — PROGRESS NOTES
55 y.o.    Patient Care Team:  Rosio Palmer APRN as PCP - General (Family Medicine)    Chief Complaint:    Follow up/ thyroid cancer  Subjective     HPI    Urvashi Ornelas,52 y.o. white female is here as a new patient for the management of metastatic papillary thyroid cancer status post total thyroidectomy and radioactive iodine ablation.  Consulted by Dr. Ashby.     Most of the information is obtained from prior medical records and also from the patient. According to the patient she was diagnosed with hyperparathyroidism about 9 years ago due to the diagnosis of elevated calcium levels on her routine blood work up.  She underwent right inferior parathyroidectomy.  Work up during that time also revealed a left thyroid lobe she subsequently underwent thyroid biopsy which revealed papillary thyroid cancer.  On 2/5/2016 patient underwent total thyroidectomy with the resection of the paratracheal lymph nodes and parathyroid exploration.  The surgical pathology was consistent with multifocal papillary carcinoma follicular variant with infiltration.  The largest nodule on the left measured 13 x 8 x 9 mm.  Carcinoma was present at the peripheral margin.  There was no evidence of angiolymphatic invasion.  3 lymph nodes were identified which had metastasis less than one millimeters in size.  Along with right superior parathyroid adenoma.  Postoperative ultrasound showed residual level III lymph nodes in the left neck measuring 2.2 x 0.8 x 1.3 cm in size.  Ultrasound-guided biopsy showed pathology consistent with papillary thyroid cancer. Subsequently patient underwent left neck dissection in April 2016 by Dr. Ashby.  Surgical pathology was consistent with metastatic papillary carcinoma involving 6 out of the 19 lymph nodes.  There was no evidence of extracapsular extension.  Patient was being followed by Dr. Carney from radiation oncology for radioactive iodine ablation and whole-body scan.     In June 2016 patient  received Thyrogen stimulated 125 mCi of I 131.  She had whole-body scan the week after and then another whole-body scan on January 27, 2017.      [facility] -   06/15/2016 - Residual activity of the thyroid resection bed represents residual functional thyroid tissue or malignancy   1/27/17 -    1. Unremarkable whole-body thyroid scintigraphy. No evidence of residual or recurrent disease.    2. Activity on both sides of the neck base likely representing contamination.  Labs at the time of the scans - Tg levels - 0.8 and less than 0.1     Last thyroid ultrasound from October 2017 performed at [facility] was within normal limits with no concerning features.  I do not have the physical report and we tried to get the report from [facility] and it was not sent to us.  She did get an neck CT scan - 07/2018 - no concerning lymph node noted in her neck.   She had repeat thyroid U/S on Oct 2018, through  and was told that her thyroid bed was empty with no concerning lymph nodes.   ( I dont have this report)  Latest thyroid ultrasound from November 2019 performed at [facility].  We do not have the report but per patient everything was within normal limits.    Today in visit patient reports that she is on levothyroxine to 25 mcg oral daily all 7 days a week.  Her energy levels are decent, she is suffering with a pneumonia for the last few months.  Evaluated by Dr. Sweeney and his nurse practitioner.  She is on a second round of antibiotics at this time. tested negative for covid.    She complains of feeling tired secondary to this pneumonia, shortness of breath because of the same.  No other significant hyper or hypothyroid symptoms    She does complain of her energy levels being fair, weight has been stable.  Normal bowel movements.  No significant hyper or hypothyroid symptoms.     Hyperparathyroidism-patient had right superior and inferior parathyroidectomy.  Currently not on any calcium  supplementation.       Prediabetes  Currently on diabetic diet.    You have chosen to receive care through a video visit today. Do you consent to use a video visit for your medical care today? Yes      Reviewed primary care physician's/consulting physician documentation and lab results :     Interval History      The following portions of the patient's history were reviewed and updated as appropriate: allergies, current medications, past family history, past medical history, past social history, past surgical history and problem list.    Past Medical History:   Diagnosis Date   • Abnormal urinalysis    • Acute bronchitis    • Acute frontal sinusitis    • Acute maxillary sinusitis    • Acute pain of right shoulder    • Acute sinusitis    • Allergic rhinitis    • Anemia    • Anxiety    • Arthritis    • Asthma    • Asthma exacerbation    • BMI 40.0-44.9, adult (CMS/[facility])    • Cancer (CMS/[facility])     THYROID   • Constipation    • Cough    • Depression    • Diaphragm paralysis    • Disease of thyroid gland     CANCER   • Dizziness    • Drug-induced nausea and vomiting    • Dyspnea    • Dysuria    • Fatigue    • Fatty liver    • H/O degenerative disc disease    • History of hallucinations    • Hypercalcemia    • Hyperlipidemia    • Hypertension    • Hypertensive kidney disease with chronic kidney disease stage III (CMS/[facility])    • Hypertrophic toenail    • Hypokalemia    • Hypothyroidism    • Joint pain    • Lumbago    • Lymph node cancer (CMS/[facility])    • Manic episode without psychotic symptoms (CMS/[facility])    • Medicare annual wellness visit, subsequent    • Migraine    • Migraine with aura    • Nausea    • Osteopenia of lumbar spine    • Overweight    • Pleurisy    • Pneumonia    • Post traumatic stress disorder    • Postmenopausal    • Rheumatoid arthritis (CMS/[facility])    • Right knee pain    • Shortness of breath    • Shoulder tendinitis    • Sleep apnea    • Thyroid disease    • Urinary frequency      Family History   Problem  Relation Age of Onset   • Hyperlipidemia Mother    • Hypertension Mother    • Osteoarthritis Mother    • Arthritis Mother         rheumatoid   • Other Mother         acute cutaneous lupus erythematosus   • Bipolar disorder Father    • Hypertension Father    • Bipolar disorder Brother    • HIV Brother    • Alcohol abuse Paternal Grandfather    • Hypertension Paternal Grandfather      Social History     Socioeconomic History   • Marital status:      Spouse name: Not on file   • Number of children: Not on file   • Years of education: Not on file   • Highest education level: Not on file   Tobacco Use   • Smoking status: Former Smoker   • Smokeless tobacco: Never Used   Substance and Sexual Activity   • Alcohol use: No   • Drug use: No     Allergies   Allergen Reactions   • Depakote Er [Divalproex Sodium Er] Other (See Comments)     HAIR FALLS OUT   • Divalproex Sodium Unknown (See Comments)   • Lamictal [Lamotrigine] Swelling and Other (See Comments)     TONGUE SWELLS AND PEELS   • Risperidone Unknown (See Comments)   • Risperidone And Related Other (See Comments)     PREGNANCY SYMPTOMS   • Valproic Acid Unknown (See Comments)      Unknown      • Erythromycin Rash   • Penicillins Rash   • Toradol [Ketorolac Tromethamine] Rash       Current Outpatient Medications:   •  albuterol (PROVENTIL HFA;VENTOLIN HFA) 108 (90 BASE) MCG/ACT inhaler, Inhale 2 puffs every 4 (four) hours as needed for wheezing., Disp: , Rfl:   •  albuterol (PROVENTIL) (2.5 MG/3ML) 0.083% nebulizer solution, Take 2.5 mg by nebulization Every 6 (Six) Hours As Needed for Wheezing or Shortness of Air., Disp: 25 vial, Rfl: 0  •  atorvastatin (LIPITOR) 40 MG tablet, Take 1 tablet by mouth Every Morning., Disp: 90 tablet, Rfl: 0  •  benzonatate (TESSALON) 200 MG capsule, Take 1 capsule by mouth 3 (Three) Times a Day As Needed for Cough., Disp: 90 capsule, Rfl: 0  •  budesonide-formoterol (SYMBICORT) 80-4.5 MCG/ACT inhaler, Inhale 2 puffs 2 (Two) Times  a Day., Disp: , Rfl:   •  buPROPion XL (WELLBUTRIN XL) 150 MG 24 hr tablet, Take 150 mg by mouth Daily., Disp: , Rfl:   •  busPIRone (BUSPAR) 30 MG tablet, TK 1 T PO  bid, Disp: , Rfl: 0  •  cloZAPine (CLOZARIL) 100 MG tablet, Take 100 mg by mouth Every Night., Disp: , Rfl:   •  levothyroxine (SYNTHROID, LEVOTHROID) 200 MCG tablet, TAKE 1 TABLET BY MOUTH EVERY MORNING, Disp: 90 tablet, Rfl: 0  •  levothyroxine (SYNTHROID, LEVOTHROID) 25 MCG tablet, TAKE 1 TABLET BY MOUTH EVERY MORNING, Disp: 90 tablet, Rfl: 0  •  Multiple Vitamins-Minerals (MULTIVITAMIN WOMEN 50+) tablet, Take 1 tablet/day by mouth., Disp: , Rfl:   •  ondansetron (Zofran) 4 MG tablet, Take 1 tablet by mouth Every 8 (Eight) Hours As Needed for Nausea or Vomiting., Disp: 12 tablet, Rfl: 0  •  prazosin (MINIPRESS) 1 MG capsule, , Disp: , Rfl:   •  SAPHRIS 2.5 MG sublingual tablet, , Disp: , Rfl:   •  SAPHRIS 5 MG sublingual tablet sublingual tablet, 2 (Two) Times a Day., Disp: , Rfl:   •  topiramate (TOPAMAX) 200 MG tablet, Take 200 mg by mouth 2 (Two) Times a Day., Disp: , Rfl: 2        Review of Systems   Constitutional: Negative for appetite change, fatigue and fever.   Eyes: Negative for visual disturbance.   Respiratory: Negative for shortness of breath.    Cardiovascular: Negative for palpitations and leg swelling.   Gastrointestinal: Negative for abdominal pain and vomiting.   Endocrine: Negative for polydipsia and polyuria.   Musculoskeletal: Negative for joint swelling and neck pain.   Skin: Negative for rash.   Neurological: Negative for weakness and numbness.   Psychiatric/Behavioral: Negative for behavioral problems.     I have reviewed the ROS as documented by the MA; Bakari Mora MD.      Objective       There were no vitals filed for this visit.  There is no height or weight on file to calculate BMI.      Physical Exam  General appearance - no distress  Eyes-PERRLA, anicteric sclera  Ear nose and throat-external ears and nose normal.   Noted midline trachea.  Respiratory-normal chest on inspection.  No respiratory distress noted.  Musculoskeletal-no edema.  Hammer toes noted.  Skin-no rashes.  Neuro-alert and oriented x3              Results Review:     I reviewed the patient's new clinical results and mentioned them above in HPI and in plan as well.    Medical records reviewed  Summary:Done      Orders Only on 08/10/2020   Component Date Value Ref Range Status   • 25 Hydroxy, Vitamin D 08/10/2020 38.6  30.0 - 100.0 ng/ml Final    Comment: Results may be falsely increased if patient taking Biotin.  Reference Range for Total Vitamin D 25(OH)  Deficiency <20.0 ng/mL  Insufficiency 21-29 ng/mL  Sufficiency  ng/mL  Toxicity >100 ng/ml     • Vitamin B-12 08/10/2020 1,084* 211 - 946 pg/mL Final    Results may be falsely increased if patient taking Biotin.   • Folate 08/10/2020 14.00  4.78 - 24.20 ng/mL Final    Results may be falsely increased if patient taking Biotin.   • Total Cholesterol 08/10/2020 114  0 - 200 mg/dL Final   • Triglycerides 08/10/2020 131  0 - 150 mg/dL Final   • HDL Cholesterol 08/10/2020 35* 40 - 60 mg/dL Final   • VLDL Cholesterol 08/10/2020 26.2  mg/dL Final   • LDL Cholesterol  08/10/2020 53  0 - 100 mg/dL Final   • Glucose 08/10/2020 106* 65 - 99 mg/dL Final   • BUN 08/10/2020 5* 6 - 20 mg/dL Final   • Creatinine 08/10/2020 1.06* 0.57 - 1.00 mg/dL Final   • eGFR Non African Am 08/10/2020 54* >60 mL/min/1.73 Final   • eGFR African Am 08/10/2020 66  >60 mL/min/1.73 Final   • BUN/Creatinine Ratio 08/10/2020 4.7* 7.0 - 25.0 Final   • Sodium 08/10/2020 142  136 - 145 mmol/L Final   • Potassium 08/10/2020 3.4* 3.5 - 5.2 mmol/L Final   • Chloride 08/10/2020 104  98 - 107 mmol/L Final   • Total CO2 08/10/2020 24.1  22.0 - 29.0 mmol/L Final   • Calcium 08/10/2020 8.2* 8.6 - 10.5 mg/dL Final   • Hemoglobin A1C 08/10/2020 6.00* 4.80 - 5.60 % Final    Comment: Hemoglobin A1C Ranges:  Increased Risk for Diabetes  5.7% to  6.4%  Diabetes                     >= 6.5%  Diabetic Goal                < 7.0%     • Free T4 08/10/2020 1.79* 0.93 - 1.70 ng/dL Final    Results may be falsely increased if patient taking Biotin.   • T3, Free 08/10/2020 2.3  2.0 - 4.4 pg/mL Final   • Thyroglobulin (TG-JOSE DAVID) 08/10/2020 <2.0  ng/mL Final    Comment: This test was developed and its performance characteristics  determined by [employer]. It has not been cleared or approved  by the Food and Drug Administration.  Reference Range:  Pubertal Children  and Adults: <40  According to the National Academy of Clinical Biochemistry,  the reference interval for Thyroglobulin (TG) should be  related to euthyroid patients and not for patients who  underwent thyroidectomy.  TG reference intervals for these  patients depend on the residual mass of the thyroid tissue  left after surgery.  Establishing a post-operative baseline  is recommended.  The assay quantitation limit is 2.0 ng/mL.     • TSH 08/10/2020 0.975  0.270 - 4.200 uIU/mL Final   • Interpretation 08/10/2020 Note   Final    Supplemental report is available.   • PDF Image 08/10/2020 Not applicable   Final     Lab Results   Component Value Date    HGBA1C 6.00 (H) 08/10/2020    HGBA1C 6.30 (H) 07/12/2020    HGBA1C 6.10 (H) 02/07/2020     Lab Results   Component Value Date    MICROALBUR 11.8 02/07/2020    CREATININE 1.06 (H) 08/10/2020     Imaging Results (Most Recent)     None                Assessment and Plan:    Urvashi was seen today for thyroid cancer.    Diagnoses and all orders for this visit:    Postsurgical hypothyroidism  -     TSH; Future  -     T4, Free; Future  -     Basic Metabolic Panel; Future  -     Vitamin D 25 Hydroxy; Future  -     Vitamin B12 & Folate; Future  -     Lipid Panel; Future  -     Hemoglobin A1c; Future  -     Microalbumin / Creatinine Urine Ratio - Urine, Clean Catch; Future    Vitamin D deficiency   -     TSH; Future  -     T4, Free; Future  -     Basic Metabolic Panel; Future  -   "   Vitamin D 25 Hydroxy; Future  -     Vitamin B12 & Folate; Future  -     Lipid Panel; Future  -     Hemoglobin A1c; Future  -     Microalbumin / Creatinine Urine Ratio - Urine, Clean Catch; Future    Thyroid cancer (CMS/HCC)  -     TSH; Future  -     T4, Free; Future  -     Basic Metabolic Panel; Future  -     Vitamin D 25 Hydroxy; Future  -     Vitamin B12 & Folate; Future  -     Lipid Panel; Future  -     Hemoglobin A1c; Future  -     Microalbumin / Creatinine Urine Ratio - Urine, Clean Catch; Future    Pre-diabetes  -     TSH; Future  -     T4, Free; Future  -     Basic Metabolic Panel; Future  -     Vitamin D 25 Hydroxy; Future  -     Vitamin B12 & Folate; Future  -     Lipid Panel; Future  -     Hemoglobin A1c; Future  -     Microalbumin / Creatinine Urine Ratio - Urine, Clean Catch; Future      Hypothyroidism-papillary thyroid cancer  Continue to suppress the TSH levels.  We will have to do it for another year.  Continue levothyroxine at 225 mcg oral daily.    Prediabetes  Continue to follow the dietary restrictions    Obesity  Discussed calorie counting - limit to 1500 justus per day and exercising 2-3 times a week.    Reviewed the prior thyroid ultrasound-neck ultrasound patient is due for another one for the year 2020.  Patient reports that she is going to get it through Dr. Ashby    Reviewed Lab results with the patient.             13   ( greater than 50% of the time) out of  25 minutes  spent counseling the patient on medication changes, treatment plan, work-up that is necessary.    Bakari Mora MD  08/24/20    EMR Dragon / transcription disclaimer:     \"Dictated utilizing Dragon dictation\".      "

## 2020-08-28 ENCOUNTER — APPOINTMENT (OUTPATIENT)
Dept: OTHER | Facility: HOSPITAL | Age: 55
End: 2020-08-28

## 2020-09-02 ENCOUNTER — TRANSCRIBE ORDERS (OUTPATIENT)
Dept: SLEEP MEDICINE | Facility: HOSPITAL | Age: 55
End: 2020-09-02

## 2020-09-02 DIAGNOSIS — Z01.818 OTHER SPECIFIED PRE-OPERATIVE EXAMINATION: Primary | ICD-10-CM

## 2020-09-05 ENCOUNTER — LAB (OUTPATIENT)
Dept: LAB | Facility: HOSPITAL | Age: 55
End: 2020-09-05

## 2020-09-05 DIAGNOSIS — Z01.818 OTHER SPECIFIED PRE-OPERATIVE EXAMINATION: ICD-10-CM

## 2020-09-05 PROCEDURE — C9803 HOPD COVID-19 SPEC COLLECT: HCPCS

## 2020-09-05 PROCEDURE — U0004 COV-19 TEST NON-CDC HGH THRU: HCPCS

## 2020-09-07 ENCOUNTER — ANESTHESIA EVENT (OUTPATIENT)
Dept: GASTROENTEROLOGY | Facility: HOSPITAL | Age: 55
End: 2020-09-07

## 2020-09-07 LAB — SARS-COV-2 RNA RESP QL NAA+PROBE: NOT DETECTED

## 2020-09-08 ENCOUNTER — ANESTHESIA (OUTPATIENT)
Dept: GASTROENTEROLOGY | Facility: HOSPITAL | Age: 55
End: 2020-09-08

## 2020-09-08 ENCOUNTER — APPOINTMENT (OUTPATIENT)
Dept: GENERAL RADIOLOGY | Facility: HOSPITAL | Age: 55
End: 2020-09-08

## 2020-09-08 ENCOUNTER — HOSPITAL ENCOUNTER (OUTPATIENT)
Facility: HOSPITAL | Age: 55
Setting detail: HOSPITAL OUTPATIENT SURGERY
Discharge: HOME | End: 2020-09-08
Attending: INTERNAL MEDICINE | Admitting: INTERNAL MEDICINE

## 2020-09-08 VITALS
OXYGEN SATURATION: 95 % | SYSTOLIC BLOOD PRESSURE: 98 MMHG | WEIGHT: 231 LBS | HEART RATE: 98 BPM | BODY MASS INDEX: 36.26 KG/M2 | DIASTOLIC BLOOD PRESSURE: 70 MMHG | TEMPERATURE: 97.4 F | RESPIRATION RATE: 20 BRPM | HEIGHT: 67 IN

## 2020-09-08 DIAGNOSIS — J18.1 LEFT LOWER LOBE CONSOLIDATION (HCC): ICD-10-CM

## 2020-09-08 LAB
APPEARANCE FLD: ABNORMAL
COLOR FLD: ABNORMAL
LYMPHOCYTES NFR FLD MANUAL: 34 %
METHOD: ABNORMAL
MONOCYTES NFR FLD: 6 %
MONOS+MACROS NFR FLD: 11 %
NEUTROPHILS NFR FLD MANUAL: 49 %
NUC CELL # FLD: 110 /MM3
RBC # FLD AUTO: ABNORMAL /MM3

## 2020-09-08 PROCEDURE — 87176 TISSUE HOMOGENIZATION CULTR: CPT | Performed by: INTERNAL MEDICINE

## 2020-09-08 PROCEDURE — 87206 SMEAR FLUORESCENT/ACID STAI: CPT | Performed by: INTERNAL MEDICINE

## 2020-09-08 PROCEDURE — 87102 FUNGUS ISOLATION CULTURE: CPT | Performed by: INTERNAL MEDICINE

## 2020-09-08 PROCEDURE — 87071 CULTURE AEROBIC QUANT OTHER: CPT | Performed by: INTERNAL MEDICINE

## 2020-09-08 PROCEDURE — 88305 TISSUE EXAM BY PATHOLOGIST: CPT | Performed by: INTERNAL MEDICINE

## 2020-09-08 PROCEDURE — 87205 SMEAR GRAM STAIN: CPT | Performed by: INTERNAL MEDICINE

## 2020-09-08 PROCEDURE — 76000 FLUOROSCOPY <1 HR PHYS/QHP: CPT

## 2020-09-08 PROCEDURE — 89051 BODY FLUID CELL COUNT: CPT | Performed by: INTERNAL MEDICINE

## 2020-09-08 PROCEDURE — 87116 MYCOBACTERIA CULTURE: CPT | Performed by: INTERNAL MEDICINE

## 2020-09-08 PROCEDURE — 88312 SPECIAL STAINS GROUP 1: CPT | Performed by: INTERNAL MEDICINE

## 2020-09-08 PROCEDURE — 25010000002 PROPOFOL 10 MG/ML EMULSION: Performed by: NURSE ANESTHETIST, CERTIFIED REGISTERED

## 2020-09-08 PROCEDURE — 71045 X-RAY EXAM CHEST 1 VIEW: CPT

## 2020-09-08 PROCEDURE — 88112 CYTOPATH CELL ENHANCE TECH: CPT | Performed by: INTERNAL MEDICINE

## 2020-09-08 PROCEDURE — 87070 CULTURE OTHR SPECIMN AEROBIC: CPT | Performed by: INTERNAL MEDICINE

## 2020-09-08 RX ORDER — PROMETHAZINE HYDROCHLORIDE 25 MG/1
25 SUPPOSITORY RECTAL ONCE AS NEEDED
Status: DISCONTINUED | OUTPATIENT
Start: 2020-09-08 | End: 2020-09-08 | Stop reason: HOSPADM

## 2020-09-08 RX ORDER — LIDOCAINE HYDROCHLORIDE 20 MG/ML
INJECTION, SOLUTION INFILTRATION; PERINEURAL AS NEEDED
Status: DISCONTINUED | OUTPATIENT
Start: 2020-09-08 | End: 2020-09-08 | Stop reason: SURG

## 2020-09-08 RX ORDER — PROMETHAZINE HYDROCHLORIDE 25 MG/1
25 TABLET ORAL ONCE AS NEEDED
Status: DISCONTINUED | OUTPATIENT
Start: 2020-09-08 | End: 2020-09-08 | Stop reason: HOSPADM

## 2020-09-08 RX ORDER — PROPOFOL 10 MG/ML
VIAL (ML) INTRAVENOUS AS NEEDED
Status: DISCONTINUED | OUTPATIENT
Start: 2020-09-08 | End: 2020-09-08 | Stop reason: SURG

## 2020-09-08 RX ORDER — LIDOCAINE HYDROCHLORIDE 20 MG/ML
INJECTION, SOLUTION EPIDURAL; INFILTRATION; INTRACAUDAL; PERINEURAL AS NEEDED
Status: DISCONTINUED | OUTPATIENT
Start: 2020-09-08 | End: 2020-09-08 | Stop reason: HOSPADM

## 2020-09-08 RX ORDER — LIDOCAINE HYDROCHLORIDE 10 MG/ML
INJECTION, SOLUTION EPIDURAL; INFILTRATION; INTRACAUDAL; PERINEURAL AS NEEDED
Status: DISCONTINUED | OUTPATIENT
Start: 2020-09-08 | End: 2020-09-08 | Stop reason: HOSPADM

## 2020-09-08 RX ORDER — SODIUM CHLORIDE, SODIUM LACTATE, POTASSIUM CHLORIDE, CALCIUM CHLORIDE 600; 310; 30; 20 MG/100ML; MG/100ML; MG/100ML; MG/100ML
1000 INJECTION, SOLUTION INTRAVENOUS CONTINUOUS
Status: DISCONTINUED | OUTPATIENT
Start: 2020-09-08 | End: 2020-09-08

## 2020-09-08 RX ORDER — SODIUM CHLORIDE 9 MG/ML
1000 INJECTION, SOLUTION INTRAVENOUS CONTINUOUS
Status: DISCONTINUED | OUTPATIENT
Start: 2020-09-08 | End: 2020-09-08 | Stop reason: HOSPADM

## 2020-09-08 RX ADMIN — SODIUM CHLORIDE 1000 ML: 9 INJECTION, SOLUTION INTRAVENOUS at 11:31

## 2020-09-08 RX ADMIN — PROPOFOL 150 MG: 10 INJECTION, EMULSION INTRAVENOUS at 12:20

## 2020-09-08 RX ADMIN — LIDOCAINE HYDROCHLORIDE 60 MG: 20 INJECTION, SOLUTION INFILTRATION; PERINEURAL at 12:20

## 2020-09-08 RX ADMIN — PROPOFOL 250 MCG/KG/MIN: 10 INJECTION, EMULSION INTRAVENOUS at 12:23

## 2020-09-08 RX ADMIN — PROPOFOL 50 MG: 10 INJECTION, EMULSION INTRAVENOUS at 12:23

## 2020-09-08 NOTE — ANESTHESIA PREPROCEDURE EVALUATION
Anesthesia Evaluation     Patient summary reviewed and Nursing notes reviewed                Airway   Mallampati: II  TM distance: >3 FB  Neck ROM: full  no difficulty expected and anterior  Dental - normal exam     Pulmonary - normal exam   (+) pneumonia , pleural effusion, a smoker Former, asthma,shortness of breath, sleep apnea,   Cardiovascular - normal exam    (+) hypertension,       Neuro/Psych  (+) headaches, dizziness/light headedness, psychiatric history Anxiety, Depression and PTSD,     GI/Hepatic/Renal/Endo    (+) obesity,   liver disease fatty liver disease, renal disease CRI,     Musculoskeletal     Abdominal  - normal exam   Substance History - negative use     OB/GYN negative ob/gyn ROS         Other   arthritis,    history of cancer                      Anesthesia Plan    ASA 3     general     intravenous induction     Anesthetic plan, all risks, benefits, and alternatives have been provided, discussed and informed consent has been obtained with: patient.    Plan discussed with CRNA.

## 2020-09-08 NOTE — ANESTHESIA PROCEDURE NOTES
Airway  Urgency: elective    Date/Time: 9/8/2020 12:23 PM    General Information and Staff    Patient location during procedure: OR  Anesthesiologist: Robert Thao MD  CRNA: Hyacinth Craig CRNA    Indications and Patient Condition    Preoxygenated: yes  Mask difficulty assessment: 0 - not attempted    Final Airway Details  Final airway type: supraglottic airway      Successful airway: unique  Size 4    Number of attempts at approach: 1  Assessment: lips, teeth, and gum same as pre-op and atraumatic intubation    Additional Comments  Atraumatic, adeq seal, secured, MV/AV until SV

## 2020-09-08 NOTE — ANESTHESIA POSTPROCEDURE EVALUATION
"Patient: Urvashi Ornelas    Procedure Summary     Date:  09/08/20 Room / Location:   [facility] /  [facility]    Anesthesia Start:  1213 Anesthesia Stop:  1300    Procedure:  BRONCHOSCOPY with fluroscopy, bal and biopsies (N/A Bronchus) Diagnosis:      Surgeon:  Yonis Sweeney MD Provider:  Pepito Allison MD    Anesthesia Type:  general ASA Status:  3          Anesthesia Type: general    Vitals  No vitals data found for the desired time range.          Post Anesthesia Care and Evaluation    Patient location during evaluation: bedside  Patient participation: complete - patient participated  Level of consciousness: sleepy but conscious  Pain score: 0  Pain management: adequate  Airway patency: patent  Anesthetic complications: No anesthetic complications    Cardiovascular status: acceptable  Respiratory status: acceptable  Hydration status: acceptable    Comments: /80 (BP Location: Left arm, Patient Position: Lying)   Pulse 85   Temp 36.3 °C (97.3 °F) (Oral)   Resp 18   Ht 170.2 cm (67\")   Wt 105 kg (231 lb)   SpO2 94%   BMI 36.18 kg/m²         "

## 2020-09-08 NOTE — DISCHARGE INSTRUCTIONS
For the next 24 hours patient needs to be with a responsible adult.    For 24 hours DO NOT drive, operate machinery, appliances, drink alcohol, make important decisions or sign legal documents.    DO NOT EAT OR DRINK ANYTHING UNTIL 2:15PM.  AFTER 2:15PM, YOU MAY START WITH SMALL SIPS OF WATER.  IF YOU BEGIN TO COUGH/STRANGLE/CHOKE WAIT FOR ANOTHER 30 MINUTES AND TRY AGAIN.  ONCE YOU CAN TAKE SIPS OF WATER WITHOUT COUGHING, YOU CAN ADVANCE TO YOUR NORMAL DIET AS TOLERATED.      Follow recommendations on procedure report if provided by your doctor.    Call Dr Sweeney for problems .  Problems may include but not limited to: large amounts of bleeding, trouble breathing, repeated vomiting, severe unrelieved pain, fever or chills.

## 2020-09-10 LAB
BACTERIA SPEC AEROBE CULT: NO GROWTH
GRAM STN SPEC: NORMAL

## 2020-09-11 LAB
BACTERIA SPEC AEROBE CULT: NORMAL
CYTO UR: NORMAL
CYTO UR: NORMAL
GRAM STN SPEC: NORMAL
LAB AP CASE REPORT: NORMAL
LAB AP CASE REPORT: NORMAL
LAB AP DIAGNOSIS COMMENT: NORMAL
PATH REPORT.FINAL DX SPEC: NORMAL
PATH REPORT.FINAL DX SPEC: NORMAL
PATH REPORT.GROSS SPEC: NORMAL
PATH REPORT.GROSS SPEC: NORMAL

## 2020-09-17 ENCOUNTER — TRANSCRIBE ORDERS (OUTPATIENT)
Dept: ADMINISTRATIVE | Facility: HOSPITAL | Age: 55
End: 2020-09-17

## 2020-09-17 DIAGNOSIS — R91.8 PULMONARY INFILTRATE: Primary | ICD-10-CM

## 2020-09-30 RX ORDER — ATORVASTATIN CALCIUM 40 MG/1
40 TABLET, FILM COATED ORAL EVERY MORNING
Qty: 90 TABLET | Refills: 0 | Status: SHIPPED | OUTPATIENT
Start: 2020-09-30 | End: 2020-12-30 | Stop reason: SDUPTHER

## 2020-10-06 LAB — FUNGUS WND CULT: NORMAL

## 2020-10-19 ENCOUNTER — HOSPITAL ENCOUNTER (OUTPATIENT)
Dept: CT IMAGING | Facility: HOSPITAL | Age: 55
Discharge: HOME | End: 2020-10-19
Admitting: INTERNAL MEDICINE

## 2020-10-19 DIAGNOSIS — R91.8 PULMONARY INFILTRATE: ICD-10-CM

## 2020-10-19 PROCEDURE — 71250 CT THORAX DX C-: CPT

## 2020-10-19 RX ORDER — LEVOTHYROXINE SODIUM 0.03 MG/1
TABLET ORAL
Qty: 90 TABLET | Refills: 1 | Status: SHIPPED | OUTPATIENT
Start: 2020-10-19 | End: 2021-04-07 | Stop reason: SDUPTHER

## 2020-10-19 RX ORDER — LEVOTHYROXINE SODIUM 0.2 MG/1
200 TABLET ORAL EVERY MORNING
Qty: 90 TABLET | Refills: 1 | Status: SHIPPED | OUTPATIENT
Start: 2020-10-19 | End: 2021-04-07 | Stop reason: SDUPTHER

## 2020-10-20 LAB
MYCOBACTERIUM SPEC CULT: NORMAL
MYCOBACTERIUM SPEC CULT: NORMAL
NIGHT BLUE STAIN TISS: NORMAL
NIGHT BLUE STAIN TISS: NORMAL

## 2020-11-17 ENCOUNTER — TELEPHONE (OUTPATIENT)
Dept: ENDOCRINOLOGY | Age: 55
End: 2020-11-17

## 2020-11-17 NOTE — TELEPHONE ENCOUNTER
PT CALLED STATING THAT HER HAIR HAS BEEN FALLING OUT IN THE SHOWER AND WHILE COMBING HAIR. PT SAYS HAIR HAS BEEN FALLING OUT BY THE HANDFUL. PT WAS WONDERING IF ITS BECAUSE OF HER THYROID BUT PT WANTS TO SPEAK WITH YOU ABOUT THE ISSUE.  PT CAN BE REACHED -[phone]

## 2020-11-17 NOTE — TELEPHONE ENCOUNTER
Please call the patient and let her know that it could be related to her thyroid but at this time we need to make sure that her thyroid levels are stable -in range given the history of her thyroid cancer.  Advise the patient to come into the office for blood work-up sometime within this week and based on these results we will be able to give the patient guidance if her hair loss is related to the thyroid or not.

## 2020-12-04 ENCOUNTER — OFFICE VISIT (OUTPATIENT)
Dept: FAMILY MEDICINE CLINIC | Facility: CLINIC | Age: 55
End: 2020-12-04

## 2020-12-04 VITALS
WEIGHT: 245.6 LBS | OXYGEN SATURATION: 96 % | TEMPERATURE: 98.2 F | BODY MASS INDEX: 38.55 KG/M2 | SYSTOLIC BLOOD PRESSURE: 114 MMHG | HEART RATE: 102 BPM | DIASTOLIC BLOOD PRESSURE: 83 MMHG | HEIGHT: 67 IN

## 2020-12-04 DIAGNOSIS — M25.511 ACUTE PAIN OF RIGHT SHOULDER: Primary | ICD-10-CM

## 2020-12-04 PROBLEM — I26.99 PULMONARY EMBOLISM (HCC): Status: ACTIVE | Noted: 2020-12-04

## 2020-12-04 PROBLEM — E78.00 HYPERCHOLESTEROLEMIA: Status: ACTIVE | Noted: 2020-12-04

## 2020-12-04 PROBLEM — F31.9 BIPOLAR DISORDER: Status: ACTIVE | Noted: 2020-12-04

## 2020-12-04 PROCEDURE — 99213 OFFICE O/P EST LOW 20 MIN: CPT | Performed by: NURSE PRACTITIONER

## 2020-12-04 RX ORDER — BENZTROPINE MESYLATE 0.5 MG/1
TABLET ORAL
COMMUNITY
Start: 2020-11-17 | End: 2021-02-03

## 2020-12-04 RX ORDER — LEVOCETIRIZINE DIHYDROCHLORIDE 5 MG/1
5 TABLET, FILM COATED ORAL DAILY PRN
Qty: 90 TABLET | Refills: 1 | Status: SHIPPED | OUTPATIENT
Start: 2020-12-04 | End: 2021-03-25

## 2020-12-04 RX ORDER — SUMATRIPTAN AND NAPROXEN SODIUM 85; 500 MG/1; MG/1
1 TABLET, FILM COATED ORAL
COMMUNITY
Start: 2020-09-30 | End: 2023-01-10

## 2020-12-04 RX ORDER — BUPROPION HYDROCHLORIDE 300 MG/1
300 TABLET ORAL EVERY MORNING
COMMUNITY
Start: 2020-11-17

## 2020-12-04 RX ORDER — GABAPENTIN 100 MG/1
100 CAPSULE ORAL DAILY
COMMUNITY
Start: 2020-11-30 | End: 2022-12-15

## 2020-12-04 NOTE — PROGRESS NOTES
Subjective   Urvashi Ornelas is a 55 y.o. female.     Chief Complaint   Patient presents with   • Pain     pain right shoulder for couple weeks- no known injury       History of Present Illness   Acute pain of right shoulder: There was no injury mechanism.  The quality of the pain is described as aching.  The pain radiates the right arm.  The pain is at a severity of 8/10.  Pertinent negatives include no muscle weakness or numbness.  The symptoms are aggravated by movement.  She has tried acetaminophen and heat for the symptoms the treatment provided mild relief.    The following portions of the patient's history were reviewed and updated as appropriate: allergies, current medications, past family history, past medical history, past social history, past surgical history and problem list.    Past Medical History:   Diagnosis Date   • Abnormal urinalysis    • Acute bronchitis    • Acute frontal sinusitis    • Acute maxillary sinusitis    • Acute pain of right shoulder    • Acute sinusitis    • Allergic rhinitis    • Anemia    • Anxiety    • Arthritis    • Asthma    • Asthma exacerbation    • BMI 40.0-44.9, adult (CMS/[facility])    • Cancer (CMS/[facility])     THYROID   • Constipation    • Cough    • Depression    • Diaphragm paralysis    • Disease of thyroid gland     CANCER   • Dizziness    • Drug-induced nausea and vomiting    • Dyspnea    • Dysuria    • Fatigue    • Fatty liver    • H/O degenerative disc disease    • History of hallucinations    • Hypercalcemia    • Hyperlipidemia    • Hypertension    • Hypertensive kidney disease with chronic kidney disease stage III    • Hypertrophic toenail    • Hypokalemia    • Hypothyroidism    • Joint pain    • Lumbago    • Lymph node cancer (CMS/[facility])    • Manic episode without psychotic symptoms (CMS/[facility])    • Medicare annual wellness visit, subsequent    • Migraine    • Migraine with aura    • Nausea    • Osteopenia of lumbar spine    • Overweight    • Pleurisy    • Pneumonia    • Post  traumatic stress disorder    • Postmenopausal    • Rheumatoid arthritis (CMS/HCC)    • Right knee pain    • Shortness of breath    • Shoulder tendinitis    • Sleep apnea    • Thyroid disease    • Urinary frequency        Past Surgical History:   Procedure Laterality Date   • BRONCHOSCOPY N/A 2020    Procedure: BRONCHOSCOPY with fluroscopy, bal and biopsies;  Surgeon: Yonis Sweeney MD;  Location: [facility] ENDOSCOPY;  Service: Pulmonary;  Laterality: N/A;  consolidation left lower lobe  consolidation left lower lobe   •  SECTION      X2   • CHOLECYSTECTOMY     • HAND SURGERY Bilateral     WRISTS PLATE PLACEMENT   • HEMATOMA EVACUATION HEAD/NECK Left 2016    Procedure: HEMATOMA EVACUATION NECK;  Surgeon: Dayo Ashby MD;  Location: [facility] OR [facility];  Service:    • HYSTERECTOMY     • KNEE SURGERY Bilateral     6YO PIGEON TOE SX   • LUNG SURGERY Left     LEFT LOWER LOBE   • NECK DISSECTION Left 2016    Procedure: LEFT MODIFIED RADICAL NECK DISSECTION;  Surgeon: Dayo Ashby MD;  [place] [facility] [place] [facility] [place]  Service:    • NEPHRECTOMY PARTIAL Left    • PARATHYROID GLAND SURGERY         • TOTAL THYROIDECTOMY      PARATHYROID 2016       Family History   Problem Relation Age of Onset   • Hyperlipidemia Mother    • Hypertension Mother    • Osteoarthritis Mother    • Arthritis Mother         rheumatoid   • Other Mother         acute cutaneous lupus erythematosus   • Bipolar disorder Father    • Hypertension Father    • Bipolar disorder Brother    • HIV Brother    • Alcohol abuse Paternal Grandfather    • Hypertension Paternal Grandfather        Social History     Socioeconomic History   • Marital status:      Spouse name: Not on file   • Number of children: Not on file   • Years of education: Not on file   • Highest education level: Not on file   Tobacco Use   • Smoking status: Former Smoker     Quit date:      Years since quittin.9   • Smokeless tobacco: Never Used   Substance  "and Sexual Activity   • Alcohol use: No   • Drug use: No   • Sexual activity: Defer       Review of Systems   Musculoskeletal:        See HPI    Neurological: Negative for weakness and numbness.       Objective   Vitals:    12/04/20 1016   BP: 114/83   Pulse: 102   Temp: 98.2 °F (36.8 °C)   TempSrc: Temporal   SpO2: 96%   Weight: 111 kg (245 lb 9.6 oz)   Height: 170.2 cm (67\")      Body mass index is 38.47 kg/m².  Physical Exam  Vitals signs and nursing note reviewed.   Constitutional:       Appearance: Normal appearance.   Cardiovascular:      Rate and Rhythm: Normal rate and regular rhythm.   Pulmonary:      Effort: Pulmonary effort is normal.      Breath sounds: Normal breath sounds.   Musculoskeletal:      Right shoulder: She exhibits pain. She exhibits normal range of motion and no tenderness.   Skin:     General: Skin is warm and dry.   Neurological:      Mental Status: She is alert and oriented to person, place, and time.   Psychiatric:         Mood and Affect: Mood normal.           Assessment/Plan   Diagnoses and all orders for this visit:    1. Acute pain of right shoulder (Primary)  -     Ambulatory Referral to Orthopedic Surgery    Other orders  -     levocetirizine (XYZAL) 5 MG tablet; Take 1 tablet by mouth Daily As Needed for Allergies.  Dispense: 90 tablet; Refill: 1               "

## 2020-12-30 ENCOUNTER — OFFICE VISIT (OUTPATIENT)
Dept: FAMILY MEDICINE CLINIC | Facility: CLINIC | Age: 55
End: 2020-12-30

## 2020-12-30 VITALS
DIASTOLIC BLOOD PRESSURE: 85 MMHG | OXYGEN SATURATION: 95 % | BODY MASS INDEX: 39.11 KG/M2 | WEIGHT: 249.2 LBS | HEART RATE: 104 BPM | HEIGHT: 67 IN | TEMPERATURE: 97.5 F | SYSTOLIC BLOOD PRESSURE: 124 MMHG

## 2020-12-30 DIAGNOSIS — Z00.00 MEDICARE ANNUAL WELLNESS VISIT, SUBSEQUENT: Primary | ICD-10-CM

## 2020-12-30 DIAGNOSIS — Z12.31 ENCOUNTER FOR SCREENING MAMMOGRAM FOR MALIGNANT NEOPLASM OF BREAST: ICD-10-CM

## 2020-12-30 DIAGNOSIS — Z13.820 SCREENING FOR OSTEOPOROSIS: ICD-10-CM

## 2020-12-30 DIAGNOSIS — Z78.0 ASYMPTOMATIC MENOPAUSAL STATE: ICD-10-CM

## 2020-12-30 PROCEDURE — G0439 PPPS, SUBSEQ VISIT: HCPCS | Performed by: NURSE PRACTITIONER

## 2020-12-30 RX ORDER — ATORVASTATIN CALCIUM 40 MG/1
40 TABLET, FILM COATED ORAL EVERY MORNING
Qty: 90 TABLET | Refills: 0 | Status: SHIPPED | OUTPATIENT
Start: 2020-12-30 | End: 2021-03-18

## 2020-12-30 RX ORDER — ATORVASTATIN CALCIUM 40 MG/1
40 TABLET, FILM COATED ORAL EVERY MORNING
Qty: 90 TABLET | Refills: 0 | OUTPATIENT
Start: 2020-12-30

## 2020-12-30 RX ORDER — ONDANSETRON 4 MG/1
4 TABLET, FILM COATED ORAL EVERY 8 HOURS PRN
Qty: 12 TABLET | Refills: 0 | Status: SHIPPED | OUTPATIENT
Start: 2020-12-30 | End: 2021-05-04 | Stop reason: SDUPTHER

## 2020-12-30 NOTE — PROGRESS NOTES
The ABCs of the Annual Wellness Visit  Subsequent Medicare Wellness Visit    Chief Complaint   Patient presents with   • Medicare Wellness-subsequent       Subjective   History of Present Illness:  Urvashi Ornelas is a 55 y.o. female who presents for a Subsequent Medicare Wellness Visit.    HEALTH RISK ASSESSMENT    Recent Hospitalizations:  Recently treated at the following:  [facility]    Current Medical Providers:  Patient Care Team:  Rosio Palmer APRN as PCP - General (Family Medicine)    Smoking Status:  Social History     Tobacco Use   Smoking Status Former Smoker   • Quit date:    • Years since quittin.0   Smokeless Tobacco Never Used       Alcohol Consumption:  Social History     Substance and Sexual Activity   Alcohol Use No       Depression Screen:   PHQ-2/PHQ-9 Depression Screening 2020   Little interest or pleasure in doing things 1   Feeling down, depressed, or hopeless 3   Trouble falling or staying asleep, or sleeping too much 3   Feeling tired or having little energy 1   Poor appetite or overeating 3   Feeling bad about yourself - or that you are a failure or have let yourself or your family down 0   Trouble concentrating on things, such as reading the newspaper or watching television 3   Moving or speaking so slowly that other people could have noticed. Or the opposite - being so fidgety or restless that you have been moving around a lot more than usual 3   Thoughts that you would be better off dead, or of hurting yourself in some way 0   Total Score 17   If you checked off any problems, how difficult have these problems made it for you to do your work, take care of things at home, or get along with other people? Not difficult at all       Fall Risk Screen:  STEADI Fall Risk Assessment was completed, and patient is at LOW risk for falls.Assessment completed on:2020    Health Habits and Functional and Cognitive Screening:  Functional & Cognitive Status  12/30/2020   Do you have difficulty preparing food and eating? No   Do you have difficulty bathing yourself, getting dressed or grooming yourself? No   Do you have difficulty using the toilet? No   Do you have difficulty moving around from place to place? No   Do you have trouble with steps or getting out of a bed or a chair? Yes   Current Diet Well Balanced Diet   Dental Exam Up to date   Eye Exam Up to date   Exercise (times per week) 3 times per week   Current Exercises Include House Cleaning   Current Exercise Activities Include Housecleaning   Do you need help using the phone?  No   Are you deaf or do you have serious difficulty hearing?  Yes   Do you need help with transportation? No   Do you need help shopping? No   Do you need help preparing meals?  No   Do you need help with housework?  No   Do you need help with laundry? No   Do you need help taking your medications? No   Do you need help managing money? No   Do you ever drive or ride in a car without wearing a seat belt? No   Have you felt unusual stress, anger or loneliness in the last month? Yes   Who do you live with? Alone   If you need help, do you have trouble finding someone available to you? No   Have you been bothered in the last four weeks by sexual problems? -   Do you have difficulty concentrating, remembering or making decisions? Yes         Does the patient have evidence of cognitive impairment? No    Asprin use counseling:Does not need ASA (and currently is not on it)    Age-appropriate Screening Schedule:  Refer to the list below for future screening recommendations based on patient's age, sex and/or medical conditions. Orders for these recommended tests are listed in the plan section. The patient has been provided with a written plan.    Health Maintenance   Topic Date Due   • COLONOSCOPY  1965   • ZOSTER VACCINE (1 of 2) 06/27/2017   • DXA SCAN  12/18/2019   • MAMMOGRAM  04/19/2021   • LIPID PANEL  08/10/2021   • TDAP/TD VACCINES  (2 - Td) 05/20/2029   • INFLUENZA VACCINE  Completed   • PAP SMEAR  Discontinued          The following portions of the patient's history were reviewed and updated as appropriate: allergies, current medications, past family history, past medical history, past social history, past surgical history and problem list.    Outpatient Medications Prior to Visit   Medication Sig Dispense Refill   • albuterol (PROVENTIL HFA;VENTOLIN HFA) 108 (90 BASE) MCG/ACT inhaler Inhale 2 puffs every 4 (four) hours as needed for wheezing.     • albuterol (PROVENTIL) (2.5 MG/3ML) 0.083% nebulizer solution Take 2.5 mg by nebulization Every 6 (Six) Hours As Needed for Wheezing or Shortness of Air. 25 vial 0   • benzonatate (TESSALON) 200 MG capsule Take 1 capsule by mouth 3 (Three) Times a Day As Needed for Cough. 90 capsule 0   • benztropine (COGENTIN) 0.5 MG tablet      • budesonide-formoterol (SYMBICORT) 80-4.5 MCG/ACT inhaler Inhale 2 puffs 2 (Two) Times a Day.     • buPROPion XL (WELLBUTRIN XL) 150 MG 24 hr tablet Take 150 mg by mouth Daily.     • buPROPion XL (WELLBUTRIN XL) 300 MG 24 hr tablet      • busPIRone (BUSPAR) 30 MG tablet TK 1 T PO  bid  0   • cloZAPine (CLOZARIL) 100 MG tablet Take 150 mg by mouth Every Night.     • gabapentin (NEURONTIN) 100 MG capsule      • levocetirizine (XYZAL) 5 MG tablet Take 5 mg by mouth Daily.     • levocetirizine (XYZAL) 5 MG tablet Take 1 tablet by mouth Daily As Needed for Allergies. 90 tablet 1   • levothyroxine (SYNTHROID, LEVOTHROID) 200 MCG tablet TAKE 1 TABLET BY MOUTH EVERY MORNING 90 tablet 1   • levothyroxine (SYNTHROID, LEVOTHROID) 25 MCG tablet TAKE 1 TABLET BY MOUTH EVERY MORNING 90 tablet 1   • Multiple Vitamins-Minerals (MULTIVITAMIN WOMEN 50+) tablet Take 1 tablet/day by mouth.     • prazosin (MINIPRESS) 1 MG capsule      • SAPHRIS 2.5 MG sublingual tablet      • SAPHRIS 5 MG sublingual tablet sublingual tablet 2 (Two) Times a Day.     • SUMAtriptan-naproxen (TREXIMET)  MG  per tablet TK 1 T PO  PRF MIGRAINE. MAX 4 TS PER WEEK.     • topiramate (TOPAMAX) 200 MG tablet Take 200 mg by mouth 2 (Two) Times a Day.  2   • atorvastatin (LIPITOR) 40 MG tablet Take 1 tablet by mouth Every Morning. 90 tablet 0   • ondansetron (Zofran) 4 MG tablet Take 1 tablet by mouth Every 8 (Eight) Hours As Needed for Nausea or Vomiting. 12 tablet 0     No facility-administered medications prior to visit.        Patient Active Problem List   Diagnosis   • Thyroid cancer (CMS/HCC)   • Asthma   • Acute bronchitis   • Vitamin D deficiency   • Trigger thumb of left hand   • Tardive dyskinesia   • Syrinx of spinal cord (CMS/HCC)   • Schizoaffective disorder (CMS/HCC)   • PTSD (post-traumatic stress disorder)   • Osteopenia   • Hypothyroidism   • Episodic paroxysmal hemicrania, not intractable   • Medicare annual wellness visit, subsequent   • Pneumonia   • Migraines   • Anxiety   • Obesity, Class III, BMI 40-49.9 (morbid obesity) (CMS/[facility])   • Parapneumonic effusion   • Hypokalemia   • CKD (chronic kidney disease) stage 3, GFR 30-59 ml/min   • Bipolar disorder (CMS/HCC)   • Hypercholesterolemia   • Pulmonary embolism (CMS/[facility])       Advanced Care Planning:  ACP discussion was held with the patient during this visit. Patient does not have an advance directive, information provided.    Review of Systems   Constitutional: Negative for fever.   HENT: Negative for ear pain, rhinorrhea and sore throat.    Eyes: Negative for visual disturbance.   Respiratory: Negative for cough and shortness of breath.    Cardiovascular: Negative for chest pain.   Gastrointestinal: Negative for abdominal pain, diarrhea, nausea and vomiting.   Genitourinary: Negative.    Musculoskeletal: Negative.    Skin: Negative for rash.   Neurological: Negative for dizziness and headaches.   Psychiatric/Behavioral: Negative for confusion.       Compared to one year ago, the patient feels her physical health is the same.  Compared to one year ago, the  "patient feels her mental health is the same.    Reviewed chart for potential of high risk medication in the elderly: yes  Reviewed chart for potential of harmful drug interactions in the elderly:yes    Objective         Vitals:    12/30/20 1413   BP: 124/85   Pulse: 104   Temp: 97.5 °F (36.4 °C)   TempSrc: Temporal   SpO2: 95%   Weight: 113 kg (249 lb 3.2 oz)   Height: 170.2 cm (67\")       Body mass index is 39.03 kg/m².  Discussed the patient's BMI with her. The BMI is above average; BMI management plan is completed.    Physical Exam  Vitals signs and nursing note reviewed.   Constitutional:       Appearance: Normal appearance.   HENT:      Head: Normocephalic and atraumatic.      Right Ear: Tympanic membrane and ear canal normal.      Left Ear: Tympanic membrane and ear canal normal.   Eyes:      Conjunctiva/sclera: Conjunctivae normal.      Pupils: Pupils are equal, round, and reactive to light.   Neck:      Musculoskeletal: Neck supple.   Cardiovascular:      Rate and Rhythm: Normal rate and regular rhythm.   Pulmonary:      Effort: Pulmonary effort is normal.      Breath sounds: Normal breath sounds.   Abdominal:      General: Bowel sounds are normal.      Palpations: Abdomen is soft.   Genitourinary:     Comments: Deferred   Musculoskeletal: Normal range of motion.   Skin:     General: Skin is warm and dry.   Neurological:      Mental Status: She is alert and oriented to person, place, and time.   Psychiatric:         Mood and Affect: Mood normal.               Assessment/Plan   Medicare Risks and Personalized Health Plan  CMS Preventative Services Quick Reference  Advance Directive Discussion  Breast Cancer/Mammogram Screening  Colon Cancer Screening  Immunizations Discussed/Encouraged (specific immunizations; Pneumococcal 23 and Shingrix )  Obesity/Overweight   Osteoprorosis Risk    The above risks/problems have been discussed with the patient.  Pertinent information has been shared with the patient in the " After Visit Summary.  Follow up plans and orders are seen below in the Assessment/Plan Section.    Diagnoses and all orders for this visit:    1. Medicare annual wellness visit, subsequent (Primary)    2. Screening for osteoporosis  -     DEXA Bone Density Axial; Future    3. Encounter for screening mammogram for malignant neoplasm of breast  -     Mammo Screening Bilateral With CAD; Future    4. Asymptomatic menopausal state   -     DEXA Bone Density Axial; Future    Other orders  -     atorvastatin (LIPITOR) 40 MG tablet; Take 1 tablet by mouth Every Morning.  Dispense: 90 tablet; Refill: 0  -     ondansetron (Zofran) 4 MG tablet; Take 1 tablet by mouth Every 8 (Eight) Hours As Needed for Nausea or Vomiting.  Dispense: 12 tablet; Refill: 0      Follow Up:  Return in about 1 year (around 12/30/2021) for Medicare Wellness.     An After Visit Summary and PPPS were given to the patient.

## 2021-01-05 ENCOUNTER — OFFICE VISIT (OUTPATIENT)
Dept: ORTHOPEDIC SURGERY | Facility: CLINIC | Age: 56
End: 2021-01-05

## 2021-01-05 ENCOUNTER — TRANSCRIBE ORDERS (OUTPATIENT)
Dept: ADMINISTRATIVE | Facility: HOSPITAL | Age: 56
End: 2021-01-05

## 2021-01-05 VITALS — WEIGHT: 240 LBS | HEIGHT: 67 IN | BODY MASS INDEX: 37.67 KG/M2 | TEMPERATURE: 98 F

## 2021-01-05 DIAGNOSIS — M75.101 NONTRAUMATIC TEAR OF RIGHT ROTATOR CUFF, UNSPECIFIED TEAR EXTENT: ICD-10-CM

## 2021-01-05 DIAGNOSIS — R52 PAIN: Primary | ICD-10-CM

## 2021-01-05 DIAGNOSIS — Z12.31 SCREENING MAMMOGRAM, ENCOUNTER FOR: Primary | ICD-10-CM

## 2021-01-05 PROCEDURE — 99214 OFFICE O/P EST MOD 30 MIN: CPT | Performed by: ORTHOPAEDIC SURGERY

## 2021-01-05 PROCEDURE — 20610 DRAIN/INJ JOINT/BURSA W/O US: CPT | Performed by: ORTHOPAEDIC SURGERY

## 2021-01-05 PROCEDURE — 73030 X-RAY EXAM OF SHOULDER: CPT | Performed by: ORTHOPAEDIC SURGERY

## 2021-01-05 RX ADMIN — METHYLPREDNISOLONE ACETATE 80 MG: 80 INJECTION, SUSPENSION INTRA-ARTICULAR; INTRALESIONAL; INTRAMUSCULAR; SOFT TISSUE at 15:06

## 2021-01-05 NOTE — PROGRESS NOTES
New Right Shoulder      Patient: Urvashi Ornelas        YOB: 1965    Medical Record Number: [id number]        Chief Complaints: right shoulder pain      History of Present Illness: This is a 55-year-old female who presents complaining of right shoulder pain she is right-hand dominant been ongoing for over a month she does have night pain has had symptoms in the past but not like this no definitive injury that she can recall pain is a 7 out of 10 symptoms are stabbing shooting clicking worse with activity somewhat better with rest past medical history is remarkable for for multiple pertinent positives they are well listed below and reviewed by me  She is on disability for mental issues    Allergies:   Allergies   Allergen Reactions   • Depakote Er [Divalproex Sodium Er] Other (See Comments)     HAIR FALLS OUT   • Divalproex Sodium Unknown (See Comments)   • Lamictal [Lamotrigine] Swelling and Other (See Comments)     TONGUE SWELLS AND PEELS   • Risperidone Unknown (See Comments)   • Risperidone And Related Other (See Comments)     PREGNANCY SYMPTOMS   • Valproic Acid Unknown (See Comments)      Unknown      • Erythromycin Rash   • Penicillins Rash   • Toradol [Ketorolac Tromethamine] Rash       Medications:   Home Medications:  Current Outpatient Medications on File Prior to Visit   Medication Sig   • albuterol (PROVENTIL HFA;VENTOLIN HFA) 108 (90 BASE) MCG/ACT inhaler Inhale 2 puffs every 4 (four) hours as needed for wheezing.   • albuterol (PROVENTIL) (2.5 MG/3ML) 0.083% nebulizer solution Take 2.5 mg by nebulization Every 6 (Six) Hours As Needed for Wheezing or Shortness of Air.   • atorvastatin (LIPITOR) 40 MG tablet Take 1 tablet by mouth Every Morning.   • benzonatate (TESSALON) 200 MG capsule Take 1 capsule by mouth 3 (Three) Times a Day As Needed for Cough.   • benztropine (COGENTIN) 0.5 MG tablet    • budesonide-formoterol (SYMBICORT) 80-4.5 MCG/ACT inhaler Inhale 2 puffs 2 (Two) Times a  Day.   • buPROPion XL (WELLBUTRIN XL) 150 MG 24 hr tablet Take 150 mg by mouth Daily.   • buPROPion XL (WELLBUTRIN XL) 300 MG 24 hr tablet    • busPIRone (BUSPAR) 30 MG tablet TK 1 T PO  bid   • cloZAPine (CLOZARIL) 100 MG tablet Take 150 mg by mouth Every Night.   • gabapentin (NEURONTIN) 100 MG capsule    • levocetirizine (XYZAL) 5 MG tablet Take 5 mg by mouth Daily.   • levocetirizine (XYZAL) 5 MG tablet Take 1 tablet by mouth Daily As Needed for Allergies.   • levothyroxine (SYNTHROID, LEVOTHROID) 200 MCG tablet TAKE 1 TABLET BY MOUTH EVERY MORNING   • levothyroxine (SYNTHROID, LEVOTHROID) 25 MCG tablet TAKE 1 TABLET BY MOUTH EVERY MORNING   • Multiple Vitamins-Minerals (MULTIVITAMIN WOMEN 50+) tablet Take 1 tablet/day by mouth.   • ondansetron (Zofran) 4 MG tablet Take 1 tablet by mouth Every 8 (Eight) Hours As Needed for Nausea or Vomiting.   • prazosin (MINIPRESS) 1 MG capsule    • SAPHRIS 2.5 MG sublingual tablet    • SAPHRIS 5 MG sublingual tablet sublingual tablet 2 (Two) Times a Day.   • SUMAtriptan-naproxen (TREXIMET)  MG per tablet TK 1 T PO  PRF MIGRAINE. MAX 4 TS PER WEEK.   • topiramate (TOPAMAX) 200 MG tablet Take 200 mg by mouth 2 (Two) Times a Day.     No current facility-administered medications on file prior to visit.      Current Medications:  Scheduled Meds:  Continuous Infusions:No current facility-administered medications for this visit.     PRN Meds:.    Past Medical History:   Diagnosis Date   • Abnormal urinalysis    • Acute bronchitis    • Acute frontal sinusitis    • Acute maxillary sinusitis    • Acute pain of right shoulder    • Acute sinusitis    • Allergic rhinitis    • Anemia    • Anxiety    • Arthritis    • Asthma    • Asthma exacerbation    • BMI 40.0-44.9, adult (CMS/HCC)    • Cancer (CMS/HCC)     THYROID   • Constipation    • Cough    • Depression    • Diaphragm paralysis    • Disease of thyroid gland     CANCER   • Dizziness    • Drug-induced nausea and vomiting    •  Dyspnea    • Dysuria    • Fatigue    • Fatty liver    • H/O degenerative disc disease    • History of hallucinations    • Hypercalcemia    • Hyperlipidemia    • Hypertension    • Hypertensive kidney disease with chronic kidney disease stage III    • Hypertrophic toenail    • Hypokalemia    • Hypothyroidism    • Joint pain    • Lumbago    • Lymph node cancer (CMS/HCC)    • Manic episode without psychotic symptoms (CMS/HCC)    • Medicare annual wellness visit, subsequent    • Migraine    • Migraine with aura    • Nausea    • Osteopenia of lumbar spine    • Overweight    • Pleurisy    • Pneumonia    • Post traumatic stress disorder    • Postmenopausal    • Rheumatoid arthritis (CMS/HCC)    • Right knee pain    • Shortness of breath    • Shoulder tendinitis    • Sleep apnea    • Thyroid disease    • Urinary frequency         Past Surgical History:   Procedure Laterality Date   • BRONCHOSCOPY N/A 2020    Procedure: BRONCHOSCOPY with fluroscopy, bal and biopsies;  Surgeon: Yonis Sweeney MD;  Location: [facility] ENDOSCOPY;  Service: Pulmonary;  Laterality: N/A;  consolidation left lower lobe  consolidation left lower lobe   •  SECTION      X2   • CHOLECYSTECTOMY     • HAND SURGERY Bilateral     WRISTS PLATE PLACEMENT   • HEMATOMA EVACUATION HEAD/NECK Left 2016    Procedure: HEMATOMA EVACUATION NECK;  Surgeon: Dayo Ashby MD;  Location: [facility] OR [facility];  Service:    • HYSTERECTOMY     • KNEE SURGERY Bilateral     4YO PIGEON TOE SX   • LUNG SURGERY Left     LEFT LOWER LOBE   • NECK DISSECTION Left 2016    Procedure: LEFT MODIFIED RADICAL NECK DISSECTION;  Surgeon: Dayo Ashby MD;  Location: [facility] OR [facility];  Service:    • NEPHRECTOMY PARTIAL Left    • PARATHYROID GLAND SURGERY         • TOTAL THYROIDECTOMY      PARATHYROID 2016        Social History     Occupational History   • Not on file   Tobacco Use   • Smoking status: Former Smoker     Quit date:      Years since quittin.0   •  "Smokeless tobacco: Never Used   Substance and Sexual Activity   • Alcohol use: No   • Drug use: No   • Sexual activity: Defer      Social History     Social History Narrative   • Not on file        Family History   Problem Relation Age of Onset   • Hyperlipidemia Mother    • Hypertension Mother    • Osteoarthritis Mother    • Arthritis Mother         rheumatoid   • Other Mother         acute cutaneous lupus erythematosus   • Bipolar disorder Father    • Hypertension Father    • Bipolar disorder Brother    • HIV Brother    • Alcohol abuse Paternal Grandfather    • Hypertension Paternal Grandfather              Review of Systems: 14 point review of systems are remarkable for the pertinent positives listed on the chart by the patient the remainder negative    Review of Systems      Physical Exam: 55 y.o. female  General Appearance:    Alert, cooperative, in no acute distress                   Vitals:    01/05/21 1447   Temp: 98 °F (36.7 °C)   Weight: 109 kg (240 lb)   Height: 170.2 cm (67\")      Patient is alert and read ×3 no acute distress appears her above-listed at height weight and age.  Affect is normal respiratory rate is normal unlabored. Heart rate regular rate rhythm, sclera, dentition and hearing are normal for the purpose of this exam.    Ortho Exam Physical exam of the right shoulder reveals no overlying skin changes no lymphedema no lymphadenopathy.  Patient has active flexion 180 with mild symptoms abduction is similar external rotation is to 50 and internal rotation to the upper lumbar spine with mild symptoms.  Patient has good rotator cuff strength 4+ over 5 with isometric strength testing with pain.  Patient has a positive impingement and a positive Mark sign.  Patient has good cervical range of motion which is full and asymptomatic no radicular symptoms.  Patient has a normal elbow exam.  Good distal pulses are present  Patient has pain with overhead activity and a positive Neer sign and a " positive empty can sign , a positive drop arm and a definitive painful arc    Large Joint Arthrocentesis: R subacromial bursa  Date/Time: 1/5/2021 3:06 PM  Consent given by: patient  Site marked: site marked  Timeout: Immediately prior to procedure a time out was called to verify the correct patient, procedure, equipment, support staff and site/side marked as required   Supporting Documentation  Indications: pain   Procedure Details  Location: shoulder - R subacromial bursa  Preparation: Patient was prepped and draped in the usual sterile fashion  Needle size: 22 G  Approach: posterior  Medications administered: 4 mL lidocaine (cardiac); 80 mg methylPREDNISolone acetate 80 MG/ML  Patient tolerance: patient tolerated the procedure well with no immediate complications                Radiology:   AP, Scapular Y and Axillary Lateral of the right shoulder were ordered/reviewed to evauate shoulder pain.  I have no comparative films she has some mild acromioclavicular arthritis otherwise no acute bony pathology  Imaging Results (Most Recent)     Procedure Component Value Units Date/Time    XR Shoulder 2+ View Right [[id number]] Resulted: 01/05/21 1451     Updated: 01/05/21 1452    Impression:      Ordering physician's impression is located in the Encounter Note dated 01/05/21. X-ray performed in the DR room.          Assessment/Plan: Right shoulder pain I think this is rotator cuff in some fashion I think she would benefit from an injection which she was agreeable to do if she fails to improve we will pursue other means of testing

## 2021-01-06 RX ORDER — METHYLPREDNISOLONE ACETATE 80 MG/ML
80 INJECTION, SUSPENSION INTRA-ARTICULAR; INTRALESIONAL; INTRAMUSCULAR; SOFT TISSUE
Status: COMPLETED | OUTPATIENT
Start: 2021-01-05 | End: 2021-01-05

## 2021-01-19 ENCOUNTER — TELEPHONE (OUTPATIENT)
Dept: ORTHOPEDIC SURGERY | Facility: CLINIC | Age: 56
End: 2021-01-19

## 2021-01-19 NOTE — TELEPHONE ENCOUNTER
Caller: PATIENT    Relationship to patient: SELF    Best call back number: [phone]    Chief complaint: RIGHT SHOULDER PAIN RADIATING INTO HER ARM.    Additional notes: PATIENT WAS CALLING TO LET DR. VINNY POWELL KNOW THAT SHE IS STILL EXPERICING PAIN IN HER RIGHT SHOULDER AND IT IS RADIATING INTO HER ARM. PATIENT STATED SINCE HER CORTISONE INJECTION ON 01.05.21 THAT HER RIGHT SHOULDER PAIN HAS BEEN CONSISTENT. PATIENT STATED DR. POWELL HAD MENTIONED TO GIVE HER A CALL IN 2 WEEKS IF HER RIGHT SHOULDER CONTINUED TO BOTHER HER AND HAVE PAIN. PLEASE ADVISE.

## 2021-01-21 ENCOUNTER — OFFICE VISIT (OUTPATIENT)
Dept: FAMILY MEDICINE CLINIC | Facility: CLINIC | Age: 56
End: 2021-01-21

## 2021-01-21 VITALS
HEIGHT: 67 IN | WEIGHT: 255 LBS | SYSTOLIC BLOOD PRESSURE: 115 MMHG | DIASTOLIC BLOOD PRESSURE: 77 MMHG | OXYGEN SATURATION: 98 % | BODY MASS INDEX: 40.02 KG/M2 | TEMPERATURE: 97.5 F | HEART RATE: 107 BPM

## 2021-01-21 DIAGNOSIS — R10.9 ABDOMINAL PAIN, UNSPECIFIED ABDOMINAL LOCATION: ICD-10-CM

## 2021-01-21 DIAGNOSIS — M54.50 ACUTE RIGHT-SIDED LOW BACK PAIN WITHOUT SCIATICA: Primary | ICD-10-CM

## 2021-01-21 LAB
BILIRUB BLD-MCNC: NEGATIVE MG/DL
CLARITY, POC: CLEAR
COLOR UR: YELLOW
GLUCOSE UR STRIP-MCNC: NEGATIVE MG/DL
KETONES UR QL: NEGATIVE
LEUKOCYTE EST, POC: ABNORMAL
NITRITE UR-MCNC: NEGATIVE MG/ML
PH UR: 6.5 [PH] (ref 5–8)
PROT UR STRIP-MCNC: NEGATIVE MG/DL
RBC # UR STRIP: NEGATIVE /UL
SP GR UR: 1.01 (ref 1–1.03)
UROBILINOGEN UR QL: NORMAL

## 2021-01-21 PROCEDURE — 81003 URINALYSIS AUTO W/O SCOPE: CPT | Performed by: NURSE PRACTITIONER

## 2021-01-21 PROCEDURE — 99212 OFFICE O/P EST SF 10 MIN: CPT | Performed by: NURSE PRACTITIONER

## 2021-01-21 NOTE — PROGRESS NOTES
"Chief Complaint  Back Pain and Urinary Tract Infection    Subjective          Urvashi Ornelas presents to [facility] for   Back Pain  This is a new problem. The current episode started in the past 7 days. The pain is present in the lumbar spine. Quality: sharp. The pain does not radiate. The pain is severe. The symptoms are aggravated by standing. Associated symptoms include abdominal pain. Pertinent negatives include no dysuria, fever, numbness or weakness. She has tried analgesics for the symptoms. The treatment provided no relief.     Objective   Vital Signs:   /77   Pulse 107   Temp 97.5 °F (36.4 °C) (Temporal)   Ht 170.2 cm (67\")   Wt 116 kg (255 lb)   SpO2 98%   BMI 39.94 kg/m²     Physical Exam  Vitals signs and nursing note reviewed.   Constitutional:       Appearance: Normal appearance.   Cardiovascular:      Rate and Rhythm: Normal rate and regular rhythm.   Pulmonary:      Effort: Pulmonary effort is normal.      Breath sounds: Normal breath sounds.   Abdominal:      General: Bowel sounds are normal.      Palpations: Abdomen is soft.      Tenderness: There is no right CVA tenderness or left CVA tenderness.   Musculoskeletal:      Lumbar back: She exhibits pain. She exhibits normal range of motion.   Skin:     General: Skin is warm and dry.   Neurological:      Mental Status: She is alert and oriented to person, place, and time.   Psychiatric:         Mood and Affect: Mood normal.        Result Review :   The following data was reviewed by: LALO Monge on 01/21/2021:  UA    Urinalysis 1/21/21   Ketones, UA Negative   Leukocytes, UA Small (1+) (A)   (A) Abnormal value                   Assessment and Plan    Problem List Items Addressed This Visit     None      Visit Diagnoses     Acute right-sided low back pain without sciatica    -  Primary    Relevant Orders    POCT urinalysis dipstick, automated (Completed)    Abdominal pain, unspecified abdominal " location        Relevant Orders    CBC & Differential    Comprehensive Metabolic Panel    Lipase    Urine Culture - Urine, Urine, Clean Catch        I spent 15 minutes caring for Urvashi on this date of service. This time includes time spent by me in the following activities:reviewing tests, performing a medically appropriate examination and/or evaluation , counseling and educating the patient/family/caregiver, ordering medications, tests, or procedures and documenting information in the medical record  Follow Up   Return if symptoms worsen or fail to improve.  Patient was given instructions and counseling regarding her condition or for health maintenance advice. Please see specific information pulled into the AVS if appropriate.

## 2021-01-22 DIAGNOSIS — M75.101 TEAR OF RIGHT ROTATOR CUFF, UNSPECIFIED TEAR EXTENT, UNSPECIFIED WHETHER TRAUMATIC: Primary | ICD-10-CM

## 2021-01-23 LAB
ALBUMIN SERPL-MCNC: 3.8 G/DL (ref 3.5–5.2)
ALBUMIN/GLOB SERPL: 1.5 G/DL
ALP SERPL-CCNC: 95 U/L (ref 39–117)
ALT SERPL-CCNC: 31 U/L (ref 1–33)
AST SERPL-CCNC: 25 U/L (ref 1–32)
BACTERIA UR CULT: NORMAL
BACTERIA UR CULT: NORMAL
BASOPHILS # BLD AUTO: 0.04 10*3/MM3 (ref 0–0.2)
BASOPHILS NFR BLD AUTO: 0.6 % (ref 0–1.5)
BILIRUB SERPL-MCNC: <0.2 MG/DL (ref 0–1.2)
BUN SERPL-MCNC: 12 MG/DL (ref 6–20)
BUN/CREAT SERPL: 11.3 (ref 7–25)
CALCIUM SERPL-MCNC: 8.6 MG/DL (ref 8.6–10.5)
CHLORIDE SERPL-SCNC: 107 MMOL/L (ref 98–107)
CO2 SERPL-SCNC: 23.9 MMOL/L (ref 22–29)
CREAT SERPL-MCNC: 1.06 MG/DL (ref 0.57–1)
EOSINOPHIL # BLD AUTO: 0.34 10*3/MM3 (ref 0–0.4)
EOSINOPHIL NFR BLD AUTO: 5.1 % (ref 0.3–6.2)
ERYTHROCYTE [DISTWIDTH] IN BLOOD BY AUTOMATED COUNT: 12.6 % (ref 12.3–15.4)
GLOBULIN SER CALC-MCNC: 2.6 GM/DL
GLUCOSE SERPL-MCNC: 116 MG/DL (ref 65–99)
HCT VFR BLD AUTO: 44.2 % (ref 34–46.6)
HGB BLD-MCNC: 14.4 G/DL (ref 12–15.9)
IMM GRANULOCYTES # BLD AUTO: 0.02 10*3/MM3 (ref 0–0.05)
IMM GRANULOCYTES NFR BLD AUTO: 0.3 % (ref 0–0.5)
LIPASE SERPL-CCNC: 65 U/L (ref 13–60)
LYMPHOCYTES # BLD AUTO: 2.03 10*3/MM3 (ref 0.7–3.1)
LYMPHOCYTES NFR BLD AUTO: 30.3 % (ref 19.6–45.3)
MCH RBC QN AUTO: 29.6 PG (ref 26.6–33)
MCHC RBC AUTO-ENTMCNC: 32.6 G/DL (ref 31.5–35.7)
MCV RBC AUTO: 90.8 FL (ref 79–97)
MONOCYTES # BLD AUTO: 0.81 10*3/MM3 (ref 0.1–0.9)
MONOCYTES NFR BLD AUTO: 12.1 % (ref 5–12)
NEUTROPHILS # BLD AUTO: 3.47 10*3/MM3 (ref 1.7–7)
NEUTROPHILS NFR BLD AUTO: 51.6 % (ref 42.7–76)
NRBC BLD AUTO-RTO: 0 /100 WBC (ref 0–0.2)
PLATELET # BLD AUTO: 210 10*3/MM3 (ref 140–450)
POTASSIUM SERPL-SCNC: 3.9 MMOL/L (ref 3.5–5.2)
PROT SERPL-MCNC: 6.4 G/DL (ref 6–8.5)
RBC # BLD AUTO: 4.87 10*6/MM3 (ref 3.77–5.28)
SODIUM SERPL-SCNC: 141 MMOL/L (ref 136–145)
WBC # BLD AUTO: 6.71 10*3/MM3 (ref 3.4–10.8)

## 2021-01-25 DIAGNOSIS — R74.8 ELEVATED LIPASE: Primary | ICD-10-CM

## 2021-01-29 DIAGNOSIS — Z12.11 SCREENING FOR COLORECTAL CANCER: Primary | ICD-10-CM

## 2021-01-29 DIAGNOSIS — Z12.12 SCREENING FOR COLORECTAL CANCER: Primary | ICD-10-CM

## 2021-02-03 ENCOUNTER — OFFICE VISIT (OUTPATIENT)
Dept: FAMILY MEDICINE CLINIC | Facility: CLINIC | Age: 56
End: 2021-02-03

## 2021-02-03 ENCOUNTER — TELEPHONE (OUTPATIENT)
Dept: FAMILY MEDICINE CLINIC | Facility: CLINIC | Age: 56
End: 2021-02-03

## 2021-02-03 VITALS
TEMPERATURE: 98.6 F | WEIGHT: 255 LBS | OXYGEN SATURATION: 99 % | HEART RATE: 108 BPM | SYSTOLIC BLOOD PRESSURE: 124 MMHG | DIASTOLIC BLOOD PRESSURE: 80 MMHG | HEIGHT: 67 IN | BODY MASS INDEX: 40.02 KG/M2

## 2021-02-03 DIAGNOSIS — M54.50 ACUTE RIGHT-SIDED LOW BACK PAIN WITHOUT SCIATICA: Primary | ICD-10-CM

## 2021-02-03 PROCEDURE — 99212 OFFICE O/P EST SF 10 MIN: CPT | Performed by: NURSE PRACTITIONER

## 2021-02-03 RX ORDER — BACLOFEN 10 MG/1
10 TABLET ORAL NIGHTLY PRN
Qty: 30 TABLET | Refills: 0 | Status: SHIPPED | OUTPATIENT
Start: 2021-02-03 | End: 2021-02-25

## 2021-02-03 RX ORDER — PREDNISONE 20 MG/1
40 TABLET ORAL DAILY
Qty: 10 TABLET | Refills: 0 | Status: SHIPPED | OUTPATIENT
Start: 2021-02-03 | End: 2021-02-08

## 2021-02-03 RX ORDER — BENZTROPINE MESYLATE 1 MG/1
2 TABLET ORAL 2 TIMES DAILY
COMMUNITY
Start: 2021-01-13 | End: 2021-08-11 | Stop reason: ALTCHOICE

## 2021-02-03 NOTE — PROGRESS NOTES
"Chief Complaint  Back Pain    Subjective          Urvashi Ornelas presents to [facility] for   History of Present Illness  Low back pain: Last seen for this on 1/21/2021. Evaluation included UA, CBC, CMP and lipase. Patient states symptoms are unchanged.   Objective   Vital Signs:   /80 (BP Location: Left arm, Patient Position: Sitting)   Pulse 108   Temp 98.6 °F (37 °C)   Ht 170.2 cm (67.01\")   Wt 116 kg (255 lb)   SpO2 99%   BMI 39.93 kg/m²     Physical Exam  Vitals signs and nursing note reviewed.   Constitutional:       Appearance: Normal appearance.   Cardiovascular:      Rate and Rhythm: Normal rate and regular rhythm.   Pulmonary:      Effort: Pulmonary effort is normal.      Breath sounds: Normal breath sounds.   Musculoskeletal:      Lumbar back: She exhibits decreased range of motion and pain.   Neurological:      Mental Status: She is alert and oriented to person, place, and time.   Psychiatric:         Mood and Affect: Mood normal.        Result Review :   The following data was reviewed by: LALO Monge on 02/03/2021:  Lipase            Assessment and Plan    Problem List Items Addressed This Visit     None      Visit Diagnoses     Acute right-sided low back pain without sciatica    -  Primary    Relevant Medications    predniSONE (DELTASONE) 20 MG tablet    baclofen (LIORESAL) 10 MG tablet    Other Relevant Orders    Ambulatory Referral to Physical Therapy        I spent 15 minutes caring for Urvashi on this date of service. This time includes time spent by me in the following activities:reviewing tests, performing a medically appropriate examination and/or evaluation , counseling and educating the patient/family/caregiver, ordering medications, tests, or procedures and documenting information in the medical record  Follow Up   No follow-ups on file.  Patient was given instructions and counseling regarding her condition or for health maintenance advice. " Please see specific information pulled into the AVS if appropriate.

## 2021-02-03 NOTE — TELEPHONE ENCOUNTER
PATIENT WAS JUST IN AND FORGOT TO REQUEST THIS MEDICATION POLYETHYLENE GLYCOL 3350 POWDER.  PLEASE SEND PRESCRIPTION TO [facility].    PATIENT CAN BE REACHED [phone]

## 2021-02-04 RX ORDER — POLYETHYLENE GLYCOL 3350 17 G/17G
17 POWDER, FOR SOLUTION ORAL DAILY PRN
Qty: 578 G | Refills: 0 | Status: SHIPPED | OUTPATIENT
Start: 2021-02-04 | End: 2021-04-30

## 2021-02-13 ENCOUNTER — APPOINTMENT (OUTPATIENT)
Dept: MRI IMAGING | Facility: HOSPITAL | Age: 56
End: 2021-02-13

## 2021-02-25 ENCOUNTER — HOSPITAL ENCOUNTER (OUTPATIENT)
Dept: MRI IMAGING | Facility: HOSPITAL | Age: 56
Discharge: HOME | End: 2021-02-25
Admitting: ORTHOPAEDIC SURGERY

## 2021-02-25 DIAGNOSIS — M54.50 ACUTE RIGHT-SIDED LOW BACK PAIN WITHOUT SCIATICA: ICD-10-CM

## 2021-02-25 DIAGNOSIS — M75.101 TEAR OF RIGHT ROTATOR CUFF, UNSPECIFIED TEAR EXTENT, UNSPECIFIED WHETHER TRAUMATIC: ICD-10-CM

## 2021-02-25 PROCEDURE — 73221 MRI JOINT UPR EXTREM W/O DYE: CPT

## 2021-02-25 RX ORDER — BACLOFEN 10 MG/1
10 TABLET ORAL NIGHTLY PRN
Qty: 30 TABLET | Refills: 0 | Status: SHIPPED | OUTPATIENT
Start: 2021-02-25 | End: 2021-03-19

## 2021-02-26 ENCOUNTER — TELEPHONE (OUTPATIENT)
Dept: PHYSICAL THERAPY | Facility: CLINIC | Age: 56
End: 2021-02-26

## 2021-03-04 ENCOUNTER — TELEPHONE (OUTPATIENT)
Dept: ORTHOPEDIC SURGERY | Facility: CLINIC | Age: 56
End: 2021-03-04

## 2021-03-04 NOTE — TELEPHONE ENCOUNTER
Caller: PATIENT      Relationship: SELF    Best call back number: [phone]    Caller requesting test results: PATIENT    What test was performed: MRI OF LEFT SHOULDER    When was the test performed: LAST WEEK    Where was the test performed: [facility]    Additional notes: PT. CALLING FOR MRI RESULTS.

## 2021-03-05 ENCOUNTER — APPOINTMENT (OUTPATIENT)
Dept: CT IMAGING | Facility: HOSPITAL | Age: 56
End: 2021-03-05

## 2021-03-08 ENCOUNTER — TREATMENT (OUTPATIENT)
Dept: PHYSICAL THERAPY | Facility: CLINIC | Age: 56
End: 2021-03-08

## 2021-03-08 DIAGNOSIS — R26.2 DIFFICULTY WALKING: ICD-10-CM

## 2021-03-08 DIAGNOSIS — G89.29 CHRONIC RIGHT-SIDED LOW BACK PAIN WITHOUT SCIATICA: Primary | ICD-10-CM

## 2021-03-08 DIAGNOSIS — M54.50 CHRONIC RIGHT-SIDED LOW BACK PAIN WITHOUT SCIATICA: Primary | ICD-10-CM

## 2021-03-08 PROCEDURE — 97110 THERAPEUTIC EXERCISES: CPT | Performed by: PHYSICAL THERAPIST

## 2021-03-08 PROCEDURE — 97161 PT EVAL LOW COMPLEX 20 MIN: CPT | Performed by: PHYSICAL THERAPIST
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
## 2021-03-10 ENCOUNTER — IMMUNIZATION (OUTPATIENT)
Dept: VACCINE CLINIC | Facility: HOSPITAL | Age: 56
End: 2021-03-10

## 2021-03-10 PROCEDURE — 91300 HC SARSCOV02 VAC 30MCG/0.3ML IM: CPT | Performed by: INTERNAL MEDICINE

## 2021-03-10 PROCEDURE — 0001A: CPT | Performed by: INTERNAL MEDICINE

## 2021-03-11 ENCOUNTER — TELEPHONE (OUTPATIENT)
Dept: FAMILY MEDICINE CLINIC | Facility: CLINIC | Age: 56
End: 2021-03-11

## 2021-03-11 NOTE — TELEPHONE ENCOUNTER
Please let patient know that CBC is within acceptable limits. Kidney function tests are stable. Liver function test is slightly elevated so will monitor. LDL is at goal. A1c is slightly elevated so will need to share with endocrinology.

## 2021-03-11 NOTE — TELEPHONE ENCOUNTER
PATIENT CALLED STATING THAT HER PSYCH DR TOLD HER TO FOLLOW UP AND SEE IF [facility] WANTED PATIENT TO COME IN FOR A CHECKUP.  DID LABWORK AND IS SENDING OVER THOSE RESULTS. PLEASE CALL PATIENT TO ADVISE IF SHE NEEDS APPOINTMENT. CREATINE LEVELS ARE HIGH ACCORDING TO LABWORK.     PLEASE CALL PATIENT [phone]

## 2021-03-18 ENCOUNTER — TELEPHONE (OUTPATIENT)
Dept: PHYSICAL THERAPY | Facility: CLINIC | Age: 56
End: 2021-03-18

## 2021-03-18 RX ORDER — ATORVASTATIN CALCIUM 40 MG/1
TABLET, FILM COATED ORAL
Qty: 90 TABLET | Refills: 3 | Status: SHIPPED | OUTPATIENT
Start: 2021-03-18 | End: 2022-02-23

## 2021-03-18 NOTE — PROGRESS NOTES
Right Shoulder MRI Follow Up      Patient: Urvashi Ornelas        YOB: 1965            Chief Complaints: right Shoulder pain      History of Present Illness: The patient is here follow-up of an MRI of the shoulder MRI demonstrates some impingement the rotator cuff as well as a questionable labral tear most of her symptoms seem to be rotator cuff she can get not get much relief from the injection she is not done physical therapy and      Physical Exam: 55 y.o. female  General Appearance:    Alert, cooperative, in no acute distress                 There were no vitals filed for this visit.     Patient is alert and read ×3 no acute distress appears her above-listed at height weight and age.  Affect is normal respiratory rate is normal unlabored. Heart rate regular rate rhythm, sclera, dentition and hearing are normal for the purpose of this exam.      Ortho Exam  Physical exam of the right shoulder reveals no overlying skin changes no lymphedema no lymphadenopathy.  Patient has active flexion 180 with mild symptoms abduction is similar external rotation is to 50 and internal rotation to the upper lumbar spine with mild symptoms.  Patient has good rotator cuff strength 4+ over 5 with isometric strength testing with pain.  Patient has a positive impingement and a positive Mark sign.  Patient has good cervical range of motion which is full and asymptomatic no radicular symptoms.  Patient has a normal elbow exam.  Good distal pulses are present  Patient has pain with overhead activity and a positive Neer sign and a positive empty can sign , a positive drop arm and a definitive painful arc    MRI Results: MRIs as above have reviewed the films myself and agree with the findings  Procedures      Assessment/Plan:      Right shoulder pain I still think it is rotator cuff hopefully she will improve with physical therapy if she fails to improve could consider arthroscopic management

## 2021-03-19 DIAGNOSIS — M54.50 ACUTE RIGHT-SIDED LOW BACK PAIN WITHOUT SCIATICA: ICD-10-CM

## 2021-03-19 RX ORDER — BACLOFEN 10 MG/1
10 TABLET ORAL NIGHTLY PRN
Qty: 90 TABLET | Refills: 0 | Status: SHIPPED | OUTPATIENT
Start: 2021-03-19 | End: 2022-03-03

## 2021-03-22 ENCOUNTER — TELEPHONE (OUTPATIENT)
Dept: PHYSICAL THERAPY | Facility: CLINIC | Age: 56
End: 2021-03-22

## 2021-03-23 ENCOUNTER — OFFICE VISIT (OUTPATIENT)
Dept: ORTHOPEDIC SURGERY | Facility: CLINIC | Age: 56
End: 2021-03-23

## 2021-03-23 VITALS — WEIGHT: 240 LBS | TEMPERATURE: 98.2 F | BODY MASS INDEX: 37.67 KG/M2 | HEIGHT: 67 IN

## 2021-03-23 DIAGNOSIS — M75.41 IMPINGEMENT SYNDROME OF RIGHT SHOULDER: Primary | ICD-10-CM

## 2021-03-23 PROCEDURE — 99213 OFFICE O/P EST LOW 20 MIN: CPT | Performed by: ORTHOPAEDIC SURGERY

## 2021-03-24 ENCOUNTER — TELEPHONE (OUTPATIENT)
Dept: PHYSICAL THERAPY | Facility: CLINIC | Age: 56
End: 2021-03-24

## 2021-03-25 ENCOUNTER — TELEMEDICINE (OUTPATIENT)
Dept: FAMILY MEDICINE CLINIC | Facility: CLINIC | Age: 56
End: 2021-03-25

## 2021-03-25 DIAGNOSIS — J01.90 ACUTE SINUSITIS, RECURRENCE NOT SPECIFIED, UNSPECIFIED LOCATION: Primary | ICD-10-CM

## 2021-03-25 PROCEDURE — 99212 OFFICE O/P EST SF 10 MIN: CPT | Performed by: NURSE PRACTITIONER

## 2021-03-25 RX ORDER — DOXYCYCLINE 100 MG/1
100 CAPSULE ORAL 2 TIMES DAILY
Qty: 20 CAPSULE | Refills: 0 | Status: SHIPPED | OUTPATIENT
Start: 2021-03-25 | End: 2021-04-04

## 2021-03-25 NOTE — PROGRESS NOTES
Chief Complaint  Sinusitis  You have chosen to receive care through a telehealth visit.  Do you consent to use a video/audio connection for your medical care today? Yes via [employer]   Subjective          Urvashi Ornelas presents to [facility]  Sinusitis  This is a new problem. The current episode started in the past 7 days. There has been no fever. Associated symptoms include congestion, sinus pressure and a sore throat. Pertinent negatives include no coughing, ear pain or shortness of breath. Past treatments include acetaminophen. The treatment provided mild relief.       Objective   Vital Signs:   There were no vitals taken for this visit.    Physical Exam  Constitutional:       Appearance: Normal appearance.   Neurological:      Mental Status: She is alert.   Psychiatric:         Mood and Affect: Mood normal.        Result Review :            Assessment and Plan    Diagnoses and all orders for this visit:    1. Acute sinusitis, recurrence not specified, unspecified location (Primary)  -     doxycycline (MONODOX) 100 MG capsule; Take 1 capsule by mouth 2 (Two) Times a Day for 10 days.  Dispense: 20 capsule; Refill: 0      I spent 15 minutes caring for Urvashi on this date of service. This time includes time spent by me in the following activities:counseling and educating the patient/family/caregiver, ordering medications, tests, or procedures and documenting information in the medical record  Follow Up   Return if symptoms worsen or fail to improve.  Patient was given instructions and counseling regarding her condition or for health maintenance advice. Please see specific information pulled into the AVS if appropriate.

## 2021-03-26 ENCOUNTER — TELEPHONE (OUTPATIENT)
Dept: ENDOCRINOLOGY | Age: 56
End: 2021-03-26

## 2021-03-31 ENCOUNTER — TELEPHONE (OUTPATIENT)
Dept: PHYSICAL THERAPY | Facility: CLINIC | Age: 56
End: 2021-03-31

## 2021-03-31 ENCOUNTER — IMMUNIZATION (OUTPATIENT)
Dept: VACCINE CLINIC | Facility: HOSPITAL | Age: 56
End: 2021-03-31

## 2021-03-31 PROCEDURE — 91300 HC SARSCOV02 VAC 30MCG/0.3ML IM: CPT | Performed by: INTERNAL MEDICINE

## 2021-03-31 PROCEDURE — 0002A: CPT | Performed by: INTERNAL MEDICINE

## 2021-04-02 ENCOUNTER — TELEPHONE (OUTPATIENT)
Dept: ENDOCRINOLOGY | Age: 56
End: 2021-04-02

## 2021-04-02 ENCOUNTER — OFFICE VISIT (OUTPATIENT)
Dept: ENDOCRINOLOGY | Age: 56
End: 2021-04-02

## 2021-04-02 VITALS — WEIGHT: 240 LBS | HEIGHT: 67 IN | BODY MASS INDEX: 37.67 KG/M2

## 2021-04-02 DIAGNOSIS — E89.0 POSTSURGICAL HYPOTHYROIDISM: Primary | ICD-10-CM

## 2021-04-02 DIAGNOSIS — C73 THYROID CANCER (HCC): ICD-10-CM

## 2021-04-02 PROCEDURE — 99441 PR PHYS/QHP TELEPHONE EVALUATION 5-10 MIN: CPT | Performed by: NURSE PRACTITIONER

## 2021-04-02 NOTE — PROGRESS NOTES
"Chief Complaint  Thyroid Cancer    You have chosen to receive care through a telephone visit. Do you consent to use a telephone visit for your medical care today? Yes    Subjective          Urvashi Ornelas presents to [facility] END[facility]CRINOLOG[facility]  History of Present Illness  Urvashi Ornelas,55 y.o. white female is here as a f/u patient for the management of metastatic papillary thyroid cancer status post total thyroidectomy (2016) and radioactive iodine ablation.  Consulted by Dr. Ashby.     \" The surgical pathology was consistent with multifocal papillary carcinoma follicular variant with infiltration.\"   patient underwent left neck dissection in April 2016 by Dr. Ashby.  Surgical pathology was consistent with metastatic papillary carcinoma.\"     Patient is no longer being followed by Dr. Carney from radiation oncology but is s/p radioactive iodine ablation and whole-body scan x 2     She missed her neck u/s in 10/2020. She is going to f/u with Dr Ashby to get this done    Current dose is 225mcg daily.  Lab Results   Component Value Date    TSH 1.210 03/15/2021     Hair loss has improved  Energy levels are better  C/o constipation relieved with miralax     Objective   Vital Signs:   Ht 170.2 cm (67\")   Wt 109 kg (240 lb)   BMI 37.59 kg/m²     Physical Exam   Alert and oriented  No apparent distress    Result Review :   The following data was reviewed by: LALO Corley on 04/02/2021:  Common labs    Common Labsle 8/10/20 8/10/20 8/10/20 1/21/21 1/21/21 3/15/21 3/15/21 3/15/21    1103 1103 1103 1619 1619 1055 1055 1055   Glucose  106 (A)   116 (A) 97     BUN  5 (A)   12 9     Creatinine  1.06 (A)   1.06 (A) 1.07 (A)     eGFR Non African Am  54 (A)   54 (A) 59 (A)     eGFR  Am  66   66 68     Sodium  142   141 145 (A)     Potassium  3.4 (A)   3.9 4.1     Chloride  104   107 109 (A)     Calcium  8.2 (A)   8.6 9.2     Total Protein     6.4      Albumin     3.80      Total Bilirubin     <0.2    "   Alkaline Phosphatase     95      AST (SGOT)     25      ALT (SGPT)     31      WBC    6.71       Hemoglobin    14.4       Hematocrit    44.2       Platelets    210       Total Cholesterol 114      165    Triglycerides 131      211 (A)    HDL Cholesterol 35 (A)      47    LDL Cholesterol  53      83    Hemoglobin A1C   6.00 (A)     6.0 (A)   (A) Abnormal value       Comments are available for some flowsheets but are not being displayed.                     Assessment and Plan    Diagnoses and all orders for this visit:    1. Postsurgical hypothyroidism (Primary)  -     TSH; Future  -     T4, Free; Future  -     Thyroglobulin Antibody and Thyroglobulin, PROSPER or LC/MS-MS; Future    2. Thyroid cancer (CMS/HCC)  -     TSH; Future  -     T4, Free; Future  -     Thyroglobulin Antibody and Thyroglobulin, PROSPER or LC/MS-MS; Future        Follow Up   Return in about 6 months (around 10/2/2021).   Will review labs with Dr Mora and adjust thyroid dose as appropriate  Patient will get thyroid ultrasound  Needs close monitoring to reduce risk of complications from over or under treatment with thyroid hormone replacement    Patient was given instructions and counseling regarding her condition or for health maintenance advice. Please see specific information pulled into the AVS if appropriate.     8 minutes on the phone with the patient    LALO Corley

## 2021-04-05 ENCOUNTER — TREATMENT (OUTPATIENT)
Dept: PHYSICAL THERAPY | Facility: CLINIC | Age: 56
End: 2021-04-05

## 2021-04-05 DIAGNOSIS — M54.50 CHRONIC RIGHT-SIDED LOW BACK PAIN WITHOUT SCIATICA: Primary | ICD-10-CM

## 2021-04-05 DIAGNOSIS — G89.29 CHRONIC RIGHT-SIDED LOW BACK PAIN WITHOUT SCIATICA: Primary | ICD-10-CM

## 2021-04-05 DIAGNOSIS — R26.2 DIFFICULTY WALKING: ICD-10-CM

## 2021-04-05 PROCEDURE — 97110 THERAPEUTIC EXERCISES: CPT | Performed by: PHYSICAL THERAPIST

## 2021-04-05 PROCEDURE — 97032 APPL MODALITY 1+ESTIM EA 15: CPT | Performed by: PHYSICAL THERAPIST

## 2021-04-05 NOTE — PROGRESS NOTES
Physical Therapy Daily Progress Note    Patient: Urvashi Ornelas   : 1965  Diagnosis/ICD-10 Code:  Chronic right-sided low back pain without sciatica [M54.5, G89.29]  Referring practitioner: LALO Monge  Date of Initial Visit: Type: THERAPY  Noted: 3/8/2021  Today's Date: 2021  Patient seen for 2 sessions           Subjective   Patient reports the home exercises were making her pain worse so she stopped doing them.    Objective     See Exercise, Manual, and Modality Logs for complete treatment.     EXERCISES:  -SKTC with stretching strap, 20 sec x 4 B  -LTR 20x  -PPT 20x  -Modified piriformis stretch, 3x20 sec B  -Supine marches 20x with TA contraction  -Bridges 20x  -SLR x20  -Sidelying clamshell x20  -1K, 120 lbs, 3x10  -6K, 40 lbs, 2x10    Assessment/Plan  Attempted to warm up on recumbent bike but patient had a significant increase in back pain. Had her perform mat exercises with TENS unit on back which helped make the exercises more tolerable.  When placing electrodes on low back noted significant bruising along patient's R sided low back which she said she was unaware of. Showed patient a picture of the bruising an she contributed it to her heating pad. Advised her to discontinue use of heating pad for a few days to see if skin changes improve.        Timed:    Manual Therapy:         mins  49844;  Therapeutic Exercise:    40     mins  74353;     Neuromuscular Daisy:        mins  44264;    Therapeutic Activity:          mins  75340;     Gait Training:           mins  82984;     Ultrasound:          mins  63913;    Electrical Stimulation:         mins  85124 ( );  Iontophoresis         mins 58528;  Aquatic Therapy         mins 41595;  Dry Needling                   mins    Untimed:  Electrical Stimulation:    20     mins  03962 ( );  Mechanical Traction:         mins  27146;     Timed Treatment:   40   mins   Total Treatment:     40   mins  Mireya Carney PT  Physical Therapist

## 2021-04-07 RX ORDER — LEVOTHYROXINE SODIUM 0.2 MG/1
200 TABLET ORAL EVERY MORNING
Qty: 90 TABLET | Refills: 1 | Status: SHIPPED | OUTPATIENT
Start: 2021-04-07 | End: 2021-04-07 | Stop reason: SDUPTHER

## 2021-04-07 RX ORDER — LEVOTHYROXINE SODIUM 0.03 MG/1
25 TABLET ORAL EVERY MORNING
Qty: 117 TABLET | Refills: 1 | Status: SHIPPED | OUTPATIENT
Start: 2021-04-07 | End: 2021-07-20

## 2021-04-07 RX ORDER — LEVOTHYROXINE SODIUM 0.2 MG/1
200 TABLET ORAL EVERY MORNING
Qty: 90 TABLET | Refills: 1 | Status: SHIPPED | OUTPATIENT
Start: 2021-04-07 | End: 2021-07-21

## 2021-04-07 RX ORDER — LEVOTHYROXINE SODIUM 0.03 MG/1
25 TABLET ORAL EVERY MORNING
Qty: 90 TABLET | Refills: 1 | Status: SHIPPED | OUTPATIENT
Start: 2021-04-07 | End: 2021-04-07 | Stop reason: SDUPTHER

## 2021-04-13 ENCOUNTER — APPOINTMENT (OUTPATIENT)
Dept: BONE DENSITY | Facility: HOSPITAL | Age: 56
End: 2021-04-13

## 2021-04-13 ENCOUNTER — APPOINTMENT (OUTPATIENT)
Dept: MAMMOGRAPHY | Facility: HOSPITAL | Age: 56
End: 2021-04-13

## 2021-04-16 ENCOUNTER — OFFICE VISIT (OUTPATIENT)
Dept: FAMILY MEDICINE CLINIC | Facility: CLINIC | Age: 56
End: 2021-04-16

## 2021-04-16 VITALS
HEIGHT: 67 IN | DIASTOLIC BLOOD PRESSURE: 82 MMHG | HEART RATE: 101 BPM | BODY MASS INDEX: 39.83 KG/M2 | SYSTOLIC BLOOD PRESSURE: 116 MMHG | OXYGEN SATURATION: 95 % | TEMPERATURE: 97.5 F | WEIGHT: 253.8 LBS

## 2021-04-16 DIAGNOSIS — R35.0 FREQUENCY OF URINATION: Primary | ICD-10-CM

## 2021-04-16 LAB
BILIRUB BLD-MCNC: NEGATIVE MG/DL
CLARITY, POC: ABNORMAL
COLOR UR: ABNORMAL
GLUCOSE UR STRIP-MCNC: NEGATIVE MG/DL
KETONES UR QL: NEGATIVE
LEUKOCYTE EST, POC: ABNORMAL
NITRITE UR-MCNC: NEGATIVE MG/ML
PH UR: 6.5 [PH] (ref 5–8)
PROT UR STRIP-MCNC: ABNORMAL MG/DL
RBC # UR STRIP: ABNORMAL /UL
SP GR UR: 1.01 (ref 1–1.03)
UROBILINOGEN UR QL: NORMAL

## 2021-04-16 PROCEDURE — 81003 URINALYSIS AUTO W/O SCOPE: CPT | Performed by: NURSE PRACTITIONER

## 2021-04-16 PROCEDURE — 99212 OFFICE O/P EST SF 10 MIN: CPT | Performed by: NURSE PRACTITIONER

## 2021-04-16 RX ORDER — SULFAMETHOXAZOLE AND TRIMETHOPRIM 800; 160 MG/1; MG/1
1 TABLET ORAL 2 TIMES DAILY
Qty: 14 TABLET | Refills: 0 | Status: SHIPPED | OUTPATIENT
Start: 2021-04-16 | End: 2021-04-23

## 2021-04-16 NOTE — PROGRESS NOTES
"Chief Complaint  Urinary Frequency (urine is pink colored at times) and Back Pain    Subjective     {Problem List  Visit Diagnosis   Encounters  Notes  Medications  Labs  Result Review Imaging  Media :23}     Urvashi Ornelas presents to [facility]  Urinary Frequency   This is a new problem. The current episode started in the past 7 days. The patient is experiencing no pain. There has been no fever. Associated symptoms include frequency. Pertinent negatives include no discharge, flank pain, hematuria, nausea or vomiting. She has tried nothing for the symptoms.       Objective   Vital Signs:   /82   Pulse 101   Temp 97.5 °F (36.4 °C) (Temporal)   Ht 170.2 cm (67\")   Wt 115 kg (253 lb 12.8 oz)   SpO2 95%   BMI 39.75 kg/m²     Physical Exam  Vitals and nursing note reviewed.   Constitutional:       Appearance: Normal appearance.   Cardiovascular:      Rate and Rhythm: Normal rate and regular rhythm.      Heart sounds: Normal heart sounds.   Pulmonary:      Effort: Pulmonary effort is normal.      Breath sounds: Normal breath sounds.   Abdominal:      General: Bowel sounds are normal.      Palpations: Abdomen is soft.      Tenderness: There is no right CVA tenderness or left CVA tenderness.   Skin:     General: Skin is warm and dry.   Neurological:      Mental Status: She is alert and oriented to person, place, and time.   Psychiatric:         Mood and Affect: Mood normal.        Result Review :   The following data was reviewed by: LALO Monge on 04/16/2021:  UA    Urinalysis 1/21/21 4/16/21   Ketones, UA Negative Negative   Leukocytes, UA Small (1+) (A) Trace (A)   (A) Abnormal value                   Assessment and Plan    Diagnoses and all orders for this visit:    1. Frequency of urination (Primary)  -     POCT urinalysis dipstick, automated  -     Urine Culture - Urine, Urine, Clean Catch  -     sulfamethoxazole-trimethoprim (Bactrim DS) 800-160 MG per " tablet; Take 1 tablet by mouth 2 (Two) Times a Day for 7 days.  Dispense: 14 tablet; Refill: 0      I spent 15 minutes caring for Urvashi on this date of service. This time includes time spent by me in the following activities:reviewing tests, performing a medically appropriate examination and/or evaluation , counseling and educating the patient/family/caregiver, ordering medications, tests, or procedures and documenting information in the medical record  Follow Up   Return if symptoms worsen or fail to improve.  Patient was given instructions and counseling regarding her condition or for health maintenance advice. Please see specific information pulled into the AVS if appropriate.

## 2021-04-18 LAB
BACTERIA UR CULT: NORMAL
BACTERIA UR CULT: NORMAL

## 2021-04-26 NOTE — PROGRESS NOTES
Patient: Urvashi Ornelas  YOB: 1965  Date of Service: 4/26/2021    Chief Complaints: Right shoulder pain    Subjective:    History of Present Illness: Pt is seen in the office today with complaints of right shoulder pain we have attempted conservative management by MRI it looks like she may have some labral pathology she states it still bothers her when she does anything active anything overhead.          Allergies:   Allergies   Allergen Reactions   • Depakote Er [Divalproex Sodium Er] Other (See Comments)     HAIR FALLS OUT   • Divalproex Sodium Unknown (See Comments)   • Lamictal [Lamotrigine] Swelling and Other (See Comments)     TONGUE SWELLS AND PEELS   • Risperidone Unknown (See Comments)   • Risperidone And Related Other (See Comments)     PREGNANCY SYMPTOMS   • Valproic Acid Unknown (See Comments)      Unknown      • Erythromycin Rash   • Penicillins Rash   • Toradol [Ketorolac Tromethamine] Rash       Medications:   Home Medications:  Current Outpatient Medications on File Prior to Visit   Medication Sig   • albuterol (PROVENTIL HFA;VENTOLIN HFA) 108 (90 BASE) MCG/ACT inhaler Inhale 2 puffs every 4 (four) hours as needed for wheezing.   • albuterol (PROVENTIL) (2.5 MG/3ML) 0.083% nebulizer solution Take 2.5 mg by nebulization Every 6 (Six) Hours As Needed for Wheezing or Shortness of Air.   • atorvastatin (LIPITOR) 40 MG tablet TAKE 1 TABLET BY MOUTH EVERY DAY IN THE MORNING   • baclofen (LIORESAL) 10 MG tablet TAKE 1 TABLET BY MOUTH AT NIGHT AS NEEDED FOR MUSCLE SPASMS.   • benztropine (COGENTIN) 1 MG tablet Take 2 mg by mouth 2 (Two) Times a Day.   • budesonide-formoterol (SYMBICORT) 80-4.5 MCG/ACT inhaler Inhale 2 puffs 2 (Two) Times a Day.   • buPROPion XL (WELLBUTRIN XL) 150 MG 24 hr tablet Take 150 mg by mouth Daily.   • buPROPion XL (WELLBUTRIN XL) 300 MG 24 hr tablet    • busPIRone (BUSPAR) 30 MG tablet TK 1 T PO  bid   • cloZAPine (CLOZARIL) 100 MG tablet Take 150 mg by mouth  Every Night.   • gabapentin (NEURONTIN) 100 MG capsule    • levocetirizine (XYZAL) 5 MG tablet Take 5 mg by mouth Daily.   • levothyroxine (SYNTHROID, LEVOTHROID) 200 MCG tablet Take 1 tablet by mouth Every Morning.   • levothyroxine (SYNTHROID, LEVOTHROID) 25 MCG tablet Take 1 tablet by mouth Every Morning. 1 tablet 5 day week and 2 tablet 2 days week   • Multiple Vitamins-Minerals (MULTIVITAMIN WOMEN 50+) tablet Take 1 tablet/day by mouth.   • ondansetron (Zofran) 4 MG tablet Take 1 tablet by mouth Every 8 (Eight) Hours As Needed for Nausea or Vomiting.   • polyethylene glycol (MIRALAX) 17 GM/SCOOP powder Take 17 g by mouth Daily As Needed (constipation).   • prazosin (MINIPRESS) 1 MG capsule    • SAPHRIS 2.5 MG sublingual tablet    • SAPHRIS 5 MG sublingual tablet sublingual tablet 2 (Two) Times a Day.   • SUMAtriptan-naproxen (TREXIMET)  MG per tablet TK 1 T PO  PRF MIGRAINE. MAX 4 TS PER WEEK.   • topiramate (TOPAMAX) 200 MG tablet Take 200 mg by mouth 2 (Two) Times a Day.   • Valbenazine Tosylate 40 MG capsule Take  by mouth.     No current facility-administered medications on file prior to visit.     Current Medications:  Scheduled Meds:  Continuous Infusions:No current facility-administered medications for this visit.    PRN Meds:.    I have reviewed the patient's medical history in detail and updated the computerized patient record.  Review and summarization of old records include:    Past Medical History:   Diagnosis Date   • Abnormal urinalysis    • Acute bronchitis    • Acute frontal sinusitis    • Acute maxillary sinusitis    • Acute pain of right shoulder    • Acute sinusitis    • Allergic rhinitis    • Anemia    • Anxiety    • Arthritis    • Asthma    • Asthma exacerbation    • BMI 40.0-44.9, adult (CMS/[facility])    • Cancer (CMS/[facility])     THYROID   • Constipation    • Cough    • Depression    • Diaphragm paralysis    • Disease of thyroid gland     CANCER   • Dizziness    • Drug-induced nausea and  vomiting    • Dyspnea    • Dysuria    • Fatigue    • Fatty liver    • H/O degenerative disc disease    • History of hallucinations    • Hypercalcemia    • Hyperlipidemia    • Hypertension    • Hypertensive kidney disease with chronic kidney disease stage III (CMS/HCC)    • Hypertrophic toenail    • Hypokalemia    • Hypothyroidism    • Joint pain    • Lumbago    • Lymph node cancer (CMS/HCC)    • Manic episode without psychotic symptoms (CMS/HCC)    • Medicare annual wellness visit, subsequent    • Migraine    • Migraine with aura    • Nausea    • Osteopenia of lumbar spine    • Overweight    • Pleurisy    • Pneumonia    • Post traumatic stress disorder    • Postmenopausal    • Rheumatoid arthritis (CMS/HCC)    • Right knee pain    • Shortness of breath    • Shoulder tendinitis    • Sleep apnea    • Thyroid disease    • Urinary frequency         Past Surgical History:   Procedure Laterality Date   • BRONCHOSCOPY N/A 2020    Procedure: BRONCHOSCOPY with fluroscopy, bal and biopsies;  Surgeon: Yonis Sweeney MD;  Location: [facility] ENDOSCOPY;  Service: Pulmonary;  Laterality: N/A;  consolidation left lower lobe  consolidation left lower lobe   •  SECTION      X2   • CHOLECYSTECTOMY     • HAND SURGERY Bilateral     WRISTS PLATE PLACEMENT   • HEMATOMA EVACUATION HEAD/NECK Left 2016    Procedure: HEMATOMA EVACUATION NECK;  Surgeon: Dayo Ashby MD;  [place] [facility] [place] [facility] [place]  Service:    • HYSTERECTOMY     • KNEE SURGERY Bilateral     6YO PIGEON TOE SX   • LUNG SURGERY Left     LEFT LOWER LOBE   • NECK DISSECTION Left 2016    Procedure: LEFT MODIFIED RADICAL NECK DISSECTION;  Surgeon: Dayo Ashby MD;  [place] [facility] [place] [facility] [place]  Service:    • NEPHRECTOMY PARTIAL Left    • PARATHYROID GLAND SURGERY         • TOTAL THYROIDECTOMY      PARATHYROID 2016        Social History     Occupational History   • Not on file   Tobacco Use   • Smoking status: Former Smoker     Quit date: 2009     Years  since quittin.3   • Smokeless tobacco: Never Used   Vaping Use   • Vaping Use: Never used   Substance and Sexual Activity   • Alcohol use: No   • Drug use: No   • Sexual activity: Defer      Social History     Social History Narrative   • Not on file        Family History   Problem Relation Age of Onset   • Hyperlipidemia Mother    • Hypertension Mother    • Osteoarthritis Mother    • Arthritis Mother         rheumatoid   • Other Mother         acute cutaneous lupus erythematosus   • Bipolar disorder Father    • Hypertension Father    • Bipolar disorder Brother    • HIV Brother    • Alcohol abuse Paternal Grandfather    • Hypertension Paternal Grandfather        ROS: 14 point review of systems was performed and was negative except for documented findings in HPI and today's encounter.     Allergies:   Allergies   Allergen Reactions   • Depakote Er [Divalproex Sodium Er] Other (See Comments)     HAIR FALLS OUT   • Divalproex Sodium Unknown (See Comments)   • Lamictal [Lamotrigine] Swelling and Other (See Comments)     TONGUE SWELLS AND PEELS   • Risperidone Unknown (See Comments)   • Risperidone And Related Other (See Comments)     PREGNANCY SYMPTOMS   • Valproic Acid Unknown (See Comments)      Unknown      • Erythromycin Rash   • Penicillins Rash   • Toradol [Ketorolac Tromethamine] Rash     Constitutional:  Denies fever, shaking or chills   Eyes:  Denies change in visual acuity   HENT:  Denies nasal congestion or sore throat   Respiratory:  Denies cough or shortness of breath   Cardiovascular:  Denies chest pain or severe LE edema   GI:  Denies abdominal pain, nausea, vomiting, bloody stools or diarrhea   Musculoskeletal:  Numbness, tingling, or loss of motor function only as noted above in history of present illness.  : Denies painful urination or hematuria  Integument:  Denies rash, lesion or ulceration   Neurologic:  Denies headache or focal weakness  Endocrine:  Denies lymphadenopathy  Psych:  Denies  confusion or change in mental status   Hem:  Denies active bleeding      Physical Exam: 55 y.o. female  Wt Readings from Last 3 Encounters:   04/16/21 115 kg (253 lb 12.8 oz)   04/02/21 109 kg (240 lb)   03/23/21 109 kg (240 lb)       There is no height or weight on file to calculate BMI.  No height and weight on file for this encounter.  There were no vitals filed for this visit.  Vital signs reviewed.   General Appearance:    Alert, cooperative, in no acute distress                    Ortho exam  Physical exam of the right shoulder reveals no overlying skin changes no lymphedema no lymphadenopathy.  Patient has active flexion 180 with mild symptoms abduction is similar external rotation is to 50 and internal rotation to the upper lumbar spine with mild symptoms.  Patient has good rotator cuff strength 4+ over 5 with isometric strength testing with pain.  Patient has a positive impingement and a positive Mark sign.  Patient has good cervical range of motion which is full and asymptomatic no radicular symptoms.  Patient has a normal elbow exam.  Good distal pulses are present  Patient has pain with overhead activity and a positive Neer sign and a positive empty can sign , a positive drop arm and a definitive painful arc  She does have some pain with stressing of her labrum       .time    Assessment: Right shoulder pain is really could be labral in origin we talked about options including arthroscopy she wants to think about it a little bit she does not have a lot of help at home and we need to get some aid to help her for the first day or so we talked about the surgery what it would entail limitations etc. she understands that she does want to think about this a little bit we did talk about risk benefits and alternatives as well    Plan: Plan is as above follow up as indicated.  Ice, elevate, and rest as needed.  Discussed conservative measures of pain control including ice, bracing.  Also talked about the  importance of strengthening and maintaining ideal body weight    Eleni Jimenez M.D.

## 2021-04-27 ENCOUNTER — DOCUMENTATION (OUTPATIENT)
Dept: PHYSICAL THERAPY | Facility: CLINIC | Age: 56
End: 2021-04-27

## 2021-04-27 ENCOUNTER — OFFICE VISIT (OUTPATIENT)
Dept: ORTHOPEDIC SURGERY | Facility: CLINIC | Age: 56
End: 2021-04-27

## 2021-04-27 VITALS — TEMPERATURE: 98.4 F | HEIGHT: 67 IN | BODY MASS INDEX: 38.45 KG/M2 | WEIGHT: 245 LBS

## 2021-04-27 DIAGNOSIS — R26.2 DIFFICULTY WALKING: ICD-10-CM

## 2021-04-27 DIAGNOSIS — G89.29 CHRONIC RIGHT-SIDED LOW BACK PAIN WITHOUT SCIATICA: Primary | ICD-10-CM

## 2021-04-27 DIAGNOSIS — S43.431D TEAR OF RIGHT GLENOID LABRUM, SUBSEQUENT ENCOUNTER: Primary | ICD-10-CM

## 2021-04-27 DIAGNOSIS — M75.41 IMPINGEMENT SYNDROME OF RIGHT SHOULDER: ICD-10-CM

## 2021-04-27 DIAGNOSIS — M54.50 CHRONIC RIGHT-SIDED LOW BACK PAIN WITHOUT SCIATICA: Primary | ICD-10-CM

## 2021-04-27 PROCEDURE — 99213 OFFICE O/P EST LOW 20 MIN: CPT | Performed by: ORTHOPAEDIC SURGERY

## 2021-04-27 NOTE — PROGRESS NOTES
Discharge Summary  Discharge Summary from Physical Therapy Report      Dates  PT visit: 3/8/21-4/5/21  Number of Visits: 2     Goals: Not Met    Short Term Goals for completion in 30 days:   -Patient will report a reduction in pain for 12-24 hours or greater following aquatic therapy session  -Patient will demonstrate good core activation with PPT to help stabilize spine  -Patient will report increased standing tolerance from 5 min to 10 min or greater to allow patient to complete ADLs such as cooking without pain/discomfort    LTGs for completion within 90 days:  -Patient will improve score on Oswestry by 20% or greater to improve quality of life (52% disability at eval)  -Patient will increase walking tolerance from 5 min to 15 min or greater to allow for patient to walk for leisure/exercise  -Patient will increase R LE hip strength to 4/5 or greater to increase LE stability during gait    Discharge Plan: Patient to return to referring/providing physician    Comments : Significant increase in back pain with all therapy exercises. Utilized TENs unit with PT to help patient tolerate session. Recommend return to MD for further treatment since even simple therapy exercises were not well tolerated.     Date of Discharge : 4/27/21        Mireya Carney, PT  Physical Therapist

## 2021-04-30 RX ORDER — LEVOCETIRIZINE DIHYDROCHLORIDE 5 MG/1
5 TABLET, FILM COATED ORAL DAILY
Qty: 90 TABLET | Refills: 0 | Status: SHIPPED | OUTPATIENT
Start: 2021-04-30 | End: 2021-08-11

## 2021-04-30 RX ORDER — POLYETHYLENE GLYCOL 3350 17 G/17G
POWDER, FOR SOLUTION ORAL
Qty: 510 G | Refills: 0 | Status: SHIPPED | OUTPATIENT
Start: 2021-04-30 | End: 2021-06-04

## 2021-05-04 ENCOUNTER — OFFICE VISIT (OUTPATIENT)
Dept: FAMILY MEDICINE CLINIC | Facility: CLINIC | Age: 56
End: 2021-05-04

## 2021-05-04 VITALS
HEIGHT: 67 IN | WEIGHT: 249.4 LBS | TEMPERATURE: 98.4 F | BODY MASS INDEX: 39.15 KG/M2 | OXYGEN SATURATION: 94 % | HEART RATE: 99 BPM | DIASTOLIC BLOOD PRESSURE: 80 MMHG | SYSTOLIC BLOOD PRESSURE: 115 MMHG

## 2021-05-04 DIAGNOSIS — R30.0 DYSURIA: Primary | ICD-10-CM

## 2021-05-04 LAB
BILIRUB BLD-MCNC: NEGATIVE MG/DL
CLARITY, POC: CLEAR
COLOR UR: YELLOW
GLUCOSE UR STRIP-MCNC: NEGATIVE MG/DL
KETONES UR QL: NEGATIVE
LEUKOCYTE EST, POC: NEGATIVE
NITRITE UR-MCNC: NEGATIVE MG/ML
PH UR: 6.5 [PH] (ref 5–8)
PROT UR STRIP-MCNC: NEGATIVE MG/DL
RBC # UR STRIP: ABNORMAL /UL
SP GR UR: 1.01 (ref 1–1.03)
UROBILINOGEN UR QL: NORMAL

## 2021-05-04 PROCEDURE — 99212 OFFICE O/P EST SF 10 MIN: CPT | Performed by: NURSE PRACTITIONER

## 2021-05-04 PROCEDURE — 81003 URINALYSIS AUTO W/O SCOPE: CPT | Performed by: NURSE PRACTITIONER

## 2021-05-04 RX ORDER — ONDANSETRON 4 MG/1
4 TABLET, FILM COATED ORAL EVERY 8 HOURS PRN
Qty: 12 TABLET | Refills: 0 | Status: SHIPPED | OUTPATIENT
Start: 2021-05-04 | End: 2021-05-21

## 2021-05-04 RX ORDER — VALBENAZINE 80 MG/1
80 CAPSULE ORAL DAILY
COMMUNITY
Start: 2021-05-03 | End: 2023-02-19 | Stop reason: HOSPADM

## 2021-05-04 RX ORDER — SULFAMETHOXAZOLE AND TRIMETHOPRIM 800; 160 MG/1; MG/1
1 TABLET ORAL 2 TIMES DAILY
Qty: 6 TABLET | Refills: 0 | Status: SHIPPED | OUTPATIENT
Start: 2021-05-04 | End: 2021-05-07

## 2021-05-04 RX ORDER — PHENAZOPYRIDINE HYDROCHLORIDE 200 MG/1
200 TABLET, FILM COATED ORAL 3 TIMES DAILY PRN
Qty: 6 TABLET | Refills: 0 | Status: SHIPPED | OUTPATIENT
Start: 2021-05-04 | End: 2021-05-06

## 2021-05-04 NOTE — PROGRESS NOTES
Subjective   Urvashi Ornelas is a 55 y.o. female.     Chief Complaint   Patient presents with   • Difficulty Urinating       Difficulty Urinating  This is a new problem. The current episode started 1 to 4 weeks ago. Associated symptoms include nausea. Pertinent negatives include no abdominal pain, change in bowel habit, fever or vomiting. Nothing aggravates the symptoms. She has tried acetaminophen for the symptoms. The treatment provided no relief.      The following portions of the patient's history were reviewed and updated as appropriate: allergies, current medications, past family history, past medical history, past social history, past surgical history and problem list.    Past Medical History:   Diagnosis Date   • Abnormal urinalysis    • Acute bronchitis    • Acute frontal sinusitis    • Acute maxillary sinusitis    • Acute pain of right shoulder    • Acute sinusitis    • Allergic rhinitis    • Anemia    • Anxiety    • Arthritis    • Asthma    • Asthma exacerbation    • BMI 40.0-44.9, adult (CMS/[facility])    • Cancer (CMS/[facility])     THYROID   • Constipation    • Cough    • Depression    • Diaphragm paralysis    • Disease of thyroid gland     CANCER   • Dizziness    • Drug-induced nausea and vomiting    • Dyspnea    • Dysuria    • Fatigue    • Fatty liver    • H/O degenerative disc disease    • History of hallucinations    • Hypercalcemia    • Hyperlipidemia    • Hypertension    • Hypertensive kidney disease with chronic kidney disease stage III (CMS/[facility])    • Hypertrophic toenail    • Hypokalemia    • Hypothyroidism    • Joint pain    • Lumbago    • Lymph node cancer (CMS/[facility])    • Manic episode without psychotic symptoms (CMS/[facility])    • Medicare annual wellness visit, subsequent    • Migraine    • Migraine with aura    • Nausea    • Osteopenia of lumbar spine    • Overweight    • Pleurisy    • Pneumonia    • Post traumatic stress disorder    • Postmenopausal    • Rheumatoid arthritis (CMS/[facility])    • Right knee pain     • Shortness of breath    • Shoulder tendinitis    • Sleep apnea    • Thyroid disease    • Urinary frequency        Past Surgical History:   Procedure Laterality Date   • BRONCHOSCOPY N/A 2020    Procedure: BRONCHOSCOPY with fluroscopy, bal and biopsies;  Surgeon: Yonis Sweeney MD;  Location: [facility] ENDOSCOPY;  Service: Pulmonary;  Laterality: N/A;  consolidation left lower lobe  consolidation left lower lobe   •  SECTION      X2   • CHOLECYSTECTOMY     • HAND SURGERY Bilateral     WRISTS PLATE PLACEMENT   • HEMATOMA EVACUATION HEAD/NECK Left 2016    Procedure: HEMATOMA EVACUATION NECK;  Surgeon: Dayo Ashby MD;  Location: [facility] OR [facility];  Service:    • HYSTERECTOMY     • KNEE SURGERY Bilateral     4YO PIGEON TOE SX   • LUNG SURGERY Left     LEFT LOWER LOBE   • NECK DISSECTION Left 2016    Procedure: LEFT MODIFIED RADICAL NECK DISSECTION;  Surgeon: Dayo Ashby MD;  [place] [facility] [place] [facility] [place]  Service:    • NEPHRECTOMY PARTIAL Left    • PARATHYROID GLAND SURGERY         • TOTAL THYROIDECTOMY      PARATHYROID 2016       Family History   Problem Relation Age of Onset   • Hyperlipidemia Mother    • Hypertension Mother    • Osteoarthritis Mother    • Arthritis Mother         rheumatoid   • Other Mother         acute cutaneous lupus erythematosus   • Bipolar disorder Father    • Hypertension Father    • Bipolar disorder Brother    • HIV Brother    • Alcohol abuse Paternal Grandfather    • Hypertension Paternal Grandfather        Social History     Socioeconomic History   • Marital status:      Spouse name: Not on file   • Number of children: Not on file   • Years of education: Not on file   • Highest education level: Not on file   Tobacco Use   • Smoking status: Former Smoker     Quit date:      Years since quittin.3   • Smokeless tobacco: Never Used   Vaping Use   • Vaping Use: Never used   Substance and Sexual Activity   • Alcohol use: No   • Drug use: No   •  "Sexual activity: Defer       Review of Systems   Constitutional: Negative for fever.   Gastrointestinal: Positive for nausea. Negative for abdominal pain, change in bowel habit and vomiting.   Genitourinary: Positive for difficulty urinating.       Objective   Vitals:    05/04/21 1040   BP: 115/80   Pulse: 99   Temp: 98.4 °F (36.9 °C)   TempSrc: Temporal   SpO2: 94%   Weight: 113 kg (249 lb 6.4 oz)   Height: 170.2 cm (67\")      Body mass index is 39.06 kg/m².  Physical Exam  Vitals and nursing note reviewed.   Constitutional:       Appearance: Normal appearance.   Cardiovascular:      Rate and Rhythm: Normal rate and regular rhythm.      Heart sounds: Normal heart sounds.   Pulmonary:      Effort: Pulmonary effort is normal.      Breath sounds: Normal breath sounds.   Abdominal:      General: Bowel sounds are normal.      Palpations: Abdomen is soft.      Tenderness: There is no abdominal tenderness. There is no right CVA tenderness or left CVA tenderness.   Skin:     General: Skin is warm and dry.   Neurological:      Mental Status: She is alert and oriented to person, place, and time.   Psychiatric:         Mood and Affect: Mood normal.           Assessment/Plan   Diagnoses and all orders for this visit:    1. Dysuria (Primary)  -     POCT urinalysis dipstick, automated  -     Urine Culture - Urine, Urine, Clean Catch  -     phenazopyridine (Pyridium) 200 MG tablet; Take 1 tablet by mouth 3 (Three) Times a Day As Needed for Bladder Spasms for up to 2 days.  Dispense: 6 tablet; Refill: 0  -     sulfamethoxazole-trimethoprim (Bactrim DS) 800-160 MG per tablet; Take 1 tablet by mouth 2 (Two) Times a Day for 3 days.  Dispense: 6 tablet; Refill: 0  -     ondansetron (Zofran) 4 MG tablet; Take 1 tablet by mouth Every 8 (Eight) Hours As Needed for Nausea or Vomiting.  Dispense: 12 tablet; Refill: 0    Approximately 15 minutes spent with patient.            "

## 2021-05-06 ENCOUNTER — TELEPHONE (OUTPATIENT)
Dept: FAMILY MEDICINE CLINIC | Facility: CLINIC | Age: 56
End: 2021-05-06

## 2021-05-06 DIAGNOSIS — R35.0 FREQUENCY OF URINATION: ICD-10-CM

## 2021-05-06 DIAGNOSIS — R30.0 DYSURIA: Primary | ICD-10-CM

## 2021-05-06 LAB
BACTERIA UR CULT: NORMAL
BACTERIA UR CULT: NORMAL

## 2021-05-06 NOTE — TELEPHONE ENCOUNTER
Pt returned your call and was given the information stated. Pt verbalized an understanding and stated it is getting alittle better and wants to know if more antibiotics can be rx'd. If not then put in referral to urology.

## 2021-05-06 NOTE — TELEPHONE ENCOUNTER
LMTCB    **HUB  May relay message to patient**      ----- Message from LALO Monge sent at 5/6/2021  7:49 AM EDT -----  Please let patient know that urine culture is negative for infection so would recommend urology referral if symptoms have persisted or worsened.

## 2021-05-20 DIAGNOSIS — R30.0 DYSURIA: ICD-10-CM

## 2021-05-21 RX ORDER — ONDANSETRON 4 MG/1
4 TABLET, FILM COATED ORAL EVERY 8 HOURS PRN
Qty: 12 TABLET | Refills: 0 | Status: SHIPPED | OUTPATIENT
Start: 2021-05-21 | End: 2021-06-25

## 2021-06-03 PROCEDURE — 82365 CALCULUS SPECTROSCOPY: CPT | Performed by: UROLOGY

## 2021-06-04 ENCOUNTER — LAB REQUISITION (OUTPATIENT)
Dept: LAB | Facility: HOSPITAL | Age: 56
End: 2021-06-04

## 2021-06-04 DIAGNOSIS — R33.9 RETENTION OF URINE, UNSPECIFIED: ICD-10-CM

## 2021-06-04 RX ORDER — POLYETHYLENE GLYCOL 3350 17 G/17G
POWDER, FOR SOLUTION ORAL
Qty: 510 G | Refills: 0 | Status: SHIPPED | OUTPATIENT
Start: 2021-06-04 | End: 2022-01-20

## 2021-06-11 LAB
CALCIUM OXALATE DIHYDRATE MFR STONE IR: 20 %
COLOR STONE: NORMAL
COM MFR STONE: 50 %
COMPN STONE: NORMAL
HYDROXYAPATITE 24H ENGDIFF UR: 30 %
LABORATORY COMMENT REPORT: NORMAL
Lab: NORMAL
Lab: NORMAL
PHOTO: NORMAL
SIZE STONE: NORMAL MM
SPEC SOURCE SUBJ: NORMAL
WT STONE: 60 MG

## 2021-06-24 DIAGNOSIS — R30.0 DYSURIA: ICD-10-CM

## 2021-06-25 RX ORDER — ONDANSETRON 4 MG/1
4 TABLET, FILM COATED ORAL EVERY 8 HOURS PRN
Qty: 12 TABLET | Refills: 0 | Status: SHIPPED | OUTPATIENT
Start: 2021-06-25 | End: 2022-04-26 | Stop reason: SDUPTHER

## 2021-07-21 RX ORDER — LEVOTHYROXINE SODIUM 0.03 MG/1
TABLET ORAL
Qty: 90 TABLET | Refills: 1 | Status: SHIPPED | OUTPATIENT
Start: 2021-07-21 | End: 2021-08-18

## 2021-07-21 RX ORDER — LEVOTHYROXINE SODIUM 0.2 MG/1
200 TABLET ORAL EVERY MORNING
Qty: 90 TABLET | Refills: 1 | Status: SHIPPED | OUTPATIENT
Start: 2021-07-21 | End: 2021-12-28 | Stop reason: SDUPTHER

## 2021-07-23 ENCOUNTER — OFFICE VISIT (OUTPATIENT)
Dept: FAMILY MEDICINE CLINIC | Facility: CLINIC | Age: 56
End: 2021-07-23

## 2021-07-23 VITALS
DIASTOLIC BLOOD PRESSURE: 66 MMHG | BODY MASS INDEX: 35.31 KG/M2 | SYSTOLIC BLOOD PRESSURE: 99 MMHG | HEIGHT: 67 IN | TEMPERATURE: 97.9 F | WEIGHT: 225 LBS | OXYGEN SATURATION: 96 % | HEART RATE: 100 BPM

## 2021-07-23 DIAGNOSIS — J01.10 ACUTE FRONTAL SINUSITIS, RECURRENCE NOT SPECIFIED: Primary | ICD-10-CM

## 2021-07-23 PROCEDURE — 99212 OFFICE O/P EST SF 10 MIN: CPT | Performed by: NURSE PRACTITIONER

## 2021-07-23 RX ORDER — TAMSULOSIN HYDROCHLORIDE 0.4 MG/1
1 CAPSULE ORAL DAILY
Status: ON HOLD | COMMUNITY
Start: 2021-05-18 | End: 2023-03-10 | Stop reason: SDUPTHER

## 2021-07-23 RX ORDER — DOXYCYCLINE 100 MG/1
100 CAPSULE ORAL 2 TIMES DAILY
Qty: 20 CAPSULE | Refills: 0 | Status: SHIPPED | OUTPATIENT
Start: 2021-07-23 | End: 2021-08-02

## 2021-07-23 NOTE — PROGRESS NOTES
"Chief Complaint  Earache (pain right ear, pain across forehead)    Subjective          Urvashi Ornelas presents to [facility]  Earache   There is pain in the right ear. This is a new problem. The current episode started in the past 7 days. There has been no fever. The pain is at a severity of 7/10. Associated symptoms include rhinorrhea. Pertinent negatives include no coughing, ear discharge or sore throat. She has tried acetaminophen for the symptoms. The treatment provided mild relief.     Objective   Vital Signs:   BP 99/66   Pulse 100   Temp 97.9 °F (36.6 °C) (Temporal)   Ht 170.2 cm (67\")   Wt 102 kg (225 lb)   SpO2 96%   BMI 35.24 kg/m²     Physical Exam  Vitals and nursing note reviewed.   Constitutional:       Appearance: Normal appearance.   HENT:      Right Ear: Tympanic membrane and ear canal normal.      Left Ear: Tympanic membrane and ear canal normal.      Nose:      Right Sinus: Frontal sinus tenderness present.      Left Sinus: Frontal sinus tenderness present.   Cardiovascular:      Rate and Rhythm: Normal rate and regular rhythm.      Heart sounds: Normal heart sounds.   Pulmonary:      Effort: Pulmonary effort is normal.      Breath sounds: Normal breath sounds.   Neurological:      Mental Status: She is alert and oriented to person, place, and time.   Psychiatric:         Mood and Affect: Mood normal.        Result Review :            Assessment and Plan    Diagnoses and all orders for this visit:    1. Acute frontal sinusitis, recurrence not specified (Primary)  -     doxycycline (MONODOX) 100 MG capsule; Take 1 capsule by mouth 2 (Two) Times a Day for 10 days.  Dispense: 20 capsule; Refill: 0      I spent 15 minutes caring for Urvashi on this date of service. This time includes time spent by me in the following activities:performing a medically appropriate examination and/or evaluation , counseling and educating the patient/family/caregiver, ordering " medications, tests, or procedures and documenting information in the medical record  Follow Up   Return if symptoms worsen or fail to improve.  Patient was given instructions and counseling regarding her condition or for health maintenance advice. Please see specific information pulled into the AVS if appropriate.

## 2021-07-28 ENCOUNTER — TELEPHONE (OUTPATIENT)
Dept: FAMILY MEDICINE CLINIC | Facility: CLINIC | Age: 56
End: 2021-07-28

## 2021-07-28 NOTE — TELEPHONE ENCOUNTER
After Visit Summary   2018    Ronlad Alberto    MRN: [id number]           Patient Information     Date Of Birth          1997        Visit Information        Provider Department      2018 11:00 AM Johana Mills, OD [facility]        Today's Diagnoses     Photophobia    -  1       Follow-ups after your visit        Who to contact     Please call your clinic at [phone] to:    Ask questions about your health    Make or cancel appointments    Discuss your medicines    Learn about your test results    Speak to your doctor            Additional Information About Your Visit        MyChart Information     [employer] is an electronic gateway that provides easy, online access to your medical records. With [employer], you can request a clinic appointment, read your test results, renew a prescription or communicate with your care team.     To sign up for [employer] visit the website at [email or web address]   You will be asked to enter the access code listed below, as well as some personal information. Please follow the directions to create your username and password.     Your access code is: [email or web address]  Expires: 2018  7:31 AM     Your access code will  in 90 days. If you need help or a new code, please contact your [facility] or call [phone] for assistance.        Care EveryWhere ID     This is your Care EveryWhere ID. This could be used by other organizations to access your [facility] medical records  [id number]         Blood Pressure from Last 3 Encounters:   17 141/80   16 133/66   11/15/16 120/65    Weight from Last 3 Encounters:   17 89.8 kg (198 lb) (94 %)*   16 92.7 kg (204 lb 6.4 oz) (96 %)*   16 90.7 kg (200 lb) (95 %)*     * Growth percentiles are based on CDC 2-20 Years data.              Today, you had the following     No orders found for display       Primary Care Provider Office Phone # Fax #  PATIENT CALLING STATING SHE IS ITCHING EVERY TIME SHE GOES OUT DOORS. SHE IS WANTING TO SEE IF THE DOSE CAN BE CHANGED FOR levocetirizine (XYZAL) 5 MG tablet OR IF SOMETHING ELSE NEEDS TO BE PRESCRIBED.        PLEASE ADVISE  [phone]         [facility] [phone]       [address] [place] [address] [address] [place] [id number]        Equal Access to Services     BILL PEÑA : Hadii aad ku hadlamsanya Belkysaraseli, mane adrianabdifatahha, daciata kalondonda ryan, ana cristina raoulin hayaan judiedi lorenzo mary bates. So [facility] [phone].    ATENCIÓN: Si habla español, tiene a cortes disposición servicios gratuitos de asistencia lingüística. Llame al [phone].    We comply with applicable federal civil rights laws and [place] laws. We do not discriminate on the basis of race, color, national origin, age, disability, sex, sexual orientation, or gender identity.            Thank you!     Thank you for choosing [facility]  for your care. Our goal is always to provide you with excellent care. Hearing back from our patients is one way we can continue to improve our services. Please take a few minutes to complete the written survey that you may receive in the mail after your visit with us. Thank you!             Your Updated Medication List - Protect others around you: Learn how to safely use, store and throw away your medicines at www.disposemymeds.org.      Notice  As of 4/17/2018 11:59 PM    You have not been prescribed any medications.

## 2021-07-28 NOTE — TELEPHONE ENCOUNTER
Please inform patient that this is because doxycycline causes photosensitivity so I would recommend her staying indoors and away from the sun as much as possible for the time being.

## 2021-08-11 ENCOUNTER — OFFICE VISIT (OUTPATIENT)
Dept: FAMILY MEDICINE CLINIC | Facility: CLINIC | Age: 56
End: 2021-08-11

## 2021-08-11 VITALS
HEIGHT: 67 IN | TEMPERATURE: 97.8 F | BODY MASS INDEX: 34.78 KG/M2 | WEIGHT: 221.6 LBS | SYSTOLIC BLOOD PRESSURE: 95 MMHG | OXYGEN SATURATION: 97 % | HEART RATE: 93 BPM | DIASTOLIC BLOOD PRESSURE: 69 MMHG

## 2021-08-11 DIAGNOSIS — L29.9 PRURITUS: Primary | ICD-10-CM

## 2021-08-11 PROCEDURE — 99213 OFFICE O/P EST LOW 20 MIN: CPT | Performed by: FAMILY MEDICINE

## 2021-08-11 RX ORDER — ASENAPINE 10 MG/1
TABLET SUBLINGUAL
COMMUNITY
Start: 2021-07-23 | End: 2022-03-03

## 2021-08-11 RX ORDER — BENZTROPINE MESYLATE 2 MG/1
TABLET ORAL
COMMUNITY
Start: 2021-07-23 | End: 2022-09-28

## 2021-08-11 RX ORDER — HYDROXYZINE HYDROCHLORIDE 25 MG/1
25 TABLET, FILM COATED ORAL EVERY 6 HOURS PRN
Qty: 60 TABLET | Refills: 1 | Status: SHIPPED | OUTPATIENT
Start: 2021-08-11 | End: 2021-08-18 | Stop reason: SINTOL

## 2021-08-11 NOTE — PROGRESS NOTES
"Chief Complaint  Itching all over (when outside)    Subjective          Urvashi Ornelas presents to [facility]  History of Present Illness  Itching  all over  When it gets hot and goes outside , x 2 months, on Xyzal, no rash, no wheezing, no chest pain or shortness of breath,  Objective   Vital Signs:   BP 95/69 (BP Location: Right arm, Patient Position: Sitting)   Pulse 93   Temp 97.8 °F (36.6 °C) (Temporal)   Ht 170.2 cm (67.01\")   Wt 101 kg (221 lb 9.6 oz)   SpO2 97%   BMI 34.70 kg/m²     Physical Exam  Vitals and nursing note reviewed.   Constitutional:       Appearance: She is well-developed.   HENT:      Head: Normocephalic and atraumatic.      Right Ear: Tympanic membrane, ear canal and external ear normal.      Left Ear: Tympanic membrane, ear canal and external ear normal.      Nose: Nose normal.   Eyes:      General: No scleral icterus.        Right eye: No discharge.         Left eye: No discharge.      Conjunctiva/sclera: Conjunctivae normal.      Pupils: Pupils are equal, round, and reactive to light.   Neck:      Thyroid: No thyromegaly.      Vascular: No JVD.      Trachea: No tracheal deviation.   Cardiovascular:      Rate and Rhythm: Normal rate and regular rhythm.      Heart sounds: Normal heart sounds. No murmur heard.   No friction rub. No gallop.    Pulmonary:      Effort: Pulmonary effort is normal. No respiratory distress.      Breath sounds: Normal breath sounds. No wheezing or rales.   Chest:      Chest wall: No tenderness.   Abdominal:      General: Bowel sounds are normal. There is no distension.      Palpations: Abdomen is soft. There is no mass.      Tenderness: There is no abdominal tenderness. There is no guarding or rebound.      Hernia: No hernia is present.   Musculoskeletal:         General: No tenderness. Normal range of motion.      Cervical back: Normal range of motion and neck supple.   Lymphadenopathy:      Cervical: No cervical adenopathy. "   Skin:     General: Skin is warm and dry.      Comments: Multiple scars from cutting on both forearms   Neurological:      Mental Status: She is alert.      Cranial Nerves: No cranial nerve deficit.      Sensory: No sensory deficit.      Motor: No abnormal muscle tone.      Coordination: Coordination normal.      Deep Tendon Reflexes: Reflexes normal.   Psychiatric:         Behavior: Behavior normal.         Thought Content: Thought content normal.         Judgment: Judgment normal.        Result Review :                 Assessment and Plan    Diagnoses and all orders for this visit:    1. Pruritus (Primary)  -     CBC & Differential  -     Comprehensive Metabolic Panel  -     hydrOXYzine (ATARAX) 25 MG tablet; Take 1 tablet by mouth Every 6 (Six) Hours As Needed for Itching.  Dispense: 60 tablet; Refill: 1        Follow Up   No follow-ups on file.  Patient was given instructions and counseling regarding her condition or for health maintenance advice. Please see specific information pulled into the AVS if appropriate.   Side effects discussed with patient in detail, all including but not limited to every single topic discussed   If no better I will refer her to an allergist  Cussed with patient side effect of hydroxyzine including but not limited to somnolence

## 2021-08-12 DIAGNOSIS — E87.6 HYPOKALEMIA: Primary | ICD-10-CM

## 2021-08-12 DIAGNOSIS — R79.89 ELEVATED SERUM CREATININE: Primary | ICD-10-CM

## 2021-08-12 LAB
ALBUMIN SERPL-MCNC: 4.1 G/DL (ref 3.5–5.2)
ALBUMIN/GLOB SERPL: 1.8 G/DL
ALP SERPL-CCNC: 80 U/L (ref 39–117)
ALT SERPL-CCNC: 23 U/L (ref 1–33)
AST SERPL-CCNC: 20 U/L (ref 1–32)
BASOPHILS # BLD AUTO: 0.03 10*3/MM3 (ref 0–0.2)
BASOPHILS NFR BLD AUTO: 0.4 % (ref 0–1.5)
BILIRUB SERPL-MCNC: 0.3 MG/DL (ref 0–1.2)
BUN SERPL-MCNC: 6 MG/DL (ref 6–20)
BUN/CREAT SERPL: 5.7 (ref 7–25)
CALCIUM SERPL-MCNC: 9.2 MG/DL (ref 8.6–10.5)
CHLORIDE SERPL-SCNC: 109 MMOL/L (ref 98–107)
CO2 SERPL-SCNC: 22.4 MMOL/L (ref 22–29)
CREAT SERPL-MCNC: 1.06 MG/DL (ref 0.57–1)
EOSINOPHIL # BLD AUTO: 0.25 10*3/MM3 (ref 0–0.4)
EOSINOPHIL NFR BLD AUTO: 3.6 % (ref 0.3–6.2)
ERYTHROCYTE [DISTWIDTH] IN BLOOD BY AUTOMATED COUNT: 12.3 % (ref 12.3–15.4)
GLOBULIN SER CALC-MCNC: 2.3 GM/DL
GLUCOSE SERPL-MCNC: 94 MG/DL (ref 65–99)
HCT VFR BLD AUTO: 42.9 % (ref 34–46.6)
HGB BLD-MCNC: 14 G/DL (ref 12–15.9)
IMM GRANULOCYTES # BLD AUTO: 0.01 10*3/MM3 (ref 0–0.05)
IMM GRANULOCYTES NFR BLD AUTO: 0.1 % (ref 0–0.5)
LYMPHOCYTES # BLD AUTO: 1.87 10*3/MM3 (ref 0.7–3.1)
LYMPHOCYTES NFR BLD AUTO: 26.7 % (ref 19.6–45.3)
MCH RBC QN AUTO: 30.2 PG (ref 26.6–33)
MCHC RBC AUTO-ENTMCNC: 32.6 G/DL (ref 31.5–35.7)
MCV RBC AUTO: 92.7 FL (ref 79–97)
MONOCYTES # BLD AUTO: 0.64 10*3/MM3 (ref 0.1–0.9)
MONOCYTES NFR BLD AUTO: 9.1 % (ref 5–12)
NEUTROPHILS # BLD AUTO: 4.2 10*3/MM3 (ref 1.7–7)
NEUTROPHILS NFR BLD AUTO: 60.1 % (ref 42.7–76)
NRBC BLD AUTO-RTO: 0 /100 WBC (ref 0–0.2)
PLATELET # BLD AUTO: 159 10*3/MM3 (ref 140–450)
POTASSIUM SERPL-SCNC: 3.4 MMOL/L (ref 3.5–5.2)
PROT SERPL-MCNC: 6.4 G/DL (ref 6–8.5)
RBC # BLD AUTO: 4.63 10*6/MM3 (ref 3.77–5.28)
SODIUM SERPL-SCNC: 143 MMOL/L (ref 136–145)
WBC # BLD AUTO: 7 10*3/MM3 (ref 3.4–10.8)

## 2021-08-12 RX ORDER — POTASSIUM CHLORIDE 750 MG/1
10 TABLET, FILM COATED, EXTENDED RELEASE ORAL DAILY
Qty: 30 TABLET | Refills: 1 | Status: SHIPPED | OUTPATIENT
Start: 2021-08-12 | End: 2021-09-03

## 2021-08-17 ENCOUNTER — TELEPHONE (OUTPATIENT)
Dept: FAMILY MEDICINE CLINIC | Facility: CLINIC | Age: 56
End: 2021-08-17

## 2021-08-17 DIAGNOSIS — L29.9 PRURITUS: Primary | ICD-10-CM

## 2021-08-17 NOTE — TELEPHONE ENCOUNTER
Caller: Urvashi Ornelas    Relationship: Self    Best call back number: [phone]    What medications are you currently taking:   Current Outpatient Medications on File Prior to Visit   Medication Sig Dispense Refill   • albuterol (PROVENTIL HFA;VENTOLIN HFA) 108 (90 BASE) MCG/ACT inhaler Inhale 2 puffs every 4 (four) hours as needed for wheezing.     • albuterol (PROVENTIL) (2.5 MG/3ML) 0.083% nebulizer solution Take 2.5 mg by nebulization Every 6 (Six) Hours As Needed for Wheezing or Shortness of Air. 25 vial 0   • asenapine maleate (SAPHRIS) 10 MG sublingual tablet sublingual tablet      • atorvastatin (LIPITOR) 40 MG tablet TAKE 1 TABLET BY MOUTH EVERY DAY IN THE MORNING 90 tablet 3   • baclofen (LIORESAL) 10 MG tablet TAKE 1 TABLET BY MOUTH AT NIGHT AS NEEDED FOR MUSCLE SPASMS. 90 tablet 0   • benztropine (COGENTIN) 2 MG tablet      • budesonide-formoterol (SYMBICORT) 80-4.5 MCG/ACT inhaler Inhale 2 puffs 2 (Two) Times a Day.     • buPROPion XL (WELLBUTRIN XL) 300 MG 24 hr tablet      • busPIRone (BUSPAR) 30 MG tablet TK 1 T PO  bid  0   • gabapentin (NEURONTIN) 100 MG capsule      • hydrOXYzine (ATARAX) 25 MG tablet Take 1 tablet by mouth Every 6 (Six) Hours As Needed for Itching. 60 tablet 1   • Ingrezza 80 MG capsule Take 80 mg by mouth Daily.     • levothyroxine (SYNTHROID, LEVOTHROID) 200 MCG tablet TAKE 1 TABLET BY MOUTH EVERY MORNING. 90 tablet 1   • levothyroxine (SYNTHROID, LEVOTHROID) 25 MCG tablet TAKE 1 TABLET BY MOUTH EVERY MORNING 5 DAYS PER WEEK AND 2 TABLETS 2 DAYS PER WEEK 90 tablet 1   • Multiple Vitamins-Minerals (MULTIVITAMIN WOMEN 50+) tablet Take 1 tablet/day by mouth.     • ondansetron (ZOFRAN) 4 MG tablet TAKE 1 TABLET BY MOUTH EVERY 8 (EIGHT) HOURS AS NEEDED FOR NAUSEA OR VOMITING. 12 tablet 0   • polyethylene glycol (MIRALAX) 17 GM/SCOOP powder MIX 17 G INTO 8 OUNCE FLUID AND TAKE BY MOUTH DAILY AS NEEDED (CONSTIPATION). 510 g 0   • potassium chloride 10 MEQ CR tablet Take 1 tablet  by mouth Daily. 30 tablet 1   • prazosin (MINIPRESS) 1 MG capsule      • SUMAtriptan-naproxen (TREXIMET)  MG per tablet TK 1 T PO  PRF MIGRAINE. MAX 4 TS PER WEEK.     • tamsulosin (FLOMAX) 0.4 MG capsule 24 hr capsule Take 1 capsule by mouth Daily.     • topiramate (TOPAMAX) 200 MG tablet Take 200 mg by mouth 2 (Two) Times a Day.  2     No current facility-administered medications on file prior to visit.          Which medication are you concerned about: HYDROXYZINE    Who prescribed you this medication: DR. SO    What are your concerns: HYDROXYZINE IS MAKING PATIENT DIZZY AND SHE WANTS TO KNOW IF CYNTHIA WANTS TO CALL SOMETHING ELSE IN OR INCREASE LEVOCETRIZINE.

## 2021-08-17 NOTE — TELEPHONE ENCOUNTER
Please inform patient that referral was just sent to [facility] today.  I believe that creatinine is stable.  However, I would recommend following through with Dr. Quesada's orders.

## 2021-08-17 NOTE — TELEPHONE ENCOUNTER
Caller: Urvashi Ornelas    Relationship to patient: Self    Best call back number: [phone]    Patient is needing: PATIENT CALLING TO CHECK STATUS OF NEPHROLOGIST REFERRAL. PATIENT ALSO WANTING TO KNOW IF CYNTHIA AGREES WITH HER NEEDING A NEPHROLOGIST REFERRAL DUE TO LAB RESULTS.

## 2021-08-18 RX ORDER — LEVOTHYROXINE SODIUM 0.03 MG/1
TABLET ORAL
Qty: 90 TABLET | Refills: 1 | Status: SHIPPED | OUTPATIENT
Start: 2021-08-18 | End: 2021-11-08

## 2021-08-18 RX ORDER — LEVOCETIRIZINE DIHYDROCHLORIDE 5 MG/1
2.5 TABLET, FILM COATED ORAL EVERY EVENING
Qty: 90 TABLET | Refills: 0 | Status: SHIPPED | OUTPATIENT
Start: 2021-08-18 | End: 2022-03-03

## 2021-08-18 NOTE — TELEPHONE ENCOUNTER
Please inform patient that I sent a refill for levocetirizine 2.5 mg to pharmacy.  Please inform patient that I also ordered allergy referral.

## 2021-08-18 NOTE — TELEPHONE ENCOUNTER
Caller: Urvashi Ornelas    Relationship to patient: Self    Best call back number: [phone]    Patient is needing: PATIENT STATES SHE CANNOT TAKE HYDROZYZINE AT ALL. SHE CUT IT IN HALF AND IT IS STILL CAUSING DIZZINESS.

## 2021-08-18 NOTE — TELEPHONE ENCOUNTER
Spoke with patient, she does not have an allergist; she has stopped the hydroxyzine but she wanted to know if you could give her another prescription for the Levocetirizine 5 MG you had given her in the past- she has a few tablets of that left but not many.

## 2021-08-18 NOTE — TELEPHONE ENCOUNTER
Please ask patient if she sees an allergist. If not, then I will put in a referral for the pruritus (itching).

## 2021-09-03 DIAGNOSIS — E87.6 HYPOKALEMIA: ICD-10-CM

## 2021-09-03 RX ORDER — POTASSIUM CHLORIDE 750 MG/1
TABLET, FILM COATED, EXTENDED RELEASE ORAL
Qty: 30 TABLET | Refills: 1 | Status: SHIPPED | OUTPATIENT
Start: 2021-09-03 | End: 2021-10-04

## 2021-09-07 ENCOUNTER — TELEPHONE (OUTPATIENT)
Dept: FAMILY MEDICINE CLINIC | Facility: CLINIC | Age: 56
End: 2021-09-07

## 2021-09-07 NOTE — TELEPHONE ENCOUNTER
LAST TIME PATIENT WAS SEEN BY DR SO, HE PRESCRIBED POTASSIUM. SHE DOES NOT KNOW IF SHOULD BE TAKING IT SINCE HER PCP IS CYNTHIA. IF SHE IS OK WITH PT KEEP TAKING IT, WILL GO TO PHARMACY AND  PRESCRIPTION    PLEASE ADVISE    [phone]

## 2021-09-07 NOTE — TELEPHONE ENCOUNTER
Please advise, I can call and inform but because the patient asked I figured send it to you just to review.

## 2021-09-07 NOTE — TELEPHONE ENCOUNTER
Please let patient know that I would recommend repeating potassium level that Dr. Quesada has already ordered first.

## 2021-09-17 NOTE — TELEPHONE ENCOUNTER
PATIENT HAS NOT  HEARD FROM ANYONE ON WHETHER OR NOT SHE SHOULD CONTINUE THE POTASSIUM SINCE LAST LABS, PLEASE REACH OUT AND LET HER KNOW.

## 2021-09-29 ENCOUNTER — TELEPHONE (OUTPATIENT)
Dept: FAMILY MEDICINE CLINIC | Facility: CLINIC | Age: 56
End: 2021-09-29

## 2021-09-29 NOTE — TELEPHONE ENCOUNTER
Caller: Urvashi Ornelas    Relationship to patient: Self    Best call back number: [phone]    Patient is needing: PATIENT STATES SHE HAS CANKER SORES IN HER MOUTH FOR A WEEK NOW AND WARM WATER AND SALT IS NOT HELPING. PATIENT IS WANTING TO KNOW IF THERE IS SOMETHING SHE CAN BUY OVER THE COUNTER TO HELP AS SHE IS IN [place] VISITING HER DAUGHTER AND THERE IS NO PHARMACY NEAR HER THAT ACCEPTS HER INSURANCE SO IT HAS TO BE OVER THE COUNTER RECOMMENDATION. CALLBACK REQUEST.

## 2021-09-30 NOTE — TELEPHONE ENCOUNTER
Please inform patient that [employer] carries an antiseptic rinse for all mouth sores that she can find at [employer], [employer], etc.

## 2021-10-04 DIAGNOSIS — E87.6 HYPOKALEMIA: ICD-10-CM

## 2021-10-04 RX ORDER — POTASSIUM CHLORIDE 750 MG/1
TABLET, FILM COATED, EXTENDED RELEASE ORAL
Qty: 30 TABLET | Refills: 1 | Status: SHIPPED | OUTPATIENT
Start: 2021-10-04 | End: 2021-11-02

## 2021-10-26 ENCOUNTER — CLINICAL SUPPORT (OUTPATIENT)
Dept: FAMILY MEDICINE CLINIC | Facility: CLINIC | Age: 56
End: 2021-10-26

## 2021-10-26 DIAGNOSIS — Z23 NEED FOR INFLUENZA VACCINATION: Primary | ICD-10-CM

## 2021-10-26 PROCEDURE — 90686 IIV4 VACC NO PRSV 0.5 ML IM: CPT | Performed by: NURSE PRACTITIONER

## 2021-10-26 PROCEDURE — G0008 ADMIN INFLUENZA VIRUS VAC: HCPCS | Performed by: NURSE PRACTITIONER

## 2021-11-01 DIAGNOSIS — E87.6 HYPOKALEMIA: ICD-10-CM

## 2021-11-02 RX ORDER — POTASSIUM CHLORIDE 750 MG/1
10 TABLET, FILM COATED, EXTENDED RELEASE ORAL DAILY
Qty: 30 TABLET | Refills: 1 | Status: SHIPPED | OUTPATIENT
Start: 2021-11-02 | End: 2021-11-26

## 2021-11-02 NOTE — TELEPHONE ENCOUNTER
Rx Refill Note  Requested Prescriptions     Pending Prescriptions Disp Refills   • potassium chloride 10 MEQ CR tablet [Pharmacy Med Name: POTASSIUM CL ER 10 MEQ TABLET] 30 tablet 1     Sig: TAKE 1 TABLET BY MOUTH EVERY DAY      Last office visit with prescribing clinician: 8/11/2021      Next office visit with prescribing clinician: 1/5/22  3}  Lorene Wilson MA  11/02/21, 12:53 EDT

## 2021-11-08 RX ORDER — LEVOTHYROXINE SODIUM 0.03 MG/1
TABLET ORAL
Qty: 90 TABLET | Refills: 1 | Status: SHIPPED | OUTPATIENT
Start: 2021-11-08 | End: 2021-11-09

## 2021-11-09 ENCOUNTER — OFFICE VISIT (OUTPATIENT)
Dept: ENDOCRINOLOGY | Age: 56
End: 2021-11-09

## 2021-11-09 VITALS
WEIGHT: 199.8 LBS | BODY MASS INDEX: 31.36 KG/M2 | HEIGHT: 67 IN | SYSTOLIC BLOOD PRESSURE: 122 MMHG | HEART RATE: 98 BPM | OXYGEN SATURATION: 96 % | DIASTOLIC BLOOD PRESSURE: 60 MMHG

## 2021-11-09 DIAGNOSIS — E89.0 POSTSURGICAL HYPOTHYROIDISM: Primary | ICD-10-CM

## 2021-11-09 DIAGNOSIS — C73 THYROID CANCER (HCC): ICD-10-CM

## 2021-11-09 DIAGNOSIS — E55.9 VITAMIN D DEFICIENCY: ICD-10-CM

## 2021-11-09 DIAGNOSIS — R73.03 PRE-DIABETES: ICD-10-CM

## 2021-11-09 DIAGNOSIS — R73.03 PREDIABETES: ICD-10-CM

## 2021-11-09 PROCEDURE — 99214 OFFICE O/P EST MOD 30 MIN: CPT | Performed by: INTERNAL MEDICINE

## 2021-11-09 NOTE — PROGRESS NOTES
Chief Complaint  Thyroid Cancer  FOLLOW UP/ THYROID CANCER  Subjective     {CC  Problem List  Visit Diagnosis   Encounters  Notes  Medications  Labs  Result Review Imaging  Media :23}       Urvashi Ornelas,56 y.o. is here as a f/u patient for the management of metastatic papillary thyroid cancer status post total thyroidectomy and radioactive iodine ablation.      Patient's history is significant for right inferior parathyroidectomy for hyperparathyroidism and elevated calcium levels.  As a part of this work-up she was noted to have thyroid nodule subsequently underwent thyroid biopsy and the pathology was consistent with papillary thyroid cancer, she underwent total thyroidectomy in 2016 along with parathyroid exploration.  Surgical pathology-multifocal papillary carcinoma.  During the surgery patient also had right superior parathyroidectomy as well.  Postoperative ultrasound showed residual level III lymph nodes in the left neck measuring 2.2 x 0.8 x 1.3 cm in size.  Ultrasound-guided biopsy showed pathology consistent with papillary thyroid cancer. Subsequently patient underwent left neck dissection in April 2016 by Dr. Ashby.  Surgical pathology was consistent with metastatic papillary carcinoma involving 6 out of the 19 lymph nodes.  There was no evidence of extracapsular extension.  Patient was being followed by Dr. Carney from radiation oncology for radioactive iodine ablation and whole-body scan.    Patient then underwent radioactive iodine ablation along with whole-body scans, with the last one in 2017 which did not show any residual disease.  Since then patient has been getting serial thyroid ultrasounds through Dr. Ashby, she was supposed to get one in 2020 but because of pneumonia and Covid pandemic she could not get the scan done.     Today in clinic patient reports that she is on levothyroxine 225 mcg oral daily.  Energy levels are good.  She has lost significant amount of weight with diet and  "exercise.  Lost about 40 pounds since 6 to 8 months.        Hyperparathyroidism-patient had right superior and inferior parathyroidectomy.  Currently not on any calcium supplementation.       Prediabetes  Currently on diabetic diet.          Reviewed primary care physician's/consulting physician documentation and lab results     I have reviewed the patient's allergies, medicines, past medical hx, family hx and social hx in detail.    Objective   Vital Signs:   /60   Pulse 98   Ht 170.2 cm (67\")   Wt 90.6 kg (199 lb 12.8 oz)   SpO2 96%   BMI 31.29 kg/m²     Physical Exam   General appearance - no distress  Eyes- anicteric sclera  Ear nose and throat-external ears and nose normal.    Respiratory-normal chest on inspection.  No respiratory distress noted.  Skin-no rashes.  Neuro-alert and oriented x3          Result Review :   The following data was reviewed by: Bakari Mora MD on 11/09/2021:  Results Encounter on 10/02/2021   Component Date Value Ref Range Status   • TSH 11/02/2021 0.069* 0.450 - 4.500 uIU/mL Final   • Free T4 11/02/2021 1.90* 0.82 - 1.77 ng/dL Final   • Thyroglobulin Ab 11/02/2021 <1.0  0.0 - 0.9 IU/mL Final    Thyroglobulin Antibody measured by [facility] Methodology   • THYROGLOBULIN BY PROSPER 11/02/2021 <0.1* 1.5 - 38.5 ng/mL Final    Comment: According to the National Academy of Clinical [employer], the  reference interval for Thyroglobulin (TG) should be related to  euthyroid patients and not for patients who underwent thyroidectomy.  TG reference intervals for these patients depend on the residual  mass of the thyroid tissue left after surgery. Establishing a  post-operative baseline is recommended.  The assay limit of quantitation is 0.1 ng/mL  Thyroglobulin measured by [facility] Immunometric Assay       Data reviewed: Referral documentation        I reviewed the patient's new clinical results and mentioned them above in HPI and in plan as well.          Assessment and " Plan    Problem List Items Addressed This Visit        Other    Thyroid cancer (HCC)    Relevant Orders    T3, Free    T4, Free    TSH    TSH    Hemoglobin A1c    Basic Metabolic Panel    T4, Free    T3, Free    Thyroglobulin    Thyroglobulin Antibody    Vitamin D deficiency    Relevant Orders    T3, Free    T4, Free    TSH    TSH    Hemoglobin A1c    Basic Metabolic Panel    T4, Free    T3, Free    Thyroglobulin    Thyroglobulin Antibody      Other Visit Diagnoses     Postsurgical hypothyroidism    -  Primary    Relevant Orders    T3, Free    T4, Free    TSH    TSH    Hemoglobin A1c    Basic Metabolic Panel    T4, Free    T3, Free    Thyroglobulin    Thyroglobulin Antibody    Hemoglobin A1c    Pre-diabetes        Relevant Orders    T3, Free    T4, Free    TSH    TSH    Hemoglobin A1c    Basic Metabolic Panel    T4, Free    T3, Free    Thyroglobulin    Thyroglobulin Antibody    Prediabetes         Relevant Orders    Hemoglobin A1c        Papillary thyroid cancer-in 2016  Given the metastatic papillary thyroid cancer we will continue to suppress the TSH levels.  Pending thyroid ultrasound-due in November 2021.  Noted that TG and TG antibody levels are negative.    Postoperative hypothyroidism-levels are not stable/chronic problem  Decrease the dosage of levothyroxine to 200 mcg oral daily.  Repeat the blood work up in 6 weeks from now.    Borderline diabetes  Check HbA1c in 6 weeks from now.      Follow Up   No follow-ups on file.    Refills/Meds sent to pharmacy    Interpreted the blood work-up/imaging results performed by the primary care/consulting physician -    Patient was given instructions and counseling regarding her condition or for health maintenance advice. Please see specific information pulled into the AVS if appropriate.

## 2021-11-18 ENCOUNTER — OFFICE VISIT (OUTPATIENT)
Dept: FAMILY MEDICINE CLINIC | Facility: CLINIC | Age: 56
End: 2021-11-18

## 2021-11-18 VITALS
SYSTOLIC BLOOD PRESSURE: 100 MMHG | WEIGHT: 197.8 LBS | HEART RATE: 96 BPM | BODY MASS INDEX: 30.98 KG/M2 | OXYGEN SATURATION: 98 % | TEMPERATURE: 98.2 F | DIASTOLIC BLOOD PRESSURE: 60 MMHG

## 2021-11-18 DIAGNOSIS — J01.10 ACUTE FRONTAL SINUSITIS, RECURRENCE NOT SPECIFIED: Primary | ICD-10-CM

## 2021-11-18 PROBLEM — M54.50 CHRONIC LOW BACK PAIN: Status: ACTIVE | Noted: 2017-10-13

## 2021-11-18 PROBLEM — G89.29 CHRONIC LOW BACK PAIN: Status: ACTIVE | Noted: 2017-10-13

## 2021-11-18 PROCEDURE — 99212 OFFICE O/P EST SF 10 MIN: CPT | Performed by: NURSE PRACTITIONER

## 2021-11-18 RX ORDER — FEXOFENADINE HCL 180 MG/1
180 TABLET ORAL DAILY
COMMUNITY
Start: 2021-10-25

## 2021-11-18 RX ORDER — DOXYCYCLINE 100 MG/1
100 CAPSULE ORAL 2 TIMES DAILY
Qty: 20 CAPSULE | Refills: 0 | Status: SHIPPED | OUTPATIENT
Start: 2021-11-18 | End: 2021-11-28

## 2021-11-18 NOTE — PROGRESS NOTES
Chief Complaint  shooting pain on forehead for past week, Earache (right ear pain), and Ear Drainage (right ear)    Subjective          Urvashi Ornelas presents to [facility]  Earache   There is pain in the right ear. This is a new problem. The current episode started 1 to 4 weeks ago. There has been no fever. The pain is at a severity of 7/10. Associated symptoms include ear discharge, headaches and hearing loss. Pertinent negatives include no coughing, rhinorrhea or sore throat. She has tried nothing for the symptoms.     Objective   Vital Signs:   /60 (BP Location: Left arm, Patient Position: Sitting, Cuff Size: Adult)   Pulse 96   Temp 98.2 °F (36.8 °C) (Temporal)   Wt 89.7 kg (197 lb 12.8 oz)   SpO2 98%   BMI 30.98 kg/m²     Physical Exam  Vitals and nursing note reviewed.   Constitutional:       Appearance: Normal appearance.   HENT:      Right Ear: Tympanic membrane and ear canal normal.      Left Ear: Tympanic membrane and ear canal normal.      Nose:      Right Sinus: Frontal sinus tenderness present.   Cardiovascular:      Rate and Rhythm: Normal rate and regular rhythm.      Heart sounds: Normal heart sounds.   Pulmonary:      Effort: Pulmonary effort is normal.      Breath sounds: Normal breath sounds.   Neurological:      Mental Status: She is alert and oriented to person, place, and time.   Psychiatric:         Mood and Affect: Mood normal.        Result Review :            Assessment and Plan    Diagnoses and all orders for this visit:    1. Acute frontal sinusitis, recurrence not specified (Primary)  -     doxycycline (MONODOX) 100 MG capsule; Take 1 capsule by mouth 2 (Two) Times a Day for 10 days.  Dispense: 20 capsule; Refill: 0      I spent 15 minutes caring for Urvashi on this date of service. This time includes time spent by me in the following activities:performing a medically appropriate examination and/or evaluation , counseling and educating the  patient/family/caregiver, ordering medications, tests, or procedures and documenting information in the medical record  Follow Up   Return if symptoms worsen or fail to improve.  Patient was given instructions and counseling regarding her condition or for health maintenance advice. Please see specific information pulled into the AVS if appropriate.

## 2021-11-26 DIAGNOSIS — E87.6 HYPOKALEMIA: ICD-10-CM

## 2021-11-26 RX ORDER — POTASSIUM CHLORIDE 750 MG/1
TABLET, FILM COATED, EXTENDED RELEASE ORAL
Qty: 30 TABLET | Refills: 1 | Status: SHIPPED | OUTPATIENT
Start: 2021-11-26 | End: 2021-12-26

## 2021-12-26 DIAGNOSIS — E87.6 HYPOKALEMIA: ICD-10-CM

## 2021-12-26 RX ORDER — POTASSIUM CHLORIDE 750 MG/1
TABLET, FILM COATED, EXTENDED RELEASE ORAL
Qty: 30 TABLET | Refills: 1 | Status: SHIPPED | OUTPATIENT
Start: 2021-12-26 | End: 2022-01-27

## 2021-12-28 RX ORDER — LEVOTHYROXINE SODIUM 0.2 MG/1
200 TABLET ORAL EVERY MORNING
Qty: 90 TABLET | Refills: 1 | Status: SHIPPED | OUTPATIENT
Start: 2021-12-28 | End: 2022-04-28

## 2022-01-05 ENCOUNTER — OFFICE VISIT (OUTPATIENT)
Dept: FAMILY MEDICINE CLINIC | Facility: CLINIC | Age: 57
End: 2022-01-05

## 2022-01-05 VITALS
DIASTOLIC BLOOD PRESSURE: 70 MMHG | TEMPERATURE: 98.9 F | WEIGHT: 187.8 LBS | BODY MASS INDEX: 29.41 KG/M2 | HEART RATE: 104 BPM | SYSTOLIC BLOOD PRESSURE: 100 MMHG | OXYGEN SATURATION: 98 %

## 2022-01-05 DIAGNOSIS — J30.9 ALLERGIC RHINITIS, UNSPECIFIED SEASONALITY, UNSPECIFIED TRIGGER: ICD-10-CM

## 2022-01-05 DIAGNOSIS — Z00.00 MEDICARE ANNUAL WELLNESS VISIT, SUBSEQUENT: Primary | ICD-10-CM

## 2022-01-05 DIAGNOSIS — Z12.31 ENCOUNTER FOR SCREENING MAMMOGRAM FOR MALIGNANT NEOPLASM OF BREAST: ICD-10-CM

## 2022-01-05 DIAGNOSIS — E87.6 HYPOKALEMIA: ICD-10-CM

## 2022-01-05 PROCEDURE — G0439 PPPS, SUBSEQ VISIT: HCPCS | Performed by: NURSE PRACTITIONER

## 2022-01-05 PROCEDURE — 1170F FXNL STATUS ASSESSED: CPT | Performed by: NURSE PRACTITIONER

## 2022-01-05 PROCEDURE — 1159F MED LIST DOCD IN RCRD: CPT | Performed by: NURSE PRACTITIONER

## 2022-01-05 RX ORDER — AZELASTINE 1 MG/ML
2 SPRAY, METERED NASAL 2 TIMES DAILY
Qty: 30 ML | Refills: 1 | Status: SHIPPED | OUTPATIENT
Start: 2022-01-05 | End: 2022-01-27

## 2022-01-05 NOTE — PROGRESS NOTES
The ABCs of the Annual Wellness Visit  Subsequent Medicare Wellness Visit    Chief Complaint   Patient presents with   • Medicare Wellness-subsequent      Subjective    History of Present Illness:  Urvashi Ornelas is a 56 y.o. female who presents for a Subsequent Medicare Wellness Visit.    The following portions of the patient's history were reviewed and   updated as appropriate: allergies, current medications, past family history, past medical history, past social history, past surgical history and problem list.    Compared to one year ago, the patient feels her physical   health is the same.    Compared to one year ago, the patient feels her mental   health is the same.    Recent Hospitalizations:  She was not admitted to the hospital during the last year.       Current Medical Providers:  Patient Care Team:  Rosio Palmer APRN as PCP - General (Family Medicine)  Bakari Mora MD as Consulting Physician (Endocrinology)  Dayo Ashby MD as Surgeon (Otolaryngology)  Yonis Sweeney MD as Consulting Physician (Pulmonary Disease)    Outpatient Medications Prior to Visit   Medication Sig Dispense Refill   • albuterol (PROVENTIL HFA;VENTOLIN HFA) 108 (90 BASE) MCG/ACT inhaler Inhale 2 puffs every 4 (four) hours as needed for wheezing.     • albuterol (PROVENTIL) (2.5 MG/3ML) 0.083% nebulizer solution Take 2.5 mg by nebulization Every 6 (Six) Hours As Needed for Wheezing or Shortness of Air. 25 vial 0   • asenapine maleate (SAPHRIS) 10 MG sublingual tablet sublingual tablet      • atorvastatin (LIPITOR) 40 MG tablet TAKE 1 TABLET BY MOUTH EVERY DAY IN THE MORNING 90 tablet 3   • baclofen (LIORESAL) 10 MG tablet TAKE 1 TABLET BY MOUTH AT NIGHT AS NEEDED FOR MUSCLE SPASMS. 90 tablet 0   • benztropine (COGENTIN) 2 MG tablet      • budesonide-formoterol (SYMBICORT) 80-4.5 MCG/ACT inhaler Inhale 2 puffs 2 (Two) Times a Day.     • buPROPion XL (WELLBUTRIN XL) 300 MG 24 hr tablet      • busPIRone (BUSPAR) 30 MG  tablet TK 1 T PO  bid  0   • fexofenadine (ALLEGRA) 180 MG tablet Take 180 mg by mouth every night at bedtime.     • gabapentin (NEURONTIN) 100 MG capsule      • Ingrezza 80 MG capsule Take 80 mg by mouth Daily.     • levocetirizine (XYZAL) 5 MG tablet Take 0.5 tablets by mouth Every Evening. 90 tablet 0   • levothyroxine (SYNTHROID, LEVOTHROID) 200 MCG tablet Take 1 tablet by mouth Every Morning. 90 tablet 1   • Multiple Vitamins-Minerals (MULTIVITAMIN WOMEN 50+) tablet Take 1 tablet/day by mouth.     • ondansetron (ZOFRAN) 4 MG tablet TAKE 1 TABLET BY MOUTH EVERY 8 (EIGHT) HOURS AS NEEDED FOR NAUSEA OR VOMITING. 12 tablet 0   • polyethylene glycol (MIRALAX) 17 GM/SCOOP powder MIX 17 G INTO 8 OUNCE FLUID AND TAKE BY MOUTH DAILY AS NEEDED (CONSTIPATION). 510 g 0   • potassium chloride 10 MEQ CR tablet TAKE 1 TABLET BY MOUTH EVERY DAY 30 tablet 1   • prazosin (MINIPRESS) 1 MG capsule      • SUMAtriptan-naproxen (TREXIMET)  MG per tablet TK 1 T PO  PRF MIGRAINE. MAX 4 TS PER WEEK.     • tamsulosin (FLOMAX) 0.4 MG capsule 24 hr capsule Take 1 capsule by mouth Daily.     • topiramate (TOPAMAX) 200 MG tablet Take 200 mg by mouth 2 (Two) Times a Day.  2     No facility-administered medications prior to visit.       No opioid medication identified on active medication list. I have reviewed chart for other potential  high risk medication/s and harmful drug interactions in the elderly.          Aspirin is not on active medication list.  Aspirin use is not indicated based on review of current medical condition/s. Risk of harm outweighs potential benefits.  .    Patient Active Problem List   Diagnosis   • Thyroid cancer (HCC)   • Asthma   • Acute bronchitis   • Vitamin D deficiency   • Trigger thumb of left hand   • Tardive dyskinesia   • Syrinx of spinal cord (HCC)   • Schizoaffective disorder (HCC)   • PTSD (post-traumatic stress disorder)   • Osteopenia   • Hypothyroidism   • Episodic paroxysmal hemicrania, not  intractable   • Medicare annual wellness visit, subsequent   • Pneumonia   • Migraines   • Anxiety   • Obesity, Class III, BMI 40-49.9 (morbid obesity) ([facility])   • Parapneumonic effusion   • Hypokalemia   • CKD (chronic kidney disease) stage 3, GFR 30-59 ml/min ([facility])   • Bipolar disorder ([facility])   • Hypercholesterolemia   • Pulmonary embolism ([facility])   • Pruritus   • Chronic low back pain     Advance Care Planning  Advance Directive is not on file.  ACP discussion was held with the patient during this visit. Patient does not have an advance directive, information provided.    Review of Systems   Constitutional: Negative for fever.   HENT: Positive for ear pain (left). Negative for rhinorrhea and sore throat.    Eyes: Negative for visual disturbance.   Respiratory: Negative for cough and shortness of breath.    Cardiovascular: Negative for chest pain.   Gastrointestinal: Negative for abdominal pain, diarrhea, nausea and vomiting.   Genitourinary: Positive for difficulty urinating.   Musculoskeletal: Negative.    Skin: Negative for rash.   Neurological: Negative for dizziness and confusion.        Objective    Vitals:    01/05/22 1359   BP: 100/70   BP Location: Right arm   Patient Position: Sitting   Cuff Size: Adult   Pulse: 104   Temp: 98.9 °F (37.2 °C)   TempSrc: Temporal   SpO2: 98%   Weight: 85.2 kg (187 lb 12.8 oz)     BMI Readings from Last 1 Encounters:   01/05/22 29.41 kg/m²   BMI is above normal parameters. Recommendations include: exercise counseling and nutrition counseling    Does the patient have evidence of cognitive impairment? No    Physical Exam  Vitals and nursing note reviewed.   Constitutional:       Appearance: Normal appearance.   HENT:      Head: Normocephalic and atraumatic.      Right Ear: Tympanic membrane and ear canal normal.      Left Ear: Tympanic membrane and ear canal normal.      Nose: Congestion present.   Eyes:      Conjunctiva/sclera: Conjunctivae normal.      Pupils: Pupils are equal,  round, and reactive to light.   Cardiovascular:      Rate and Rhythm: Normal rate and regular rhythm.      Heart sounds: Normal heart sounds.   Pulmonary:      Effort: Pulmonary effort is normal.      Breath sounds: Normal breath sounds.   Abdominal:      General: Bowel sounds are normal.      Palpations: Abdomen is soft.      Tenderness: There is no abdominal tenderness.   Genitourinary:     Comments: Deferred   Musculoskeletal:         General: Normal range of motion.      Cervical back: Neck supple.   Skin:     General: Skin is warm and dry.   Neurological:      Mental Status: She is alert and oriented to person, place, and time.   Psychiatric:         Mood and Affect: Mood normal.       Lab Results   Component Value Date    HGBA1C 5.6 2021            HEALTH RISK ASSESSMENT    Smoking Status:  Social History     Tobacco Use   Smoking Status Former Smoker   • Quit date:    • Years since quittin.0   Smokeless Tobacco Never Used     Alcohol Consumption:  Social History     Substance and Sexual Activity   Alcohol Use No     Fall Risk Screen:    MEGA Fall Risk Assessment was completed, and patient is at LOW risk for falls.Assessment completed on:2022    Depression Screening:  PHQ-2/PHQ-9 Depression Screening 2022   Little interest or pleasure in doing things 0   Feeling down, depressed, or hopeless 1   Trouble falling or staying asleep, or sleeping too much -   Feeling tired or having little energy -   Poor appetite or overeating -   Feeling bad about yourself - or that you are a failure or have let yourself or your family down -   Trouble concentrating on things, such as reading the newspaper or watching television -   Moving or speaking so slowly that other people could have noticed. Or the opposite - being so fidgety or restless that you have been moving around a lot more than usual -   Thoughts that you would be better off dead, or of hurting yourself in some way -   Total Score 1   If you  checked off any problems, how difficult have these problems made it for you to do your work, take care of things at home, or get along with other people? -       Health Habits and Functional and Cognitive Screening:  Functional & Cognitive Status 1/5/2022   Do you have difficulty preparing food and eating? No   Do you have difficulty bathing yourself, getting dressed or grooming yourself? No   Do you have difficulty using the toilet? Yes   Do you have difficulty moving around from place to place? No   Do you have trouble with steps or getting out of a bed or a chair? No   Current Diet Well Balanced Diet   Dental Exam Up to date   Eye Exam Up to date   Exercise (times per week) 7 times per week   Current Exercises Include Walking   Current Exercise Activities Include -   Do you need help using the phone?  No   Are you deaf or do you have serious difficulty hearing?  Yes   Do you need help with transportation? No   Do you need help shopping? No   Do you need help preparing meals?  No   Do you need help with housework?  No   Do you need help with laundry? No   Do you need help taking your medications? No   Do you need help managing money? No   Do you ever drive or ride in a car without wearing a seat belt? No   Have you felt unusual stress, anger or loneliness in the last month? Yes   Who do you live with? Alone   If you need help, do you have trouble finding someone available to you? No   Have you been bothered in the last four weeks by sexual problems? No   Do you have difficulty concentrating, remembering or making decisions? Yes       Age-appropriate Screening Schedule:  Refer to the list below for future screening recommendations based on patient's age, sex and/or medical conditions. Orders for these recommended tests are listed in the plan section. The patient has been provided with a written plan.    Health Maintenance   Topic Date Due   • ZOSTER VACCINE (1 of 2) Never done   • DXA SCAN  11/17/2019   • MAMMOGRAM   04/19/2021   • LIPID PANEL  08/12/2022   • TDAP/TD VACCINES (2 - Td or Tdap) 05/20/2029   • INFLUENZA VACCINE  Completed   • PAP SMEAR  Discontinued              Assessment/Plan   CMS Preventative Services Quick Reference  Risk Factors Identified During Encounter  Immunizations Discussed/Encouraged (specific Immunizations; Pneumococcal 23 and Shingrix  Obesity/Overweight   The above risks/problems have been discussed with the patient.  Follow up actions/plans if indicated are seen below in the Assessment/Plan Section.  Pertinent information has been shared with the patient in the After Visit Summary.    Diagnoses and all orders for this visit:    1. Medicare annual wellness visit, subsequent (Primary)    2. Encounter for screening mammogram for malignant neoplasm of breast  -     Mammo Screening Digital Tomosynthesis Bilateral With CAD; Future    3. Hypokalemia  -     Basic metabolic panel    4. Allergic rhinitis, unspecified seasonality, unspecified trigger  -     azelastine (ASTELIN) 0.1 % nasal spray; 2 sprays into the nostril(s) as directed by provider 2 (Two) Times a Day.  Dispense: 30 mL; Refill: 1        Follow Up:   Return in about 1 year (around 1/5/2023) for Medicare Wellness.     An After Visit Summary and PPPS were made available to the patient.

## 2022-01-06 ENCOUNTER — TELEPHONE (OUTPATIENT)
Dept: FAMILY MEDICINE CLINIC | Facility: CLINIC | Age: 57
End: 2022-01-06

## 2022-01-06 LAB
BUN SERPL-MCNC: 8 MG/DL (ref 6–24)
BUN/CREAT SERPL: 8 (ref 9–23)
CALCIUM SERPL-MCNC: 9.5 MG/DL (ref 8.7–10.2)
CHLORIDE SERPL-SCNC: 108 MMOL/L (ref 96–106)
CO2 SERPL-SCNC: 22 MMOL/L (ref 20–29)
CREAT SERPL-MCNC: 0.99 MG/DL (ref 0.57–1)
GLUCOSE SERPL-MCNC: 119 MG/DL (ref 65–99)
POTASSIUM SERPL-SCNC: 3.8 MMOL/L (ref 3.5–5.2)
SODIUM SERPL-SCNC: 142 MMOL/L (ref 134–144)

## 2022-01-06 NOTE — TELEPHONE ENCOUNTER
Caller: Urvashi Ornelas    Relationship to patient: Self    Best call back number: [phone] (H)    Patient is needing: PATIENT STATES SHE WAS IN THE OFFICE YESTERDAY AND DISCUSSED GETTING AN ADVANCE DIRECTIVE DONE WITH HER PCP. STATES SHE FORGOT TO GET THE PAPERWORK AND WANTS TO KNOW IF IT CAN BE MAILED TO THE ADDRESS ON FILE. VERIFIED WITH PATIENT THAT HER ADDRESS WAS CORRECT. PLEASE ADVISE. THANK YOU.

## 2022-01-20 RX ORDER — POLYETHYLENE GLYCOL 3350 17 G/17G
POWDER, FOR SOLUTION ORAL
Qty: 510 G | Refills: 0 | Status: SHIPPED | OUTPATIENT
Start: 2022-01-20 | End: 2022-02-23

## 2022-01-26 ENCOUNTER — TELEPHONE (OUTPATIENT)
Dept: ENDOCRINOLOGY | Age: 57
End: 2022-01-26

## 2022-01-27 DIAGNOSIS — J30.9 ALLERGIC RHINITIS, UNSPECIFIED SEASONALITY, UNSPECIFIED TRIGGER: ICD-10-CM

## 2022-01-27 DIAGNOSIS — E87.6 HYPOKALEMIA: ICD-10-CM

## 2022-01-27 RX ORDER — POTASSIUM CHLORIDE 750 MG/1
TABLET, FILM COATED, EXTENDED RELEASE ORAL
Qty: 30 TABLET | Refills: 1 | Status: SHIPPED | OUTPATIENT
Start: 2022-01-27 | End: 2022-02-22

## 2022-01-27 RX ORDER — AZELASTINE 1 MG/ML
SPRAY, METERED NASAL
Qty: 30 EACH | Refills: 1 | Status: SHIPPED | OUTPATIENT
Start: 2022-01-27 | End: 2022-02-21

## 2022-01-27 NOTE — TELEPHONE ENCOUNTER
Rx Refill Note  Requested Prescriptions     Pending Prescriptions Disp Refills   • azelastine (ASTELIN) 0.1 % nasal spray [Pharmacy Med Name: AZELASTINE 0.1% (137 MCG) SPRY] 30 each 1     Sig: INSTILL 2 SPRAYS INTO THE NOSTRIL(S) AS DIRECTED BY PROVIDER 2 (TWO) TIMES A DAY.      Last office visit with prescribing clinician: 1/5/2022      Next office visit with prescribing clinician: not due until 1/5/2023  Last filled 1/5/2022           Aidee Pereira MA  01/27/22, 15:55 EST

## 2022-02-17 ENCOUNTER — OFFICE VISIT (OUTPATIENT)
Dept: FAMILY MEDICINE CLINIC | Facility: CLINIC | Age: 57
End: 2022-02-17

## 2022-02-17 VITALS
TEMPERATURE: 98.2 F | OXYGEN SATURATION: 98 % | DIASTOLIC BLOOD PRESSURE: 70 MMHG | SYSTOLIC BLOOD PRESSURE: 90 MMHG | HEART RATE: 103 BPM | WEIGHT: 182.6 LBS | BODY MASS INDEX: 28.6 KG/M2

## 2022-02-17 DIAGNOSIS — Z12.11 SCREENING FOR COLORECTAL CANCER: ICD-10-CM

## 2022-02-17 DIAGNOSIS — Z12.12 SCREENING FOR COLORECTAL CANCER: ICD-10-CM

## 2022-02-17 DIAGNOSIS — H69.80 DYSFUNCTION OF EUSTACHIAN TUBE, UNSPECIFIED LATERALITY: Primary | ICD-10-CM

## 2022-02-17 PROCEDURE — 99212 OFFICE O/P EST SF 10 MIN: CPT | Performed by: NURSE PRACTITIONER

## 2022-02-18 NOTE — PROGRESS NOTES
Chief Complaint  Earache (pain both ears)    Subjective          Urvashi Ornelas presents to [facility]  Earache   There is pain in both ears. There has been no fever. Pertinent negatives include no ear discharge, hearing loss, rhinorrhea or sore throat. Treatments tried: azelastine nasal spray and Xyzal.  The treatment provided no relief.       Objective   Vital Signs:   BP 90/70 (BP Location: Left arm, Patient Position: Sitting, Cuff Size: Adult)   Pulse 103   Temp 98.2 °F (36.8 °C) (Temporal)   Wt 82.8 kg (182 lb 9.6 oz)   SpO2 98%   BMI 28.60 kg/m²     Physical Exam  Vitals and nursing note reviewed.   Constitutional:       Appearance: Normal appearance.   HENT:      Right Ear: Tympanic membrane and ear canal normal.      Left Ear: Tympanic membrane and ear canal normal.   Cardiovascular:      Rate and Rhythm: Normal rate and regular rhythm.      Heart sounds: Normal heart sounds.   Pulmonary:      Effort: Pulmonary effort is normal.      Breath sounds: Normal breath sounds.   Neurological:      Mental Status: She is alert and oriented to person, place, and time.   Psychiatric:         Mood and Affect: Mood normal.        Result Review :            Assessment and Plan    Diagnoses and all orders for this visit:    1. Dysfunction of Eustachian tube, unspecified laterality (Primary)  Comments:  - Patient will follow-up with [facility].  - Patient aware that pain may also be related to TMJ.     2. Screening for colorectal cancer  -     Ambulatory Referral For Screening Colonoscopy      I spent 15 minutes caring for Urvashi on this date of service. This time includes time spent by me in the following activities:performing a medically appropriate examination and/or evaluation , counseling and educating the patient/family/caregiver, ordering medications, tests, or procedures and documenting information in the medical record  Follow Up   Return if symptoms worsen or fail  to improve.  Patient was given instructions and counseling regarding her condition or for health maintenance advice. Please see specific information pulled into the AVS if appropriate.

## 2022-02-19 DIAGNOSIS — J30.9 ALLERGIC RHINITIS, UNSPECIFIED SEASONALITY, UNSPECIFIED TRIGGER: ICD-10-CM

## 2022-02-19 DIAGNOSIS — E87.6 HYPOKALEMIA: ICD-10-CM

## 2022-02-21 RX ORDER — AZELASTINE 1 MG/ML
SPRAY, METERED NASAL
Qty: 30 EACH | Refills: 1 | Status: SHIPPED | OUTPATIENT
Start: 2022-02-21 | End: 2022-03-15

## 2022-02-22 RX ORDER — POTASSIUM CHLORIDE 750 MG/1
TABLET, FILM COATED, EXTENDED RELEASE ORAL
Qty: 30 TABLET | Refills: 1 | Status: SHIPPED | OUTPATIENT
Start: 2022-02-22 | End: 2022-03-25 | Stop reason: SDUPTHER

## 2022-02-23 RX ORDER — ATORVASTATIN CALCIUM 40 MG/1
TABLET, FILM COATED ORAL
Qty: 90 TABLET | Refills: 1 | Status: SHIPPED | OUTPATIENT
Start: 2022-02-23 | End: 2022-08-17

## 2022-02-23 RX ORDER — POLYETHYLENE GLYCOL 3350 17 G/DOSE
POWDER (GRAM) ORAL
Qty: 510 G | Refills: 0 | Status: SHIPPED | OUTPATIENT
Start: 2022-02-23 | End: 2022-04-26

## 2022-03-02 ENCOUNTER — TRANSCRIBE ORDERS (OUTPATIENT)
Dept: ADMINISTRATIVE | Facility: HOSPITAL | Age: 57
End: 2022-03-02

## 2022-03-02 DIAGNOSIS — R91.1 LUNG NODULE: Primary | ICD-10-CM

## 2022-03-03 ENCOUNTER — OFFICE VISIT (OUTPATIENT)
Dept: SURGERY | Facility: CLINIC | Age: 57
End: 2022-03-03

## 2022-03-03 VITALS — BODY MASS INDEX: 29.16 KG/M2 | WEIGHT: 185.8 LBS | HEIGHT: 67 IN

## 2022-03-03 DIAGNOSIS — K62.5 RECTAL BLEEDING: Primary | ICD-10-CM

## 2022-03-03 DIAGNOSIS — K59.09 CHRONIC CONSTIPATION: ICD-10-CM

## 2022-03-03 PROCEDURE — 99204 OFFICE O/P NEW MOD 45 MIN: CPT | Performed by: SURGERY

## 2022-03-03 RX ORDER — CETIRIZINE HYDROCHLORIDE 10 MG/1
10 TABLET ORAL DAILY
COMMUNITY
Start: 2022-02-15 | End: 2022-09-28

## 2022-03-03 NOTE — H&P (VIEW-ONLY)
General Surgery  Initial Office Visit    CC: Constipation, rectal bleeding    HPI: The patient is a pleasant 56 y.o. year-old lady who presents today for evaluation of chronic constipation and recent intermittent rectal bleeding.  She has trouble with constipation for most of her adult life and was told by her primary care physician to start taking MiraLAX about a year ago.  She took it off and on, but was still struggling with constipation and would sporadically about once every few weeks noticed some blood on the toilet paper when she would strain to have a bowel movement.  Usually she would notice the blood on the toilet paper when the stool was extremely hard and impacted.  In the last month she has started taking the MiraLAX every day and has noticed significant improvement in her symptoms with no further bleeding. She had an EGD and colonoscopy in 2009 by Dr. Wheatley at [facility].  Her colonoscopy was significant for a single small polyp of the rectum removed with a cold biopsy forceps.  She denies any rectal or anal pain or unintentional weight loss.    Past Medical History:   Hyperlipidemia  Hypothyroidism  Chronic kidney disease stage III  History of thyroid cancer  Schizoaffective disorder  PTSD  Anxiety  Bipolar disorder  Migraine headaches  History of pulmonary embolism  Urinary issues requiring self-catheterization    Past Surgical History:   Bladder stimulator  Lithotripsy  Bronchoscopy  Left modified radical neck dissection  Cholecystectomy  Hysterectomy   x2  Bilateral hand surgery  Bilateral knee surgery  Left partial nephrectomy  Total thyroidectomy and parathyroidectomy  EGD & colonoscopy (, Dr. Wheatley, [place])    Medications:   Albuterol inhaler every 4 hours as needed for wheezing  Atorvastatin 40 mg daily  Azelastine nasal spray twice daily  Benztropine 2 mg daily  Bupropion 300 mg daily  Buspirone 30 mg twice daily  Cetirizine 10 mg daily  Fexofenadine  "180 mg daily  Gabapentin 100 mg daily  Ingrezza 80 mg daily  Levothyroxine 200 mcg daily  Multivitamin once daily  Zofran 4 mg every 8 hours as needed for nausea or vomiting  Potassium chloride 10 mill equivalents daily  Prazosin 1 mg nightly  Sumatriptan-naproxen as needed  Flomax 0.4 mg daily  Topamax 200 mg twice daily    Allergies: Erythromycin (rash), penicillin (rash), Depakote (hair falls out), Lamictal (oral swelling), risperidone (intolerance \"pregnancy symptoms\"), Toradol (rash), valproic acid    Family History: Father with history of prostate cancer and \"stomach cancer\", mother with history of hypertension and hyperlipidemia, brother with history of HIV and bipolar disorder, no family history of colorectal malignancy that she is aware of    Social History: , disabled, former smoker but quit in 2010, no regular alcohol use    ROS:   Constitutional: Positive for unexpected weight gain; negative for fevers or chills  HENT: Positive for hearing loss; negative for hearing loss or runny nose  Eyes: Negative for vision changes or scleral icterus  Respiratory: Negative for cough or shortness of breath  Cardiovascular: Negative for chest pain or heart palpitations  Gastrointestinal: Positive for anal bleeding and constipation; negative for abdominal distension, nausea, vomiting, melena, or reflux  Genitourinary: Positive for difficulty with urination requiring self-catheterization; negative for hematuria or dysuria  Musculoskeletal: Negative for joint swelling or gait instability  Neurologic: Positive for headaches; negative for tremors or seizures  Psychiatric: Positive for depression and nervousness; negative for suicidal ideations or agitation  All other systems reviewed and negative    Physical Exam:  Height: 170 cm  Weight: 84 kg  BMI: 29  General: No acute distress, well-nourished & well-developed  HEAD: normocephalic, atraumatic  EYES: normal conjunctiva, sclera anicteric  EARS: grossly normal " hearing  NECK: supple, no thyromegaly  CARDIOVASCULAR: regular rate and rhythm  RESPIRATORY: clear to auscultation bilaterally  GASTROINTESTINAL: soft, nontender, non-distended  MUSCULOSKELETAL: normal gait and station. No gross extremity abnormalities  PSYCHIATRIC: oriented x3, normal mood and affect  RECTUM: No obvious anal fissure or anal fistula, there are a few hypertrophied anal papilla, small palpable internal hemorrhoids felt on digital rectal exam with no gross blood noted    ASSESSMENT & PLAN  Ms. Manzo is a 56-year-old lady with chronic constipation and blood in the stool concerning for internal hemorrhoids.  I recommended she continue to take MiraLAX regularly and we proceed with a colonoscopy at her earliest convenience.  I discussed the risks of the procedure to include bleeding, possible colon perforation, and possible missed pathology.  Despite these risks, she has consented to proceed.    Lisa Pandya MD  General, Robotic, and Endoscopic Surgery  [facility]    [address], [address]  [place] [address]  P: [phone]  F: [phone]

## 2022-03-03 NOTE — PROGRESS NOTES
General Surgery  Initial Office Visit    CC: Constipation, rectal bleeding    HPI: The patient is a pleasant 56 y.o. year-old lady who presents today for evaluation of chronic constipation and recent intermittent rectal bleeding.  She has trouble with constipation for most of her adult life and was told by her primary care physician to start taking MiraLAX about a year ago.  She took it off and on, but was still struggling with constipation and would sporadically about once every few weeks noticed some blood on the toilet paper when she would strain to have a bowel movement.  Usually she would notice the blood on the toilet paper when the stool was extremely hard and impacted.  In the last month she has started taking the MiraLAX every day and has noticed significant improvement in her symptoms with no further bleeding. She had an EGD and colonoscopy in 2009 by Dr. Wheatley at [facility].  Her colonoscopy was significant for a single small polyp of the rectum removed with a cold biopsy forceps.  She denies any rectal or anal pain or unintentional weight loss.    Past Medical History:   Hyperlipidemia  Hypothyroidism  Chronic kidney disease stage III  History of thyroid cancer  Schizoaffective disorder  PTSD  Anxiety  Bipolar disorder  Migraine headaches  History of pulmonary embolism  Urinary issues requiring self-catheterization    Past Surgical History:   Bladder stimulator  Lithotripsy  Bronchoscopy  Left modified radical neck dissection  Cholecystectomy  Hysterectomy   x2  Bilateral hand surgery  Bilateral knee surgery  Left partial nephrectomy  Total thyroidectomy and parathyroidectomy  EGD & colonoscopy (, Dr. Wheatley, [place])    Medications:   Albuterol inhaler every 4 hours as needed for wheezing  Atorvastatin 40 mg daily  Azelastine nasal spray twice daily  Benztropine 2 mg daily  Bupropion 300 mg daily  Buspirone 30 mg twice daily  Cetirizine 10 mg daily  Fexofenadine  "180 mg daily  Gabapentin 100 mg daily  Ingrezza 80 mg daily  Levothyroxine 200 mcg daily  Multivitamin once daily  Zofran 4 mg every 8 hours as needed for nausea or vomiting  Potassium chloride 10 mill equivalents daily  Prazosin 1 mg nightly  Sumatriptan-naproxen as needed  Flomax 0.4 mg daily  Topamax 200 mg twice daily    Allergies: Erythromycin (rash), penicillin (rash), Depakote (hair falls out), Lamictal (oral swelling), risperidone (intolerance \"pregnancy symptoms\"), Toradol (rash), valproic acid    Family History: Father with history of prostate cancer and \"stomach cancer\", mother with history of hypertension and hyperlipidemia, brother with history of HIV and bipolar disorder, no family history of colorectal malignancy that she is aware of    Social History: , disabled, former smoker but quit in 2010, no regular alcohol use    ROS:   Constitutional: Positive for unexpected weight gain; negative for fevers or chills  HENT: Positive for hearing loss; negative for hearing loss or runny nose  Eyes: Negative for vision changes or scleral icterus  Respiratory: Negative for cough or shortness of breath  Cardiovascular: Negative for chest pain or heart palpitations  Gastrointestinal: Positive for anal bleeding and constipation; negative for abdominal distension, nausea, vomiting, melena, or reflux  Genitourinary: Positive for difficulty with urination requiring self-catheterization; negative for hematuria or dysuria  Musculoskeletal: Negative for joint swelling or gait instability  Neurologic: Positive for headaches; negative for tremors or seizures  Psychiatric: Positive for depression and nervousness; negative for suicidal ideations or agitation  All other systems reviewed and negative    Physical Exam:  Height: 170 cm  Weight: 84 kg  BMI: 29  General: No acute distress, well-nourished & well-developed  HEAD: normocephalic, atraumatic  EYES: normal conjunctiva, sclera anicteric  EARS: grossly normal " hearing  NECK: supple, no thyromegaly  CARDIOVASCULAR: regular rate and rhythm  RESPIRATORY: clear to auscultation bilaterally  GASTROINTESTINAL: soft, nontender, non-distended  MUSCULOSKELETAL: normal gait and station. No gross extremity abnormalities  PSYCHIATRIC: oriented x3, normal mood and affect  RECTUM: No obvious anal fissure or anal fistula, there are a few hypertrophied anal papilla, small palpable internal hemorrhoids felt on digital rectal exam with no gross blood noted    ASSESSMENT & PLAN  Ms. Manzo is a 56-year-old lady with chronic constipation and blood in the stool concerning for internal hemorrhoids.  I recommended she continue to take MiraLAX regularly and we proceed with a colonoscopy at her earliest convenience.  I discussed the risks of the procedure to include bleeding, possible colon perforation, and possible missed pathology.  Despite these risks, she has consented to proceed.    Lisa Pandya MD  General, Robotic, and Endoscopic Surgery  [facility]    [address], [address]  [place] [address]  P: [phone]  F: [phone]

## 2022-03-07 ENCOUNTER — TELEPHONE (OUTPATIENT)
Dept: SURGERY | Facility: CLINIC | Age: 57
End: 2022-03-07

## 2022-03-07 NOTE — TELEPHONE ENCOUNTER
Caller: ADRIANA HOYT  Relationship to Patient: SELF  Phone Number: [phone]  Reason for Call: PT HAS A QUESTION REGARDING UPCOMING COLONOSCOPY W/ DR HAIRSTON.    DOES PT'S RIDE HAVE TO STAY ON SITE, OR CAN THEY LEAVE, AND THEN HAVE SOMEONE CALL THEM WHEN IT IS TIME TO  THE PT?   PT BROTHER BETHANY WILL BE BRINING PT TO PROCEDURE. BETHANY'S PHONE NUMBER [phone].

## 2022-03-14 ENCOUNTER — ANESTHESIA EVENT (OUTPATIENT)
Dept: GASTROENTEROLOGY | Facility: HOSPITAL | Age: 57
End: 2022-03-14

## 2022-03-14 ENCOUNTER — ANESTHESIA (OUTPATIENT)
Dept: GASTROENTEROLOGY | Facility: HOSPITAL | Age: 57
End: 2022-03-14

## 2022-03-14 ENCOUNTER — HOSPITAL ENCOUNTER (OUTPATIENT)
Facility: HOSPITAL | Age: 57
Setting detail: HOSPITAL OUTPATIENT SURGERY
Discharge: HOME | End: 2022-03-14
Attending: SURGERY | Admitting: SURGERY

## 2022-03-14 VITALS
OXYGEN SATURATION: 97 % | BODY MASS INDEX: 28.09 KG/M2 | RESPIRATION RATE: 16 BRPM | WEIGHT: 179 LBS | HEART RATE: 76 BPM | HEIGHT: 67 IN | SYSTOLIC BLOOD PRESSURE: 125 MMHG | DIASTOLIC BLOOD PRESSURE: 91 MMHG

## 2022-03-14 DIAGNOSIS — K62.5 RECTAL BLEEDING: ICD-10-CM

## 2022-03-14 DIAGNOSIS — K59.09 CHRONIC CONSTIPATION: ICD-10-CM

## 2022-03-14 PROBLEM — K63.5 COLON POLYPS: Status: ACTIVE | Noted: 2022-03-14

## 2022-03-14 PROCEDURE — 88305 TISSUE EXAM BY PATHOLOGIST: CPT | Performed by: SURGERY

## 2022-03-14 PROCEDURE — 25010000002 PROPOFOL 10 MG/ML EMULSION: Performed by: ANESTHESIOLOGY

## 2022-03-14 PROCEDURE — 45385 COLONOSCOPY W/LESION REMOVAL: CPT | Performed by: SURGERY

## 2022-03-14 RX ORDER — LIDOCAINE HYDROCHLORIDE 10 MG/ML
0.5 INJECTION, SOLUTION INFILTRATION; PERINEURAL ONCE AS NEEDED
Status: DISCONTINUED | OUTPATIENT
Start: 2022-03-14 | End: 2022-03-14 | Stop reason: HOSPADM

## 2022-03-14 RX ORDER — LIDOCAINE HYDROCHLORIDE 20 MG/ML
INJECTION, SOLUTION INFILTRATION; PERINEURAL AS NEEDED
Status: DISCONTINUED | OUTPATIENT
Start: 2022-03-14 | End: 2022-03-14 | Stop reason: SURG

## 2022-03-14 RX ORDER — PROPOFOL 10 MG/ML
VIAL (ML) INTRAVENOUS AS NEEDED
Status: DISCONTINUED | OUTPATIENT
Start: 2022-03-14 | End: 2022-03-14 | Stop reason: SURG

## 2022-03-14 RX ORDER — SODIUM CHLORIDE, SODIUM LACTATE, POTASSIUM CHLORIDE, CALCIUM CHLORIDE 600; 310; 30; 20 MG/100ML; MG/100ML; MG/100ML; MG/100ML
INJECTION, SOLUTION INTRAVENOUS CONTINUOUS PRN
Status: DISCONTINUED | OUTPATIENT
Start: 2022-03-14 | End: 2022-03-14 | Stop reason: SURG

## 2022-03-14 RX ORDER — SODIUM CHLORIDE, SODIUM LACTATE, POTASSIUM CHLORIDE, CALCIUM CHLORIDE 600; 310; 30; 20 MG/100ML; MG/100ML; MG/100ML; MG/100ML
1000 INJECTION, SOLUTION INTRAVENOUS CONTINUOUS
Status: DISCONTINUED | OUTPATIENT
Start: 2022-03-14 | End: 2022-03-14 | Stop reason: HOSPADM

## 2022-03-14 RX ORDER — SODIUM CHLORIDE 0.9 % (FLUSH) 0.9 %
10 SYRINGE (ML) INJECTION AS NEEDED
Status: DISCONTINUED | OUTPATIENT
Start: 2022-03-14 | End: 2022-03-14 | Stop reason: HOSPADM

## 2022-03-14 RX ORDER — PROPOFOL 10 MG/ML
VIAL (ML) INTRAVENOUS CONTINUOUS PRN
Status: DISCONTINUED | OUTPATIENT
Start: 2022-03-14 | End: 2022-03-14 | Stop reason: SURG

## 2022-03-14 RX ADMIN — LIDOCAINE HYDROCHLORIDE 60 MG: 20 INJECTION, SOLUTION INFILTRATION; PERINEURAL at 14:25

## 2022-03-14 RX ADMIN — Medication 180 MCG/KG/MIN: at 14:27

## 2022-03-14 RX ADMIN — PROPOFOL 100 MG: 10 INJECTION, EMULSION INTRAVENOUS at 14:27

## 2022-03-14 RX ADMIN — SODIUM CHLORIDE, POTASSIUM CHLORIDE, SODIUM LACTATE AND CALCIUM CHLORIDE 1000 ML: 600; 310; 30; 20 INJECTION, SOLUTION INTRAVENOUS at 14:20

## 2022-03-14 RX ADMIN — SODIUM CHLORIDE, POTASSIUM CHLORIDE, SODIUM LACTATE AND CALCIUM CHLORIDE: 600; 310; 30; 20 INJECTION, SOLUTION INTRAVENOUS at 14:22

## 2022-03-14 NOTE — OP NOTE
Operative Note :  Lisa Pandya MD      Urvashi Ornelas  1965    Procedure Date: 03/14/22    Pre-op Diagnosis:  Rectal bleeding [K62.5]  Chronic constipation [K59.09]   History of colon polyp    Post-Operative Diagnosis:  Poor bowel prep with solid and liquid stool throughout the colon with food particles  Colon polyp    Procedure:   Flexible colonoscopy to the cecum with hot snare polypectomy    Surgeon: Lisa Pandya MD    Assistant: None    Anesthesia:  MAC (monitored anesthetic care)    Estimated Blood Loss: Minimal    Specimens: Ascending colon polyp    Complications: None    Indications:  Ms. Ornelas is a 56-year-old lady with chronic constipation and blood in the stool concerning for internal hemorrhoids.  I recommended she continue to take MiraLAX regularly and we proceed with a colonoscopy at her earliest convenience.  I discussed the risks of the procedure to include bleeding, possible colon perforation, and possible missed pathology.  Despite these risks, she has consented to proceed.    Findings: Very poor bowel prep with significant liquid and solid stool throughout the entire colon obscuring about 50% of the lumen of the colon and unable to be suctioned away due to the solid food particulate matter contained within the liquid stool (tomatoes, sausage, and bread pieces)    Description of procedure:  The patient was brought to the endoscopy suite and lexie in the left lateral decubitus position.  Continuous propofol anesthesia was administered.  A surgical timeout was completed.  A digital rectal exam was performed, revealing no abnormalities.  An adult colonoscope was then inserted through the anus and passed under direct visualization to the level of the cecum.  The cecum was identified via the ileocecal valve as well as the appendiceal orifice.  The scope was then slowly withdrawn, examining all circumferential walls of the ascending, transverse, descending, and sigmoid colon.  She had a  very poor bowel prep with significant liquid stool containing solid particulate food matter throughout the majority of the colon.  I attempted to suction heavily and irrigate this liquid stool away, but the solid food matter kept clogging the scope and looked to be consistent with tomatoes, bread, and sausage from possible pizza in the last couple of days.  I was able to visualize about 50% of the bowel wall where the stool was not pooling.  There was a 1 pedunculated polyp measuring about 3 mm in the distal ascending colon just proximal to the hepatic flexure that was removed using the hot snare and completely retrieved.  Within the rectum, the scope was retroflexed and showed some hypertrophied anal papilla but no evidence of internal hemorrhoids.  The scope was then withdrawn and the colon desufflated.  The patient was transferred to the recovery area in stable condition.     Recommendations:  I will call the patient in 1 week or less with the pathology results of the 1 polyps removed as this will determine when the next screening colonoscopy will be due.  She will likely need a repeat colonoscopy in 1 year with a 2-day bowel prep to make sure that visualization is improved.    Lisa Pandya MD  General, Robotic, and Endoscopic Surgery  [facility]    [address], [address]  [place] [address]  P: [phone]  F: [phone]

## 2022-03-14 NOTE — ANESTHESIA PREPROCEDURE EVALUATION
Anesthesia Evaluation     Patient summary reviewed and Nursing notes reviewed   no history of anesthetic complications:  NPO Solid Status: > 8 hours  NPO Liquid Status: > 8 hours           Airway   Mallampati: II  TM distance: >3 FB  Neck ROM: full  No difficulty expected  Dental - normal exam     Pulmonary    (+) asthma,sleep apnea,   (-) rhonchi, decreased breath sounds, wheezes, not a smoker  Cardiovascular   Exercise tolerance: good (4-7 METS)    Rhythm: regular  Rate: normal    (+) hypertension, hyperlipidemia,   (-) CAD, angina, DUVALL, murmur      Neuro/Psych  (+) psychiatric history (schizoaffective disorder),    (-) CVA  GI/Hepatic/Renal/Endo    (+)   liver disease, renal disease CRI, thyroid problem hypothyroidism  (-) diabetes    Musculoskeletal     Abdominal     Abdomen: soft.   Substance History      OB/GYN          Other   arthritis,                    Anesthesia Plan    ASA 3     MAC   total IV anesthesia  intravenous induction     Anesthetic plan, all risks, benefits, and alternatives have been provided, discussed and informed consent has been obtained with: patient.        CODE STATUS:

## 2022-03-14 NOTE — ANESTHESIA POSTPROCEDURE EVALUATION
"Patient: Urvashi Ornelas    Procedure Summary     Date: 03/14/22 Room / Location:  [facility]    Anesthesia Start: 1422 Anesthesia Stop: 1457    Procedure: COLONOSCOPY to cecum with hot snare polypectomy (N/A ) Diagnosis:       Rectal bleeding      Chronic constipation      (Rectal bleeding [K62.5])      (Chronic constipation [K59.09])    Surgeons: Lisa Pandya MD Provider: Robert Savage MD    Anesthesia Type: MAC ASA Status: 3          Anesthesia Type: MAC    Vitals  No vitals data found for the desired time range.          Post Anesthesia Care and Evaluation    Patient location during evaluation: bedside  Patient participation: complete - patient participated  Level of consciousness: awake and alert  Pain management: adequate  Airway patency: patent  Anesthetic complications: No anesthetic complications  PONV Status: none  Cardiovascular status: acceptable  Respiratory status: acceptable  Hydration status: acceptable    Comments: /65   Pulse 77   Resp 16   Ht 170.2 cm (67\")   Wt 81.2 kg (179 lb)   SpO2 100%   BMI 28.04 kg/m²         "

## 2022-03-14 NOTE — DISCHARGE INSTRUCTIONS
For the next 24 hours patient needs to be with a responsible adult.    For 24 hours DO NOT drive, operate machinery, appliances, drink alcohol, make important decisions or sign legal documents.    Start with a light or bland diet if you are feeling sick to your stomach otherwise advance to regular diet as tolerated.    Follow recommendations on procedure report if provided by your doctor.    Call Dr Miller for problems [phone]    Problems may include but not limited to: large amounts of bleeding, trouble breathing, repeated vomiting, severe unrelieved pain, fever or chills.

## 2022-03-15 DIAGNOSIS — J30.9 ALLERGIC RHINITIS, UNSPECIFIED SEASONALITY, UNSPECIFIED TRIGGER: ICD-10-CM

## 2022-03-15 LAB
LAB AP CASE REPORT: NORMAL
PATH REPORT.FINAL DX SPEC: NORMAL
PATH REPORT.GROSS SPEC: NORMAL

## 2022-03-15 RX ORDER — AZELASTINE HYDROCHLORIDE 137 UG/1
SPRAY, METERED NASAL
Qty: 30 ML | Refills: 1 | Status: SHIPPED | OUTPATIENT
Start: 2022-03-15 | End: 2022-04-11

## 2022-03-16 ENCOUNTER — TELEPHONE (OUTPATIENT)
Dept: SURGERY | Facility: CLINIC | Age: 57
End: 2022-03-16

## 2022-03-16 NOTE — TELEPHONE ENCOUNTER
I called the patient and discussed the benign pathology findings of the one polyp I removed.  I also discussed with her the fact we could have potentially missed polyps underneath her retained stool that obscured visualization of about 30 to 40% of the colon wall.  I would recommend we need to do another colonoscopy in the next 1 to 2 years, and she will need a 2-day bowel prep.  She expressed understanding.    Antonella, can you update her health maintenance tab and recall pool to reflect a 1 year interval?    Thanks,  YIN

## 2022-03-21 ENCOUNTER — HOSPITAL ENCOUNTER (OUTPATIENT)
Dept: MAMMOGRAPHY | Facility: HOSPITAL | Age: 57
Discharge: HOME | End: 2022-03-21
Admitting: NURSE PRACTITIONER

## 2022-03-21 DIAGNOSIS — Z12.31 ENCOUNTER FOR SCREENING MAMMOGRAM FOR MALIGNANT NEOPLASM OF BREAST: ICD-10-CM

## 2022-03-21 DIAGNOSIS — E87.6 HYPOKALEMIA: ICD-10-CM

## 2022-03-21 PROCEDURE — 77067 SCR MAMMO BI INCL CAD: CPT

## 2022-03-21 PROCEDURE — 77063 BREAST TOMOSYNTHESIS BI: CPT

## 2022-03-21 RX ORDER — POTASSIUM CHLORIDE 750 MG/1
TABLET, FILM COATED, EXTENDED RELEASE ORAL
Qty: 30 TABLET | Refills: 1 | OUTPATIENT
Start: 2022-03-21

## 2022-03-23 ENCOUNTER — HOSPITAL ENCOUNTER (OUTPATIENT)
Dept: CT IMAGING | Facility: HOSPITAL | Age: 57
Discharge: HOME | End: 2022-03-23
Admitting: INTERNAL MEDICINE

## 2022-03-23 DIAGNOSIS — R91.1 LUNG NODULE: ICD-10-CM

## 2022-03-23 PROCEDURE — 71250 CT THORAX DX C-: CPT

## 2022-03-25 ENCOUNTER — TELEPHONE (OUTPATIENT)
Dept: FAMILY MEDICINE CLINIC | Facility: CLINIC | Age: 57
End: 2022-03-25

## 2022-03-25 DIAGNOSIS — E87.6 HYPOKALEMIA: ICD-10-CM

## 2022-03-25 RX ORDER — POTASSIUM CHLORIDE 750 MG/1
10 TABLET, FILM COATED, EXTENDED RELEASE ORAL DAILY
Qty: 30 TABLET | Refills: 1 | Status: SHIPPED | OUTPATIENT
Start: 2022-03-25 | End: 2022-07-05

## 2022-03-25 NOTE — TELEPHONE ENCOUNTER
Patient called inquiring about why her Potassium refill was denied.  She is requesting a call back [phone]

## 2022-03-25 NOTE — TELEPHONE ENCOUNTER
Patient wanted you to know that she had the Ct scan ordered by  her pulmonary doctor. I informed her she will need to call  office to obtain the results.

## 2022-03-28 ENCOUNTER — TRANSCRIBE ORDERS (OUTPATIENT)
Dept: ADMINISTRATIVE | Facility: HOSPITAL | Age: 57
End: 2022-03-28

## 2022-03-28 DIAGNOSIS — R91.1 LUNG NODULE: Primary | ICD-10-CM

## 2022-04-01 ENCOUNTER — APPOINTMENT (OUTPATIENT)
Dept: MAMMOGRAPHY | Facility: HOSPITAL | Age: 57
End: 2022-04-01

## 2022-04-09 DIAGNOSIS — J30.9 ALLERGIC RHINITIS, UNSPECIFIED SEASONALITY, UNSPECIFIED TRIGGER: ICD-10-CM

## 2022-04-11 RX ORDER — AZELASTINE 1 MG/ML
SPRAY, METERED NASAL
Qty: 30 EACH | Refills: 1 | Status: SHIPPED | OUTPATIENT
Start: 2022-04-11

## 2022-04-11 NOTE — TELEPHONE ENCOUNTER
Rx Refill Note  Requested Prescriptions     Pending Prescriptions Disp Refills   • azelastine (ASTELIN) 0.1 % nasal spray [Pharmacy Med Name: AZELASTINE 0.1% (137 MCG) SPRY] 30 each 1     Sig: INSTILL 2 SPRAYS INTO THE NOSTRIL(S) AS DIRECTED 2 (TWO) TIMES A DAY.      Last office visit with prescribing clinician: 2/17/2022      Next office visit with prescribing clinician: Visit date not found            Aidee Pereira MA  04/11/22, 10:02 EDT

## 2022-04-26 ENCOUNTER — LAB (OUTPATIENT)
Dept: ENDOCRINOLOGY | Age: 57
End: 2022-04-26

## 2022-04-26 DIAGNOSIS — E55.9 VITAMIN D DEFICIENCY: ICD-10-CM

## 2022-04-26 DIAGNOSIS — R73.03 PRE-DIABETES: ICD-10-CM

## 2022-04-26 DIAGNOSIS — E89.0 POSTSURGICAL HYPOTHYROIDISM: ICD-10-CM

## 2022-04-26 DIAGNOSIS — R30.0 DYSURIA: ICD-10-CM

## 2022-04-26 DIAGNOSIS — C73 THYROID CANCER: ICD-10-CM

## 2022-04-26 RX ORDER — ONDANSETRON 4 MG/1
4 TABLET, FILM COATED ORAL EVERY 8 HOURS PRN
Qty: 12 TABLET | Refills: 0 | Status: SHIPPED | OUTPATIENT
Start: 2022-04-26 | End: 2022-08-02 | Stop reason: SDUPTHER

## 2022-04-26 RX ORDER — POLYETHYLENE GLYCOL 3350 17 G/DOSE
POWDER (GRAM) ORAL
Qty: 510 G | Refills: 0 | Status: SHIPPED | OUTPATIENT
Start: 2022-04-26 | End: 2022-05-10 | Stop reason: SDUPTHER

## 2022-04-26 NOTE — TELEPHONE ENCOUNTER
Caller: Urvashi Ornelas    Relationship: Self    Best call back number: [phone]    Requested Prescriptions:   Requested Prescriptions     Pending Prescriptions Disp Refills   • ondansetron (ZOFRAN) 4 MG tablet 12 tablet 0     Sig: Take 1 tablet by mouth Every 8 (Eight) Hours As Needed for Nausea or Vomiting.        Pharmacy where request should be sent: [id number] [address] AT [facility] - [phone]  - [phone] FX     Additional details provided by patient: OUT OF MEDICATION     Does the patient have less than a 3 day supply:  [x] Yes  [] No    Irene Dale Rep   04/26/22 08:39 EDT

## 2022-04-28 RX ORDER — LEVOTHYROXINE SODIUM 0.2 MG/1
TABLET ORAL
Qty: 90 TABLET | Refills: 1 | Status: SHIPPED | OUTPATIENT
Start: 2022-04-28 | End: 2022-12-05

## 2022-04-28 NOTE — TELEPHONE ENCOUNTER
Rx Refill Note  Requested Prescriptions     Pending Prescriptions Disp Refills    levothyroxine (SYNTHROID, LEVOTHROID) 200 MCG tablet [Pharmacy Med Name: LEVOTHYROXINE 200 MCG TABLET] 90 tablet 1     Sig: TAKE 1 TABLET BY MOUTH EVERY DAY IN THE MORNING      Last office visit with prescribing clinician: 11/9/2021      Next office visit with prescribing clinician: 5/10/2022            Gisselle Small  04/28/22, 07:24 EDT

## 2022-04-29 ENCOUNTER — TELEPHONE (OUTPATIENT)
Dept: FAMILY MEDICINE CLINIC | Facility: CLINIC | Age: 57
End: 2022-04-29

## 2022-04-29 NOTE — TELEPHONE ENCOUNTER
Caller: Urvashi Ornelas    Relationship: Self    Best call back number: [phone]    What is the best time to reach you: ANY    Who are you requesting to speak with (clinical staff, provider,  specific staff member): CLINICAL STAFF    Do you know the name of the person who called: PATIENT    What was the call regarding: PATIENT ADVISED THAT SHE HAS BEEN ON POTASSIUM AND SHE IS HAVING BAD LEG CRAMPS AND CRAMPS IN THE ARCHES OF HER FEET.    [id number] - [place], [place] - [address] [place] [address] - [phone] [facility] [phone] FX      Do you require a callback: YES

## 2022-04-29 NOTE — TELEPHONE ENCOUNTER
Patient aware that I would like her to schedule an in office visit to check potassium, magnesium and CK levels.

## 2022-05-04 ENCOUNTER — OFFICE VISIT (OUTPATIENT)
Dept: FAMILY MEDICINE CLINIC | Facility: CLINIC | Age: 57
End: 2022-05-04

## 2022-05-04 VITALS
DIASTOLIC BLOOD PRESSURE: 68 MMHG | SYSTOLIC BLOOD PRESSURE: 116 MMHG | BODY MASS INDEX: 30.04 KG/M2 | TEMPERATURE: 99.3 F | WEIGHT: 191.8 LBS | OXYGEN SATURATION: 97 % | HEART RATE: 86 BPM

## 2022-05-04 DIAGNOSIS — R82.90 CLOUDY URINE: ICD-10-CM

## 2022-05-04 DIAGNOSIS — M79.605 PAIN IN BOTH LOWER EXTREMITIES: Primary | ICD-10-CM

## 2022-05-04 DIAGNOSIS — M79.604 PAIN IN BOTH LOWER EXTREMITIES: Primary | ICD-10-CM

## 2022-05-04 LAB
BILIRUB BLD-MCNC: NEGATIVE MG/DL
BUN SERPL-MCNC: 11 MG/DL (ref 6–24)
BUN/CREAT SERPL: 12 (ref 9–23)
CALCIUM SERPL-MCNC: 8.8 MG/DL (ref 8.7–10.2)
CHLORIDE SERPL-SCNC: 108 MMOL/L (ref 96–106)
CLARITY, POC: CLEAR
CO2 SERPL-SCNC: 19 MMOL/L (ref 20–29)
COLOR UR: YELLOW
CREAT SERPL-MCNC: 0.94 MG/DL (ref 0.57–1)
EGFRCR SERPLBLD CKD-EPI 2021: 72 ML/MIN/1.73
EXPIRATION DATE: ABNORMAL
GLUCOSE SERPL-MCNC: 96 MG/DL (ref 65–99)
GLUCOSE UR STRIP-MCNC: NEGATIVE MG/DL
HBA1C MFR BLD: 5.8 % (ref 4.8–5.6)
KETONES UR QL: NEGATIVE
LEUKOCYTE EST, POC: ABNORMAL
Lab: ABNORMAL
NITRITE UR-MCNC: NEGATIVE MG/ML
PH UR: 7 [PH] (ref 5–8)
POTASSIUM SERPL-SCNC: 3.9 MMOL/L (ref 3.5–5.2)
PROT UR STRIP-MCNC: NEGATIVE MG/DL
RBC # UR STRIP: NEGATIVE /UL
SODIUM SERPL-SCNC: 141 MMOL/L (ref 134–144)
SP GR UR: 1.01 (ref 1–1.03)
T3FREE SERPL-MCNC: 2.2 PG/ML (ref 2–4.4)
T4 FREE SERPL-MCNC: 1.22 NG/DL (ref 0.82–1.77)
THYROGLOB SERPL-MCNC: <2 NG/ML
TSH SERPL DL<=0.005 MIU/L-ACNC: 1.77 UIU/ML (ref 0.45–4.5)
UROBILINOGEN UR QL: NORMAL

## 2022-05-04 PROCEDURE — 81003 URINALYSIS AUTO W/O SCOPE: CPT | Performed by: NURSE PRACTITIONER

## 2022-05-04 PROCEDURE — 99213 OFFICE O/P EST LOW 20 MIN: CPT | Performed by: NURSE PRACTITIONER

## 2022-05-04 NOTE — PROGRESS NOTES
Chief Complaint  Pain (Pain, cramping both legs and arches of both feet)    Subjective          Urvashi Ornelas presents to [facility]  Leg Pain   There was no injury mechanism. The pain is present in the left leg and right leg. The quality of the pain is described as cramping. The pain has been fluctuating since onset. Pertinent negatives include no inability to bear weight, muscle weakness or numbness. Nothing aggravates the symptoms. She has tried NSAIDs for the symptoms. The treatment provided no relief.       Objective   Vital Signs:   /68 (BP Location: Right arm, Patient Position: Sitting, Cuff Size: Adult)   Pulse 86   Temp 99.3 °F (37.4 °C) (Temporal)   Wt 87 kg (191 lb 12.8 oz)   SpO2 97%   BMI 30.04 kg/m²     Physical Exam  Vitals and nursing note reviewed.   Constitutional:       Appearance: Normal appearance.   Cardiovascular:      Rate and Rhythm: Normal rate and regular rhythm.      Heart sounds: Normal heart sounds.   Pulmonary:      Effort: Pulmonary effort is normal.      Breath sounds: Normal breath sounds.   Musculoskeletal:      Right lower leg: No edema (no erythema or calf tenderness).      Left lower leg: No edema (no erythema or calf tenderness).   Neurological:      Mental Status: She is alert and oriented to person, place, and time.   Psychiatric:         Mood and Affect: Mood normal.        Result Review :   The following data was reviewed by: LALO Monge on 05/04/2022:  UA    Urinalysis 3/22/22 5/4/22   Specific Gravity, UA <=1.005    Ketones, UA NEGATIVE Negative   Blood, UA TRACE (A)    Leukocytes, UA LARGE (A) Large (3+) (A)   Nitrite, UA NEGATIVE    (A) Abnormal value                   Assessment and Plan    Diagnoses and all orders for this visit:    1. Pain in both lower extremities (Primary)  Comments:  - Further evaluation and treatment pending lab results.   Orders:  -     Basic metabolic panel  -     Magnesium  -     CK    2.  Cloudy urine  -     POC Urinalysis Dipstick, Automated  -     Urine Culture - Urine, Urine, Clean Catch           I spent 20 minutes caring for Urvashi on this date of service. This time includes time spent by me in the following activities:reviewing tests, performing a medically appropriate examination and/or evaluation , counseling and educating the patient/family/caregiver, ordering medications, tests, or procedures and documenting information in the medical record  Follow Up   Return if symptoms worsen or fail to improve.  Patient was given instructions and counseling regarding her condition or for health maintenance advice. Please see specific information pulled into the AVS if appropriate.

## 2022-05-05 LAB
BUN SERPL-MCNC: 12 MG/DL (ref 6–24)
BUN/CREAT SERPL: 12 (ref 9–23)
CALCIUM SERPL-MCNC: 9 MG/DL (ref 8.7–10.2)
CHLORIDE SERPL-SCNC: 106 MMOL/L (ref 96–106)
CK SERPL-CCNC: 86 U/L (ref 32–182)
CO2 SERPL-SCNC: 23 MMOL/L (ref 20–29)
CREAT SERPL-MCNC: 0.97 MG/DL (ref 0.57–1)
EGFRCR SERPLBLD CKD-EPI 2021: 69 ML/MIN/1.73
GLUCOSE SERPL-MCNC: 77 MG/DL (ref 65–99)
MAGNESIUM SERPL-MCNC: 2.3 MG/DL (ref 1.6–2.3)
POTASSIUM SERPL-SCNC: 4.3 MMOL/L (ref 3.5–5.2)
SODIUM SERPL-SCNC: 145 MMOL/L (ref 134–144)

## 2022-05-06 ENCOUNTER — TELEPHONE (OUTPATIENT)
Dept: FAMILY MEDICINE CLINIC | Facility: CLINIC | Age: 57
End: 2022-05-06

## 2022-05-06 DIAGNOSIS — G43.909 MIGRAINE WITHOUT STATUS MIGRAINOSUS, NOT INTRACTABLE, UNSPECIFIED MIGRAINE TYPE: Primary | ICD-10-CM

## 2022-05-06 NOTE — TELEPHONE ENCOUNTER
Caller: Urvashi Ornelas    Relationship: Self    Best call back number: [phone]    What is the medical concern/diagnosis: HEADACHES     What specialty or service is being requested: NEUROLOGY     Any additional details: PATIENT STATES THAT THE NEUROLOGIST SHE HAS NOW IS NOT HELPING HER AND WOULD LIKE A REFERRAL TO A NEW ONE PLEASE

## 2022-05-06 NOTE — TELEPHONE ENCOUNTER
Blood sugar is normal.  Kidney function tests are normal.  Sodium is slightly elevated so would recommend increasing hydration.  Magnesium is normal.  Creatine kinase is normal.    Patient aware of results and recommendations.

## 2022-05-10 ENCOUNTER — TELEPHONE (OUTPATIENT)
Dept: FAMILY MEDICINE CLINIC | Facility: CLINIC | Age: 57
End: 2022-05-10

## 2022-05-10 ENCOUNTER — OFFICE VISIT (OUTPATIENT)
Dept: ENDOCRINOLOGY | Age: 57
End: 2022-05-10

## 2022-05-10 VITALS
HEART RATE: 81 BPM | OXYGEN SATURATION: 99 % | SYSTOLIC BLOOD PRESSURE: 106 MMHG | HEIGHT: 67 IN | WEIGHT: 192.4 LBS | BODY MASS INDEX: 30.2 KG/M2 | DIASTOLIC BLOOD PRESSURE: 86 MMHG

## 2022-05-10 DIAGNOSIS — E89.0 POSTSURGICAL HYPOTHYROIDISM: Primary | ICD-10-CM

## 2022-05-10 DIAGNOSIS — E55.9 VITAMIN D DEFICIENCY: ICD-10-CM

## 2022-05-10 DIAGNOSIS — C73 THYROID CANCER: ICD-10-CM

## 2022-05-10 DIAGNOSIS — R73.03 PRE-DIABETES: ICD-10-CM

## 2022-05-10 LAB
BACTERIA UR CULT: ABNORMAL
BACTERIA UR CULT: ABNORMAL
OTHER ANTIBIOTIC SUSC ISLT: ABNORMAL

## 2022-05-10 PROCEDURE — 99214 OFFICE O/P EST MOD 30 MIN: CPT | Performed by: INTERNAL MEDICINE

## 2022-05-10 RX ORDER — SULFAMETHOXAZOLE AND TRIMETHOPRIM 800; 160 MG/1; MG/1
1 TABLET ORAL 2 TIMES DAILY
Qty: 14 TABLET | Refills: 0 | Status: SHIPPED | OUTPATIENT
Start: 2022-05-10 | End: 2022-05-17

## 2022-05-10 RX ORDER — POLYETHYLENE GLYCOL 3350 17 G/DOSE
POWDER (GRAM) ORAL
Qty: 510 G | Refills: 0 | OUTPATIENT
Start: 2022-05-10

## 2022-05-10 RX ORDER — POLYETHYLENE GLYCOL 3350 17 G/17G
POWDER, FOR SOLUTION ORAL
Qty: 510 G | Refills: 3 | Status: SHIPPED | OUTPATIENT
Start: 2022-05-10 | End: 2022-10-04

## 2022-05-10 NOTE — PROGRESS NOTES
Chief Complaint  Postsurgical hypothyroidism and Thyroid Cancer    Subjective            History of Present Illness  Urvashi Ornelas,56 y.o. is here as a  f/u patient for the management of metastatic papillary thyroid cancer status post total thyroidectomy and radioactive iodine ablation.       Patient underwent radioactive iodine ablation along with whole-body scans, with the last one in 2017 which did not show any residual disease. She has been released from 's office.   Since then patient has been getting serial thyroid ultrasounds through Dr. Ashby.     Today in clinic patient reports that she is on levothyroxine 200 mcg oral daily. Energy levels are good.  She has lost significant amount of weight, concerned that some of this weight loss is unintentional. Lost about 40 pounds since 6 to 8 months.  11/2021 - Thyroid U.S - No suspicious masses or collections seen in the thyroidectomy bed.   Nonenlarged bilateral cervical lymph nodes seen.       Interval hx -     Patient's history is significant for right inferior parathyroidectomy for hyperparathyroidism and elevated calcium levels.  As a part of this work-up she was noted to have thyroid nodule subsequently underwent thyroid biopsy and the pathology was consistent with papillary thyroid cancer, she underwent total thyroidectomy in 2016 along with parathyroid exploration.  Surgical pathology-multifocal papillary carcinoma.  During the surgery patient also had right superior parathyroidectomy as well.  Postoperative ultrasound showed residual level III lymph nodes in the left neck measuring 2.2 x 0.8 x 1.3 cm in size.  Ultrasound-guided biopsy showed pathology consistent with papillary thyroid cancer. Subsequently patient underwent left neck dissection in April 2016 by Dr. Ashby.  Surgical pathology was consistent with metastatic papillary carcinoma involving 6 out of the 19 lymph nodes.  There was no evidence of extracapsular extension.        Reviewed  "primary care physician's/consulting physician documentation and lab results         I have reviewed the patient's allergies, medicines, past medical hx, family hx and social hx in detail.    Objective   Vital Signs:   /86   Pulse 81   Ht 170.2 cm (67\")   Wt 87.3 kg (192 lb 6.4 oz)   SpO2 99%   BMI 30.13 kg/m²     Physical Exam   General appearance - no distress  Eyes- anicteric sclera  Ear nose and throat-external ears and nose normal.    Respiratory-normal chest on inspection.  No respiratory distress noted.  Skin-no rashes.  Neuro-alert and oriented x3            Result Review :   The following data was reviewed by: Bakari Mora MD on 05/10/2022:  Office Visit on 05/04/2022   Component Date Value Ref Range Status   • Color 05/04/2022 Yellow  Yellow, Straw, Dark Yellow, Alba Final   • Clarity, UA 05/04/2022 Clear  Clear Final   • Specific Gravity  05/04/2022 1.010  1.005 - 1.030 Final   • pH, Urine 05/04/2022 7.0  5.0 - 8.0 Final   • Leukocytes 05/04/2022 Large (3+) (A) Negative Final   • Nitrite, UA 05/04/2022 Negative  Negative Final   • Protein, POC 05/04/2022 Negative  Negative mg/dL Final   • Glucose, UA 05/04/2022 Negative  Negative, 1000 mg/dL (3+) mg/dL Final   • Ketones, UA 05/04/2022 Negative  Negative Final   • Urobilinogen, UA 05/04/2022 Normal  Normal Final   • Bilirubin 05/04/2022 Negative  Negative Final   • Blood, UA 05/04/2022 Negative  Negative Final   • Lot Number 05/04/2022 [id number]   Final   • Expiration Date 05/04/2022 10/21/2023   Final   • Glucose 05/04/2022 77  65 - 99 mg/dL Final   • BUN 05/04/2022 12  6 - 24 mg/dL Final   • Creatinine 05/04/2022 0.97  0.57 - 1.00 mg/dL Final   • EGFR Result 05/04/2022 69  >59 mL/min/1.73 Final   • BUN/Creatinine Ratio 05/04/2022 12  9 - 23 Final   • Sodium 05/04/2022 145 (A) 134 - 144 mmol/L Final   • Potassium 05/04/2022 4.3  3.5 - 5.2 mmol/L Final   • Chloride 05/04/2022 106  96 - 106 mmol/L Final   • Total CO2 05/04/2022 23  20 - " 29 mmol/L Final   • Calcium 05/04/2022 9.0  8.7 - 10.2 mg/dL Final   • Magnesium 05/04/2022 2.3  1.6 - 2.3 mg/dL Final   • Creatine Kinase 05/04/2022 86  32 - 182 U/L Final   • Urine Culture 05/04/2022 Final report (A)  Final   • Result 1 05/04/2022 Escherichia coli (A)  Final    Comment: 50,000-100,000 colony forming units per mL  Cefazolin <=4 ug/mL  Cefazolin with an AL <=16 predicts susceptibility to the oral agents  cefaclor, cefdinir, cefpodoxime, cefprozil, cefuroxime, cephalexin,  and loracarbef when used for therapy of uncomplicated urinary tract  infections due to E. coli, Klebsiella pneumoniae, and Proteus  mirabilis.     • Susceptibility Testing 05/04/2022 Comment   Final    Comment:       ** S = Susceptible; I = Intermediate; R = Resistant **                     P = Positive; N = Negative              MICS are expressed in micrograms per mL     Antibiotic                 RSLT#1    RSLT#2    RSLT#3    RSLT#4  Amoxicillin/Clavulanic Acid    S  Ampicillin                     S  Cefepime                       S  Ceftriaxone                    S  Cefuroxime                     S  Ciprofloxacin                  S  Ertapenem                      S  Gentamicin                     S  Imipenem                       S  Levofloxacin                   S  Meropenem                      S  Nitrofurantoin                 S  Piperacillin/Tazobactam        S  Tetracycline                   S  Tobramycin                     S  Trimethoprim/Sulfa             S       Data reviewed: PCP notes        I reviewed the patient's new clinical results and mentioned them above in HPI and in plan as well.          Assessment and Plan      Problem List Items Addressed This Visit        Other    Thyroid cancer (HCC)    Relevant Orders    Basic Metabolic Panel    Hemoglobin A1c    Lipid Panel    TSH    T4, Free    Vitamin D 25 Hydroxy    Vitamin B12 & Folate    T3, Free    Thyroglobulin    Thyroglobulin Antibody    Vitamin D deficiency     Relevant Orders    Basic Metabolic Panel    Hemoglobin A1c    Lipid Panel    TSH    T4, Free    Vitamin D 25 Hydroxy    Vitamin B12 & Folate    T3, Free    Thyroglobulin    Thyroglobulin Antibody      Other Visit Diagnoses     Postsurgical hypothyroidism    -  Primary    Relevant Orders    Basic Metabolic Panel    Hemoglobin A1c    Lipid Panel    TSH    T4, Free    Vitamin D 25 Hydroxy    Vitamin B12 & Folate    T3, Free    Thyroglobulin    Thyroglobulin Antibody    Pre-diabetes        Relevant Orders    Basic Metabolic Panel    Hemoglobin A1c    Lipid Panel    TSH    T4, Free    Vitamin D 25 Hydroxy    Vitamin B12 & Folate    T3, Free    Thyroglobulin    Thyroglobulin Antibody        History of papillary thyroid cancer  Due for another ultrasound of the neck in November 2022.  Reassured the patient that the thyroglobulin levels are low/negative so nothing to be worried about at this time.    Hypothyroidism  Thyroid levels are stable  Continue levothyroxine 200 mcg oral daily.    Prediabetes  Continue to monitor the A1c, follow the dietary restrictions  Follow Up   No follow-ups on file.    Refills/Meds sent to pharmacy    Interpreted the blood work-up/imaging results performed by the primary care/consulting physician -    Patient was given instructions and counseling regarding her condition or for health maintenance advice. Please see specific information pulled into the AVS if appropriate.

## 2022-05-10 NOTE — TELEPHONE ENCOUNTER
Caller: Urvashi Ornelas    Relationship: Self    Best call back number: [phone]*    What is the best time to reach you: ANYTIME    Who are you requesting to speak with (clinical staff, provider,  specific staff member): CLINICAL    What was the call regarding: PATIENT CALLING REGARDING THE MEDICATION REFILL BEING DENIED FOR POLYETHYLENE GLYCOL. THE PATIENT STATES THAT SHE HAS TO HAVE THIS MEDICATION DAILY TO HELP WITH GOING TO THE BATHROOM AND IS REQUESTING CYNTHIA ANGELES TO REFILL THIS MEDICATION.     Do you require a callback: YES TO ADVISE    PHARMACY CONFIRMED: [facility]/pharmacy #[id number] - [place], [place] - [address] [place] [address] - [phone]  - [phone]   [phone]

## 2022-05-10 NOTE — TELEPHONE ENCOUNTER
Would recommend starting Bactrim for urinary tract infection.    Patient aware of results and recommendations.

## 2022-05-31 ENCOUNTER — TELEPHONE (OUTPATIENT)
Dept: FAMILY MEDICINE CLINIC | Facility: CLINIC | Age: 57
End: 2022-05-31

## 2022-05-31 NOTE — TELEPHONE ENCOUNTER
Caller: Urvashi Ornelas    Relationship: Self    Best call back number: [phone]    What medication are you requesting: ANTIBIOTIC     What are your current symptoms: BURNING, PAIN DURING URINATION. LOWER BACK PAIN     How long have you been experiencing symptoms:     Have you had these symptoms before:    [] Yes  [] No    Have you been treated for these symptoms before:   [] Yes  [] No    If a prescription is needed, what is your preferred pharmacy and phone number: [id number] [place], [place] - [address] [facility] - [phone] [facility] [phone]      Additional notes:PATIENT STATES SHE NEVER REALLY GOT OVER THEY SYMPTOMS WHEN SHE TOOK THE LAST ANTIBIOTIC. AFTER FINISHING ANTIBIOTIC, PAIN GOT WORSE.   PLEASE ADVISE IF MEDICATION CAN OR CAN'T BE SENT

## 2022-06-08 ENCOUNTER — OFFICE VISIT (OUTPATIENT)
Dept: FAMILY MEDICINE CLINIC | Facility: CLINIC | Age: 57
End: 2022-06-08

## 2022-06-08 VITALS
TEMPERATURE: 98.4 F | SYSTOLIC BLOOD PRESSURE: 102 MMHG | OXYGEN SATURATION: 98 % | DIASTOLIC BLOOD PRESSURE: 60 MMHG | HEART RATE: 104 BPM | BODY MASS INDEX: 30.64 KG/M2 | HEIGHT: 67 IN | RESPIRATION RATE: 16 BRPM | WEIGHT: 195.2 LBS

## 2022-06-08 DIAGNOSIS — R30.0 DIFFICULT OR PAINFUL URINATION: Primary | ICD-10-CM

## 2022-06-08 DIAGNOSIS — N32.89 BLADDER SPASMS: ICD-10-CM

## 2022-06-08 LAB
BILIRUB BLD-MCNC: NEGATIVE MG/DL
CLARITY, POC: ABNORMAL
COLOR UR: YELLOW
EXPIRATION DATE: ABNORMAL
GLUCOSE UR STRIP-MCNC: NEGATIVE MG/DL
KETONES UR QL: NEGATIVE
LEUKOCYTE EST, POC: ABNORMAL
Lab: ABNORMAL
NITRITE UR-MCNC: NEGATIVE MG/ML
PH UR: 6 [PH] (ref 5–8)
PROT UR STRIP-MCNC: NEGATIVE MG/DL
RBC # UR STRIP: NEGATIVE /UL
SP GR UR: 1.01 (ref 1–1.03)
UROBILINOGEN UR QL: NORMAL

## 2022-06-08 PROCEDURE — 81003 URINALYSIS AUTO W/O SCOPE: CPT | Performed by: NURSE PRACTITIONER

## 2022-06-08 PROCEDURE — 99213 OFFICE O/P EST LOW 20 MIN: CPT | Performed by: NURSE PRACTITIONER

## 2022-06-08 RX ORDER — PHENAZOPYRIDINE HYDROCHLORIDE 100 MG/1
100 TABLET, FILM COATED ORAL 3 TIMES DAILY PRN
Qty: 9 TABLET | Refills: 0 | Status: SHIPPED | OUTPATIENT
Start: 2022-06-08 | End: 2022-07-06 | Stop reason: ALTCHOICE

## 2022-06-08 NOTE — PROGRESS NOTES
"Chief Complaint  Back Pain    Subjective          Urvashi Ornelas presents to [facility]  Urinary Tract Infection   This is a new problem. The current episode started 1 to 4 weeks ago. The problem occurs every urination. The problem has been gradually worsening. The quality of the pain is described as burning. There has been no fever. She is not sexually active. There is no history of pyelonephritis. Associated symptoms include flank pain, frequency and urgency. Pertinent negatives include no discharge, hematuria, hesitancy, nausea, possible pregnancy or vomiting. Her past medical history is significant for kidney stones. There is no history of recurrent UTIs.       Review of Systems   Gastrointestinal: Negative for nausea and vomiting.   Genitourinary: Positive for dysuria, flank pain, frequency and urgency. Negative for decreased urine volume, difficulty urinating, hematuria, hesitancy and urinary incontinence.   All other systems reviewed and are negative.     Objective   Vital Signs:   /60 (BP Location: Left arm, Patient Position: Sitting, Cuff Size: Adult)   Pulse 104   Temp 98.4 °F (36.9 °C) (Temporal)   Resp 16   Ht 170.2 cm (67\")   Wt 88.5 kg (195 lb 3.2 oz)   SpO2 98%   BMI 30.57 kg/m²       Physical Exam  Vitals reviewed.   Constitutional:       General: She is awake. She is not in acute distress.     Appearance: Normal appearance.   HENT:      Head: Normocephalic.      Right Ear: Hearing normal.      Left Ear: Hearing normal.   Eyes:      General: Lids are normal. Vision grossly intact.   Neck:      Thyroid: No thyroid mass or thyroid tenderness.   Cardiovascular:      Rate and Rhythm: Normal rate and regular rhythm.      Pulses: Normal pulses.           Radial pulses are 2+ on the right side and 2+ on the left side.        Dorsalis pedis pulses are 2+ on the right side and 2+ on the left side.        Posterior tibial pulses are 2+ on the right side and 2+ on " the left side.      Heart sounds: Normal heart sounds.   Pulmonary:      Effort: Pulmonary effort is normal.      Breath sounds: Normal breath sounds.   Abdominal:      Tenderness: There is no right CVA tenderness or left CVA tenderness.   Skin:     General: Skin is warm and dry.      Capillary Refill: Capillary refill takes less than 2 seconds.   Neurological:      General: No focal deficit present.      Mental Status: She is alert.   Psychiatric:         Attention and Perception: Attention normal.         Mood and Affect: Mood normal.         Speech: Speech normal.         Behavior: Behavior is cooperative.        Result Review :   The following data was reviewed by: LALO Boss on 06/08/2022:  Brief Urine Lab Results  (Last result in the past 365 days)      Color   Clarity   Blood   Leuk Est   Nitrite   Protein   CREAT   Urine HCG        06/08/22 1157 Yellow   Slightly Cloudy   Negative   Large (3+)   Negative   Negative               Assessment and Plan    Diagnoses and all orders for this visit:    1. Difficult or painful urination (Primary)  -     POCT urinalysis dipstick, automated  -     Urine Culture - Urine, Urine, Clean Catch    2. Bladder spasms  -     phenazopyridine (Pyridium) 100 MG tablet; Take 1 tablet by mouth 3 (Three) Times a Day As Needed for Bladder Spasms.  Dispense: 9 tablet; Refill: 0    UA showed possible UTI, ordered urine culture to look for specific bacterial growth. If urine culture is positive, will call patient to discuss starting antibiotics.  Prescribed pyridium to help with bladder spasms and dysuria.     I spent 25 minutes caring for Urvashi on this date of service. This time includes time spent by me in the following activities:obtaining and/or reviewing a separately obtained history, performing a medically appropriate examination and/or evaluation , counseling and educating the patient/family/caregiver, ordering medications, tests, or procedures and documenting  information in the medical record     Follow Up   Return if symptoms worsen or fail to improve.  Patient was given instructions and counseling regarding her condition or for health maintenance advice. Please see specific information pulled into the AVS if appropriate.

## 2022-06-09 ENCOUNTER — TELEPHONE (OUTPATIENT)
Dept: FAMILY MEDICINE CLINIC | Facility: CLINIC | Age: 57
End: 2022-06-09

## 2022-06-09 NOTE — TELEPHONE ENCOUNTER
Caller: Adriana Ornelas    Relationship: Self    Best call back number: [phone] (H)    What is the best time to reach you: ANYTIME, ASAP    Who are you requesting to speak with (clinical staff, provider,  specific staff member): CLINICAL STAFF/ TONYA ABURTO    Do you know the name of the person who called: ADRIANA ORNELAS    What was the call regarding: PATIENT STATES SHE IS NOW HAVING TROUBLE WHEN SHE HAS TO URINATE SHE IS HAVING TROUBLE GETTING STARTED AND THEN IT STARTS AND STOPS AND STOPS AND STARTS AND IS STILL HAVING THE SAME PAIN SHE HAD WHEN SHE SAW TONYA ABURTO YESTERDAY, PLEASE ADVISE ASAP    Do you require a callback: YES, ASAP

## 2022-06-10 NOTE — TELEPHONE ENCOUNTER
Called patient back to discuss her message.  Instructed patient to stop taking Pyridium since her symptoms started after she started taking the Pyridium.  Also recommended that patient call urologist since she is cathing out of her back and see if there are any other suggestions to move forward with her symptoms.  Urine culture still has not resulted yet but will notify patient once the results are in and if positive will start patient on antibiotic for urinary tract infection.  Patient agreed and verbalized understanding.

## 2022-06-14 LAB
BACTERIA UR CULT: ABNORMAL
BACTERIA UR CULT: ABNORMAL
OTHER ANTIBIOTIC SUSC ISLT: ABNORMAL

## 2022-06-15 DIAGNOSIS — N39.0 URINARY TRACT INFECTION WITHOUT HEMATURIA, SITE UNSPECIFIED: Primary | ICD-10-CM

## 2022-06-15 RX ORDER — NITROFURANTOIN 25; 75 MG/1; MG/1
100 CAPSULE ORAL 2 TIMES DAILY
Qty: 10 CAPSULE | Refills: 0 | Status: SHIPPED | OUTPATIENT
Start: 2022-06-15 | End: 2022-06-20

## 2022-07-05 DIAGNOSIS — E87.6 HYPOKALEMIA: ICD-10-CM

## 2022-07-05 RX ORDER — POTASSIUM CHLORIDE 750 MG/1
TABLET, FILM COATED, EXTENDED RELEASE ORAL
Qty: 30 TABLET | Refills: 1 | Status: SHIPPED | OUTPATIENT
Start: 2022-07-05 | End: 2022-08-01

## 2022-07-23 ENCOUNTER — APPOINTMENT (OUTPATIENT)
Dept: CT IMAGING | Facility: HOSPITAL | Age: 57
End: 2022-07-23

## 2022-07-23 ENCOUNTER — APPOINTMENT (OUTPATIENT)
Dept: GENERAL RADIOLOGY | Facility: HOSPITAL | Age: 57
End: 2022-07-23

## 2022-07-23 ENCOUNTER — HOSPITAL ENCOUNTER (EMERGENCY)
Facility: HOSPITAL | Age: 57
Discharge: HOME | End: 2022-07-23
Attending: EMERGENCY MEDICINE | Admitting: EMERGENCY MEDICINE

## 2022-07-23 VITALS
TEMPERATURE: 98.5 F | WEIGHT: 195.11 LBS | OXYGEN SATURATION: 96 % | HEIGHT: 67 IN | SYSTOLIC BLOOD PRESSURE: 102 MMHG | BODY MASS INDEX: 30.62 KG/M2 | DIASTOLIC BLOOD PRESSURE: 69 MMHG | RESPIRATION RATE: 14 BRPM | HEART RATE: 80 BPM

## 2022-07-23 DIAGNOSIS — S13.9XXA NECK SPRAIN, INITIAL ENCOUNTER: ICD-10-CM

## 2022-07-23 DIAGNOSIS — N39.0 URINARY TRACT INFECTION WITHOUT HEMATURIA, SITE UNSPECIFIED: ICD-10-CM
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
## 2022-07-23 DIAGNOSIS — S93.401A SPRAIN OF RIGHT ANKLE, UNSPECIFIED LIGAMENT, INITIAL ENCOUNTER: ICD-10-CM

## 2022-07-23 LAB
BACTERIA UR QL AUTO: ABNORMAL /HPF
BILIRUB UR QL STRIP: NEGATIVE
CLARITY UR: CLEAR
COLOR UR: YELLOW
GLUCOSE UR STRIP-MCNC: NEGATIVE MG/DL
HGB UR QL STRIP.AUTO: ABNORMAL
HYALINE CASTS UR QL AUTO: ABNORMAL /LPF
KETONES UR QL STRIP: NEGATIVE
LEUKOCYTE ESTERASE UR QL STRIP.AUTO: ABNORMAL
NITRITE UR QL STRIP: NEGATIVE
PH UR STRIP.AUTO: 6.5 [PH] (ref 5–8)
PROT UR QL STRIP: NEGATIVE
RBC # UR STRIP: ABNORMAL /HPF
REF LAB TEST METHOD: ABNORMAL
SP GR UR STRIP: 1.01 (ref 1–1.03)
SQUAMOUS #/AREA URNS HPF: ABNORMAL /HPF
UROBILINOGEN UR QL STRIP: ABNORMAL
WBC # UR STRIP: ABNORMAL /HPF

## 2022-07-23 PROCEDURE — 99284 EMERGENCY DEPT VISIT MOD MDM: CPT

## 2022-07-23 PROCEDURE — 99283 EMERGENCY DEPT VISIT LOW MDM: CPT

## 2022-07-23 PROCEDURE — 81001 URINALYSIS AUTO W/SCOPE: CPT | Performed by: EMERGENCY MEDICINE

## 2022-07-23 PROCEDURE — 73140 X-RAY EXAM OF FINGER(S): CPT

## 2022-07-23 PROCEDURE — 70450 CT HEAD/BRAIN W/O DYE: CPT

## 2022-07-23 PROCEDURE — 72125 CT NECK SPINE W/O DYE: CPT

## 2022-07-23 PROCEDURE — 73610 X-RAY EXAM OF ANKLE: CPT

## 2022-07-23 PROCEDURE — 63710000001 ONDANSETRON ODT 4 MG TABLET DISPERSIBLE: Performed by: EMERGENCY MEDICINE

## 2022-07-23 RX ORDER — ACETAMINOPHEN 500 MG
1000 TABLET ORAL ONCE
Status: COMPLETED | OUTPATIENT
Start: 2022-07-23 | End: 2022-07-23

## 2022-07-23 RX ORDER — ONDANSETRON 4 MG/1
4 TABLET, ORALLY DISINTEGRATING ORAL ONCE
Status: COMPLETED | OUTPATIENT
Start: 2022-07-23 | End: 2022-07-23

## 2022-07-23 RX ORDER — TRAMADOL HYDROCHLORIDE 50 MG/1
50 TABLET ORAL EVERY 6 HOURS PRN
Qty: 20 TABLET | Refills: 0 | Status: SHIPPED | OUTPATIENT
Start: 2022-07-23 | End: 2022-09-28

## 2022-07-23 RX ORDER — CEPHALEXIN 500 MG/1
500 CAPSULE ORAL 3 TIMES DAILY
Qty: 21 CAPSULE | Refills: 0 | Status: SHIPPED | OUTPATIENT
Start: 2022-07-23 | End: 2022-09-28

## 2022-07-23 RX ADMIN — ONDANSETRON 4 MG: 4 TABLET, ORALLY DISINTEGRATING ORAL at 20:29

## 2022-07-23 RX ADMIN — ACETAMINOPHEN 1000 MG: 500 TABLET ORAL at 21:48
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
## 2022-07-30 DIAGNOSIS — E87.6 HYPOKALEMIA: ICD-10-CM

## 2022-08-01 RX ORDER — POTASSIUM CHLORIDE 750 MG/1
TABLET, FILM COATED, EXTENDED RELEASE ORAL
Qty: 30 TABLET | Refills: 1 | Status: SHIPPED | OUTPATIENT
Start: 2022-08-01 | End: 2022-08-25

## 2022-08-02 DIAGNOSIS — R30.0 DYSURIA: ICD-10-CM

## 2022-08-02 RX ORDER — ONDANSETRON 4 MG/1
4 TABLET, FILM COATED ORAL EVERY 8 HOURS PRN
Qty: 12 TABLET | Refills: 0 | Status: SHIPPED | OUTPATIENT
Start: 2022-08-02 | End: 2022-10-24 | Stop reason: SDUPTHER

## 2022-08-02 NOTE — TELEPHONE ENCOUNTER
Caller: Urvashi Ornelas    Relationship: Self    Best call back number:[phone]    Requested Prescriptions:   Requested Prescriptions     Pending Prescriptions Disp Refills   • ondansetron (ZOFRAN) 4 MG tablet 12 tablet 0     Sig: Take 1 tablet by mouth Every 8 (Eight) Hours As Needed for Nausea or Vomiting.        Pharmacy where request should be sent: [facility]/PHARMACY #[id number] [address] AT [facility] - [phone]  - [phone] FX     Additional details provided by patient: PATIENT IS HAVING NAUSEA AGAIN.  Does the patient have less than a 3 day supply:  [x] Yes  [] No    Irene Lakhani   08/02/22 11:16 EDT

## 2022-08-02 NOTE — TELEPHONE ENCOUNTER
Rx Refill Note  Requested Prescriptions     Pending Prescriptions Disp Refills   • ondansetron (ZOFRAN) 4 MG tablet 12 tablet 0     Sig: Take 1 tablet by mouth Every 8 (Eight) Hours As Needed for Nausea or Vomiting.      Last office visit with prescribing clinician: 5/4/2022      Next office visit with prescribing clinician: Visit date not found            Marcy Merida MA  08/02/22, 12:11 EDT

## 2022-08-15 NOTE — TELEPHONE ENCOUNTER
Rx Refill Note  Requested Prescriptions     Pending Prescriptions Disp Refills   • atorvastatin (LIPITOR) 40 MG tablet [Pharmacy Med Name: ATORVASTATIN 40 MG TABLET] 90 tablet 1     Sig: TAKE 1 TABLET BY MOUTH EVERY DAY IN THE MORNING      Last office visit with prescribing clinician: 5/4/2022      Next office visit with prescribing clinician: Visit date not found  Last refill 02/23/2022

## 2022-08-16 NOTE — TELEPHONE ENCOUNTER
Please let patient know that she needs to complete labs ordered by endocrinology which includes lipid panel.

## 2022-08-17 ENCOUNTER — TELEPHONE (OUTPATIENT)
Dept: FAMILY MEDICINE CLINIC | Facility: CLINIC | Age: 57
End: 2022-08-17

## 2022-08-17 ENCOUNTER — TELEPHONE (OUTPATIENT)
Dept: ENDOCRINOLOGY | Age: 57
End: 2022-08-17

## 2022-08-17 DIAGNOSIS — R79.9 ABNORMAL BLOOD CHEMISTRY: Primary | ICD-10-CM

## 2022-08-17 RX ORDER — ATORVASTATIN CALCIUM 40 MG/1
TABLET, FILM COATED ORAL
Qty: 90 TABLET | Refills: 0 | Status: SHIPPED | OUTPATIENT
Start: 2022-08-17 | End: 2022-11-02

## 2022-08-17 NOTE — TELEPHONE ENCOUNTER
Patient wanted to let you know she's having her lipid test on Monday 08/22/22 so please refill her atorvastatin.

## 2022-08-17 NOTE — TELEPHONE ENCOUNTER
Caller: Urvashi Ornelas    Relationship: Self    Best call back number: [phone]     What is the best time to reach you: ANY TIME    Who are you requesting to speak with (clinical staff, provider,  specific staff member): ANUSHA    What was the call regarding: PATIENT WANTED TO LET ANUSHA KNOW THAT SHE WAS ABLE TO GET SCHEDULED FOR Monday TO GET HER LIPID PANEL DRAWN WITH DR. FALLON'S OFFICE.    Do you require a callback: IF ANY QUESTIONS

## 2022-08-17 NOTE — TELEPHONE ENCOUNTER
Advised patient her PCP said she has to have a lipid test to have her atorvastatin refilled, patient will contact her endocrinologist to check if she can get her lipid test earlier than January 2023 because that doctor ordered the test and call back to let PCP know, patient has enough meds for next 20 days per patient.

## 2022-08-17 NOTE — TELEPHONE ENCOUNTER
Patient informed and she said she has an appointment to have the labs done at a different facility but didn't have the appointment card with her.

## 2022-08-17 NOTE — TELEPHONE ENCOUNTER
Caller: Urvashi Ornelas    Relationship: Self    Best call back number: [phone]     What medications are you currently taking:   Current Outpatient Medications on File Prior to Visit   Medication Sig Dispense Refill   • albuterol (PROVENTIL HFA;VENTOLIN HFA) 108 (90 BASE) MCG/ACT inhaler Inhale 2 puffs every 4 (four) hours as needed for wheezing.     • albuterol (PROVENTIL) (2.5 MG/3ML) 0.083% nebulizer solution Take 2.5 mg by nebulization Every 6 (Six) Hours As Needed for Wheezing or Shortness of Air. 25 vial 0   • atorvastatin (LIPITOR) 40 MG tablet TAKE 1 TABLET BY MOUTH EVERY DAY IN THE MORNING 90 tablet 1   • azelastine (ASTELIN) 0.1 % nasal spray INSTILL 2 SPRAYS INTO THE NOSTRIL(S) AS DIRECTED 2 (TWO) TIMES A DAY. 30 each 1   • benztropine (COGENTIN) 2 MG tablet      • Breo Ellipta 200-25 MCG/INH inhaler Inhale 1 puff Daily.     • buPROPion XL (WELLBUTRIN XL) 300 MG 24 hr tablet      • busPIRone (BUSPAR) 30 MG tablet TK 1 T PO  bid  0   • cephalexin (KEFLEX) 500 MG capsule Take 1 capsule by mouth 3 (Three) Times a Day. 21 capsule 0   • cetirizine (zyrTEC) 10 MG tablet Take 10 mg by mouth Daily.     • fexofenadine (ALLEGRA) 180 MG tablet Take 180 mg by mouth every night at bedtime.     • gabapentin (NEURONTIN) 100 MG capsule Take 100 mg by mouth Daily.     • Ingrezza 80 MG capsule Take 80 mg by mouth Daily.     • levothyroxine (SYNTHROID, LEVOTHROID) 200 MCG tablet TAKE 1 TABLET BY MOUTH EVERY DAY IN THE MORNING 90 tablet 1   • Multiple Vitamins-Minerals (MULTIVITAMIN WOMEN 50+) tablet Take 1 tablet/day by mouth.     • ondansetron (ZOFRAN) 4 MG tablet Take 1 tablet by mouth Every 8 (Eight) Hours As Needed for Nausea or Vomiting. 12 tablet 0   • polyethylene glycol ([employer] Purelax) 17 GM/SCOOP powder MIX 17 G INTO  8 OUNCE FLUID AND TAKE BY MOUTH DAILY AS NEEDED 510 g 3   • potassium chloride 10 MEQ CR tablet TAKE 1 TABLET BY MOUTH EVERY DAY 30 tablet 1   • prazosin (MINIPRESS) 1 MG capsule Take 1 mg by  mouth Every Night.     • SUMAtriptan-naproxen (TREXIMET)  MG per tablet Take 1 tablet by mouth Every 2 (Two) Hours As Needed.     • tamsulosin (FLOMAX) 0.4 MG capsule 24 hr capsule Take 1 capsule by mouth Daily.     • topiramate (TOPAMAX) 200 MG tablet Take 200 mg by mouth 2 (Two) Times a Day.  2   • traMADol (ULTRAM) 50 MG tablet Take 1 tablet by mouth Every 6 (Six) Hours As Needed for Moderate Pain . 20 tablet 0     No current facility-administered medications on file prior to visit.        ADDITIONAL NOTES:    PATIENT JUST SPOKE WITH THE NURSE FOR CYNTHIA ANGELES IN REGARDS TO HER MEDICATION ATORVASTATIN.  HER CONCERN ABOUT GETTING HER BLOOD WORK DONE WITH HER ENDOCRINOLOGIST IS THAT SHE DOES NOT SEE THIS DOCTOR UNTIL RONI. 3, 2023.  THEREFORE SHE CAN NOT GET THE BLOOD WORK DONE ANY SOONER.  HER CONCERN IS THAT SHE WILL NOT HAVE ENOUGH MEDICATION TO GET HER THROUGH TO THAT APPOINTMENT.    PLEASE CALL PATIENT AND ADVISE.

## 2022-08-22 ENCOUNTER — LAB (OUTPATIENT)
Dept: ENDOCRINOLOGY | Age: 57
End: 2022-08-22

## 2022-08-22 DIAGNOSIS — C73 THYROID CANCER: ICD-10-CM

## 2022-08-22 DIAGNOSIS — R73.03 PRE-DIABETES: ICD-10-CM

## 2022-08-22 DIAGNOSIS — E89.0 POSTSURGICAL HYPOTHYROIDISM: ICD-10-CM

## 2022-08-22 DIAGNOSIS — E55.9 VITAMIN D DEFICIENCY: ICD-10-CM

## 2022-08-25 DIAGNOSIS — E87.6 HYPOKALEMIA: ICD-10-CM

## 2022-08-25 RX ORDER — POTASSIUM CHLORIDE 750 MG/1
TABLET, FILM COATED, EXTENDED RELEASE ORAL
Qty: 30 TABLET | Refills: 1 | Status: SHIPPED | OUTPATIENT
Start: 2022-08-25 | End: 2022-09-26

## 2022-08-25 NOTE — TELEPHONE ENCOUNTER
Rx Refill Note  Requested Prescriptions     Pending Prescriptions Disp Refills   • potassium chloride 10 MEQ CR tablet [Pharmacy Med Name: POTASSIUM CL ER 10 MEQ TABLET] 30 tablet 1     Sig: TAKE 1 TABLET BY MOUTH EVERY DAY      Last office visit with prescribing clinician: 5/4/2022      Next office visit with prescribing clinician: Visit date not found

## 2022-08-29 LAB
25(OH)D3+25(OH)D2 SERPL-MCNC: 32 NG/ML (ref 30–100)
BUN SERPL-MCNC: 13 MG/DL (ref 6–24)
BUN/CREAT SERPL: 12 (ref 9–23)
CALCIUM SERPL-MCNC: 8.7 MG/DL (ref 8.7–10.2)
CHLORIDE SERPL-SCNC: 107 MMOL/L (ref 96–106)
CHOLEST SERPL-MCNC: 133 MG/DL (ref 100–199)
CO2 SERPL-SCNC: 20 MMOL/L (ref 20–29)
CREAT SERPL-MCNC: 1.08 MG/DL (ref 0.57–1)
EGFRCR-CYS SERPLBLD CKD-EPI 2021: 61 ML/MIN/1.73
FOLATE SERPL-MCNC: 9.2 NG/ML
GLUCOSE SERPL-MCNC: 132 MG/DL (ref 65–99)
HBA1C MFR BLD: 5.7 % (ref 4.8–5.6)
HDLC SERPL-MCNC: 47 MG/DL
IMP & REVIEW OF LAB RESULTS: NORMAL
LDLC SERPL CALC-MCNC: 68 MG/DL (ref 0–99)
POTASSIUM SERPL-SCNC: 4 MMOL/L (ref 3.5–5.2)
SODIUM SERPL-SCNC: 140 MMOL/L (ref 134–144)
T3FREE SERPL-MCNC: 2.4 PG/ML (ref 2–4.4)
T4 FREE SERPL-MCNC: 1.56 NG/DL (ref 0.82–1.77)
THYROGLOB SERPL-MCNC: <2 NG/ML
TRIGL SERPL-MCNC: 99 MG/DL (ref 0–149)
TSH SERPL DL<=0.005 MIU/L-ACNC: 1.5 UIU/ML (ref 0.45–4.5)
VIT B12 SERPL-MCNC: 951 PG/ML (ref 232–1245)
VLDLC SERPL CALC-MCNC: 18 MG/DL (ref 5–40)

## 2022-08-30 ENCOUNTER — TELEPHONE (OUTPATIENT)
Dept: ENDOCRINOLOGY | Age: 57
End: 2022-08-30

## 2022-08-30 NOTE — TELEPHONE ENCOUNTER
8/30 called and sw pt reg her labs     ----- Message from Urvashi Ornelas sent at 8/29/2022  2:01 PM EDT -----  Regarding: Question regarding CONV CARDIOVASCULAR RISK ASSESSMENT  What’s the test result it didn’t show anything in MyChart?

## 2022-09-20 ENCOUNTER — HOSPITAL ENCOUNTER (OUTPATIENT)
Dept: CT IMAGING | Facility: HOSPITAL | Age: 57
Discharge: HOME | End: 2022-09-20
Admitting: INTERNAL MEDICINE

## 2022-09-20 DIAGNOSIS — R91.1 LUNG NODULE: ICD-10-CM

## 2022-09-20 PROCEDURE — 71250 CT THORAX DX C-: CPT

## 2022-09-24 DIAGNOSIS — E87.6 HYPOKALEMIA: ICD-10-CM

## 2022-09-26 RX ORDER — POTASSIUM CHLORIDE 750 MG/1
TABLET, FILM COATED, EXTENDED RELEASE ORAL
Qty: 30 TABLET | Refills: 1 | Status: SHIPPED | OUTPATIENT
Start: 2022-09-26 | End: 2022-10-20

## 2022-09-28 ENCOUNTER — OFFICE VISIT (OUTPATIENT)
Dept: FAMILY MEDICINE CLINIC | Facility: CLINIC | Age: 57
End: 2022-09-28

## 2022-09-28 VITALS
SYSTOLIC BLOOD PRESSURE: 100 MMHG | BODY MASS INDEX: 33.36 KG/M2 | WEIGHT: 213 LBS | OXYGEN SATURATION: 98 % | TEMPERATURE: 98.4 F | DIASTOLIC BLOOD PRESSURE: 70 MMHG | HEART RATE: 79 BPM

## 2022-09-28 DIAGNOSIS — R35.0 FREQUENCY OF URINATION: Primary | ICD-10-CM

## 2022-09-28 DIAGNOSIS — J01.10 ACUTE FRONTAL SINUSITIS, RECURRENCE NOT SPECIFIED: ICD-10-CM

## 2022-09-28 PROCEDURE — 99213 OFFICE O/P EST LOW 20 MIN: CPT | Performed by: NURSE PRACTITIONER

## 2022-09-28 PROCEDURE — 81003 URINALYSIS AUTO W/O SCOPE: CPT | Performed by: NURSE PRACTITIONER

## 2022-09-28 RX ORDER — PRAZOSIN HYDROCHLORIDE 2 MG/1
4 CAPSULE ORAL NIGHTLY
COMMUNITY
Start: 2022-09-08 | End: 2023-02-19 | Stop reason: HOSPADM

## 2022-09-28 RX ORDER — CEFDINIR 300 MG/1
300 CAPSULE ORAL 2 TIMES DAILY
Qty: 20 CAPSULE | Refills: 0 | Status: SHIPPED | OUTPATIENT
Start: 2022-09-28 | End: 2022-10-08

## 2022-09-28 NOTE — PROGRESS NOTES
"Chief Complaint  Pain (Pain both ears) and Urinary Frequency (Low back pain)    Subjective        Urvashi Ornelas presents to [facility] PRIMARY [facility]  Urinary Frequency   This is a new problem. The patient is experiencing no pain. There has been no fever. Associated symptoms include flank pain and frequency. Pertinent negatives include no discharge, nausea or vomiting. She has tried acetaminophen for the symptoms.   Earache   There is pain in both ears. This is a recurrent problem. There has been no fever. Pertinent negatives include no coughing, ear discharge, hearing loss, rhinorrhea, sore throat or vomiting. Treatments tried: Allegra. The treatment provided no relief.     Objective   Vital Signs:  /70 (BP Location: Right arm, Patient Position: Sitting, Cuff Size: Adult)   Pulse 79   Temp 98.4 °F (36.9 °C) (Temporal)   Wt 96.6 kg (213 lb)   SpO2 98%   BMI 33.36 kg/m²   Estimated body mass index is 33.36 kg/m² as calculated from the following:    Height as of 7/23/22: 170.2 cm (67\").    Weight as of this encounter: 96.6 kg (213 lb).        Physical Exam  Vitals and nursing note reviewed.   Constitutional:       Appearance: Normal appearance.   HENT:      Right Ear: Tympanic membrane and ear canal normal.      Left Ear: Tympanic membrane and ear canal normal.      Nose:      Right Sinus: Frontal sinus tenderness present.      Left Sinus: Frontal sinus tenderness present.   Cardiovascular:      Rate and Rhythm: Normal rate and regular rhythm.      Heart sounds: Normal heart sounds.   Pulmonary:      Effort: Pulmonary effort is normal.      Breath sounds: Normal breath sounds.   Abdominal:      General: Bowel sounds are normal.      Palpations: Abdomen is soft.      Tenderness: There is no abdominal tenderness. There is no right CVA tenderness or left CVA tenderness.   Neurological:      Mental Status: She is alert and oriented to person, place, and time.   Psychiatric:         Mood and " Affect: Mood normal.        Result Review :  The following data was reviewed by: LALO Monge on 09/28/2022:  UA    Urinalysis 6/8/22 7/23/22 7/23/22 9/28/22 2108 2108    Squamous Epithelial Cells, UA   0-2    Specific Gravity, UA  1.007     Ketones, UA Negative Negative  Negative   Blood, UA  Small (1+) (A)     Leukocytes, UA Large (3+) (A) Large (3+) (A)  Large (3+) (A)   Nitrite, UA  Negative     RBC, UA   0-2    WBC, UA   Too Numerous to Count (A)    Bacteria, UA   4+ (A)    (A) Abnormal value                   Assessment and Plan   Diagnoses and all orders for this visit:    1. Frequency of urination (Primary)  -     POCT urinalysis dipstick, automated  -     Urine Culture - Urine, Urine, Clean Catch  -     cefdinir (OMNICEF) 300 MG capsule; Take 1 capsule by mouth 2 (Two) Times a Day for 10 days.  Dispense: 20 capsule; Refill: 0    2. Acute frontal sinusitis, recurrence not specified  -     cefdinir (OMNICEF) 300 MG capsule; Take 1 capsule by mouth 2 (Two) Times a Day for 10 days.  Dispense: 20 capsule; Refill: 0           I spent 20 minutes caring for Urvashi on this date of service. This time includes time spent by me in the following activities:reviewing tests, performing a medically appropriate examination and/or evaluation , counseling and educating the patient/family/caregiver, ordering medications, tests, or procedures and documenting information in the medical record  Follow Up   Return if symptoms worsen or fail to improve.  Patient was given instructions and counseling regarding her condition or for health maintenance advice. Please see specific information pulled into the AVS if appropriate.

## 2022-10-03 ENCOUNTER — OFFICE VISIT (OUTPATIENT)
Dept: ORTHOPEDIC SURGERY | Facility: CLINIC | Age: 57
End: 2022-10-03

## 2022-10-03 VITALS — BODY MASS INDEX: 32.96 KG/M2 | WEIGHT: 210 LBS | TEMPERATURE: 97.1 F | HEIGHT: 67 IN

## 2022-10-03 DIAGNOSIS — M75.41 IMPINGEMENT SYNDROME OF RIGHT SHOULDER: Primary | ICD-10-CM

## 2022-10-03 LAB
BACTERIA UR CULT: ABNORMAL
BACTERIA UR CULT: ABNORMAL
OTHER ANTIBIOTIC SUSC ISLT: ABNORMAL

## 2022-10-03 PROCEDURE — 99213 OFFICE O/P EST LOW 20 MIN: CPT | Performed by: ORTHOPAEDIC SURGERY

## 2022-10-03 PROCEDURE — 73030 X-RAY EXAM OF SHOULDER: CPT | Performed by: ORTHOPAEDIC SURGERY

## 2022-10-03 PROCEDURE — 20610 DRAIN/INJ JOINT/BURSA W/O US: CPT | Performed by: ORTHOPAEDIC SURGERY

## 2022-10-03 RX ORDER — METHYLPREDNISOLONE ACETATE 40 MG/ML
80 INJECTION, SUSPENSION INTRA-ARTICULAR; INTRALESIONAL; INTRAMUSCULAR; SOFT TISSUE
Status: COMPLETED | OUTPATIENT
Start: 2022-10-03 | End: 2022-10-03

## 2022-10-03 RX ADMIN — METHYLPREDNISOLONE ACETATE 80 MG: 40 INJECTION, SUSPENSION INTRA-ARTICULAR; INTRALESIONAL; INTRAMUSCULAR; SOFT TISSUE at 12:45

## 2022-10-03 NOTE — PROGRESS NOTES
Patient: Urvashi Ornelas  YOB: 1965  Date of Service: 10/3/2022    Chief Complaints:   Chief Complaint   Patient presents with   • Right Shoulder - Follow-up, Pain     Right shoulder pain  Subjective:    History of Present Illness: Pt is seen in the office today with complaints of right shoulder pain I saw her last year for this we did do an MRI which shows some tendinitis of the rotator cuff no obvious full-thickness tear did not evaluate the labrum quite well have thought all along this was rotator cuff.  We injected her and did not get much relief.  We are going to proceed with other means of treatment however she had a lot of other issues including a bladder bladder stimulator placement so she returns now for further treatment of her right shoulder.  Her symptoms are the same moderate intermittent aching worse with activity somewhat better with rest  Chief Complaint   Patient presents with   • Right Shoulder - Follow-up, Pain   .          Allergies:   Allergies   Allergen Reactions   • Depakote Er [Divalproex Sodium Er] Other (See Comments)     HAIR FALLS OUT   • Divalproex Sodium Unknown (See Comments)     Hair falls out     • Lamictal [Lamotrigine] Swelling and Other (See Comments)     TONGUE SWELLS AND PEELS   • Risperidone And Related Other (See Comments)     PREGNANCY SYMPTOMS   • Erythromycin Rash   • Penicillins Rash   • Risperidone Unknown (See Comments)     Unknown     • Toradol [Ketorolac Tromethamine] Rash   • Valproic Acid Unknown (See Comments)      Unknown          Medications:   Home Medications:  Current Outpatient Medications on File Prior to Visit   Medication Sig   • atorvastatin (LIPITOR) 40 MG tablet TAKE 1 TABLET BY MOUTH EVERY DAY IN THE MORNING   • buPROPion XL (WELLBUTRIN XL) 300 MG 24 hr tablet    • busPIRone (BUSPAR) 30 MG tablet TK 1 T PO  bid   • cefdinir (OMNICEF) 300 MG capsule Take 1 capsule by mouth 2 (Two) Times a Day for 10 days.   • fexofenadine (ALLEGRA) 180  MG tablet Take 180 mg by mouth every night at bedtime.   • gabapentin (NEURONTIN) 100 MG capsule Take 100 mg by mouth Daily.   • Ingrezza 80 MG capsule Take 80 mg by mouth Daily.   • levothyroxine (SYNTHROID, LEVOTHROID) 200 MCG tablet TAKE 1 TABLET BY MOUTH EVERY DAY IN THE MORNING   • Multiple Vitamins-Minerals (MULTIVITAMIN WOMEN 50+) tablet Take 1 tablet/day by mouth.   • potassium chloride 10 MEQ CR tablet TAKE 1 TABLET BY MOUTH EVERY DAY   • prazosin (MINIPRESS) 2 MG capsule Take 2 mg by mouth Every Night.   • tamsulosin (FLOMAX) 0.4 MG capsule 24 hr capsule Take 1 capsule by mouth Daily.   • topiramate (TOPAMAX) 200 MG tablet Take 200 mg by mouth 2 (Two) Times a Day.   • albuterol (PROVENTIL HFA;VENTOLIN HFA) 108 (90 BASE) MCG/ACT inhaler Inhale 2 puffs every 4 (four) hours as needed for wheezing.   • albuterol (PROVENTIL) (2.5 MG/3ML) 0.083% nebulizer solution Take 2.5 mg by nebulization Every 6 (Six) Hours As Needed for Wheezing or Shortness of Air.   • azelastine (ASTELIN) 0.1 % nasal spray INSTILL 2 SPRAYS INTO THE NOSTRIL(S) AS DIRECTED 2 (TWO) TIMES A DAY.   • Breo Ellipta 200-25 MCG/INH inhaler Inhale 1 puff Daily.   • ondansetron (ZOFRAN) 4 MG tablet Take 1 tablet by mouth Every 8 (Eight) Hours As Needed for Nausea or Vomiting.   • polyethylene glycol ([employer] Purelax) 17 GM/SCOOP powder MIX 17 G INTO  8 OUNCE FLUID AND TAKE BY MOUTH DAILY AS NEEDED   • SUMAtriptan-naproxen (TREXIMET)  MG per tablet Take 1 tablet by mouth Every 2 (Two) Hours As Needed.     No current facility-administered medications on file prior to visit.     Current Medications:  Scheduled Meds:  Continuous Infusions:No current facility-administered medications for this visit.    PRN Meds:.    I have reviewed the patient's medical history in detail and updated the computerized patient record.  Review and summarization of old records include:    Past Medical History:   Diagnosis Date   • Abnormal urinalysis    • Acute bronchitis     • Acute frontal sinusitis    • Acute maxillary sinusitis    • Acute pain of right shoulder    • Acute sinusitis    • Allergic rhinitis    • Anemia    • Anxiety    • Arthritis    • Asthma    • Asthma exacerbation    • BMI 40.0-44.9, adult (HCC)    • Bronchitis    • Cancer (HCC)     THYROID   • Constipation    • Cough    • Depression    • Diaphragm paralysis    • Disease of thyroid gland     CANCER   • Dizziness    • Drug-induced nausea and vomiting    • Dyspnea    • Dysuria    • Fatigue    • Fatty liver    • H/O blood clots    • H/O degenerative disc disease    • Hearing loss    • History of hallucinations    • Hx of radiation therapy    • Hypercalcemia    • Hyperlipidemia    • Hypertension    • Hypertensive kidney disease with chronic kidney disease stage III (HCC)    • Hypertrophic toenail    • Hypokalemia    • Hypothyroidism    • Joint pain    • Lumbago    • Lymph node cancer (HCC)    • Manic episode without psychotic symptoms (HCC)    • Medicare annual wellness visit, subsequent    • Migraine    • Migraine with aura    • Nausea    • Osteopenia of lumbar spine    • Overweight    • Parathyroid disease (HCC)    • Pleurisy    • Pneumonia    • Post traumatic stress disorder    • Postmenopausal    • Rheumatoid arthritis (HCC)    • Right knee pain    • Shortness of breath    • Shoulder tendinitis    • Sleep apnea    • Thyroid disease    • Urinary frequency         Past Surgical History:   Procedure Laterality Date   • BLADDER SURGERY  2021    BLADDER STIMULATOR   • BRONCHOSCOPY N/A 2020    Procedure: BRONCHOSCOPY with fluroscopy, bal and biopsies;  Surgeon: Yonis Sweeney MD;  Location: [facility] ENDOSCOPY;  Service: Pulmonary;  Laterality: N/A;  consolidation left lower lobe  consolidation left lower lobe   •  SECTION      X2   • CHOLECYSTECTOMY     • COLONOSCOPY     • COLONOSCOPY N/A 2022    Procedure: COLONOSCOPY to cecum with hot snare polypectomy;  Surgeon: Lisa Pandya MD;  Location:  [facility] ENDOSCOPY;  Service: General;  Laterality: N/A;  pre- rectal bleeding, hx consipation  post- polyp, poor prep, hypertrpohy anal papllia    • HAND SURGERY Bilateral     WRISTS PLATE PLACEMENT   • HEMATOMA EVACUATION HEAD/NECK Left 2016    Procedure: HEMATOMA EVACUATION NECK;  Surgeon: Daoy Ashby MD;  Location: [facility] OR [facility];  Service:    • HERNIA REPAIR     • HYSTERECTOMY     • KIDNEY STONE SURGERY     • KNEE SURGERY Bilateral     4YO PIGEON TOE SX   • LUNG SURGERY Left     LEFT LOWER LOBE   • NECK DISSECTION Left 2016    Procedure: LEFT MODIFIED RADICAL NECK DISSECTION;  Surgeon: Dayo Ashby MD;  Location: [facility] OR [facility];  Service:    • NEPHRECTOMY PARTIAL Left    • PARATHYROID GLAND SURGERY         • TOTAL THYROIDECTOMY      PARATHYROID 2016        Social History     Occupational History   • Not on file   Tobacco Use   • Smoking status: Former Smoker     Packs/day: 0.50     Years: 20.00     Pack years: 10.00     Quit date:      Years since quittin.7   • Smokeless tobacco: Never Used   Vaping Use   • Vaping Use: Never used   Substance and Sexual Activity   • Alcohol use: No   • Drug use: No   • Sexual activity: Defer      Social History     Social History Narrative   • Not on file        Family History   Problem Relation Age of Onset   • Hyperlipidemia Mother    • Hypertension Mother    • Osteoarthritis Mother    • Arthritis Mother         rheumatoid   • Other Mother         acute cutaneous lupus erythematosus   • Bipolar disorder Father    • Hypertension Father    • Arthritis Father    • Stomach cancer Father    • Skin cancer Father    • Prostate cancer Father    • Heart disease Father    • Kidney disease Father    • Bipolar disorder Brother    • HIV Brother    • Alcohol abuse Paternal Grandfather    • Hypertension Paternal Grandfather        ROS: 14 point review of systems was performed and was negative except for documented findings in HPI and today's  encounter.     Allergies:   Allergies   Allergen Reactions   • Depakote Er [Divalproex Sodium Er] Other (See Comments)     HAIR FALLS OUT   • Divalproex Sodium Unknown (See Comments)     Hair falls out     • Lamictal [Lamotrigine] Swelling and Other (See Comments)     TONGUE SWELLS AND PEELS   • Risperidone And Related Other (See Comments)     PREGNANCY SYMPTOMS   • Erythromycin Rash   • Penicillins Rash   • Risperidone Unknown (See Comments)     Unknown     • Toradol [Ketorolac Tromethamine] Rash   • Valproic Acid Unknown (See Comments)      Unknown        Constitutional:  Denies fever, shaking or chills   Eyes:  Denies change in visual acuity   HENT:  Denies nasal congestion or sore throat   Respiratory:  Denies cough or shortness of breath   Cardiovascular:  Denies chest pain or severe LE edema   GI:  Denies abdominal pain, nausea, vomiting, bloody stools or diarrhea   Musculoskeletal:  Numbness, tingling, or loss of motor function only as noted above in history of present illness.  : Denies painful urination or hematuria  Integument:  Denies rash, lesion or ulceration   Neurologic:  Denies headache or focal weakness  Endocrine:  Denies lymphadenopathy  Psych:  Denies confusion or change in mental status   Hem:  Denies active bleeding      Physical Exam: 57 y.o. female  Wt Readings from Last 3 Encounters:   10/03/22 95.3 kg (210 lb)   09/28/22 96.6 kg (213 lb)   07/23/22 88.5 kg (195 lb 1.7 oz)       Body mass index is 32.89 kg/m².  Facility age limit for growth percentiles is 20 years.  Vitals:    10/03/22 1126   Temp: 97.1 °F (36.2 °C)     Vital signs reviewed.   General Appearance:    Alert, cooperative, in no acute distress                    Ortho exam  Physical exam of the right shoulder reveals no overlying skin changes no lymphedema no lymphadenopathy.  Patient has active flexion 180 with mild symptoms abduction is similar external rotation is to 50 and internal rotation to the upper lumbar spine with  mild symptoms.  Patient has good rotator cuff strength 4+ over 5 with isometric strength testing with pain.  Patient has a positive impingement and a positive Mark sign.  Patient has good cervical range of motion which is full and asymptomatic no radicular symptoms.  Patient has a normal elbow exam.  Good distal pulses are present  Patient has pain with overhead activity and a positive Neer sign and a positive empty can sign , a positive drop arm and a definitive painful arc      I did review her MRI results of which are above also reviewed new x-rays today AP scapular Y and actually lateral to compare to x-rays done last year she has some acromioclavicular arthritis otherwise no acute pathology  .time    Assessment: Right shoulder pain I do think this is rotator cuff in origin she has an MRI which shows some tendinitis she did not get relief from the last injection I really think we should try 1 more and then she is not done physical therapy I think she needs to see physical therapy working on cuff and core strengthening should she fail that we will pursue other means of testing    Plan:   Follow up as indicated.  Ice, elevate, and rest as needed.  Discussed conservative measures of pain control including ice, bracing.  Also talked about the importance of strengthening   Eleni Jimenez M.D.    Large Joint Arthrocentesis: R subacromial bursa  Date/Time: 10/3/2022 12:45 PM  Consent given by: patient  Site marked: site marked  Timeout: Immediately prior to procedure a time out was called to verify the correct patient, procedure, equipment, support staff and site/side marked as required   Supporting Documentation  Indications: pain   Procedure Details  Location: shoulder - R subacromial bursa  Preparation: Patient was prepped and draped in the usual sterile fashion  Needle gauge: 21G.  Approach: posterior  Medications administered: 2 mL lidocaine (cardiac); 80 mg methylPREDNISolone acetate 40 MG/ML  Patient  tolerance: patient tolerated the procedure well with no immediate complications

## 2022-10-04 RX ORDER — POLYETHYLENE GLYCOL 3350 17 G/17G
POWDER, FOR SOLUTION ORAL
Qty: 510 G | Refills: 3 | Status: SHIPPED | OUTPATIENT
Start: 2022-10-04 | End: 2023-01-10 | Stop reason: SDUPTHER

## 2022-10-11 ENCOUNTER — OFFICE VISIT (OUTPATIENT)
Dept: FAMILY MEDICINE CLINIC | Facility: CLINIC | Age: 57
End: 2022-10-11

## 2022-10-11 VITALS
BODY MASS INDEX: 33.21 KG/M2 | HEIGHT: 67 IN | TEMPERATURE: 96.6 F | SYSTOLIC BLOOD PRESSURE: 112 MMHG | HEART RATE: 83 BPM | WEIGHT: 211.6 LBS | OXYGEN SATURATION: 98 % | DIASTOLIC BLOOD PRESSURE: 70 MMHG

## 2022-10-11 DIAGNOSIS — R30.0 DYSURIA: Primary | ICD-10-CM

## 2022-10-11 DIAGNOSIS — J01.00 ACUTE NON-RECURRENT MAXILLARY SINUSITIS: ICD-10-CM

## 2022-10-11 DIAGNOSIS — R30.0 DIFFICULT OR PAINFUL URINATION: ICD-10-CM

## 2022-10-11 DIAGNOSIS — R35.0 FREQUENCY OF URINATION: ICD-10-CM

## 2022-10-11 DIAGNOSIS — R25.2 LEG CRAMPS: ICD-10-CM

## 2022-10-11 DIAGNOSIS — N39.0 URINARY TRACT INFECTION WITHOUT HEMATURIA, SITE UNSPECIFIED: ICD-10-CM

## 2022-10-11 LAB
BILIRUB BLD-MCNC: NEGATIVE MG/DL
CLARITY, POC: CLEAR
COLOR UR: YELLOW
EXPIRATION DATE: NORMAL
GLUCOSE UR STRIP-MCNC: NEGATIVE MG/DL
KETONES UR QL: NEGATIVE
LEUKOCYTE EST, POC: NEGATIVE
Lab: NORMAL
NITRITE UR-MCNC: NEGATIVE MG/ML
PH UR: 6 [PH] (ref 5–8)
PROT UR STRIP-MCNC: NEGATIVE MG/DL
RBC # UR STRIP: NEGATIVE /UL
SP GR UR: 1.02 (ref 1–1.03)
UROBILINOGEN UR QL: NORMAL

## 2022-10-11 PROCEDURE — 81003 URINALYSIS AUTO W/O SCOPE: CPT | Performed by: FAMILY MEDICINE

## 2022-10-11 PROCEDURE — 99214 OFFICE O/P EST MOD 30 MIN: CPT | Performed by: FAMILY MEDICINE

## 2022-10-11 RX ORDER — DOXYCYCLINE HYCLATE 100 MG/1
100 CAPSULE ORAL 2 TIMES DAILY
Qty: 14 CAPSULE | Refills: 0 | Status: SHIPPED | OUTPATIENT
Start: 2022-10-11 | End: 2022-12-15

## 2022-10-11 NOTE — PROGRESS NOTES
Subjective   Urvashi Ornelas is a 57 y.o. female.     Chief Complaint   Patient presents with   • Urinary Tract Infection   • Sinusitis       History of Present Illness     Patient is complaining on the sinus congestion facial pain around her eyes as well as on the forehead.  Ear pain and feeling sick.  She is also here to follow-up on the urinary tract infection she was recently treated for E. coli.  She states that she is still having some back pain.    The following portions of the patient's history were reviewed and updated as appropriate: allergies, current medications, past family history, past medical history, past social history, past surgical history and problem list.    Past Medical History:   Diagnosis Date   • Abnormal urinalysis    • Acute bronchitis    • Acute frontal sinusitis    • Acute maxillary sinusitis    • Acute pain of right shoulder    • Acute sinusitis    • Allergic rhinitis    • Anemia    • Anxiety    • Arthritis    • Asthma    • Asthma exacerbation    • BMI 40.0-44.9, adult (HCC)    • Bronchitis    • Cancer (HCC)     THYROID   • Constipation    • Cough    • Depression    • Diaphragm paralysis    • Disease of thyroid gland     CANCER   • Dizziness    • Drug-induced nausea and vomiting    • Dyspnea    • Dysuria    • Fatigue    • Fatty liver    • H/O blood clots    • H/O degenerative disc disease    • Hearing loss    • History of hallucinations    • Hx of radiation therapy    • Hypercalcemia    • Hyperlipidemia    • Hypertension    • Hypertensive kidney disease with chronic kidney disease stage III (HCC)    • Hypertrophic toenail    • Hypokalemia    • Hypothyroidism    • Joint pain    • Lumbago    • Lymph node cancer (HCC)    • Manic episode without psychotic symptoms (HCC)    • Medicare annual wellness visit, subsequent    • Migraine    • Migraine with aura    • Nausea    • Osteopenia of lumbar spine    • Overweight    • Parathyroid disease (HCC)    • Pleurisy    • Pneumonia    • Post  traumatic stress disorder    • Postmenopausal    • Rheumatoid arthritis (HCC)    • Right knee pain    • Shortness of breath    • Shoulder tendinitis    • Sleep apnea    • Thyroid disease    • Urinary frequency        Past Surgical History:   Procedure Laterality Date   • BLADDER SURGERY  2021    BLADDER STIMULATOR   • BRONCHOSCOPY N/A 2020    Procedure: BRONCHOSCOPY with fluroscopy, bal and biopsies;  Surgeon: Yonis Sweeney MD;  Location: [facility] ENDOSCOPY;  Service: Pulmonary;  Laterality: N/A;  consolidation left lower lobe  consolidation left lower lobe   •  SECTION      X2   • CHOLECYSTECTOMY     • COLONOSCOPY     • COLONOSCOPY N/A 2022    Procedure: COLONOSCOPY to cecum with hot snare polypectomy;  Surgeon: Lisa Pandya MD;  [facility] END[facility];  Service: General;  Laterality: N/A;  pre- rectal bleeding, hx consipation  post- polyp, poor prep, hypertrpohy anal papllia    • HAND SURGERY Bilateral     WRISTS PLATE PLACEMENT   • HEMATOMA EVACUATION HEAD/NECK Left 2016    Procedure: HEMATOMA EVACUATION NECK;  Surgeon: Dayo Ashby MD;  [place] [facility] [place] [facility] [place]  Service:    • HERNIA REPAIR     • HYSTERECTOMY     • KIDNEY STONE SURGERY     • KNEE SURGERY Bilateral     6YO PIGEON TOE SX   • LUNG SURGERY Left     LEFT LOWER LOBE   • NECK DISSECTION Left 2016    Procedure: LEFT MODIFIED RADICAL NECK DISSECTION;  Surgeon: Dayo Ashby MD;  [place] [facility] [place] [facility] [place]  Service:    • NEPHRECTOMY PARTIAL Left    • PARATHYROID GLAND SURGERY         • TOTAL THYROIDECTOMY      PARATHYROID 2016       Family History   Problem Relation Age of Onset   • Hyperlipidemia Mother    • Hypertension Mother    • Osteoarthritis Mother    • Arthritis Mother         rheumatoid   • Other Mother         acute cutaneous lupus erythematosus   • Bipolar disorder Father    • Hypertension Father    • Arthritis Father    • Stomach cancer Father    • Skin cancer Father    •  Prostate cancer Father    • Heart disease Father    • Kidney disease Father    • Bipolar disorder Brother    • HIV Brother    • Alcohol abuse Paternal Grandfather    • Hypertension Paternal Grandfather        Social History     Socioeconomic History   • Marital status:    Tobacco Use   • Smoking status: Former     Packs/day: 0.50     Years: 20.00     Pack years: 10.00     Types: Cigarettes     Quit date:      Years since quittin.7   • Smokeless tobacco: Never   Vaping Use   • Vaping Use: Never used   Substance and Sexual Activity   • Alcohol use: No   • Drug use: No   • Sexual activity: Defer       Current Outpatient Medications on File Prior to Visit   Medication Sig Dispense Refill   • albuterol (PROVENTIL HFA;VENTOLIN HFA) 108 (90 BASE) MCG/ACT inhaler Inhale 2 puffs every 4 (four) hours as needed for wheezing.     • albuterol (PROVENTIL) (2.5 MG/3ML) 0.083% nebulizer solution Take 2.5 mg by nebulization Every 6 (Six) Hours As Needed for Wheezing or Shortness of Air. 25 vial 0   • atorvastatin (LIPITOR) 40 MG tablet TAKE 1 TABLET BY MOUTH EVERY DAY IN THE MORNING 90 tablet 0   • azelastine (ASTELIN) 0.1 % nasal spray INSTILL 2 SPRAYS INTO THE NOSTRIL(S) AS DIRECTED 2 (TWO) TIMES A DAY. 30 each 1   • Breo Ellipta 200-25 MCG/INH inhaler Inhale 1 puff Daily.     • buPROPion XL (WELLBUTRIN XL) 300 MG 24 hr tablet      • busPIRone (BUSPAR) 30 MG tablet TK 1 T PO  bid  0   • fexofenadine (ALLEGRA) 180 MG tablet Take 180 mg by mouth every night at bedtime.     • gabapentin (NEURONTIN) 100 MG capsule Take 100 mg by mouth Daily.     • Ingrezza 80 MG capsule Take 80 mg by mouth Daily.     • levothyroxine (SYNTHROID, LEVOTHROID) 200 MCG tablet TAKE 1 TABLET BY MOUTH EVERY DAY IN THE MORNING 90 tablet 1   • Multiple Vitamins-Minerals (MULTIVITAMIN WOMEN 50+) tablet Take 1 tablet/day by mouth.     • ondansetron (ZOFRAN) 4 MG tablet Take 1 tablet by mouth Every 8 (Eight) Hours As Needed for Nausea or Vomiting.  12 tablet 0   • polyethylene glycol (MIRALAX) 17 GM/SCOOP powder MIX 17 G INTO 8 OUNCE FLUID AND TAKE BY MOUTH DAILY AS NEEDED 510 g 3   • potassium chloride 10 MEQ CR tablet TAKE 1 TABLET BY MOUTH EVERY DAY 30 tablet 1   • prazosin (MINIPRESS) 2 MG capsule Take 2 mg by mouth Every Night.     • SUMAtriptan-naproxen (TREXIMET)  MG per tablet Take 1 tablet by mouth Every 2 (Two) Hours As Needed.     • tamsulosin (FLOMAX) 0.4 MG capsule 24 hr capsule Take 1 capsule by mouth Daily.     • topiramate (TOPAMAX) 200 MG tablet Take 200 mg by mouth 2 (Two) Times a Day.  2     No current facility-administered medications on file prior to visit.       Review of Systems   HENT: Positive for congestion, ear pain, postnasal drip, rhinorrhea and sinus pressure.    Genitourinary: Positive for dysuria.   Musculoskeletal: Positive for back pain and myalgias.       Recent Results (from the past 4704 hour(s))   Thyroglobulin    Collection Time: 04/26/22  2:37 PM    Specimen: Blood   Result Value Ref Range    Thyroglobulin (TG-JOSE DAVID) <2.0 ng/mL   T3, Free    Collection Time: 04/26/22  2:37 PM    Specimen: Blood   Result Value Ref Range    T3, Free 2.2 2.0 - 4.4 pg/mL   T4, Free    Collection Time: 04/26/22  2:37 PM    Specimen: Blood   Result Value Ref Range    Free T4 1.22 0.82 - 1.77 ng/dL   Basic Metabolic Panel    Collection Time: 04/26/22  2:37 PM    Specimen: Blood   Result Value Ref Range    Glucose 96 65 - 99 mg/dL    BUN 11 6 - 24 mg/dL    Creatinine 0.94 0.57 - 1.00 mg/dL    EGFR Result 72 >59 mL/min/1.73    BUN/Creatinine Ratio 12 9 - 23    Sodium 141 134 - 144 mmol/L    Potassium 3.9 3.5 - 5.2 mmol/L    Chloride 108 (H) 96 - 106 mmol/L    Total CO2 19 (L) 20 - 29 mmol/L    Calcium 8.8 8.7 - 10.2 mg/dL   Hemoglobin A1c    Collection Time: 04/26/22  2:37 PM    Specimen: Blood   Result Value Ref Range    Hemoglobin A1C 5.8 (H) 4.8 - 5.6 %   TSH    Collection Time: 04/26/22  2:37 PM    Specimen: Blood   Result Value Ref Range     TSH 1.770 0.450 - 4.500 uIU/mL   Basic metabolic panel    Collection Time: 05/04/22  2:10 PM    Specimen: Blood   Result Value Ref Range    Glucose 77 65 - 99 mg/dL    BUN 12 6 - 24 mg/dL    Creatinine 0.97 0.57 - 1.00 mg/dL    EGFR Result 69 >59 mL/min/1.73    BUN/Creatinine Ratio 12 9 - 23    Sodium 145 (H) 134 - 144 mmol/L    Potassium 4.3 3.5 - 5.2 mmol/L    Chloride 106 96 - 106 mmol/L    Total CO2 23 20 - 29 mmol/L    Calcium 9.0 8.7 - 10.2 mg/dL   Magnesium    Collection Time: 05/04/22  2:10 PM    Specimen: Blood   Result Value Ref Range    Magnesium 2.3 1.6 - 2.3 mg/dL   CK    Collection Time: 05/04/22  2:10 PM    Specimen: Blood   Result Value Ref Range    Creatine Kinase 86 32 - 182 U/L   POC Urinalysis Dipstick, Automated    Collection Time: 05/04/22  2:28 PM    Specimen: Urine   Result Value Ref Range    Color Yellow Yellow, Straw, Dark Yellow, Alba    Clarity, UA Clear Clear    Specific Gravity  1.010 1.005 - 1.030    pH, Urine 7.0 5.0 - 8.0    Leukocytes Large (3+) (A) Negative    Nitrite, UA Negative Negative    Protein, POC Negative Negative mg/dL    Glucose, UA Negative Negative, 1000 mg/dL (3+) mg/dL    Ketones, UA Negative Negative    Urobilinogen, UA Normal Normal    Bilirubin Negative Negative    Blood, UA Negative Negative    Lot Number [id number]     Expiration Date 10/21/2023    Urine Culture - Urine, Urine, Clean Catch    Collection Time: 05/04/22  3:42 PM    Specimen: Urine, Clean Catch    UC   Result Value Ref Range    Urine Culture Final report (A)     Result 1 Escherichia coli (A)     Susceptibility Testing Comment    POCT urinalysis dipstick, automated    Collection Time: 06/08/22 11:57 AM    Specimen: Urine   Result Value Ref Range    Color Yellow Yellow, Straw, Dark Yellow, Alba    Clarity, UA Slightly Cloudy (A) Clear    Specific Gravity  1.015 1.005 - 1.030    pH, Urine 6.0 5.0 - 8.0    Leukocytes Large (3+) (A) Negative    Nitrite, UA Negative Negative    Protein, POC  Negative Negative mg/dL    Glucose, UA Negative Negative, 1000 mg/dL (3+) mg/dL    Ketones, UA Negative Negative    Urobilinogen, UA Normal Normal    Bilirubin Negative Negative    Blood, UA Negative Negative    Lot Number [id number]     Expiration Date 12/17/2023    Urine Culture - Urine, Urine, Clean Catch    Collection Time: 06/08/22 12:49 PM    Specimen: Urine, Clean Catch    UC   Result Value Ref Range    Urine Culture Final report (A)     Result 1 Escherichia coli (A)     Susceptibility Testing Comment    Urinalysis With Microscopic If Indicated (No Culture) - Urine, Clean Catch    Collection Time: 07/23/22  9:08 PM    Specimen: Urine, Clean Catch   Result Value Ref Range    Color, UA Yellow Yellow, Straw    Appearance, UA Clear Clear    pH, UA 6.5 5.0 - 8.0    Specific Gravity, UA 1.007 1.005 - 1.030    Glucose, UA Negative Negative    Ketones, UA Negative Negative    Bilirubin, UA Negative Negative    Blood, UA Small (1+) (A) Negative    Protein, UA Negative Negative    Leuk Esterase, UA Large (3+) (A) Negative    Nitrite, UA Negative Negative    Urobilinogen, UA 0.2 E.U./dL 0.2 - 1.0 E.U./dL   Urinalysis, Microscopic Only - Urine, Clean Catch    Collection Time: 07/23/22  9:08 PM    Specimen: Urine, Clean Catch   Result Value Ref Range    RBC, UA 0-2 None Seen, 0-2 /HPF    WBC, UA Too Numerous to Count (A) None Seen, 0-2 /HPF    Bacteria, UA 4+ (A) None Seen /HPF    Squamous Epithelial Cells, UA 0-2 None Seen, 0-2 /HPF    Hyaline Casts, UA 0-2 None Seen /LPF    Methodology Automated Microscopy    Thyroglobulin    Collection Time: 08/22/22  2:24 PM    Specimen: Blood   Result Value Ref Range    Thyroglobulin (TG-JOSE DAVID) <2.0 ng/mL   T3, Free    Collection Time: 08/22/22  2:24 PM    Specimen: Blood   Result Value Ref Range    T3, Free 2.4 2.0 - 4.4 pg/mL   Vitamin B12 & Folate    Collection Time: 08/22/22  2:24 PM    Specimen: Blood   Result Value Ref Range    Vitamin B-12 951 232 - 1,245 pg/mL    Folate 9.2  >3.0 ng/mL   Vitamin D 25 Hydroxy    Collection Time: 08/22/22  2:24 PM    Specimen: Blood   Result Value Ref Range    25 Hydroxy, Vitamin D 32.0 30.0 - 100.0 ng/mL   T4, Free    Collection Time: 08/22/22  2:24 PM    Specimen: Blood   Result Value Ref Range    Free T4 1.56 0.82 - 1.77 ng/dL   TSH    Collection Time: 08/22/22  2:24 PM    Specimen: Blood   Result Value Ref Range    TSH 1.500 0.450 - 4.500 uIU/mL   Lipid Panel    Collection Time: 08/22/22  2:24 PM    Specimen: Blood   Result Value Ref Range    Total Cholesterol 133 100 - 199 mg/dL    Triglycerides 99 0 - 149 mg/dL    HDL Cholesterol 47 >39 mg/dL    VLDL Cholesterol Preet 18 5 - 40 mg/dL    LDL Chol Calc (NIH) 68 0 - 99 mg/dL   Hemoglobin A1c    Collection Time: 08/22/22  2:24 PM    Specimen: Blood   Result Value Ref Range    Hemoglobin A1C 5.7 (H) 4.8 - 5.6 %   Basic Metabolic Panel    Collection Time: 08/22/22  2:24 PM    Specimen: Blood   Result Value Ref Range    Glucose 132 (H) 65 - 99 mg/dL    BUN 13 6 - 24 mg/dL    Creatinine 1.08 (H) 0.57 - 1.00 mg/dL    EGFR Result 61 >59 mL/min/1.73    BUN/Creatinine Ratio 12 9 - 23    Sodium 140 134 - 144 mmol/L    Potassium 4.0 3.5 - 5.2 mmol/L    Chloride 107 (H) 96 - 106 mmol/L    Total CO2 20 20 - 29 mmol/L    Calcium 8.7 8.7 - 10.2 mg/dL   Cardiovascular Risk Assessment    Collection Time: 08/22/22  2:24 PM   Result Value Ref Range    Interpretation Note    POCT urinalysis dipstick, automated    Collection Time: 09/28/22  2:19 PM    Specimen: Urine   Result Value Ref Range    Color Yellow Yellow, Straw, Dark Yellow, Alba    Clarity, UA Cloudy (A) Clear    Specific Gravity  1.015 1.005 - 1.030    pH, Urine 6.0 5.0 - 8.0    Leukocytes Large (3+) (A) Negative    Nitrite, UA Negative Negative    Protein, POC Negative Negative mg/dL    Glucose, UA Negative Negative mg/dL    Ketones, UA Negative Negative    Urobilinogen, UA Normal Normal, 0.2 E.U./dL    Bilirubin Negative Negative    Blood, UA Negative Negative  "   Lot Number [id number]     Expiration Date 10/21/2023    Urine Culture - Urine, Urine, Clean Catch    Collection Time: 09/28/22  4:46 PM    Specimen: Urine, Clean Catch    UC   Result Value Ref Range    Urine Culture Final report (A)     Result 1 Comment (A)     Susceptibility Testing Comment      Objective   Vitals:    10/11/22 0958   BP: 112/70   BP Location: Right arm   Patient Position: Sitting   Cuff Size: Adult   Pulse: 83   Temp: 96.6 °F (35.9 °C)   SpO2: 98%   Weight: 96 kg (211 lb 9.6 oz)   Height: 170.2 cm (67.01\")     Body mass index is 33.13 kg/m².  Physical Exam  Vitals and nursing note reviewed.   Constitutional:       General: She is not in acute distress.     Appearance: She is well-developed. She is not diaphoretic.   HENT:      Ears:      Comments: Bilateral TM effusion with erythema more on the right     Mouth/Throat:      Mouth: Mucous membranes are moist.      Pharynx: No oropharyngeal exudate or posterior oropharyngeal erythema.   Cardiovascular:      Rate and Rhythm: Normal rate and regular rhythm.   Pulmonary:      Effort: Pulmonary effort is normal. No respiratory distress.      Breath sounds: Normal breath sounds. No wheezing.   Abdominal:      Tenderness: There is abdominal tenderness.           Diagnoses and all orders for this visit:    1. Dysuria (Primary)  -     POCT urinalysis dipstick, automated  -     Urine Culture - Urine, Urine, Clean Catch    2. Frequency of urination  -     POCT urinalysis dipstick, automated  -     Urine Culture - Urine, Urine, Clean Catch    3. Difficult or painful urination  -     POCT urinalysis dipstick, automated  -     Urine Culture - Urine, Urine, Clean Catch    4. Urinary tract infection without hematuria, site unspecified  -     POCT urinalysis dipstick, automated  -     Urine Culture - Urine, Urine, Clean Catch    5. Leg cramps  -     Basic Metabolic Panel    6. Acute non-recurrent maxillary sinusitis  -     doxycycline (VIBRAMYCIN) 100 MG " capsule; Take 1 capsule by mouth 2 (Two) Times a Day.  Dispense: 14 capsule; Refill: 0      Return if symptoms worsen or fail to improve.

## 2022-10-13 LAB
BACTERIA UR CULT: NORMAL
BACTERIA UR CULT: NORMAL
BUN SERPL-MCNC: 17 MG/DL (ref 6–20)
BUN/CREAT SERPL: 16.3 (ref 7–25)
CALCIUM SERPL-MCNC: 9.4 MG/DL (ref 8.6–10.5)
CHLORIDE SERPL-SCNC: 108 MMOL/L (ref 98–107)
CO2 SERPL-SCNC: 24.3 MMOL/L (ref 22–29)
CREAT SERPL-MCNC: 1.04 MG/DL (ref 0.57–1)
EGFRCR SERPLBLD CKD-EPI 2021: 63.6 ML/MIN/1.73
GLUCOSE SERPL-MCNC: 56 MG/DL (ref 65–99)
POTASSIUM SERPL-SCNC: 4.2 MMOL/L (ref 3.5–5.2)
SODIUM SERPL-SCNC: 141 MMOL/L (ref 136–145)

## 2022-10-17 ENCOUNTER — TELEPHONE (OUTPATIENT)
Dept: FAMILY MEDICINE CLINIC | Facility: CLINIC | Age: 57
End: 2022-10-17

## 2022-10-17 NOTE — TELEPHONE ENCOUNTER
Caller: Adriana Hoyt    Relationship: Self    Best call back number: [phone] (Mobile)    What is the best time to reach you: ANYTIME, ASAP    Who are you requesting to speak with (clinical staff, provider,  specific staff member): CLINICAL STAFF/ CYNTHIA ANGELES    Do you know the name of the person who called: ADRIANA HOYT    What was the call regarding: PATIENT IS ASKING IF SHE NEEDS TO MAKE AN APPT FOR THE COVID BOOSTER AND PNEUMONIA VACCINE AND ANOTHER VACCINATION THAT DR PARADA HAD MENTIONED BUT PATIENT COULD NOT REMEMBER THE NAME OF    PATIENT ALSO WANTS TO KNOW WHEN SHE CAN COME IN AND GET THESE BECAUSE SHE WAS TOLD SHE NEEDS TO BE WELL BEFORE SHE CAN GET THESE BY DR PARADA AND PATIENT IS TAKING HER LAST PILL OF ANTIBIOTICS SOON    PLEASE ADVISE PATIENT ASAP    Do you require a callback: YES, ASAP

## 2022-10-18 ENCOUNTER — TREATMENT (OUTPATIENT)
Dept: PHYSICAL THERAPY | Facility: CLINIC | Age: 57
End: 2022-10-18

## 2022-10-18 DIAGNOSIS — M75.41 IMPINGEMENT SYNDROME OF RIGHT SHOULDER: ICD-10-CM

## 2022-10-18 DIAGNOSIS — M25.511 CHRONIC RIGHT SHOULDER PAIN: Primary | ICD-10-CM

## 2022-10-18 DIAGNOSIS — M67.911 TENDINOPATHY OF ROTATOR CUFF, RIGHT: ICD-10-CM

## 2022-10-18 DIAGNOSIS — G89.29 CHRONIC RIGHT SHOULDER PAIN: Primary | ICD-10-CM

## 2022-10-18 PROCEDURE — 97110 THERAPEUTIC EXERCISES: CPT | Performed by: PHYSICAL THERAPIST

## 2022-10-18 PROCEDURE — 97530 THERAPEUTIC ACTIVITIES: CPT | Performed by: PHYSICAL THERAPIST

## 2022-10-18 PROCEDURE — 97161 PT EVAL LOW COMPLEX 20 MIN: CPT | Performed by: PHYSICAL THERAPIST

## 2022-10-18 NOTE — PROGRESS NOTES
"Physical Therapy Initial Evaluation and Plan of Care  [address], [address], [place] [address]    Patient: Urvashi Ornelas   : 1965  Diagnosis/ICD-10 Code:  Chronic right shoulder pain [M25.511, G89.29]  Referring practitioner: Eleni Jimenez MD    Subjective Evaluation    History of Present Illness  Onset date: 1+ year ago.  Mechanism of injury: Patient reports 1+ year of (R) shoulder pain.  She saw Dr. Jimenez last year who gave patient an injection which was not helpful and an MRI was performed.  MRI revealed rotator cuff tendonopathy and a possible labral tear.  She had a bladder stimulator placed in 2021 which has just recently become effective for patient to urinate independently.  Patient returned to see Dr. Jimenez again this  where she received another injection which was again not helpful.  She was referred to PT and has f/u with Dr. Jimenez in November.    Patient reports pain reaching behind her back such as to don bra, laying on (R) side, lifting grandson, and reaching overhead or out to the side. Her shoulder wakes her from sleep several times per night.      PMH notable for bladder stimulator, bipolar, hypercholesterolemia, CKD stage 2, PTSD, anxiety, and depression.       Patient Occupation: on disability Quality of life: fair    Pain  Current pain ratin  At best pain ratin  At worst pain ratin  Location: (R) lateral shoulder, lateral upper arm  Quality: sharp  Relieving factors: medications (Tylenol)  Aggravating factors: outstretched reach, repetitive movement, prolonged positioning, sleeping, overhead activity, movement and lifting  Progression: no change    Social Support  Lives with: alone    Hand dominance: right    Diagnostic Tests  MRI studies: abnormal (from ) (R) SS tendonopathy, possible labral tear    Treatments  Treatments tried: no previous PT for shoulder.  Patient Goals  Patient goals for therapy: decreased pain  Patient goal: \"I'd just " "like for the pain to stop; to be able to do something with my arm without so much pain\"           Subjective Questionnaire: QuickDASH: 47.73%    Objective          Tenderness     Additional Tenderness Details  Mod (+) TTP (R) posterior shoulder at IS, TM tendons    Active Range of Motion   Left Shoulder   Flexion: 152 degrees   Extension: 65 degrees   Abduction: 137 degrees   External rotation 90°: 77 degrees   Internal rotation 90°: 52 degrees     Right Shoulder   Flexion: 118 degrees with pain  Extension: 43 degrees with pain  Abduction: 116 degrees with pain  External rotation 90°: 33 degreeswith pain  Internal rotation 90°: 57 degrees with pain    Left Elbow   Flexion: 144 degrees   Extension: 0 degrees     Strength/Myotome Testing     Left Shoulder     Planes of Motion   Flexion: 4+   Extension: 4+   Abduction: 4+   External rotation at 0°: 4     Right Shoulder     Planes of Motion   Flexion: 4- (limited by pain)   Extension: 4   Abduction: 3+ (limited by pain)   External rotation at 0°: 4- (limited by pain)   Internal rotation at 0°: 4     Left Elbow   Flexion: 4+  Extension: 4    Right Elbow   Flexion: 4-  Extension: 4-    Tests     Right Shoulder   Positive Hawkin's and Neer's.           Assessment & Plan     Assessment  Impairments: abnormal coordination, abnormal muscle firing, abnormal or restricted ROM, activity intolerance, impaired physical strength, lacks appropriate home exercise program and pain with function  Functional Limitations: carrying objects, lifting, sleeping, uncomfortable because of pain, reaching behind back, reaching overhead and unable to perform repetitive tasks  Assessment details: Patient is a 57 y.o female with a 1+ year history of (R) shoulder pain.  She originally saw Dr. Jimenez last year and underwent MRI which revealed supraspinatus tendinopathy and possible labral tear, but patient was undergoing urinary issues which eventually resulted in placement of a bladder stimulator.  " Now that this issue has stabilized, she returned to Dr. Jimenez as shoulder pain has persisted in her dominant (R) UE.  Injections have not been significantly helpful.  She reports symptoms 4-9/10 with overhead and outstretched reach, reaching behind her back to don bra, lifting/carrying, and sleeping.  She exhibits localized tenderness of (R) shoulder, decreased and painful (R) shoulder AROM, decreased (R) UE strength, and positive special testing.  Her QuickDASH score is 47.73% indicating a significant perceived level of functional limitation.  She will benefit from skilled PT services to reduce these symptoms and optimize functional activity tolerance for improved QOL.  Prognosis: good    Goals  Plan Goals: STGs: to be met in 4 weeks  1. Patient will be independent with initial HEP  2. Patient will report improved (R) shoulder symptoms 2-5/10 for improved tolerance to ADLs and dressing activities  3. Patient will report consistently sleeping without pain interruption for improved restorative healing  4. Patient will demonstrate improved (R) shoulder AROM flexion, abduction >/= 150 deg and ER >/= 60 deg for improved functional reach of (R) UE  5. Patient will have negative special testing (R) shoulder     LTGs: to be met in 8 weeks  1. Patient will be independent with progressed HEP  2. Patient will report improved (R) shoulder symptoms 0-3/10 for improved ADL tolerance and ability to don bra  3. Patient will demonstrate improved (R) shoulder strength >/= 4+/5 painfree for evidence of improved functional strength  4. Patient will have improved QuickDASH score </= 20% for subjective evidence of functional improvement    Plan  Therapy options: will be seen for skilled therapy services  Planned modality interventions: cryotherapy and thermotherapy (hydrocollator packs)  Planned therapy interventions: ADL retraining, fine motor coordination training, flexibility, functional ROM exercises, home exercise program, joint  mobilization, manual therapy, neuromuscular re-education, postural training, soft tissue mobilization, strengthening, stretching and therapeutic activities  Frequency: 2x week  Duration in weeks: 8  Treatment plan discussed with: patient        Manual Therapy:         mins  03360;  Therapeutic Exercise:    18     mins  26238;     Neuromuscular Daisy:        mins  53127;    Therapeutic Activity:     13     mins  96324;     Gait Training:           mins  73064;     Ultrasound:          mins  89624;    Electrical Stimulation:         mins  11448 ( );  Dry Needling          mins self-pay    Timed Treatment:   31   mins   Total Treatment:     52   mins    PT SIGNATURE: Hyacinth Lutz PT, DPT, OCS  Electronically signed by: Hyacinth Lutz PT, 10/18/22, 11:30 AM EDT  [place] License #[id number]     DATE TREATMENT INITIATED: 10/18/2022    Medicare Initial Certification  Certification Period: 10/18/2022 thru 1/15/2023  I certify that the therapy services are furnished while this patient is under my care.  The services outlined above are required by this patient, and will be reviewed every 90 days.     PHYSICIAN: Eleni Jimenez MD  [id number]                                          DATE:     Please sign and return via fax to [phone]. Thank you, [facility].

## 2022-10-19 ENCOUNTER — TELEPHONE (OUTPATIENT)
Dept: PHYSICAL THERAPY | Facility: CLINIC | Age: 57
End: 2022-10-19

## 2022-10-20 ENCOUNTER — CLINICAL SUPPORT (OUTPATIENT)
Dept: FAMILY MEDICINE CLINIC | Facility: CLINIC | Age: 57
End: 2022-10-20

## 2022-10-20 ENCOUNTER — TELEPHONE (OUTPATIENT)
Dept: FAMILY MEDICINE CLINIC | Facility: CLINIC | Age: 57
End: 2022-10-20

## 2022-10-20 DIAGNOSIS — E87.6 HYPOKALEMIA: ICD-10-CM

## 2022-10-20 DIAGNOSIS — Z23 NEED FOR COVID-19 VACCINE: Primary | ICD-10-CM

## 2022-10-20 DIAGNOSIS — Z23 IMMUNIZATION DUE: Primary | ICD-10-CM

## 2022-10-20 DIAGNOSIS — R30.0 DYSURIA: ICD-10-CM

## 2022-10-20 DIAGNOSIS — Z23 NEED FOR PNEUMOCOCCAL VACCINATION: Primary | ICD-10-CM

## 2022-10-20 PROCEDURE — 0124A PR ADM SARSCOV2 30MCG/0.3ML BST: CPT | Performed by: NURSE PRACTITIONER

## 2022-10-20 PROCEDURE — 91312 COVID-19 (PFIZER) BIVALENT BOOSTER 12+YRS: CPT | Performed by: NURSE PRACTITIONER

## 2022-10-20 PROCEDURE — 90677 PCV20 VACCINE IM: CPT | Performed by: NURSE PRACTITIONER

## 2022-10-20 PROCEDURE — G0009 ADMIN PNEUMOCOCCAL VACCINE: HCPCS | Performed by: NURSE PRACTITIONER

## 2022-10-20 RX ORDER — POTASSIUM CHLORIDE 750 MG/1
TABLET, FILM COATED, EXTENDED RELEASE ORAL
Qty: 30 TABLET | Refills: 1 | Status: SHIPPED | OUTPATIENT
Start: 2022-10-20 | End: 2022-11-17

## 2022-10-20 NOTE — TELEPHONE ENCOUNTER
SPOKE WITH PATIENT. ENCOURAGED HER TO HOLD OFF ON HEP TODAY, ATTEMPT ONCE TOMORROW, AND WE WILL ASSESS RESPONSE TO DETERMINE WHETHER HEP IS PROBLEMATIC VS HER SHOULDER WAS IRRITATED FROM EVALUATION/MEASUREMENTS.  SHE VERBALIZES UNDERSTANDING AND INTENT TO COMPLY.

## 2022-10-20 NOTE — TELEPHONE ENCOUNTER
Caller: Urvashi Ornelas    Relationship: Self    Best call back number: [phone] (Mobile)    What medication are you requesting: ZOFRAN     What are your current symptoms:  NAUSEA     How long have you been experiencing symptoms: 10/19/22    Have you had these symptoms before:    [] Yes  [] No    Have you been treated for these symptoms before:   [] Yes  [] No    If a prescription is needed, what is your preferred pharmacy and phone number:  [id number] [address] [facility] - [phone] [facility] [phone]       Additional notes: PLEASE CONTACT PATIENT TO ADVISE.          THANKS

## 2022-10-21 ENCOUNTER — TELEPHONE (OUTPATIENT)
Dept: FAMILY MEDICINE CLINIC | Facility: CLINIC | Age: 57
End: 2022-10-21

## 2022-10-24 RX ORDER — ONDANSETRON 4 MG/1
4 TABLET, FILM COATED ORAL EVERY 8 HOURS PRN
Qty: 30 TABLET | Refills: 0 | Status: SHIPPED | OUTPATIENT
Start: 2022-10-24

## 2022-10-24 NOTE — TELEPHONE ENCOUNTER
LVMTCB    Ok for hub and  can read message    Done, got under Malachi that she needed is a shingles shot   It only could be given in the pharmacy is a Medicare benefit per pharmacy.   But I would suggest she wait at least 2 weeks after COVID and pneumonia and then goes to the pharmacy and get her for shingles

## 2022-10-25 ENCOUNTER — TREATMENT (OUTPATIENT)
Dept: PHYSICAL THERAPY | Facility: CLINIC | Age: 57
End: 2022-10-25

## 2022-10-25 DIAGNOSIS — G89.29 CHRONIC RIGHT SHOULDER PAIN: Primary | ICD-10-CM

## 2022-10-25 DIAGNOSIS — M25.511 CHRONIC RIGHT SHOULDER PAIN: Primary | ICD-10-CM

## 2022-10-25 PROCEDURE — 97110 THERAPEUTIC EXERCISES: CPT | Performed by: PHYSICAL THERAPIST

## 2022-10-25 NOTE — PROGRESS NOTES
Physical Therapy Daily Progress Note      Visit # 2      Subjective Evaluation    History of Present Illness    Subjective comment: Pt reports current pain level of 7/10 in (R) shoulder.       Objective   See Exercise, Manual, and Modality Logs for complete treatment.       Assessment & Plan     Assessment    Assessment details: Pt had increased pain in (R) shoulder and neck the day after her first treatment (10/18).  She was told to to wait a day and try her HEP on Friday.  Pt performed her HEP on Friday and again had increased pain the next day.  Pt was started with 10 mins of moist heat on her (R) shoulder today.  Pt was only able to complete 8/10 supine shoulder flexion. Pt was given vc to not push in to pain on table slides and was able to complete all reps.  Pt had increased pain from scapular retraction and stopped at 2 reps.  All other exercises were deferred.                     Manual Therapy:    0     mins  79938;  Therapeutic Exercise:    14     mins  85580;     Neuromuscular Daisy:    0    mins  84125;    Therapeutic Activity:     0     mins  46979;     Gait Trainin     mins  66701;     Ultrasound:     0     mins  21013;    Work Hardening           0      mins 24032  Iontophoresis               0   mins 26178  E-Stim                          _14_ mins 88009 ( )    Timed Treatment:   14   mins   Total Treatment:     14   mins    Spencer Lees PTA  Physical Therapist Assistant

## 2022-10-28 ENCOUNTER — TELEPHONE (OUTPATIENT)
Dept: FAMILY MEDICINE CLINIC | Facility: CLINIC | Age: 57
End: 2022-10-28

## 2022-10-28 ENCOUNTER — TREATMENT (OUTPATIENT)
Dept: PHYSICAL THERAPY | Facility: CLINIC | Age: 57
End: 2022-10-28

## 2022-10-28 DIAGNOSIS — G89.29 CHRONIC RIGHT SHOULDER PAIN: Primary | ICD-10-CM

## 2022-10-28 DIAGNOSIS — M75.41 IMPINGEMENT SYNDROME OF RIGHT SHOULDER: ICD-10-CM

## 2022-10-28 DIAGNOSIS — M67.911 TENDINOPATHY OF ROTATOR CUFF, RIGHT: ICD-10-CM

## 2022-10-28 DIAGNOSIS — M25.511 CHRONIC RIGHT SHOULDER PAIN: Primary | ICD-10-CM

## 2022-10-28 PROCEDURE — 97110 THERAPEUTIC EXERCISES: CPT | Performed by: PHYSICAL THERAPIST

## 2022-10-28 NOTE — TELEPHONE ENCOUNTER
Left message on voicemail that fax has been received on this patient and PCP will fill out as soon as she can.    INFORMATION FAXED TO [facility] ON 10/28/22

## 2022-10-28 NOTE — PROGRESS NOTES
Physical Therapy Daily Treatment Note      [address], [address], [place] [address]    Patient: Urvashi Ornelas   : 1965  Referring practitioner: Eleni Jimenez MD  Date of Initial Visit: Type: THERAPY  Noted: 10/18/2022  Today's Date: 10/28/2022  Patient seen for 3 sessions         Visit Diagnoses:     ICD-10-CM ICD-9-CM   1. Chronic right shoulder pain  M25.511 719.41    G89.29 338.29   2. Impingement syndrome of right shoulder  M75.41 726.2   3. Tendinopathy of rotator cuff, right  M67.911 727.9         Subjective Evaluation    History of Present Illness    Subjective comment: My shoulder is hurting really bad.  These exercises are killing me.Pain  Current pain ratin  Location: (R) shoulder           Objective   See Exercise, Manual, and Modality Logs for complete treatment.   *Initiated submax shld roxane    Assessment & Plan     Assessment    Assessment details: Patient continues to report a high pain level (R) shoulder and her tolerance for exercise thus far is poor despite apparent best effort.  She reports great pain with all AAROM activities for (R) shoulder despite verbal cueing for improved relaxation of (R) UE as well as to perform through modified ROM arcs as necessary.  She is able to initiate shoulder isometrics in multiple planes with modified intensity of effort.          Progress per Plan of Care as s/s allow.         Timed:  Manual Therapy:         mins  96761;  Therapeutic Exercise:    34     mins  65270;     Neuromuscular Daisy:        mins  52417;    Therapeutic Activity:          mins  85955;     Gait Training:           mins  07811;     Ultrasound:          mins  63378;    Untimed:  Electrical Stimulation:         mins  06700 ( );  Mechanical Traction:        mins  51551;   Dry Needling              __5_  mins   88791    Timed Treatment:   34   mins   Total Treatment:     44   mins    Hyacinth Lutz, PT, DPT, OCS  Physical Therapist  [place] License  #[id number]  Electronically signed by: Hyacinth Lutz PT, 10/28/22, 11:30 AM EDT

## 2022-10-28 NOTE — TELEPHONE ENCOUNTER
Caller: MAGGI    Relationship: [facility]    Best call back number:     What is the best time to reach you:    Who are you requesting to speak with (clinical staff, provider,  specific staff member):     Do you know the name of the person who called:     What was the call regarding: MAGGI IS CALLING IN TO SEE IF THE OFFICE HAS RECEIVED A FAX THAT HE RESENT TO THE OFFICE ON 10/21/22 AS HE HAS NOT RECEIVED IT YET.     Do you require a callback: YES

## 2022-10-31 ENCOUNTER — TREATMENT (OUTPATIENT)
Dept: PHYSICAL THERAPY | Facility: CLINIC | Age: 57
End: 2022-10-31

## 2022-10-31 DIAGNOSIS — G89.29 CHRONIC RIGHT SHOULDER PAIN: Primary | ICD-10-CM

## 2022-10-31 DIAGNOSIS — M75.41 IMPINGEMENT SYNDROME OF RIGHT SHOULDER: ICD-10-CM

## 2022-10-31 DIAGNOSIS — M25.511 CHRONIC RIGHT SHOULDER PAIN: Primary | ICD-10-CM

## 2022-10-31 DIAGNOSIS — M67.911 TENDINOPATHY OF ROTATOR CUFF, RIGHT: ICD-10-CM

## 2022-10-31 PROCEDURE — 97110 THERAPEUTIC EXERCISES: CPT | Performed by: PHYSICAL THERAPIST

## 2022-10-31 NOTE — PROGRESS NOTES
Physical Therapy Daily Treatment Note  [address], [address]  [place] [address]  Visit # 4      Subjective Evaluation    History of Present Illness    Subjective comment: Pt reports (R) shoulder pain of 7/10 this morning.       Objective   See Exercise, Manual, and Modality Logs for complete treatment.       Assessment & Plan     Assessment    Assessment details: Pt tolerated treatment with increased pain in (R) shoulder from all movements.  Pt reports pain level of 8/10 s/p treatment and that it would be higher by night.  Pt was able to increase repos on some exercises.  Continue to progress per POC.                     Manual Therapy:    0     mins  27234;  Therapeutic Exercise:    25     mins  49563;     Neuromuscular Daisy:    0    mins  98021;    Therapeutic Activity:     0     mins  49925;     Gait Trainin     mins  91087;     Ultrasound:     0     mins  04930;    Work Hardening           0      mins 65161  Iontophoresis               0   mins 67223  E-Stim                          _0_ mins 07609 ( )    Timed Treatment:   25   mins   Total Treatment:     25   mins    Spencer Lees PTA  Physical Therapist Assistant

## 2022-11-01 ENCOUNTER — TELEPHONE (OUTPATIENT)
Dept: FAMILY MEDICINE CLINIC | Facility: CLINIC | Age: 57
End: 2022-11-01

## 2022-11-01 NOTE — TELEPHONE ENCOUNTER
Caller: Urvashi Ornelas    Relationship: Self    Best call back number: [phone]       PATIENT CALLED IN STATING SHE RECEIVED A CALL, WHERE SHE SPOKE TO A HUMAN BEING AND THEY WERE MENTIONING SOME KIND OF IN HOME, IMMUNO SWAB TEST.      PATIENT TOLD THE PERSON THAT SHE WAS GOING TO TALK TO [employer] FIRST TO CONFIRM.    PATIENT HAS NOT HEARD ABOUT THIS THROUGH [employer] AND SHE WANTED TO SEE IF THIS WAS LEGITIMATE     PLEASE CALL PATIENT BACK

## 2022-11-01 NOTE — TELEPHONE ENCOUNTER
Spoke with patient and told her I didn't see any type of order from you for this and if that company ( patient didn't know the name ) calls her back she is to tell them to fax the info to the office and that pcp will review and determine if patient needs this test.

## 2022-11-01 NOTE — TELEPHONE ENCOUNTER
Patient informed that PCP did not order these braces and to not talk to these companies, patient voiced understanding.

## 2022-11-01 NOTE — TELEPHONE ENCOUNTER
Patient received a call from Basil Whitt around noon stating he was calling on our behalf in order to get the patient fitted for some wrist & arm braces.

## 2022-11-02 ENCOUNTER — TREATMENT (OUTPATIENT)
Dept: PHYSICAL THERAPY | Facility: CLINIC | Age: 57
End: 2022-11-02

## 2022-11-02 DIAGNOSIS — G89.29 CHRONIC RIGHT-SIDED LOW BACK PAIN WITHOUT SCIATICA: ICD-10-CM

## 2022-11-02 DIAGNOSIS — M75.41 IMPINGEMENT SYNDROME OF RIGHT SHOULDER: ICD-10-CM

## 2022-11-02 DIAGNOSIS — M54.50 CHRONIC RIGHT-SIDED LOW BACK PAIN WITHOUT SCIATICA: ICD-10-CM

## 2022-11-02 DIAGNOSIS — M25.511 CHRONIC RIGHT SHOULDER PAIN: Primary | ICD-10-CM

## 2022-11-02 DIAGNOSIS — M67.911 TENDINOPATHY OF ROTATOR CUFF, RIGHT: ICD-10-CM

## 2022-11-02 DIAGNOSIS — G89.29 CHRONIC RIGHT SHOULDER PAIN: Primary | ICD-10-CM

## 2022-11-02 PROCEDURE — 97110 THERAPEUTIC EXERCISES: CPT | Performed by: PHYSICAL THERAPIST

## 2022-11-02 RX ORDER — ATORVASTATIN CALCIUM 40 MG/1
TABLET, FILM COATED ORAL
Qty: 90 TABLET | Refills: 3 | Status: SHIPPED | OUTPATIENT
Start: 2022-11-02 | End: 2023-02-19 | Stop reason: HOSPADM

## 2022-11-02 NOTE — PROGRESS NOTES
"Physical Therapy Daily Treatment Note  [address], [address]  [place] [address]  Visit # 5      Subjective Evaluation    History of Present Illness    Subjective comment: Pt reports her (R) shoulder is \"about the same\". 7/10 pain level       Objective   See Exercise, Manual, and Modality Logs for complete treatment.       Assessment & Plan     Assessment    Assessment details: Pt tolerated treatment with no verbal c/o pain in (R) shoulder.  Pt was able to increase reps on her strengthening exercises with no issues.  Pt's subjective reports have not improved since starting treatment.       Goals  Plan Goals: STGs: to be met in 4 weeks  1. Patient will be independent with initial HEP-NOT MET  2. Patient will report improved (R) shoulder symptoms 2-10 for improved tolerance to ADLs and dressing activities-NOT MET  3. Patient will report consistently sleeping without pain interruption for improved restorative healing-NOT MET  4. Patient will demonstrate improved (R) shoulder AROM flexion, abduction >/= 150 deg and ER >/= 60 deg for improved functional reach of (R) UE-NOT MET  5. Patient will have negative special testing (R) shoulder -NOT MET                     Manual Therapy:    0     mins  01456;  Therapeutic Exercise:    24     mins  89453;     Neuromuscular Daisy:    0    mins  36528;    Therapeutic Activity:     0     mins  90801;     Gait Trainin     mins  95276;     Ultrasound:     0     mins  68305;    Work Hardening           0      mins 99812  Iontophoresis               0   mins 42298  E-Stim                          _0_ mins 34434 ( )    Timed Treatment:   24   mins   Total Treatment:     24   mins    Spencer Lees PTA  Physical Therapist Assistant  "

## 2022-11-03 ENCOUNTER — TELEPHONE (OUTPATIENT)
Dept: URGENT CARE | Facility: CLINIC | Age: 57
End: 2022-11-03

## 2022-11-05 NOTE — PROGRESS NOTES
Patient: Urvashi Ornelas  YOB: 1965  Date of Service: 11/5/2022    Chief Complaints: Right shoulder pain  Subjective:    History of Present Illness: Pt is seen in the office today with complaints of right shoulder pain she has an MRI which shows some tendinitis of the rotator cuff she has a small interstitial tear evidence of impingement was done injections which gave her very temporary relief she is done physical therapy again very temporary relief we talked about the next step which would be.  Arthroscopy she has several things going on right now and states she just mentally cannot do a surgery but she understands what her options are she knows she can have another injection should she decide to wait        Allergies:   Allergies   Allergen Reactions   • Depakote Er [Divalproex Sodium Er] Other (See Comments)     HAIR FALLS OUT   • Divalproex Sodium Unknown (See Comments)     Hair falls out     • Lamictal [Lamotrigine] Swelling and Other (See Comments)     TONGUE SWELLS AND PEELS   • Risperidone And Related Other (See Comments)     PREGNANCY SYMPTOMS   • Erythromycin Rash   • Penicillins Rash   • Risperidone Unknown (See Comments)     Unknown     • Toradol [Ketorolac Tromethamine] Rash   • Valproic Acid Unknown (See Comments)      Unknown          Medications:   Home Medications:  Current Outpatient Medications on File Prior to Visit   Medication Sig   • albuterol (PROVENTIL HFA;VENTOLIN HFA) 108 (90 BASE) MCG/ACT inhaler Inhale 2 puffs every 4 (four) hours as needed for wheezing.   • albuterol (PROVENTIL) (2.5 MG/3ML) 0.083% nebulizer solution Take 2.5 mg by nebulization Every 6 (Six) Hours As Needed for Wheezing or Shortness of Air.   • atorvastatin (LIPITOR) 40 MG tablet TAKE 1 TABLET BY MOUTH EVERY DAY IN THE MORNING   • azelastine (ASTELIN) 0.1 % nasal spray INSTILL 2 SPRAYS INTO THE NOSTRIL(S) AS DIRECTED 2 (TWO) TIMES A DAY.   • Breo Ellipta 200-25 MCG/INH inhaler Inhale 1 puff Daily.   •  buPROPion XL (WELLBUTRIN XL) 300 MG 24 hr tablet    • busPIRone (BUSPAR) 30 MG tablet TK 1 T PO  bid   • doxycycline (VIBRAMYCIN) 100 MG capsule Take 1 capsule by mouth 2 (Two) Times a Day.   • fexofenadine (ALLEGRA) 180 MG tablet Take 180 mg by mouth every night at bedtime.   • gabapentin (NEURONTIN) 100 MG capsule Take 100 mg by mouth Daily.   • Ingrezza 80 MG capsule Take 80 mg by mouth Daily.   • levothyroxine (SYNTHROID, LEVOTHROID) 200 MCG tablet TAKE 1 TABLET BY MOUTH EVERY DAY IN THE MORNING   • Multiple Vitamins-Minerals (MULTIVITAMIN WOMEN 50+) tablet Take 1 tablet/day by mouth.   • nitrofurantoin, macrocrystal-monohydrate, (Macrobid) 100 MG capsule Take 1 capsule by mouth 2 (Two) Times a Day.   • ondansetron (ZOFRAN) 4 MG tablet Take 1 tablet by mouth Every 8 (Eight) Hours As Needed for Nausea or Vomiting.   • phenazopyridine (Pyridium) 100 MG tablet Take 1 tablet by mouth 3 (Three) Times a Day.   • polyethylene glycol (MIRALAX) 17 GM/SCOOP powder MIX 17 G INTO 8 OUNCE FLUID AND TAKE BY MOUTH DAILY AS NEEDED   • potassium chloride 10 MEQ CR tablet TAKE 1 TABLET BY MOUTH EVERY DAY   • prazosin (MINIPRESS) 1 MG capsule    • prazosin (MINIPRESS) 2 MG capsule Take 2 mg by mouth Every Night.   • SUMAtriptan-naproxen (TREXIMET)  MG per tablet Take 1 tablet by mouth Every 2 (Two) Hours As Needed.   • tamsulosin (FLOMAX) 0.4 MG capsule 24 hr capsule Take 1 capsule by mouth Daily.   • topiramate (TOPAMAX) 200 MG tablet Take 200 mg by mouth 2 (Two) Times a Day.     No current facility-administered medications on file prior to visit.     Current Medications:  Scheduled Meds:  Continuous Infusions:No current facility-administered medications for this visit.    PRN Meds:.    I have reviewed the patient's medical history in detail and updated the computerized patient record.  Review and summarization of old records include:    Past Medical History:   Diagnosis Date   • Abnormal urinalysis    • Acute bronchitis     • Acute frontal sinusitis    • Acute maxillary sinusitis    • Acute pain of right shoulder    • Acute sinusitis    • Allergic rhinitis    • Anemia    • Anxiety    • Arthritis    • Asthma    • Asthma exacerbation    • BMI 40.0-44.9, adult (HCC)    • Bronchitis    • Cancer (HCC)     THYROID   • Constipation    • Cough    • Depression    • Diaphragm paralysis    • Disease of thyroid gland     CANCER   • Dizziness    • Drug-induced nausea and vomiting    • Dyspnea    • Dysuria    • Fatigue    • Fatty liver    • H/O blood clots    • H/O degenerative disc disease    • Hearing loss    • History of hallucinations    • Hx of radiation therapy    • Hypercalcemia    • Hyperlipidemia    • Hypertension    • Hypertensive kidney disease with chronic kidney disease stage III (HCC)    • Hypertrophic toenail    • Hypokalemia    • Hypothyroidism    • Joint pain    • Lumbago    • Lymph node cancer (HCC)    • Manic episode without psychotic symptoms (HCC)    • Medicare annual wellness visit, subsequent    • Migraine    • Migraine with aura    • Nausea    • Osteopenia of lumbar spine    • Overweight    • Parathyroid disease (HCC)    • Pleurisy    • Pneumonia    • Post traumatic stress disorder    • Postmenopausal    • Rheumatoid arthritis (HCC)    • Right knee pain    • Shortness of breath    • Shoulder tendinitis    • Sleep apnea    • Thyroid disease    • Urinary frequency         Past Surgical History:   Procedure Laterality Date   • BLADDER SURGERY  2021    BLADDER STIMULATOR   • BRONCHOSCOPY N/A 2020    Procedure: BRONCHOSCOPY with fluroscopy, bal and biopsies;  Surgeon: Yonis Sweeney MD;  Location: [facility] ENDOSCOPY;  Service: Pulmonary;  Laterality: N/A;  consolidation left lower lobe  consolidation left lower lobe   •  SECTION      X2   • CHOLECYSTECTOMY     • COLONOSCOPY     • COLONOSCOPY N/A 2022    Procedure: COLONOSCOPY to cecum with hot snare polypectomy;  Surgeon: Lisa Pandya MD;  Location:  [facility] ENDOSCOPY;  Service: General;  Laterality: N/A;  pre- rectal bleeding, hx consipation  post- polyp, poor prep, hypertrpohy anal papllia    • HAND SURGERY Bilateral     WRISTS PLATE PLACEMENT   • HEMATOMA EVACUATION HEAD/NECK Left 2016    Procedure: HEMATOMA EVACUATION NECK;  Surgeon: Dayo Ashby MD;  Location: [facility] OR [facility];  Service:    • HERNIA REPAIR     • HYSTERECTOMY     • KIDNEY STONE SURGERY     • KNEE SURGERY Bilateral     4YO PIGEON TOE SX   • LUNG SURGERY Left     LEFT LOWER LOBE   • NECK DISSECTION Left 2016    Procedure: LEFT MODIFIED RADICAL NECK DISSECTION;  Surgeon: Dayo Ashby MD;  Location: [facility];  Service:    • NEPHRECTOMY PARTIAL Left    • PARATHYROID GLAND SURGERY         • TOTAL THYROIDECTOMY      PARATHYROID 2016        Social History     Occupational History   • Not on file   Tobacco Use   • Smoking status: Former     Packs/day: 0.50     Years: 20.00     Pack years: 10.00     Types: Cigarettes     Quit date:      Years since quittin.8   • Smokeless tobacco: Never   Vaping Use   • Vaping Use: Never used   Substance and Sexual Activity   • Alcohol use: No   • Drug use: No   • Sexual activity: Defer      Social History     Social History Narrative   • Not on file        Family History   Problem Relation Age of Onset   • Hyperlipidemia Mother    • Hypertension Mother    • Osteoarthritis Mother    • Arthritis Mother         rheumatoid   • Other Mother         acute cutaneous lupus erythematosus   • Bipolar disorder Father    • Hypertension Father    • Arthritis Father    • Stomach cancer Father    • Skin cancer Father    • Prostate cancer Father    • Heart disease Father    • Kidney disease Father    • Bipolar disorder Brother    • HIV Brother    • Alcohol abuse Paternal Grandfather    • Hypertension Paternal Grandfather        ROS: 14 point review of systems was performed and was negative except for documented findings in HPI and  today's encounter.     Allergies:   Allergies   Allergen Reactions   • Depakote Er [Divalproex Sodium Er] Other (See Comments)     HAIR FALLS OUT   • Divalproex Sodium Unknown (See Comments)     Hair falls out     • Lamictal [Lamotrigine] Swelling and Other (See Comments)     TONGUE SWELLS AND PEELS   • Risperidone And Related Other (See Comments)     PREGNANCY SYMPTOMS   • Erythromycin Rash   • Penicillins Rash   • Risperidone Unknown (See Comments)     Unknown     • Toradol [Ketorolac Tromethamine] Rash   • Valproic Acid Unknown (See Comments)      Unknown        Constitutional:  Denies fever, shaking or chills   Eyes:  Denies change in visual acuity   HENT:  Denies nasal congestion or sore throat   Respiratory:  Denies cough or shortness of breath   Cardiovascular:  Denies chest pain or severe LE edema   GI:  Denies abdominal pain, nausea, vomiting, bloody stools or diarrhea   Musculoskeletal:  Numbness, tingling, or loss of motor function only as noted above in history of present illness.  : Denies painful urination or hematuria  Integument:  Denies rash, lesion or ulceration   Neurologic:  Denies headache or focal weakness  Endocrine:  Denies lymphadenopathy  Psych:  Denies confusion or change in mental status   Hem:  Denies active bleeding      Physical Exam: 57 y.o. female  Wt Readings from Last 3 Encounters:   10/11/22 96 kg (211 lb 9.6 oz)   10/03/22 95.3 kg (210 lb)   09/28/22 96.6 kg (213 lb)       There is no height or weight on file to calculate BMI.  No height and weight on file for this encounter.  There were no vitals filed for this visit.  Vital signs reviewed.   General Appearance:    Alert, cooperative, in no acute distress                    Ortho exam    Physical exam of the right shoulder reveals no overlying skin changes no lymphedema no lymphadenopathy.  Patient has active flexion 180 with mild symptoms abduction is similar external rotation is to 50 and internal rotation to the upper  lumbar spine with mild symptoms.  Patient has good rotator cuff strength 4+ over 5 with isometric strength testing with pain.  Patient has a positive impingement and a positive Mark sign.  Patient has good cervical range of motion which is full and asymptomatic no radicular symptoms.  Patient has a normal elbow exam.  Good distal pulses are present  Patient has pain with overhead activity and a positive Neer sign and a positive empty can sign , a positive drop arm and a definitive painful arc         .time    Assessment: Persistent right shoulder pain despite conservative measures we talked about options including arthroscopy she wants to hold off on that right now she feels like she is got a lot of things on her plate and needs to wait to challenge her self with surgery I think that is quite reasonable    Plan: She will call me if she wants to proceed with a neck step or wants to do any other conservative measures  Follow up as indicated.  Ice, elevate, and rest as needed.  Discussed conservative measures of pain control including ice, bracing.  Also talked about the importance of strengthening and maintaining ideal body weight    Eleni Jimenez M.D.

## 2022-11-07 ENCOUNTER — OFFICE VISIT (OUTPATIENT)
Dept: ORTHOPEDIC SURGERY | Facility: CLINIC | Age: 57
End: 2022-11-07

## 2022-11-07 VITALS — WEIGHT: 211.7 LBS | HEIGHT: 67 IN | BODY MASS INDEX: 33.23 KG/M2 | TEMPERATURE: 98 F

## 2022-11-07 DIAGNOSIS — M75.41 IMPINGEMENT SYNDROME OF RIGHT SHOULDER: Primary | ICD-10-CM

## 2022-11-07 PROCEDURE — 99213 OFFICE O/P EST LOW 20 MIN: CPT | Performed by: ORTHOPAEDIC SURGERY

## 2022-11-17 DIAGNOSIS — E87.6 HYPOKALEMIA: ICD-10-CM

## 2022-11-17 RX ORDER — POTASSIUM CHLORIDE 750 MG/1
TABLET, FILM COATED, EXTENDED RELEASE ORAL
Qty: 30 TABLET | Refills: 1 | Status: SHIPPED | OUTPATIENT
Start: 2022-11-17 | End: 2022-12-20

## 2022-11-28 ENCOUNTER — OFFICE VISIT (OUTPATIENT)
Dept: FAMILY MEDICINE CLINIC | Facility: CLINIC | Age: 57
End: 2022-11-28

## 2022-11-28 VITALS
TEMPERATURE: 96 F | HEIGHT: 67 IN | BODY MASS INDEX: 33.43 KG/M2 | DIASTOLIC BLOOD PRESSURE: 68 MMHG | SYSTOLIC BLOOD PRESSURE: 102 MMHG | WEIGHT: 213 LBS

## 2022-11-28 DIAGNOSIS — J01.01 ACUTE RECURRENT MAXILLARY SINUSITIS: Primary | ICD-10-CM

## 2022-11-28 DIAGNOSIS — S70.12XA CONTUSION OF LEFT THIGH, INITIAL ENCOUNTER: ICD-10-CM

## 2022-11-28 PROCEDURE — 99213 OFFICE O/P EST LOW 20 MIN: CPT | Performed by: NURSE PRACTITIONER

## 2022-11-28 RX ORDER — DOXYCYCLINE 100 MG/1
100 CAPSULE ORAL 2 TIMES DAILY
Qty: 14 CAPSULE | Refills: 0 | Status: SHIPPED | OUTPATIENT
Start: 2022-11-28 | End: 2022-12-05

## 2022-11-28 NOTE — PROGRESS NOTES
"Chief Complaint  Bleeding/Bruising (Fell on 11/16/2022) and Sinusitis (Started last Saturday )    Subjective          Urvashi Ornelas presents to [facility]  History of Present Illness  Fell on 11/16/22 on her left side, bruising to her left leg. Still having pain to left outer leg and around her knee area, especially when she needs to sleep. Has been taking tylenol for pain with little to no relief.     Sinusitis  This is a recurrent problem. The problem is unchanged. There has been no fever. Associated symptoms include headaches, sinus pressure and sneezing. Pertinent negatives include no congestion, coughing, ear pain, shortness of breath or sore throat. Past treatments include nothing.       Review of Systems   Constitutional: Negative for fatigue and fever.   HENT: Positive for sinus pressure and sneezing. Negative for congestion, ear discharge, ear pain, postnasal drip, rhinorrhea and sore throat.    Eyes: Negative for pain, discharge, redness and itching.   Respiratory: Negative for cough, shortness of breath and wheezing.    Gastrointestinal: Negative for abdominal pain, diarrhea, nausea and vomiting.   Musculoskeletal: Negative for arthralgias and myalgias.   Skin: Positive for bruise (left thigh and left lower leg).   Neurological: Positive for headache.      Objective   Vital Signs:   /68 (BP Location: Left arm, Patient Position: Sitting, Cuff Size: Adult)   Temp 96 °F (35.6 °C) (Temporal)   Ht 170.2 cm (67\")   Wt 96.6 kg (213 lb)   BMI 33.36 kg/m²     Physical Exam  Vitals reviewed.   Constitutional:       General: She is awake. She is not in acute distress.     Appearance: Normal appearance.   HENT:      Head: Normocephalic.      Right Ear: Hearing, tympanic membrane, ear canal and external ear normal.      Left Ear: Hearing, tympanic membrane, ear canal and external ear normal.      Nose:      Right Sinus: Maxillary sinus tenderness and frontal sinus tenderness " present.      Left Sinus: Maxillary sinus tenderness and frontal sinus tenderness present.   Neck:      Thyroid: No thyroid mass or thyroid tenderness.   Cardiovascular:      Rate and Rhythm: Normal rate and regular rhythm.      Pulses: Normal pulses.           Radial pulses are 2+ on the right side and 2+ on the left side.        Dorsalis pedis pulses are 2+ on the right side and 2+ on the left side.        Posterior tibial pulses are 2+ on the right side and 2+ on the left side.      Heart sounds: Normal heart sounds.   Pulmonary:      Effort: Pulmonary effort is normal.      Breath sounds: Normal breath sounds.   Lymphadenopathy:      Cervical: No cervical adenopathy.   Skin:     General: Skin is warm and dry.      Capillary Refill: Capillary refill takes less than 2 seconds.      Findings: Bruising (left outer thigh, left lower leg. Various stages of healing, healing well) present.        Result Review :     Assessment and Plan    Diagnoses and all orders for this visit:    1. Acute recurrent maxillary sinusitis (Primary)  -     doxycycline (MONODOX) 100 MG capsule; Take 1 capsule by mouth 2 (Two) Times a Day for 7 days.  Dispense: 14 capsule; Refill: 0    2. Contusion of left thigh, initial encounter    Discussed with patient the bruising appears to be healing well.  Discussed with patient that she should continue taking Tylenol for pain relief, can use ice to help dull the pain and heat to help with the bruising improve.  Prescribed doxycycline for recurrent maxillary sinusitis management.    I spent 20 minutes caring for Urvashi on this date of service. This time includes time spent by me in the following activities:reviewing tests, obtaining and/or reviewing a separately obtained history, performing a medically appropriate examination and/or evaluation , counseling and educating the patient/family/caregiver, ordering medications, tests, or procedures, documenting information in the medical record and care  coordination     Follow Up   Return if symptoms worsen or fail to improve.  Patient was given instructions and counseling regarding her condition or for health maintenance advice. Please see specific information pulled into the AVS if appropriate.

## 2022-11-29 ENCOUNTER — TELEPHONE (OUTPATIENT)
Dept: ENDOCRINOLOGY | Age: 57
End: 2022-11-29

## 2022-11-29 DIAGNOSIS — E89.0 POSTSURGICAL HYPOTHYROIDISM: Primary | ICD-10-CM

## 2022-11-29 DIAGNOSIS — E55.9 VITAMIN D DEFICIENCY: ICD-10-CM

## 2022-11-29 DIAGNOSIS — R73.03 PRE-DIABETES: ICD-10-CM

## 2022-12-05 RX ORDER — LEVOTHYROXINE SODIUM 0.2 MG/1
TABLET ORAL
Qty: 90 TABLET | Refills: 1 | Status: SHIPPED | OUTPATIENT
Start: 2022-12-05 | End: 2023-03-23

## 2022-12-15 ENCOUNTER — OFFICE VISIT (OUTPATIENT)
Dept: ENDOCRINOLOGY | Age: 57
End: 2022-12-15

## 2022-12-15 VITALS
DIASTOLIC BLOOD PRESSURE: 80 MMHG | HEIGHT: 67 IN | BODY MASS INDEX: 32.83 KG/M2 | TEMPERATURE: 98.8 F | WEIGHT: 209.2 LBS | SYSTOLIC BLOOD PRESSURE: 114 MMHG | HEART RATE: 103 BPM | OXYGEN SATURATION: 97 %

## 2022-12-15 DIAGNOSIS — E89.0 POSTSURGICAL HYPOTHYROIDISM: Primary | ICD-10-CM

## 2022-12-15 DIAGNOSIS — E55.9 VITAMIN D DEFICIENCY: ICD-10-CM

## 2022-12-15 DIAGNOSIS — R73.03 PRE-DIABETES: ICD-10-CM

## 2022-12-15 DIAGNOSIS — C73 THYROID CANCER: ICD-10-CM

## 2022-12-15 PROCEDURE — 99214 OFFICE O/P EST MOD 30 MIN: CPT | Performed by: INTERNAL MEDICINE

## 2022-12-15 RX ORDER — KETOTIFEN FUMARATE 0.35 MG/ML
SOLUTION/ DROPS OPHTHALMIC
COMMUNITY
Start: 2022-12-06

## 2022-12-15 RX ORDER — FLUTICASONE PROPIONATE 50 MCG
2 SPRAY, SUSPENSION (ML) NASAL DAILY
COMMUNITY
Start: 2022-11-07 | End: 2023-04-03 | Stop reason: SDUPTHER

## 2022-12-15 NOTE — PROGRESS NOTES
Chief Complaint  Hypothyroidism (Pt states energy levels have been lower than normal. Weight is higher than expected. No family hx of thyroid disease. )    Subjective            History of Present Illness  Urvashi Ornelas,57 y.o. is here as a  f/u patient for the management of metastatic papillary thyroid cancer status post total thyroidectomy and radioactive iodine ablation.       Patient underwent radioactive iodine ablation along with whole-body scans, with the last one in 2017 which did not show any residual disease. She has been released from 's office.   Since then patient has been getting serial thyroid ultrasounds through Dr. Ashby.     Last US from Dr. Ashby was from nov - 2022 - which showed cervical lymph nodes but no concerning features.      Today in clinic patient reports that she is on levothyroxine 200 mcg oral daily. Energy levels are good. Weight has stabilized since last visit and she even gained back some now.       Interval hx -      Patient's history is significant for right inferior parathyroidectomy for hyperparathyroidism and elevated calcium levels.  As a part of this work-up she was noted to have thyroid nodule subsequently underwent thyroid biopsy and the pathology was consistent with papillary thyroid cancer, she underwent total thyroidectomy in 2016 along with parathyroid exploration.  Surgical pathology-multifocal papillary carcinoma.  During the surgery patient also had right superior parathyroidectomy as well.  Postoperative ultrasound showed residual level III lymph nodes in the left neck measuring 2.2 x 0.8 x 1.3 cm in size.  Ultrasound-guided biopsy showed pathology consistent with papillary thyroid cancer. Subsequently patient underwent left neck dissection in April 2016 by Dr. Ashby.  Surgical pathology was consistent with metastatic papillary carcinoma involving 6 out of the 19 lymph nodes.  There was no evidence of extracapsular extension.       Reviewed primary care  "physician's/consulting physician documentation and lab results     I have reviewed the patient's allergies, medicines, past medical hx, family hx and social hx in detail.    Objective   Vital Signs:   /80   Pulse 103   Temp 98.8 °F (37.1 °C) (Temporal)   Ht 170.2 cm (67.01\")   Wt 94.9 kg (209 lb 3.2 oz)   SpO2 97%   BMI 32.76 kg/m²     Physical Exam   General appearance - no distress  Eyes- anicteric sclera  Ear nose and throat-external ears and nose normal.    Respiratory-normal chest on inspection.  No respiratory distress noted.  Skin-no rashes.  Neuro-alert and oriented x3  Empty thyroid bed          Result Review :   The following data was reviewed by: Bakari Mora MD on 12/15/2022:  Results Encounter on 12/01/2022   Component Date Value Ref Range Status   • T3, Free 12/01/2022 2.5  2.0 - 4.4 pg/mL Final   • Free T4 12/01/2022 1.75 (H)  0.93 - 1.70 ng/dL Final    Results may be falsely increased if patient taking Biotin.   • TSH 12/01/2022 1.300  0.270 - 4.200 uIU/mL Final   • Thyroglobulin (TG-JOSE DAVID) 12/01/2022 <2.0  ng/mL Final    Comment: This test was developed and its performance characteristics  determined by [facility]. It has not been cleared or approved  by the Food and Drug Administration.  Reference Range:  Pubertal Children  and Adults: <40  According to the National Academy of Clinical Biochemistry,  the reference interval for Thyroglobulin (TG) should be  related to euthyroid patients and not for patients who  underwent thyroidectomy.  TG reference intervals for these  patients depend on the residual mass of the thyroid tissue  left after surgery.  Establishing a post-operative baseline  is recommended.  The assay quantitation limit is 2.0 ng/mL.     • Glucose 12/01/2022 102 (H)  65 - 99 mg/dL Final   • BUN 12/01/2022 12  6 - 20 mg/dL Final   • Creatinine 12/01/2022 1.12 (H)  0.57 - 1.00 mg/dL Final   • EGFR Result 12/01/2022 58.2 (L)  >60.0 mL/min/1.73 Final    Comment: National Kidney " Foundation and American Society of  Nephrology (ASN) Task Force recommended calculation based  on the Chronic Kidney Disease Epidemiology Collaboration  (CKD-EPI) equation refit without adjustment for race.  GFR Normal >60  Chronic Kidney Disease <60  Kidney Failure <15     • BUN/Creatinine Ratio 12/01/2022 10.7  7.0 - 25.0 Final   • Sodium 12/01/2022 144  136 - 145 mmol/L Final   • Potassium 12/01/2022 4.4  3.5 - 5.2 mmol/L Final   • Chloride 12/01/2022 110 (H)  98 - 107 mmol/L Final   • Total CO2 12/01/2022 20.8 (L)  22.0 - 29.0 mmol/L Final   • Calcium 12/01/2022 9.3  8.6 - 10.5 mg/dL Final   • Hemoglobin A1C 12/01/2022 5.40  4.80 - 5.60 % Final    Comment: Hemoglobin A1C Ranges:  Increased Risk for Diabetes  5.7% to 6.4%  Diabetes                     >= 6.5%  Diabetic Goal                < 7.0%     • Total Cholesterol 12/01/2022 152  0 - 200 mg/dL Final    Comment: Cholesterol Reference Ranges  (U.S. Department of Health and Human Services ATP III  Classifications)  Desirable          <200 mg/dL  Borderline High    200-239 mg/dL  High Risk          >240 mg/dL  Triglyceride Reference Ranges  (U.S. Department of Health and Human Services ATP III  Classifications)  Normal           <150 mg/dL  Borderline High  150-199 mg/dL  High             200-499 mg/dL  Very High        >500 mg/dL  HDL Reference Ranges  (U.S. Department of Health and Human Services ATP III  Classifications)  Low     <40 mg/dl (major risk factor for CHD)  High    >60 mg/dl ('negative' risk factor for CHD)  LDL Reference Ranges  (U.S. Department of Health and Human Services ATP III  Classifications)  Optimal          <100 mg/dL  Near Optimal     100-129 mg/dL  Borderline High  130-159 mg/dL  High             160-189 mg/dL  Very High        >189 mg/dL     • Triglycerides 12/01/2022 106  0 - 150 mg/dL Final   • HDL Cholesterol 12/01/2022 49  40 - 60 mg/dL Final   • VLDL Cholesterol Preet 12/01/2022 19  5 - 40 mg/dL Final   • LDL Chol Calc ([facility])  12/01/2022 84  0 - 100 mg/dL Final   • Vitamin B-12 12/01/2022 655  211 - 946 pg/mL Final    Results may be falsely increased if patient taking Biotin.   • Folate 12/01/2022 8.99  4.78 - 24.20 ng/mL Final    Results may be falsely increased if patient taking Biotin.   • 25 Hydroxy, Vitamin D 12/01/2022 32.5  30.0 - 100.0 ng/mL Final    Comment: Results may be falsely increased if patient taking Biotin.  Reference Range for Total Vitamin D 25(OH)  Deficiency <20.0 ng/mL  Insufficiency 21-29 ng/mL  Sufficiency  ng/mL  Toxicity >100 ng/ml     • Thyroglobulin Ab 12/01/2022 <1.0  0.0 - 0.9 IU/mL Final    Thyroglobulin Antibody measured by [employer]   • Interpretation 12/01/2022 Note   Final    Supplemental report is available.     Data reviewed: PCP and Dr. Ashby notes        I reviewed the patient's new clinical results and mentioned them above in HPI and in plan as well.          Assessment and Plan    Problem List Items Addressed This Visit        Other    Thyroid cancer (HCC)    Relevant Orders    TSH    T4, Free    T3, Free    Thyroglobulin    Lipid Panel    Vitamin D,25-Hydroxy    Vitamin B12 & Folate    Basic Metabolic Panel    Hemoglobin A1c    Anti-Thyroglobulin Antibody    Vitamin D deficiency    Relevant Orders    TSH    T4, Free    T3, Free    Thyroglobulin    Lipid Panel    Vitamin D,25-Hydroxy    Vitamin B12 & Folate    Basic Metabolic Panel    Hemoglobin A1c    Anti-Thyroglobulin Antibody   Other Visit Diagnoses     Postsurgical hypothyroidism    -  Primary    Relevant Orders    TSH    T4, Free    T3, Free    Thyroglobulin    Lipid Panel    Vitamin D,25-Hydroxy    Vitamin B12 & Folate    Basic Metabolic Panel    Hemoglobin A1c    Anti-Thyroglobulin Antibody    Pre-diabetes        Relevant Orders    TSH    T4, Free    T3, Free    Thyroglobulin    Lipid Panel    Vitamin D,25-Hydroxy    Vitamin B12 & Folate    Basic Metabolic Panel    Hemoglobin A1c    Anti-Thyroglobulin Antibody         Thyroid cancer -   Tg levels are negative   U/S of the thyroid - empty and no concerning Lymph nodes identified.     Hypothyroidism - chronic - post surgical   Continue levothyroxine 200 mcg oral daily.   levels are stable.     CKD - followed by renal.           Follow Up   No follow-ups on file.    Refills/Meds sent to pharmacy    Interpreted the blood work-up/imaging results performed by the primary care/consulting physician -    Patient was given instructions and counseling regarding her condition or for health maintenance advice. Please see specific information pulled into the AVS if appropriate.

## 2022-12-20 DIAGNOSIS — E87.6 HYPOKALEMIA: ICD-10-CM

## 2022-12-20 RX ORDER — POTASSIUM CHLORIDE 750 MG/1
TABLET, FILM COATED, EXTENDED RELEASE ORAL
Qty: 30 TABLET | Refills: 2 | Status: SHIPPED | OUTPATIENT
Start: 2022-12-20 | End: 2023-02-19 | Stop reason: HOSPADM

## 2022-12-28 ENCOUNTER — TELEPHONE (OUTPATIENT)
Dept: ORTHOPEDIC SURGERY | Facility: CLINIC | Age: 57
End: 2022-12-28

## 2022-12-28 NOTE — TELEPHONE ENCOUNTER
Caller: ADRIANA HOYT     Relationship to patient:SELF   Best call back number: [phone]    Chief complaint: LEFT KNEE     Type of visit: INJECTION     Requested date: ASAP

## 2023-01-10 ENCOUNTER — OFFICE VISIT (OUTPATIENT)
Dept: FAMILY MEDICINE CLINIC | Facility: CLINIC | Age: 58
End: 2023-01-10
Payer: MEDICARE

## 2023-01-10 VITALS
DIASTOLIC BLOOD PRESSURE: 74 MMHG | OXYGEN SATURATION: 98 % | HEIGHT: 67 IN | HEART RATE: 81 BPM | WEIGHT: 219.6 LBS | SYSTOLIC BLOOD PRESSURE: 110 MMHG | BODY MASS INDEX: 34.47 KG/M2

## 2023-01-10 DIAGNOSIS — K59.09 CHRONIC CONSTIPATION: ICD-10-CM

## 2023-01-10 DIAGNOSIS — R25.2 MUSCLE CRAMPS: ICD-10-CM

## 2023-01-10 DIAGNOSIS — E78.00 HYPERCHOLESTEROLEMIA: Primary | ICD-10-CM

## 2023-01-10 PROBLEM — Z86.39 HISTORY OF HYPOKALEMIA: Status: RESOLVED | Noted: 2023-01-10 | Resolved: 2023-01-10

## 2023-01-10 PROBLEM — Z86.39 HISTORY OF HYPOKALEMIA: Status: ACTIVE | Noted: 2023-01-10

## 2023-01-10 PROCEDURE — 99213 OFFICE O/P EST LOW 20 MIN: CPT | Performed by: NURSE PRACTITIONER

## 2023-01-10 RX ORDER — POLYETHYLENE GLYCOL 3350 17 G/17G
17 POWDER, FOR SOLUTION ORAL 2 TIMES DAILY PRN
Qty: 510 G | Refills: 2 | Status: SHIPPED | OUTPATIENT
Start: 2023-01-10

## 2023-01-10 NOTE — PROGRESS NOTES
"Chief Complaint  Leg Pain (Leg cramps for a couple months.)    Subjective          Urvashi Ornelas presents to [facility]  History of Present Illness  Urvashi Ornelas is a 57 year old female here to establish care and with c/o muscle cramps in bilateral legs intermittently over the past couple months, with increased cramping at bedtime.  She has taken tylenol for pain, but does not really help.  She also has been taking Potassium 10mEq for about 1 year.    HLD - currently taking Atorvastatin 40mg at HS.  She has been taking this medication for about 3-4 years without any problems.    Asthma - developed asthma as an adult in her mid 20's.  Was on Breo, Albuterol HFA inhaler and Albuterol nebulizer, and Dr. Sweeney said she does not have to use these medications any longer.  She continues to follow with Dr. Sweeney as needed.    Thyroid - history of thyroid cancer and had thyroidectomy and parathyroidecomy in 2016, was suppose to remove lymph node, but could not find lymph node at the time.  She had a second precedure to remove the lymph node and wound up that 9 lymph nodes were removed.   Dr. Ashby, ENT monitors thyroid lymph nodes.     Thyroid Levels - thyroid levels monitored and dose adjustments made by Dr. Mora.    SHANA - did home sleep study, put on C-pap machine.  Since then she has lost 70lbs, gained 20lbs back and was told no longer needed to use C-pap.  She is not currently using C-pap machine.    CKD - she has \"Stage 2 or 3\"; followed by Dr. Dempsey, Nephrologist.    Urinary Retention - history of urinary retention, was self cathing, now has bladder stimulator; followed by Dr. Doty, at [facility].    Bipolar, Schizoaffective Disorder, PTSD, Anxiety, Borderline Personality Disorder - currently being treated by [facility].  All medication prescribed by [facility].      Review of Systems   Respiratory: Negative for shortness of breath.    Cardiovascular: Negative for " "chest pain, palpitations and leg swelling.   Gastrointestinal: Positive for constipation. Negative for abdominal pain, anal bleeding, blood in stool, diarrhea, nausea, rectal pain and vomiting.   Endocrine: Negative for cold intolerance and heat intolerance.   Musculoskeletal: Negative for arthralgias and myalgias.        Intermittent leg cramps, worse at night over the past couple months.         Objective   Vital Signs:   /74 (BP Location: Left arm)   Pulse 81   Ht 170.2 cm (67.01\")   Wt 99.6 kg (219 lb 9.6 oz)   SpO2 98%   BMI 34.38 kg/m²     Physical Exam  Vitals and nursing note reviewed.   Constitutional:       General: She is not in acute distress.     Appearance: Normal appearance.   HENT:      Head: Normocephalic and atraumatic.   Cardiovascular:      Rate and Rhythm: Normal rate and regular rhythm.      Pulses: Normal pulses.      Heart sounds: Normal heart sounds. No murmur heard.  Pulmonary:      Effort: Pulmonary effort is normal. No respiratory distress.      Breath sounds: Normal breath sounds. No wheezing or rhonchi.   Abdominal:      Palpations: Abdomen is soft.      Tenderness: There is no abdominal tenderness.   Musculoskeletal:      Right lower leg: No edema.      Left lower leg: No edema.   Skin:     General: Skin is warm and dry.   Neurological:      Mental Status: She is alert.   Psychiatric:         Mood and Affect: Mood normal.      The 10-year ASCVD risk score (Chelle DK, et al., 2019) is: 1.3%    Values used to calculate the score:      Age: 57 years      Sex: Female      Is Non- : No      Diabetic: No      Tobacco smoker: No      Systolic Blood Pressure: 110 mmHg      Is BP treated: No      HDL Cholesterol: 58 mg/dL      Total Cholesterol: 181 mg/dL    Result Review :   The following data was reviewed by: LALO Washington on 01/10/2023:  CMP    CMP 10/11/22 12/1/22 1/10/23   Glucose 56 (A) 102 (A) CANCELED   BUN 17 12 CANCELED   Creatinine 1.04 (A) " 1.12 (A) CANCELED   Sodium 141 144 CANCELED   Potassium 4.2 4.4 CANCELED   Chloride 108 (A) 110 (A) CANCELED   Calcium 9.4 9.3 CANCELED   Total Protein   CANCELED   Albumin   CANCELED   Total Bilirubin   CANCELED   Alkaline Phosphatase   CANCELED   AST (SGOT)   CANCELED   ALT (SGPT)   CANCELED   BUN/Creatinine Ratio 16.3 10.7    (A) Abnormal value       Comments are available for some flowsheets but are not being displayed.             Lipid Panel    Lipid Panel 8/22/22 12/1/22 1/10/23   Total Cholesterol 133 152 181   Triglycerides 99 106 130   HDL Cholesterol 47 49 58   VLDL Cholesterol 18 19 23   LDL Cholesterol  68 84 100      Comments are available for some flowsheets but are not being displayed.           Most Recent A1C    HGBA1C Most Recent 12/1/22   Hemoglobin A1C 5.40      Comments are available for some flowsheets but are not being displayed.                     Assessment and Plan    Diagnoses and all orders for this visit:    1. Hypercholesterolemia (Primary)  Assessment & Plan:  Lipid abnormalities are improving with treatment.  Nutritional counseling was provided. and continue Atorvastatin.   Ordered Lipid panel today.  Lipids will be reassessed in 3 months.      Orders:  -     Lipid Panel    2. Chronic constipation  Assessment & Plan:  Chronic constipation.  Discussed increasing water intake and increasing activity.  Start Miralax BID as needed for constipation.  Follow-up if symptoms worsen or fail to improve.    Orders:  -     polyethylene glycol (MIRALAX) 17 GM/SCOOP powder; Take 17 g by mouth 2 (Two) Times a Day As Needed (Constipation).  Dispense: 510 g; Refill: 2    3. Muscle cramps  Comments:  Intermittent muscle cramps.  After labs (CMP) will determine if cramps are possibly related to Atorvastatin, or if related to low postassium level.  Orders:  -     Comprehensive Metabolic Panel    Other orders  -     Request Problem    I spent 40 minutes caring for Urvashi on this date of service. This  time includes time spent by me in the following activities:preparing for the visit, reviewing tests, performing a medically appropriate examination and/or evaluation , counseling and educating the patient/family/caregiver, ordering medications, tests, or procedures and documenting information in the medical record  Follow Up   Return in about 3 months (around 4/10/2023) for Annual physical.  Patient was given instructions and counseling regarding her condition or for health maintenance advice. Please see specific information pulled into the AVS if appropriate.

## 2023-01-11 ENCOUNTER — OFFICE VISIT (OUTPATIENT)
Dept: ORTHOPEDIC SURGERY | Facility: CLINIC | Age: 58
End: 2023-01-11
Payer: MEDICARE

## 2023-01-11 VITALS — TEMPERATURE: 97.5 F | HEIGHT: 67 IN | WEIGHT: 219.5 LBS | BODY MASS INDEX: 34.45 KG/M2

## 2023-01-11 DIAGNOSIS — M25.562 LEFT KNEE PAIN, UNSPECIFIED CHRONICITY: Primary | ICD-10-CM

## 2023-01-11 DIAGNOSIS — M17.12 ARTHRITIS OF LEFT KNEE: ICD-10-CM

## 2023-01-11 PROCEDURE — 99214 OFFICE O/P EST MOD 30 MIN: CPT | Performed by: ORTHOPAEDIC SURGERY

## 2023-01-11 PROCEDURE — 73562 X-RAY EXAM OF KNEE 3: CPT | Performed by: ORTHOPAEDIC SURGERY
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
## 2023-01-12 LAB
ALBUMIN SERPL-MCNC: NORMAL G/DL
ALP SERPL-CCNC: NORMAL U/L
ALT SERPL-CCNC: NORMAL U/L
AST SERPL-CCNC: NORMAL U/L
BILIRUB SERPL-MCNC: NORMAL MG/DL
BUN SERPL-MCNC: NORMAL MG/DL
CALCIUM SERPL-MCNC: NORMAL MG/DL
CHLORIDE SERPL-SCNC: NORMAL MMOL/L
CHOLEST SERPL-MCNC: 181 MG/DL (ref 0–200)
CO2 SERPL-SCNC: NORMAL MMOL/L
CREAT SERPL-MCNC: NORMAL MG/DL
GLUCOSE SERPL-MCNC: NORMAL MG/DL
HDLC SERPL-MCNC: 58 MG/DL (ref 40–60)
LDLC SERPL CALC-MCNC: 100 MG/DL (ref 0–100)
POTASSIUM SERPL-SCNC: NORMAL MMOL/L
PROT SERPL-MCNC: NORMAL G/DL
REQUEST PROBLEM: NORMAL
SODIUM SERPL-SCNC: NORMAL MMOL/L
TRIGL SERPL-MCNC: 130 MG/DL (ref 0–150)
VLDLC SERPL CALC-MCNC: 23 MG/DL (ref 5–40)

## 2023-01-13 ENCOUNTER — TELEPHONE (OUTPATIENT)
Dept: FAMILY MEDICINE CLINIC | Facility: CLINIC | Age: 58
End: 2023-01-13

## 2023-01-13 ENCOUNTER — PATIENT ROUNDING (BHMG ONLY) (OUTPATIENT)
Dept: FAMILY MEDICINE CLINIC | Facility: CLINIC | Age: 58
End: 2023-01-13
Payer: MEDICARE

## 2023-01-13 NOTE — TELEPHONE ENCOUNTER
"    Caller: Urvashi Ornelas    Relationship: Self    Best call back number: [phone]    Caller requesting test results: SELF    What test was performed: LABS    When was the test performed: 01/11/2023    Where was the test performed: IN-OFFICE    Additional notes: THE PATIENT'S LABS ARE SHOWING UP AS \"CANCELED\" ON HER [facility] ACCOUNT. THE PATIENT DOES NOT KNOW WHY THIS IS HAPPENING AND WOULD LIKE TO KNOW IF SHE NEEDS TO REDO THE LABWORK. PLEASE ADVISE.   "

## 2023-01-13 NOTE — PROGRESS NOTES
A [employer] message has been sent to patient rounding with [facility].

## 2023-01-14 PROBLEM — R25.2 MUSCLE CRAMPS: Status: ACTIVE | Noted: 2023-01-14

## 2023-01-14 NOTE — TELEPHONE ENCOUNTER
After reviewing patient's lab results, I called patient and informed her that the lipid panel was resulted and the results of the lipids were normal, but the CMP was cancelled.  I also explained to the patient that after I reviewed the labs, I noticed the CMP was cancelled because there was not enough blood in the specimen to complete the results of the CMP so [facility] cancelled the entire CMP lab order.  I informed the patient that she would have to come in to the office again to have the CMP recollected.  Patient said she will be in on Monday between 10-10:30 to have CMP recollected.

## 2023-01-15 NOTE — ASSESSMENT & PLAN NOTE
Chronic constipation.  Discussed increasing water intake and increasing activity.  Start Miralax BID as needed for constipation.  Follow-up if symptoms worsen or fail to improve.

## 2023-01-15 NOTE — ASSESSMENT & PLAN NOTE
Lipid abnormalities are improving with treatment.  Nutritional counseling was provided. and continue Atorvastatin.   Ordered Lipid panel today.  Lipids will be reassessed in 3 months.

## 2023-01-16 DIAGNOSIS — R25.2 MUSCLE CRAMPS: Primary | ICD-10-CM

## 2023-01-16 DIAGNOSIS — E55.9 VITAMIN D DEFICIENCY: ICD-10-CM

## 2023-01-16 DIAGNOSIS — E89.0 POSTSURGICAL HYPOTHYROIDISM: ICD-10-CM

## 2023-01-16 DIAGNOSIS — C73 THYROID CANCER: ICD-10-CM

## 2023-01-16 DIAGNOSIS — R73.03 PRE-DIABETES: ICD-10-CM

## 2023-01-17 LAB
ALBUMIN SERPL-MCNC: 3.8 G/DL (ref 3.5–5.2)
ALBUMIN/GLOB SERPL: 1.5 G/DL
ALP SERPL-CCNC: 101 U/L (ref 39–117)
ALT SERPL-CCNC: 22 U/L (ref 1–33)
AST SERPL-CCNC: 13 U/L (ref 1–32)
BILIRUB SERPL-MCNC: 0.2 MG/DL (ref 0–1.2)
BUN SERPL-MCNC: 17 MG/DL (ref 6–20)
BUN/CREAT SERPL: 16.5 (ref 7–25)
CALCIUM SERPL-MCNC: 8.9 MG/DL (ref 8.6–10.5)
CHLORIDE SERPL-SCNC: 108 MMOL/L (ref 98–107)
CO2 SERPL-SCNC: 25.7 MMOL/L (ref 22–29)
CREAT SERPL-MCNC: 1.03 MG/DL (ref 0.57–1)
EGFRCR SERPLBLD CKD-EPI 2021: 64.3 ML/MIN/1.73
GLOBULIN SER CALC-MCNC: 2.6 GM/DL
GLUCOSE SERPL-MCNC: 93 MG/DL (ref 65–99)
POTASSIUM SERPL-SCNC: 4.4 MMOL/L (ref 3.5–5.2)
PROT SERPL-MCNC: 6.4 G/DL (ref 6–8.5)
SODIUM SERPL-SCNC: 142 MMOL/L (ref 136–145)

## 2023-01-23 ENCOUNTER — TELEPHONE (OUTPATIENT)
Dept: FAMILY MEDICINE CLINIC | Facility: CLINIC | Age: 58
End: 2023-01-23
Payer: MEDICARE

## 2023-01-23 NOTE — TELEPHONE ENCOUNTER
Caller: Johnson Urvashi A    Relationship: Self    Best call back number: [phone]    What medications are you currently taking:   Current Outpatient Medications on File Prior to Visit   Medication Sig Dispense Refill   • albuterol (PROVENTIL HFA;VENTOLIN HFA) 108 (90 BASE) MCG/ACT inhaler Inhale 2 puffs every 4 (four) hours as needed for wheezing.     • atorvastatin (LIPITOR) 40 MG tablet TAKE 1 TABLET BY MOUTH EVERY DAY IN THE MORNING 90 tablet 3   • azelastine (ASTELIN) 0.1 % nasal spray INSTILL 2 SPRAYS INTO THE NOSTRIL(S) AS DIRECTED 2 (TWO) TIMES A DAY. 30 each 1   • buPROPion XL (WELLBUTRIN XL) 300 MG 24 hr tablet Take 300 mg by mouth Every Morning.     • busPIRone (BUSPAR) 30 MG tablet TK 1 T PO  bid  0   • fexofenadine (ALLEGRA) 180 MG tablet Take 180 mg by mouth every night at bedtime.     • fluticasone (FLONASE) 50 MCG/ACT nasal spray 2 sprays by Each Nare route Daily.     • Ingrezza 80 MG capsule Take 80 mg by mouth Daily.     • ketotifen (ZADITOR) 0.025 % ophthalmic solution INSTILL 1 DROP EACH EYE 2-3 TIMES DAILY AS NEEDED     • levothyroxine (SYNTHROID, LEVOTHROID) 200 MCG tablet TAKE 1 TABLET BY MOUTH EVERY DAY IN THE MORNING 90 tablet 1   • Multiple Vitamins-Minerals (MULTIVITAMIN WOMEN 50+) tablet Take 1 tablet/day by mouth.     • ondansetron (ZOFRAN) 4 MG tablet Take 1 tablet by mouth Every 8 (Eight) Hours As Needed for Nausea or Vomiting. 30 tablet 0   • polyethylene glycol (MIRALAX) 17 GM/SCOOP powder Take 17 g by mouth 2 (Two) Times a Day As Needed (Constipation). 510 g 2   • potassium chloride 10 MEQ CR tablet TAKE 1 TABLET BY MOUTH EVERY DAY 30 tablet 2   • prazosin (MINIPRESS) 2 MG capsule Take 4 mg by mouth Every Night.     • tamsulosin (FLOMAX) 0.4 MG capsule 24 hr capsule Take 1 capsule by mouth Daily.     • topiramate (TOPAMAX) 200 MG tablet Take 200 mg by mouth 2 (Two) Times a Day.  2     No current facility-administered medications on file prior to visit.          When did you start  taking these medications: 3 YEARS AGO    Which medication are you concerned about: atorvastatin (LIPITOR) 40 MG tablet    Who prescribed you this medication: CYNTHIA ANGELES    What are your concerns: LEG CRAMPS ARE GETTING WORSE PATIENT IS NOW HAVING CRAMPS ALL OVER HER BODY.    How long have you had these concerns: 1 MONTH    PATIENT ALSO WANTS HAS SOME QUESTIONS ABOUT potassium chloride 10 MEQ CR tablet. PATIENT WASN'T TO KNOW IF SHE SHOULD CONTINUE NOT TAKING THEM OR START TAKING THEM AGAIN.

## 2023-01-25 ENCOUNTER — TELEPHONE (OUTPATIENT)
Dept: FAMILY MEDICINE CLINIC | Facility: CLINIC | Age: 58
End: 2023-01-25

## 2023-01-25 NOTE — TELEPHONE ENCOUNTER
Caller: Urvashi Ornelas    Relationship: Self    Best call back number: [phone]       PATIENT IS GOING TO THE ER, VERSUS COMING INTO THE OFFICE TODAY.    SHE WAS SCHEDULED AT 3 TODAY BUT CALLED STATING THE ABDOMINAL PAIN AND BACK PAIN SHE HAS IS TOO MUCH AND SHE WILL CALL US AFTER THIS ER VISIT.

## 2023-01-25 NOTE — TELEPHONE ENCOUNTER
Called Ms. Ornelas and advise her to stop taking Atorvastatin due to severe muscle aches all over her body.  Recommended she call me and let me know when the cramping has gone away and I will start her on Zetia 10mg daily, she agreed with recommendation.

## 2023-01-27 ENCOUNTER — TELEPHONE (OUTPATIENT)
Dept: FAMILY MEDICINE CLINIC | Facility: CLINIC | Age: 58
End: 2023-01-27

## 2023-01-27 NOTE — TELEPHONE ENCOUNTER
Caller: Urvashi Ornelas    Relationship: Self    Best call back number: [phone]    What is the best time to reach you: ANY    Who are you requesting to speak with (clinical staff, provider,  specific staff member): CLINICAL    Do you know the name of the person who called:     What was the call regarding:PATIENT HAS BEEN CONSTIPATED FOR THE LAST 5-6 DAYS. PATIENT HAS TRIED A FEW OVER THE COUNTER MEDICATION AND STILL HAS NOT GONE TO THE RESTROOM. PATIENT JUST WANTS TO KNOW WHAT TO DO.    Do you require a callback: YES

## 2023-01-31 ENCOUNTER — OFFICE VISIT (OUTPATIENT)
Dept: FAMILY MEDICINE CLINIC | Facility: CLINIC | Age: 58
End: 2023-01-31
Payer: MEDICARE

## 2023-01-31 VITALS
WEIGHT: 220.8 LBS | DIASTOLIC BLOOD PRESSURE: 54 MMHG | HEART RATE: 101 BPM | BODY MASS INDEX: 34.65 KG/M2 | OXYGEN SATURATION: 98 % | HEIGHT: 67 IN | SYSTOLIC BLOOD PRESSURE: 110 MMHG

## 2023-01-31 DIAGNOSIS — K59.09 CHRONIC CONSTIPATION: ICD-10-CM

## 2023-01-31 DIAGNOSIS — R31.9 HEMATURIA, UNSPECIFIED TYPE: Primary | ICD-10-CM

## 2023-01-31 LAB
BILIRUB BLD-MCNC: NEGATIVE MG/DL
CLARITY, POC: ABNORMAL
COLOR UR: YELLOW
EXPIRATION DATE: ABNORMAL
GLUCOSE UR STRIP-MCNC: NEGATIVE MG/DL
KETONES UR QL: NEGATIVE
LEUKOCYTE EST, POC: NEGATIVE
Lab: ABNORMAL
NITRITE UR-MCNC: NEGATIVE MG/ML
PH UR: 6 [PH] (ref 5–8)
PROT UR STRIP-MCNC: NEGATIVE MG/DL
RBC # UR STRIP: ABNORMAL /UL
SP GR UR: 1.02 (ref 1–1.03)
UROBILINOGEN UR QL: ABNORMAL

## 2023-01-31 PROCEDURE — 81003 URINALYSIS AUTO W/O SCOPE: CPT | Performed by: NURSE PRACTITIONER

## 2023-01-31 PROCEDURE — 99213 OFFICE O/P EST LOW 20 MIN: CPT | Performed by: NURSE PRACTITIONER

## 2023-01-31 RX ORDER — DOCUSATE SODIUM 100 MG/1
100 CAPSULE, LIQUID FILLED ORAL 2 TIMES DAILY
Qty: 60 CAPSULE | Refills: 0 | Status: SHIPPED | OUTPATIENT
Start: 2023-01-31 | End: 2023-03-23 | Stop reason: SDUPTHER

## 2023-02-05 PROBLEM — R31.9 HEMATURIA: Status: ACTIVE | Noted: 2023-02-05

## 2023-02-05 PROBLEM — K59.00 CONSTIPATION: Status: ACTIVE | Noted: 2022-03-03

## 2023-02-08 ENCOUNTER — TELEPHONE (OUTPATIENT)
Dept: FAMILY MEDICINE CLINIC | Facility: CLINIC | Age: 58
End: 2023-02-08
Payer: MEDICARE

## 2023-02-08 NOTE — TELEPHONE ENCOUNTER
Caller: Urvashi Ornelas    Relationship: Self    Best call back number: [phone]    Who are you requesting to speak with (clinical staff, provider,  specific staff member): CLINICAL STAFF     What was the call regarding: PATIENT WENT TO [facility]  ON Monday FOR CONSTIPATION WAS GIVEN AN ENEMA BUT SAYS SHE STILL FEELS REALLY CONSTIPATED AND HER STOMACH STARTING TO HURT WANTING TO KNOW WHAT SHE CAN DO TO HELP THE MIRLAX AND COLACE ARE NOT WORKING     Do you require a callback: YES

## 2023-02-08 NOTE — TELEPHONE ENCOUNTER
Spoke to patient regarding her constipation.  Recommended taking milk of Magnesia, Doculax 5mg and Fiber tonight after she eats dinner.  If no bowel movement next day by lunch time, take another Doculax 5 mg tablet and when have soft bowel movement, stop Doculax, wait a day or two and start taking a stool softener daily to maintain regular bowel movements.  She refuses to take Miralax.  She reports Miralax causes her soft stools that do not come out of rectum.  She said she would buy all medication over the counter.  She will follow-up in one week to re-assess constipation.

## 2023-02-17 ENCOUNTER — APPOINTMENT (OUTPATIENT)
Dept: CT IMAGING | Facility: HOSPITAL | Age: 58
DRG: 660 | End: 2023-02-17
Payer: MEDICARE

## 2023-02-17 ENCOUNTER — OFFICE VISIT (OUTPATIENT)
Dept: FAMILY MEDICINE CLINIC | Facility: CLINIC | Age: 58
End: 2023-02-17
Payer: MEDICARE

## 2023-02-17 ENCOUNTER — APPOINTMENT (OUTPATIENT)
Dept: GENERAL RADIOLOGY | Facility: HOSPITAL | Age: 58
DRG: 660 | End: 2023-02-17
Payer: MEDICARE

## 2023-02-17 ENCOUNTER — HOSPITAL ENCOUNTER (INPATIENT)
Facility: HOSPITAL | Age: 58
LOS: 1 days | Discharge: HOME | DRG: 660 | End: 2023-02-19
Attending: EMERGENCY MEDICINE | Admitting: INTERNAL MEDICINE
Payer: MEDICARE

## 2023-02-17 VITALS
TEMPERATURE: 98.5 F | WEIGHT: 212.8 LBS | OXYGEN SATURATION: 97 % | HEIGHT: 67 IN | BODY MASS INDEX: 33.4 KG/M2 | DIASTOLIC BLOOD PRESSURE: 78 MMHG | HEART RATE: 118 BPM | SYSTOLIC BLOOD PRESSURE: 124 MMHG

## 2023-02-17 DIAGNOSIS — N17.9 AKI (ACUTE KIDNEY INJURY): ICD-10-CM

## 2023-02-17 DIAGNOSIS — R06.02 SHORTNESS OF BREATH ON EXERTION: ICD-10-CM

## 2023-02-17 DIAGNOSIS — K59.00 CONSTIPATION, UNSPECIFIED CONSTIPATION TYPE: Primary | ICD-10-CM

## 2023-02-17 DIAGNOSIS — R00.0 TACHYCARDIA: ICD-10-CM

## 2023-02-17 DIAGNOSIS — R14.2 BELCHING: ICD-10-CM

## 2023-02-17 DIAGNOSIS — N20.0 KIDNEY STONE: ICD-10-CM

## 2023-02-17 DIAGNOSIS — N12 PYELONEPHRITIS: Primary | ICD-10-CM

## 2023-02-17 DIAGNOSIS — R06.00 DYSPNEA, UNSPECIFIED TYPE: ICD-10-CM

## 2023-02-17 DIAGNOSIS — R05.9 COUGH, UNSPECIFIED TYPE: ICD-10-CM

## 2023-02-17 DIAGNOSIS — R09.89 RALES: ICD-10-CM

## 2023-02-17 PROBLEM — R94.31 ST SEGMENT DEPRESSION: Status: ACTIVE | Noted: 2023-02-17

## 2023-02-17 LAB
ALBUMIN SERPL-MCNC: 3.6 G/DL (ref 3.5–5.2)
ALBUMIN/GLOB SERPL: 0.9 G/DL
ALP SERPL-CCNC: 98 U/L (ref 39–117)
ALT SERPL W P-5'-P-CCNC: 19 U/L (ref 1–33)
ANION GAP SERPL CALCULATED.3IONS-SCNC: 12.5 MMOL/L (ref 5–15)
AST SERPL-CCNC: 21 U/L (ref 1–32)
B PARAPERT DNA SPEC QL NAA+PROBE: NOT DETECTED
B PERT DNA SPEC QL NAA+PROBE: NOT DETECTED
BACTERIA UR QL AUTO: ABNORMAL /HPF
BASOPHILS # BLD AUTO: 0.03 10*3/MM3 (ref 0–0.2)
BASOPHILS NFR BLD AUTO: 0.3 % (ref 0–1.5)
BILIRUB SERPL-MCNC: 0.4 MG/DL (ref 0–1.2)
BILIRUB UR QL STRIP: NEGATIVE
BUN SERPL-MCNC: 10 MG/DL (ref 6–20)
BUN/CREAT SERPL: 6.8 (ref 7–25)
C PNEUM DNA NPH QL NAA+NON-PROBE: NOT DETECTED
CALCIUM SPEC-SCNC: 9.5 MG/DL (ref 8.6–10.5)
CHLORIDE SERPL-SCNC: 99 MMOL/L (ref 98–107)
CLARITY UR: ABNORMAL
CO2 SERPL-SCNC: 20.5 MMOL/L (ref 22–29)
COLOR UR: YELLOW
CREAT SERPL-MCNC: 1.47 MG/DL (ref 0.57–1)
D-LACTATE SERPL-SCNC: 1.1 MMOL/L (ref 0.5–2)
DEPRECATED RDW RBC AUTO: 35.9 FL (ref 37–54)
EGFRCR SERPLBLD CKD-EPI 2021: 42 ML/MIN/1.73
EOSINOPHIL # BLD AUTO: 0.02 10*3/MM3 (ref 0–0.4)
EOSINOPHIL NFR BLD AUTO: 0.2 % (ref 0.3–6.2)
ERYTHROCYTE [DISTWIDTH] IN BLOOD BY AUTOMATED COUNT: 11.4 % (ref 12.3–15.4)
FLUAV SUBTYP SPEC NAA+PROBE: NOT DETECTED
FLUBV RNA ISLT QL NAA+PROBE: NOT DETECTED
GEN 5 2HR TROPONIN T REFLEX: 10 NG/L
GLOBULIN UR ELPH-MCNC: 4.2 GM/DL
GLUCOSE SERPL-MCNC: 133 MG/DL (ref 65–99)
GLUCOSE UR STRIP-MCNC: NEGATIVE MG/DL
HADV DNA SPEC NAA+PROBE: NOT DETECTED
HCOV 229E RNA SPEC QL NAA+PROBE: NOT DETECTED
HCOV HKU1 RNA SPEC QL NAA+PROBE: NOT DETECTED
HCOV NL63 RNA SPEC QL NAA+PROBE: NOT DETECTED
HCOV OC43 RNA SPEC QL NAA+PROBE: NOT DETECTED
HCT VFR BLD AUTO: 40.3 % (ref 34–46.6)
HGB BLD-MCNC: 13.1 G/DL (ref 12–15.9)
HGB UR QL STRIP.AUTO: ABNORMAL
HMPV RNA NPH QL NAA+NON-PROBE: NOT DETECTED
HOLD SPECIMEN: NORMAL
HOLD SPECIMEN: NORMAL
HPIV1 RNA ISLT QL NAA+PROBE: NOT DETECTED
HPIV2 RNA SPEC QL NAA+PROBE: NOT DETECTED
HPIV3 RNA NPH QL NAA+PROBE: NOT DETECTED
HPIV4 P GENE NPH QL NAA+PROBE: NOT DETECTED
HYALINE CASTS UR QL AUTO: ABNORMAL /LPF
IMM GRANULOCYTES # BLD AUTO: 0.03 10*3/MM3 (ref 0–0.05)
IMM GRANULOCYTES NFR BLD AUTO: 0.3 % (ref 0–0.5)
KETONES UR QL STRIP: ABNORMAL
LEUKOCYTE ESTERASE UR QL STRIP.AUTO: ABNORMAL
LYMPHOCYTES # BLD AUTO: 0.85 10*3/MM3 (ref 0.7–3.1)
LYMPHOCYTES NFR BLD AUTO: 9 % (ref 19.6–45.3)
M PNEUMO IGG SER IA-ACNC: NOT DETECTED
MCH RBC QN AUTO: 28.5 PG (ref 26.6–33)
MCHC RBC AUTO-ENTMCNC: 32.5 G/DL (ref 31.5–35.7)
MCV RBC AUTO: 87.8 FL (ref 79–97)
MONOCYTES # BLD AUTO: 1.16 10*3/MM3 (ref 0.1–0.9)
MONOCYTES NFR BLD AUTO: 12.3 % (ref 5–12)
NEUTROPHILS NFR BLD AUTO: 7.32 10*3/MM3 (ref 1.7–7)
NEUTROPHILS NFR BLD AUTO: 77.9 % (ref 42.7–76)
NITRITE UR QL STRIP: NEGATIVE
NRBC BLD AUTO-RTO: 0 /100 WBC (ref 0–0.2)
PH UR STRIP.AUTO: 6.5 [PH] (ref 5–8)
PLATELET # BLD AUTO: 251 10*3/MM3 (ref 140–450)
PMV BLD AUTO: 10.5 FL (ref 6–12)
POTASSIUM SERPL-SCNC: 3.3 MMOL/L (ref 3.5–5.2)
PROCALCITONIN SERPL-MCNC: 0.19 NG/ML (ref 0–0.25)
PROT SERPL-MCNC: 7.8 G/DL (ref 6–8.5)
PROT UR QL STRIP: ABNORMAL
QT INTERVAL: 305 MS
RBC # BLD AUTO: 4.59 10*6/MM3 (ref 3.77–5.28)
RBC # UR STRIP: ABNORMAL /HPF
REF LAB TEST METHOD: ABNORMAL
RHINOVIRUS RNA SPEC NAA+PROBE: NOT DETECTED
RSV RNA NPH QL NAA+NON-PROBE: NOT DETECTED
SARS-COV-2 RNA NPH QL NAA+NON-PROBE: NOT DETECTED
SODIUM SERPL-SCNC: 132 MMOL/L (ref 136–145)
SP GR UR STRIP: 1.01 (ref 1–1.03)
SQUAMOUS #/AREA URNS HPF: ABNORMAL /HPF
TROPONIN T DELTA: 0 NG/L
TROPONIN T SERPL HS-MCNC: 10 NG/L
UROBILINOGEN UR QL STRIP: ABNORMAL
WBC # UR STRIP: ABNORMAL /HPF
WBC NRBC COR # BLD: 9.41 10*3/MM3 (ref 3.4–10.8)
WHOLE BLOOD HOLD COAG: NORMAL
WHOLE BLOOD HOLD SPECIMEN: NORMAL

## 2023-02-17 PROCEDURE — G0378 HOSPITAL OBSERVATION PER HR: HCPCS

## 2023-02-17 PROCEDURE — 0 IOPAMIDOL PER 1 ML: Performed by: EMERGENCY MEDICINE

## 2023-02-17 PROCEDURE — 0202U NFCT DS 22 TRGT SARS-COV-2: CPT | Performed by: EMERGENCY MEDICINE

## 2023-02-17 PROCEDURE — 99213 OFFICE O/P EST LOW 20 MIN: CPT | Performed by: NURSE PRACTITIONER

## 2023-02-17 PROCEDURE — 25010000002 ONDANSETRON PER 1 MG

## 2023-02-17 PROCEDURE — 83605 ASSAY OF LACTIC ACID: CPT | Performed by: EMERGENCY MEDICINE

## 2023-02-17 PROCEDURE — 71046 X-RAY EXAM CHEST 2 VIEWS: CPT

## 2023-02-17 PROCEDURE — 87086 URINE CULTURE/COLONY COUNT: CPT | Performed by: EMERGENCY MEDICINE

## 2023-02-17 PROCEDURE — 93000 ELECTROCARDIOGRAM COMPLETE: CPT | Performed by: NURSE PRACTITIONER

## 2023-02-17 PROCEDURE — 87186 SC STD MICRODIL/AGAR DIL: CPT | Performed by: EMERGENCY MEDICINE

## 2023-02-17 PROCEDURE — 84484 ASSAY OF TROPONIN QUANT: CPT | Performed by: EMERGENCY MEDICINE

## 2023-02-17 PROCEDURE — 81001 URINALYSIS AUTO W/SCOPE: CPT | Performed by: EMERGENCY MEDICINE

## 2023-02-17 PROCEDURE — 71275 CT ANGIOGRAPHY CHEST: CPT

## 2023-02-17 PROCEDURE — 84145 PROCALCITONIN (PCT): CPT | Performed by: EMERGENCY MEDICINE

## 2023-02-17 PROCEDURE — 85025 COMPLETE CBC W/AUTO DIFF WBC: CPT

## 2023-02-17 PROCEDURE — 87077 CULTURE AEROBIC IDENTIFY: CPT | Performed by: EMERGENCY MEDICINE

## 2023-02-17 PROCEDURE — 99284 EMERGENCY DEPT VISIT MOD MDM: CPT

## 2023-02-17 PROCEDURE — 80053 COMPREHEN METABOLIC PANEL: CPT | Performed by: EMERGENCY MEDICINE

## 2023-02-17 PROCEDURE — 36415 COLL VENOUS BLD VENIPUNCTURE: CPT

## 2023-02-17 PROCEDURE — 93005 ELECTROCARDIOGRAM TRACING: CPT

## 2023-02-17 PROCEDURE — 93005 ELECTROCARDIOGRAM TRACING: CPT | Performed by: EMERGENCY MEDICINE

## 2023-02-17 PROCEDURE — 74176 CT ABD & PELVIS W/O CONTRAST: CPT

## 2023-02-17 PROCEDURE — 93010 ELECTROCARDIOGRAM REPORT: CPT | Performed by: INTERNAL MEDICINE

## 2023-02-17 RX ORDER — ONDANSETRON 2 MG/ML
4 INJECTION INTRAMUSCULAR; INTRAVENOUS EVERY 6 HOURS PRN
Status: DISCONTINUED | OUTPATIENT
Start: 2023-02-17 | End: 2023-02-19 | Stop reason: HOSPADM

## 2023-02-17 RX ORDER — SODIUM CHLORIDE 0.9 % (FLUSH) 0.9 %
10 SYRINGE (ML) INJECTION AS NEEDED
Status: DISCONTINUED | OUTPATIENT
Start: 2023-02-17 | End: 2023-02-19 | Stop reason: HOSPADM

## 2023-02-17 RX ORDER — HYDROCODONE BITARTRATE AND ACETAMINOPHEN 5; 325 MG/1; MG/1
1 TABLET ORAL EVERY 6 HOURS PRN
Status: DISCONTINUED | OUTPATIENT
Start: 2023-02-17 | End: 2023-02-19 | Stop reason: HOSPADM

## 2023-02-17 RX ORDER — NITROGLYCERIN 0.4 MG/1
0.4 TABLET SUBLINGUAL
Status: DISCONTINUED | OUTPATIENT
Start: 2023-02-17 | End: 2023-02-19 | Stop reason: HOSPADM

## 2023-02-17 RX ORDER — BUSPIRONE HYDROCHLORIDE 15 MG/1
30 TABLET ORAL 2 TIMES DAILY
Status: DISCONTINUED | OUTPATIENT
Start: 2023-02-17 | End: 2023-02-19 | Stop reason: HOSPADM

## 2023-02-17 RX ORDER — SODIUM CHLORIDE, SODIUM LACTATE, POTASSIUM CHLORIDE, CALCIUM CHLORIDE 600; 310; 30; 20 MG/100ML; MG/100ML; MG/100ML; MG/100ML
100 INJECTION, SOLUTION INTRAVENOUS CONTINUOUS
Status: DISCONTINUED | OUTPATIENT
Start: 2023-02-17 | End: 2023-02-19 | Stop reason: HOSPADM

## 2023-02-17 RX ORDER — SODIUM CHLORIDE 0.9 % (FLUSH) 0.9 %
10 SYRINGE (ML) INJECTION EVERY 12 HOURS SCHEDULED
Status: DISCONTINUED | OUTPATIENT
Start: 2023-02-17 | End: 2023-02-19 | Stop reason: HOSPADM

## 2023-02-17 RX ORDER — SODIUM CHLORIDE 9 MG/ML
40 INJECTION, SOLUTION INTRAVENOUS AS NEEDED
Status: DISCONTINUED | OUTPATIENT
Start: 2023-02-17 | End: 2023-02-19 | Stop reason: HOSPADM

## 2023-02-17 RX ORDER — LEVOTHYROXINE SODIUM 0.1 MG/1
200 TABLET ORAL EVERY MORNING
Status: DISCONTINUED | OUTPATIENT
Start: 2023-02-18 | End: 2023-02-19 | Stop reason: HOSPADM

## 2023-02-17 RX ORDER — BUPROPION HYDROCHLORIDE 300 MG/1
300 TABLET ORAL EVERY MORNING
Status: DISCONTINUED | OUTPATIENT
Start: 2023-02-18 | End: 2023-02-19 | Stop reason: HOSPADM

## 2023-02-17 RX ORDER — ACETAMINOPHEN 325 MG/1
650 TABLET ORAL EVERY 4 HOURS PRN
Status: DISCONTINUED | OUTPATIENT
Start: 2023-02-17 | End: 2023-02-19 | Stop reason: HOSPADM

## 2023-02-17 RX ORDER — TAMSULOSIN HYDROCHLORIDE 0.4 MG/1
0.4 CAPSULE ORAL DAILY
Status: DISCONTINUED | OUTPATIENT
Start: 2023-02-18 | End: 2023-02-19 | Stop reason: HOSPADM

## 2023-02-17 RX ORDER — ONDANSETRON 4 MG/1
4 TABLET, FILM COATED ORAL EVERY 6 HOURS PRN
Status: DISCONTINUED | OUTPATIENT
Start: 2023-02-17 | End: 2023-02-19 | Stop reason: HOSPADM

## 2023-02-17 RX ORDER — TOPIRAMATE 100 MG/1
200 TABLET, FILM COATED ORAL 2 TIMES DAILY
Status: DISCONTINUED | OUTPATIENT
Start: 2023-02-17 | End: 2023-02-19 | Stop reason: HOSPADM

## 2023-02-17 RX ORDER — ASPIRIN 325 MG
325 TABLET ORAL ONCE
Status: COMPLETED | OUTPATIENT
Start: 2023-02-17 | End: 2023-02-17

## 2023-02-17 RX ORDER — ACETAMINOPHEN 500 MG
1000 TABLET ORAL ONCE
Status: COMPLETED | OUTPATIENT
Start: 2023-02-17 | End: 2023-02-17

## 2023-02-17 RX ADMIN — SODIUM CHLORIDE, POTASSIUM CHLORIDE, SODIUM LACTATE AND CALCIUM CHLORIDE 100 ML/HR: 600; 310; 30; 20 INJECTION, SOLUTION INTRAVENOUS at 21:50

## 2023-02-17 RX ADMIN — Medication 10 ML: at 21:25

## 2023-02-17 RX ADMIN — TOPIRAMATE 200 MG: 100 TABLET, FILM COATED ORAL at 23:15

## 2023-02-17 RX ADMIN — IOPAMIDOL 95 ML: 755 INJECTION, SOLUTION INTRAVENOUS at 16:51

## 2023-02-17 RX ADMIN — ACETAMINOPHEN 650 MG: 325 TABLET, FILM COATED ORAL at 23:15

## 2023-02-17 RX ADMIN — SODIUM CHLORIDE, POTASSIUM CHLORIDE, SODIUM LACTATE AND CALCIUM CHLORIDE 1000 ML: 600; 310; 30; 20 INJECTION, SOLUTION INTRAVENOUS at 16:12

## 2023-02-17 RX ADMIN — ONDANSETRON 4 MG: 2 INJECTION INTRAMUSCULAR; INTRAVENOUS at 20:56

## 2023-02-17 RX ADMIN — ASPIRIN 325 MG: 325 TABLET ORAL at 16:14

## 2023-02-17 RX ADMIN — ACETAMINOPHEN 1000 MG: 500 TABLET ORAL at 16:14

## 2023-02-17 NOTE — PROGRESS NOTES
"Chief Complaint  Constipation    Subjective          Urvashi Ornelas presents to [facility]  History of Present Illness  Urvashi Ornelas is a 57 year old female her to follow-up on constipation.  She reports after Dulcolax, Milk of Magnesia and Metamucill she is now having consistent daily soft formed brown stool.  Denies problems with constipation today.    She also reports belching a lot more than usual.  Recommended she not use a straw when drinking, minimize gum chewing, slow down pace of eating and when put fork to mouth try not to suck in air when putting food into mouth and patient agreed.    She said she went to Urology on Wednesday and was told she still has blood in her urine.  Urology referred her to Dr. Perez, to evaluate her further for blood in urine.    She reports feeling itchiness inside her chest that she cannot itch.  When she takes a deep breath in she said it makes her cough.  She denies SOB, chest pain, heart palpitations, fever, chills, PND, eye/ear pain/drainage, congestion, runny nose, and sneezing.  She reports sometimes gets SOB when she walks a long distance.      History of PE in right lung in 2004, was on blood thinner then taken off blood thinner at some point.  She also has history of mass (4in x 4in per pt measure) in right lung in 2010 found on CT scan, mass on right lower lobe was removed.          Review of Systems   Constitutional: Negative for chills and fever.   HENT: Negative for congestion, ear discharge, ear pain, postnasal drip, rhinorrhea, sinus pressure, sinus pain, sneezing and sore throat.    Eyes: Negative for pain and discharge.   Respiratory: Positive for cough and shortness of breath. Negative for wheezing.         When take deep breath makes her cough.  Chest feels like deep itch inside chest.  SOB when walking long distance.   Cardiovascular: Negative for chest pain, palpitations and leg swelling.        Today, EKG showed \"extensive ST " "changes may be due to MI\"   Gastrointestinal: Negative for constipation.   Neurological: Negative for dizziness, syncope and light-headedness.         Objective   Vital Signs:   /78 (BP Location: Right arm)   Pulse 118   Temp 98.5 °F (36.9 °C) (Oral)   Ht 170.2 cm (67\")   Wt 96.5 kg (212 lb 12.8 oz)   SpO2 97%   BMI 33.33 kg/m²     Physical Exam  Vitals and nursing note reviewed.   Constitutional:       General: She is not in acute distress.     Appearance: Normal appearance. She is not ill-appearing, toxic-appearing or diaphoretic.   HENT:      Head: Normocephalic and atraumatic.   Eyes:      Extraocular Movements: Extraocular movements intact.      Conjunctiva/sclera: Conjunctivae normal.      Pupils: Pupils are equal, round, and reactive to light.   Cardiovascular:      Rate and Rhythm: Regular rhythm. Tachycardia present.      Heart sounds: Normal heart sounds. No murmur heard.     Comments: -122 today.  Pulmonary:      Effort: Pulmonary effort is normal. No respiratory distress.      Breath sounds: Rales present. No wheezing or rhonchi.      Comments: Decreased air movement and faint rales in RLL  Chest:      Chest wall: No tenderness.   Skin:     General: Skin is warm and dry.   Neurological:      Mental Status: She is alert.   Psychiatric:         Mood and Affect: Mood normal.        Result Review :     CMP    CMP 2/17/23 2/18/23 2/19/23   Glucose 133 (A) 120 (A) 107 (A)   BUN 10 10 9   Creatinine 1.47 (A) 1.31 (A) 1.21 (A)   eGFR 42.0 (A) 48.2 (A) 53.0 (A)   Sodium 132 (A) 139 136   Potassium 3.3 (A) 3.1 (A) 4.2   Chloride 99 107 111 (A)   Calcium 9.5 8.3 (A) 9.0   Total Protein 7.8     Albumin 3.6     Globulin 4.2     Total Bilirubin 0.4     Alkaline Phosphatase 98     AST (SGOT) 21     ALT (SGPT) 19     Albumin/Globulin Ratio 0.9     BUN/Creatinine Ratio 6.8 (A) 7.6 7.4   Anion Gap 12.5 10.0 6.3   (A) Abnormal value            Data reviewed: Consultant notes Urology 2/15/23     ECG 12 " Lead    Date/Time: 2/17/2023 2:14 PM  Performed by: Meggan Washington APRN  Authorized by: Meggan Washington APRN   Comparison: compared with previous ECG from 7/13/2020  Comparison to previous ECG: Prior ekg showed NSR compared to today showing extensive ST-T depression changes  Rate: tachycardic  ST Depression: V1, V2, V3, V4, V5, V6, II and I  T inversion: V1, V2, V3, V4, V5 and V6    Clinical impression: myocardial ischemia              Assessment and Plan    Diagnoses and all orders for this visit:    1. Constipation, unspecified constipation type (Primary)  Assessment & Plan:  Patient confirmed resolved, stool soft, brown and formed.  Discussed importance of drinking more water and minimizing soda and juice.  Discussed taking stool softener, as needed, for constipation.         2. Tachycardia  Comments:  Tachycardia,  to 122 today.  A-febrile.  EKG result = ST-T depression.  Patient sent to the ED today.  Orders:  -     ECG 12 Lead    3. Shortness of breath on exertion  Assessment & Plan:  C/O shortness of breath on exertion only.  O2sat 97% today.  Denies SOB today.  Discussed ordering Chest X-ray, but patient went to the ED due to Tachycardia today.      4. Rales  Comments:  Right lower lobe rales with decreased air movement.  Discussed ordering Chest X-ray but patient went to ED due to Tachycardia and EKG ST-T changes.      5. Cough, unspecified type  Comments:  C/O cough only when taking deep breath.      6. Belching  Assessment & Plan:  C/O belching a lot more than usual.  Recommended she not use a straw when drinking, minimize gum chewing, slow down pace of eating and when put fork to mouth try not to suck in air when putting food into mouth and patient agreed.        Follow Up   Return Patient sent to ED due to Tachycardia and EKG ST-T changes..  Patient was given instructions and counseling regarding her condition or for health maintenance advice. Please see specific information pulled into the  AVS if appropriate.

## 2023-02-18 ENCOUNTER — ANESTHESIA (OUTPATIENT)
Dept: PERIOP | Facility: HOSPITAL | Age: 58
DRG: 660 | End: 2023-02-18
Payer: MEDICARE

## 2023-02-18 ENCOUNTER — APPOINTMENT (OUTPATIENT)
Dept: GENERAL RADIOLOGY | Facility: HOSPITAL | Age: 58
DRG: 660 | End: 2023-02-18
Payer: MEDICARE

## 2023-02-18 ENCOUNTER — ANESTHESIA EVENT (OUTPATIENT)
Dept: PERIOP | Facility: HOSPITAL | Age: 58
DRG: 660 | End: 2023-02-18
Payer: MEDICARE

## 2023-02-18 PROBLEM — N12 PYELONEPHRITIS: Status: RESOLVED | Noted: 2023-02-18 | Resolved: 2023-02-18

## 2023-02-18 PROBLEM — N20.1 RIGHT URETERAL STONE: Status: ACTIVE | Noted: 2023-02-18

## 2023-02-18 PROBLEM — N12 PYELONEPHRITIS: Status: ACTIVE | Noted: 2023-02-18

## 2023-02-18 LAB
ANION GAP SERPL CALCULATED.3IONS-SCNC: 10 MMOL/L (ref 5–15)
BUN SERPL-MCNC: 10 MG/DL (ref 6–20)
BUN/CREAT SERPL: 7.6 (ref 7–25)
CALCIUM SPEC-SCNC: 8.3 MG/DL (ref 8.6–10.5)
CHLORIDE SERPL-SCNC: 107 MMOL/L (ref 98–107)
CO2 SERPL-SCNC: 22 MMOL/L (ref 22–29)
CREAT SERPL-MCNC: 1.31 MG/DL (ref 0.57–1)
DEPRECATED RDW RBC AUTO: 36.5 FL (ref 37–54)
EGFRCR SERPLBLD CKD-EPI 2021: 48.2 ML/MIN/1.73
ERYTHROCYTE [DISTWIDTH] IN BLOOD BY AUTOMATED COUNT: 11.5 % (ref 12.3–15.4)
GLUCOSE BLDC GLUCOMTR-MCNC: 88 MG/DL (ref 70–130)
GLUCOSE SERPL-MCNC: 120 MG/DL (ref 65–99)
HCT VFR BLD AUTO: 32.8 % (ref 34–46.6)
HGB BLD-MCNC: 11 G/DL (ref 12–15.9)
MCH RBC QN AUTO: 29.8 PG (ref 26.6–33)
MCHC RBC AUTO-ENTMCNC: 33.5 G/DL (ref 31.5–35.7)
MCV RBC AUTO: 88.9 FL (ref 79–97)
PLATELET # BLD AUTO: 201 10*3/MM3 (ref 140–450)
PMV BLD AUTO: 10.5 FL (ref 6–12)
POTASSIUM SERPL-SCNC: 3.1 MMOL/L (ref 3.5–5.2)
RBC # BLD AUTO: 3.69 10*6/MM3 (ref 3.77–5.28)
SODIUM SERPL-SCNC: 139 MMOL/L (ref 136–145)
WBC NRBC COR # BLD: 11.43 10*3/MM3 (ref 3.4–10.8)

## 2023-02-18 PROCEDURE — 25010000002 ONDANSETRON PER 1 MG

## 2023-02-18 PROCEDURE — 25010000002 FENTANYL CITRATE (PF) 50 MCG/ML SOLUTION: Performed by: ANESTHESIOLOGY

## 2023-02-18 PROCEDURE — 25010000002 ONDANSETRON PER 1 MG: Performed by: UROLOGY

## 2023-02-18 PROCEDURE — 63710000001 ONDANSETRON PER 8 MG

## 2023-02-18 PROCEDURE — 80048 BASIC METABOLIC PNL TOTAL CA: CPT

## 2023-02-18 PROCEDURE — C1769 GUIDE WIRE: HCPCS | Performed by: UROLOGY

## 2023-02-18 PROCEDURE — 25010000002 CEFTRIAXONE PER 250 MG: Performed by: UROLOGY

## 2023-02-18 PROCEDURE — 25010000002 PROPOFOL 10 MG/ML EMULSION: Performed by: ANESTHESIOLOGY

## 2023-02-18 PROCEDURE — 0T768DZ DILATION OF RIGHT URETER WITH INTRALUMINAL DEVICE, VIA NATURAL OR ARTIFICIAL OPENING ENDOSCOPIC: ICD-10-PCS | Performed by: UROLOGY

## 2023-02-18 PROCEDURE — 74018 RADEX ABDOMEN 1 VIEW: CPT

## 2023-02-18 PROCEDURE — C1758 CATHETER, URETERAL: HCPCS | Performed by: UROLOGY

## 2023-02-18 PROCEDURE — C2617 STENT, NON-COR, TEM W/O DEL: HCPCS | Performed by: UROLOGY

## 2023-02-18 PROCEDURE — 85027 COMPLETE CBC AUTOMATED: CPT

## 2023-02-18 PROCEDURE — 82962 GLUCOSE BLOOD TEST: CPT

## 2023-02-18 DEVICE — URETERAL STENT
Type: IMPLANTABLE DEVICE | Site: URETER | Status: FUNCTIONAL
Brand: CONTOUR™

## 2023-02-18 RX ORDER — AMOXICILLIN 250 MG
2 CAPSULE ORAL NIGHTLY
Status: DISCONTINUED | OUTPATIENT
Start: 2023-02-18 | End: 2023-02-19 | Stop reason: HOSPADM

## 2023-02-18 RX ORDER — POTASSIUM CHLORIDE 7.45 MG/ML
10 INJECTION INTRAVENOUS
Status: DISCONTINUED | OUTPATIENT
Start: 2023-02-18 | End: 2023-02-19 | Stop reason: HOSPADM

## 2023-02-18 RX ORDER — SODIUM CHLORIDE 0.9 % (FLUSH) 0.9 %
3-10 SYRINGE (ML) INJECTION AS NEEDED
Status: DISCONTINUED | OUTPATIENT
Start: 2023-02-18 | End: 2023-02-18 | Stop reason: HOSPADM

## 2023-02-18 RX ORDER — HYDROCODONE BITARTRATE AND ACETAMINOPHEN 7.5; 325 MG/1; MG/1
1 TABLET ORAL ONCE AS NEEDED
Status: DISCONTINUED | OUTPATIENT
Start: 2023-02-18 | End: 2023-02-18 | Stop reason: HOSPADM

## 2023-02-18 RX ORDER — FAMOTIDINE 10 MG/ML
20 INJECTION, SOLUTION INTRAVENOUS ONCE
Status: COMPLETED | OUTPATIENT
Start: 2023-02-18 | End: 2023-02-18

## 2023-02-18 RX ORDER — LABETALOL HYDROCHLORIDE 5 MG/ML
5 INJECTION, SOLUTION INTRAVENOUS
Status: DISCONTINUED | OUTPATIENT
Start: 2023-02-18 | End: 2023-02-18 | Stop reason: HOSPADM

## 2023-02-18 RX ORDER — SODIUM CHLORIDE 0.9 % (FLUSH) 0.9 %
3 SYRINGE (ML) INJECTION EVERY 12 HOURS SCHEDULED
Status: DISCONTINUED | OUTPATIENT
Start: 2023-02-18 | End: 2023-02-18 | Stop reason: HOSPADM

## 2023-02-18 RX ORDER — LIDOCAINE HYDROCHLORIDE 10 MG/ML
0.5 INJECTION, SOLUTION EPIDURAL; INFILTRATION; INTRACAUDAL; PERINEURAL ONCE AS NEEDED
Status: DISCONTINUED | OUTPATIENT
Start: 2023-02-18 | End: 2023-02-18 | Stop reason: HOSPADM

## 2023-02-18 RX ORDER — MIDAZOLAM HYDROCHLORIDE 1 MG/ML
1 INJECTION INTRAMUSCULAR; INTRAVENOUS
Status: DISCONTINUED | OUTPATIENT
Start: 2023-02-18 | End: 2023-02-18 | Stop reason: HOSPADM

## 2023-02-18 RX ORDER — HYDRALAZINE HYDROCHLORIDE 20 MG/ML
5 INJECTION INTRAMUSCULAR; INTRAVENOUS
Status: DISCONTINUED | OUTPATIENT
Start: 2023-02-18 | End: 2023-02-18 | Stop reason: HOSPADM

## 2023-02-18 RX ORDER — HYDROMORPHONE HYDROCHLORIDE 1 MG/ML
0.5 INJECTION, SOLUTION INTRAMUSCULAR; INTRAVENOUS; SUBCUTANEOUS
Status: DISCONTINUED | OUTPATIENT
Start: 2023-02-18 | End: 2023-02-18 | Stop reason: HOSPADM

## 2023-02-18 RX ORDER — SODIUM CHLORIDE, SODIUM LACTATE, POTASSIUM CHLORIDE, CALCIUM CHLORIDE 600; 310; 30; 20 MG/100ML; MG/100ML; MG/100ML; MG/100ML
9 INJECTION, SOLUTION INTRAVENOUS CONTINUOUS
Status: DISCONTINUED | OUTPATIENT
Start: 2023-02-18 | End: 2023-02-19 | Stop reason: HOSPADM

## 2023-02-18 RX ORDER — DIPHENHYDRAMINE HCL 25 MG
25 CAPSULE ORAL
Status: DISCONTINUED | OUTPATIENT
Start: 2023-02-18 | End: 2023-02-18 | Stop reason: HOSPADM

## 2023-02-18 RX ORDER — NALOXONE HCL 0.4 MG/ML
0.2 VIAL (ML) INJECTION AS NEEDED
Status: DISCONTINUED | OUTPATIENT
Start: 2023-02-18 | End: 2023-02-18 | Stop reason: HOSPADM

## 2023-02-18 RX ORDER — FLUMAZENIL 0.1 MG/ML
0.2 INJECTION INTRAVENOUS AS NEEDED
Status: DISCONTINUED | OUTPATIENT
Start: 2023-02-18 | End: 2023-02-18 | Stop reason: HOSPADM

## 2023-02-18 RX ORDER — FENTANYL CITRATE 50 UG/ML
50 INJECTION, SOLUTION INTRAMUSCULAR; INTRAVENOUS
Status: DISCONTINUED | OUTPATIENT
Start: 2023-02-18 | End: 2023-02-18 | Stop reason: HOSPADM

## 2023-02-18 RX ORDER — ONDANSETRON 2 MG/ML
4 INJECTION INTRAMUSCULAR; INTRAVENOUS ONCE AS NEEDED
Status: DISCONTINUED | OUTPATIENT
Start: 2023-02-18 | End: 2023-02-18 | Stop reason: HOSPADM

## 2023-02-18 RX ORDER — POTASSIUM CHLORIDE 750 MG/1
40 TABLET, FILM COATED, EXTENDED RELEASE ORAL AS NEEDED
Status: DISCONTINUED | OUTPATIENT
Start: 2023-02-18 | End: 2023-02-19 | Stop reason: HOSPADM

## 2023-02-18 RX ORDER — POTASSIUM CHLORIDE 1.5 G/1.77G
40 POWDER, FOR SOLUTION ORAL AS NEEDED
Status: DISCONTINUED | OUTPATIENT
Start: 2023-02-18 | End: 2023-02-19 | Stop reason: HOSPADM

## 2023-02-18 RX ORDER — PROMETHAZINE HYDROCHLORIDE 25 MG/1
25 SUPPOSITORY RECTAL ONCE AS NEEDED
Status: DISCONTINUED | OUTPATIENT
Start: 2023-02-18 | End: 2023-02-18 | Stop reason: HOSPADM

## 2023-02-18 RX ORDER — PROMETHAZINE HYDROCHLORIDE 25 MG/1
25 TABLET ORAL ONCE AS NEEDED
Status: DISCONTINUED | OUTPATIENT
Start: 2023-02-18 | End: 2023-02-18 | Stop reason: HOSPADM

## 2023-02-18 RX ORDER — PROPOFOL 10 MG/ML
VIAL (ML) INTRAVENOUS AS NEEDED
Status: DISCONTINUED | OUTPATIENT
Start: 2023-02-18 | End: 2023-02-18 | Stop reason: SURG

## 2023-02-18 RX ORDER — DIPHENHYDRAMINE HYDROCHLORIDE 50 MG/ML
12.5 INJECTION INTRAMUSCULAR; INTRAVENOUS
Status: DISCONTINUED | OUTPATIENT
Start: 2023-02-18 | End: 2023-02-18 | Stop reason: HOSPADM

## 2023-02-18 RX ORDER — MAGNESIUM HYDROXIDE 1200 MG/15ML
LIQUID ORAL AS NEEDED
Status: DISCONTINUED | OUTPATIENT
Start: 2023-02-18 | End: 2023-02-18 | Stop reason: HOSPADM

## 2023-02-18 RX ORDER — OXYCODONE AND ACETAMINOPHEN 7.5; 325 MG/1; MG/1
1 TABLET ORAL EVERY 4 HOURS PRN
Status: DISCONTINUED | OUTPATIENT
Start: 2023-02-18 | End: 2023-02-18 | Stop reason: HOSPADM

## 2023-02-18 RX ORDER — EPHEDRINE SULFATE 50 MG/ML
5 INJECTION, SOLUTION INTRAVENOUS ONCE AS NEEDED
Status: DISCONTINUED | OUTPATIENT
Start: 2023-02-18 | End: 2023-02-18 | Stop reason: HOSPADM

## 2023-02-18 RX ADMIN — ACETAMINOPHEN 650 MG: 325 TABLET, FILM COATED ORAL at 23:25

## 2023-02-18 RX ADMIN — TOPIRAMATE 200 MG: 100 TABLET, FILM COATED ORAL at 08:04

## 2023-02-18 RX ADMIN — BUPROPION HYDROCHLORIDE 300 MG: 300 TABLET, EXTENDED RELEASE ORAL at 08:04

## 2023-02-18 RX ADMIN — TOPIRAMATE 200 MG: 100 TABLET, FILM COATED ORAL at 21:00

## 2023-02-18 RX ADMIN — ONDANSETRON 4 MG: 2 INJECTION INTRAMUSCULAR; INTRAVENOUS at 18:49

## 2023-02-18 RX ADMIN — ACETAMINOPHEN 650 MG: 325 TABLET, FILM COATED ORAL at 08:49

## 2023-02-18 RX ADMIN — LEVOTHYROXINE SODIUM 200 MCG: 0.11 TABLET ORAL at 08:04

## 2023-02-18 RX ADMIN — SODIUM CHLORIDE, POTASSIUM CHLORIDE, SODIUM LACTATE AND CALCIUM CHLORIDE 9 ML/HR: 600; 310; 30; 20 INJECTION, SOLUTION INTRAVENOUS at 12:20

## 2023-02-18 RX ADMIN — POTASSIUM CHLORIDE 40 MEQ: 750 TABLET, EXTENDED RELEASE ORAL at 19:58

## 2023-02-18 RX ADMIN — ONDANSETRON 4 MG: 2 INJECTION INTRAMUSCULAR; INTRAVENOUS at 13:23

## 2023-02-18 RX ADMIN — TAMSULOSIN HYDROCHLORIDE 0.4 MG: 0.4 CAPSULE ORAL at 08:04

## 2023-02-18 RX ADMIN — FENTANYL CITRATE 100 MCG: 50 INJECTION, SOLUTION INTRAMUSCULAR; INTRAVENOUS at 13:21

## 2023-02-18 RX ADMIN — ACETAMINOPHEN 650 MG: 325 TABLET, FILM COATED ORAL at 14:02

## 2023-02-18 RX ADMIN — POTASSIUM CHLORIDE 40 MEQ: 750 TABLET, EXTENDED RELEASE ORAL at 05:41

## 2023-02-18 RX ADMIN — POTASSIUM CHLORIDE 40 MEQ: 750 TABLET, EXTENDED RELEASE ORAL at 16:16

## 2023-02-18 RX ADMIN — SODIUM CHLORIDE, POTASSIUM CHLORIDE, SODIUM LACTATE AND CALCIUM CHLORIDE 100 ML/HR: 600; 310; 30; 20 INJECTION, SOLUTION INTRAVENOUS at 21:05

## 2023-02-18 RX ADMIN — POTASSIUM CHLORIDE 40 MEQ: 750 TABLET, EXTENDED RELEASE ORAL at 08:49

## 2023-02-18 RX ADMIN — CEFTRIAXONE 2 G: 2 INJECTION, POWDER, FOR SOLUTION INTRAMUSCULAR; INTRAVENOUS at 19:53

## 2023-02-18 RX ADMIN — ACETAMINOPHEN 650 MG: 325 TABLET, FILM COATED ORAL at 18:49

## 2023-02-18 RX ADMIN — PROPOFOL 150 MG: 10 INJECTION, EMULSION INTRAVENOUS at 13:14

## 2023-02-18 RX ADMIN — ONDANSETRON HYDROCHLORIDE 4 MG: 4 TABLET, FILM COATED ORAL at 08:48

## 2023-02-18 RX ADMIN — ONDANSETRON 4 MG: 2 INJECTION INTRAMUSCULAR; INTRAVENOUS at 02:45

## 2023-02-18 RX ADMIN — FAMOTIDINE 20 MG: 10 INJECTION INTRAVENOUS at 12:20

## 2023-02-18 NOTE — ANESTHESIA PROCEDURE NOTES
Airway  Urgency: emergent    Date/Time: 2/18/2023 1:10 PM  End Time:2/18/2023 1:14 PM    General Information and Staff    Patient location during procedure: OR  Anesthesiologist: Pepito Olivia MD    Consent for Airway (if performed for an anesthetic, see related documentation for consents)  Patient identity confirmed: verbally with patient  Consent: No emergent situation. Verbal consent obtained.  Consent given by: patient      Indications and Patient Condition  Indications for airway management: airway protection    Preoxygenated: yes  MILS maintained throughout  Mask difficulty assessment: 1 - vent by mask    Final Airway Details  Final airway type: supraglottic airway      Successful airway: classic  Size 4     Number of attempts at approach: 1

## 2023-02-18 NOTE — ANESTHESIA PREPROCEDURE EVALUATION
Anesthesia Evaluation     Patient summary reviewed and Nursing notes reviewed                Airway   Mallampati: II  TM distance: >3 FB  Neck ROM: full  Dental      Pulmonary    (+) pneumonia , pleural effusion, a smoker Former, asthma,shortness of breath, sleep apnea,   Cardiovascular     ECG reviewed  Rhythm: regular  Rate: normal    (+) hypertension, DVT, hyperlipidemia,       Neuro/Psych  (+) headaches, dizziness/light headedness, numbness, psychiatric history Anxiety and Depression,    GI/Hepatic/Renal/Endo    (+) morbid obesity, GI bleeding , liver disease fatty liver disease, renal disease stones, diabetes mellitus type 2, thyroid problem hypothyroidism    Musculoskeletal     Abdominal    Substance History - negative use     OB/GYN negative ob/gyn ROS         Other   arthritis,    history of cancer                  Anesthesia Plan    ASA 3 - emergent     general     (I have reviewed the patient's history with the patient and the chart, including all pertinent laboratory results and imaging. I have explained the risks of anesthesia including but not limited to dental damage, corneal abrasion, nerve injury, MI, stroke, and death. Questions asked and answered. Anesthetic plan discussed with patient and team as indicated. Patient expressed understanding of the above.  )  intravenous induction     Anesthetic plan, risks, benefits, and alternatives have been provided, discussed and informed consent has been obtained with: patient.        CODE STATUS:    Code Status (Patient has no pulse and is not breathing): CPR (Attempt to Resuscitate)  Medical Interventions (Patient has pulse or is breathing): Full Support

## 2023-02-18 NOTE — ANESTHESIA POSTPROCEDURE EVALUATION
Patient: Urvashi Ornelas    Procedure Summary     Date: 02/18/23 Room / Location: [facility] OR 01 / [facility] MAIN OR    Anesthesia Start: 1309 Anesthesia Stop: 1344    Procedure: CYSTOSCOPY, RIGHT STONE MANIPIULATION, RIGHT URETERAL STENT INSERTION (Right) Diagnosis:     Surgeons: Pepito Davidson MD Provider: Pepito Olivia MD    Anesthesia Type: general ASA Status: 3 - Emergent          Anesthesia Type: general    Vitals  Vitals Value Taken Time   /73 02/18/23 1351   Temp 37.1 °C (98.8 °F) 02/18/23 1339   Pulse 86 02/18/23 1353   Resp 16 02/18/23 1345   SpO2 98 % 02/18/23 1353   Vitals shown include unvalidated device data.        Post Anesthesia Care and Evaluation    Patient location during evaluation: PACU  Patient participation: complete - patient participated  Level of consciousness: awake and alert  Pain management: adequate    Airway patency: patent  Anesthetic complications: No anesthetic complications    Cardiovascular status: acceptable  Respiratory status: acceptable  Hydration status: acceptable    Comments: --------------------            02/18/23               1345     --------------------   BP:       105/72     Pulse:      89       Resp:       16       Temp:                SpO2:      100%     --------------------

## 2023-02-19 ENCOUNTER — READMISSION MANAGEMENT (OUTPATIENT)
Dept: CALL CENTER | Facility: HOSPITAL | Age: 58
End: 2023-02-19
Payer: MEDICARE

## 2023-02-19 VITALS
RESPIRATION RATE: 16 BRPM | HEART RATE: 83 BPM | BODY MASS INDEX: 33.43 KG/M2 | WEIGHT: 213 LBS | OXYGEN SATURATION: 95 % | TEMPERATURE: 97.9 F | SYSTOLIC BLOOD PRESSURE: 106 MMHG | HEIGHT: 67 IN | DIASTOLIC BLOOD PRESSURE: 70 MMHG

## 2023-02-19 PROBLEM — R94.31 ST SEGMENT DEPRESSION: Status: RESOLVED | Noted: 2023-02-17 | Resolved: 2023-02-19

## 2023-02-19 PROBLEM — R14.2 BELCHING: Status: ACTIVE | Noted: 2023-02-19

## 2023-02-19 LAB
ANION GAP SERPL CALCULATED.3IONS-SCNC: 6.3 MMOL/L (ref 5–15)
BACTERIA SPEC AEROBE CULT: ABNORMAL
BUN SERPL-MCNC: 9 MG/DL (ref 6–20)
BUN/CREAT SERPL: 7.4 (ref 7–25)
CALCIUM SPEC-SCNC: 9 MG/DL (ref 8.6–10.5)
CHLORIDE SERPL-SCNC: 111 MMOL/L (ref 98–107)
CO2 SERPL-SCNC: 18.7 MMOL/L (ref 22–29)
CREAT SERPL-MCNC: 1.21 MG/DL (ref 0.57–1)
DEPRECATED RDW RBC AUTO: 36 FL (ref 37–54)
EGFRCR SERPLBLD CKD-EPI 2021: 53 ML/MIN/1.73
ERYTHROCYTE [DISTWIDTH] IN BLOOD BY AUTOMATED COUNT: 11.5 % (ref 12.3–15.4)
GLUCOSE SERPL-MCNC: 107 MG/DL (ref 65–99)
HCT VFR BLD AUTO: 32.1 % (ref 34–46.6)
HGB BLD-MCNC: 10.6 G/DL (ref 12–15.9)
MAGNESIUM SERPL-MCNC: 1.9 MG/DL (ref 1.6–2.6)
MCH RBC QN AUTO: 29.1 PG (ref 26.6–33)
MCHC RBC AUTO-ENTMCNC: 33 G/DL (ref 31.5–35.7)
MCV RBC AUTO: 88.2 FL (ref 79–97)
PLATELET # BLD AUTO: 233 10*3/MM3 (ref 140–450)
PMV BLD AUTO: 10.5 FL (ref 6–12)
POTASSIUM SERPL-SCNC: 4.2 MMOL/L (ref 3.5–5.2)
RBC # BLD AUTO: 3.64 10*6/MM3 (ref 3.77–5.28)
SODIUM SERPL-SCNC: 136 MMOL/L (ref 136–145)
WBC NRBC COR # BLD: 9.24 10*3/MM3 (ref 3.4–10.8)

## 2023-02-19 PROCEDURE — 80048 BASIC METABOLIC PNL TOTAL CA: CPT | Performed by: UROLOGY

## 2023-02-19 PROCEDURE — 83735 ASSAY OF MAGNESIUM: CPT | Performed by: INTERNAL MEDICINE

## 2023-02-19 PROCEDURE — 85027 COMPLETE CBC AUTOMATED: CPT | Performed by: UROLOGY

## 2023-02-19 RX ORDER — CEPHALEXIN 500 MG/1
500 CAPSULE ORAL 3 TIMES DAILY
Qty: 30 CAPSULE | Refills: 0 | Status: SHIPPED | OUTPATIENT
Start: 2023-02-19 | End: 2023-03-01

## 2023-02-19 RX ADMIN — LEVOTHYROXINE SODIUM 200 MCG: 0.11 TABLET ORAL at 06:22

## 2023-02-19 RX ADMIN — Medication 10 ML: at 09:00

## 2023-02-19 RX ADMIN — TAMSULOSIN HYDROCHLORIDE 0.4 MG: 0.4 CAPSULE ORAL at 08:59

## 2023-02-19 RX ADMIN — POTASSIUM CHLORIDE 40 MEQ: 750 TABLET, EXTENDED RELEASE ORAL at 00:23

## 2023-02-19 RX ADMIN — BUPROPION HYDROCHLORIDE 300 MG: 300 TABLET, EXTENDED RELEASE ORAL at 06:21

## 2023-02-19 RX ADMIN — SODIUM CHLORIDE, POTASSIUM CHLORIDE, SODIUM LACTATE AND CALCIUM CHLORIDE 100 ML/HR: 600; 310; 30; 20 INJECTION, SOLUTION INTRAVENOUS at 07:38

## 2023-02-19 RX ADMIN — TOPIRAMATE 200 MG: 100 TABLET, FILM COATED ORAL at 09:00

## 2023-02-19 NOTE — OUTREACH NOTE
Prep Survey    Flowsheet Row Responses   [facility] patient discharged from? [place]   Is LACE score < 7 ? No   Eligibility [facility]   Date of Admission 02/17/23   Date of Discharge 02/19/23   Discharge Disposition Home or Self Care   Discharge diagnosis Pyelonephritis of right kidney,    right ureteral stent placement    Does the patient have one of the following disease processes/diagnoses(primary or secondary)? Other   Does the patient have Home health ordered? No   Is there a DME ordered? No   Prep survey completed? Yes          Olive FLORIAN - Registered Nurse

## 2023-02-19 NOTE — ASSESSMENT & PLAN NOTE
Patient confirmed resolved, stool soft, brown and formed.  Discussed importance of drinking more water and minimizing soda and juice.  Discussed taking stool softener, as needed, for constipation.

## 2023-02-19 NOTE — ASSESSMENT & PLAN NOTE
C/O belching a lot more than usual.  Recommended she not use a straw when drinking, minimize gum chewing, slow down pace of eating and when put fork to mouth try not to suck in air when putting food into mouth and patient agreed.

## 2023-02-19 NOTE — ASSESSMENT & PLAN NOTE
C/O shortness of breath on exertion only.  O2sat 97% today.  Denies SOB today.  Discussed ordering Chest X-ray, but patient went to the ED due to Tachycardia today.

## 2023-02-20 ENCOUNTER — TRANSITIONAL CARE MANAGEMENT TELEPHONE ENCOUNTER (OUTPATIENT)
Dept: CALL CENTER | Facility: HOSPITAL | Age: 58
End: 2023-02-20
Payer: MEDICARE

## 2023-02-20 NOTE — OUTREACH NOTE
Call Center TCM Note    Flowsheet Row Responses   [facility] patient discharged from? [place]   Does the patient have one of the following disease processes/diagnoses(primary or secondary)? Other   TCM attempt successful? Yes   Call start time 0856   Call end time 0904   Discharge diagnosis Pyelonephritis of right kidney,    right ureteral stent placement    Person spoke with today (if not patient) and relationship patient   Meds reviewed with patient/caregiver? Yes   Does the patient have all medications ordered at discharge? Yes   Is the patient taking all medications as directed (includes completed medication regime)? Yes   Comments PCP Meggan Washington, APRDELMER \[facility]\"" FOLLOW UP 2/24/2023  2:15 PM   Does the patient have an appointment with their PCP within 7 days of discharge? No   Nursing Interventions Assisted patient with making appointment per protocol   Has home health visited the patient within 72 hours of discharge? N/A   Psychosocial issues? No   Did the patient receive a copy of their discharge instructions? Yes   Nursing interventions Reviewed instructions with patient   What is the patient's perception of their health status since discharge? Improving   Is the patient/caregiver able to teach back signs and symptoms related to disease process for when to call PCP? Yes   Is the patient/caregiver able to teach back the hierarchy of who to call/visit for symptoms/problems? PCP, Specialist, Home health nurse, Urgent Care, ED, 911 Yes   If the patient is a current smoker, are they able to teach back resources for cessation? Not a smoker   TCM call completed? Yes   Wrap up additional comments Patient to follow up with urology for definitive treatment of stone.    Call end time 0904   Would this patient benefit from a Referral to Amb Social Work? No   Is the patient interested in additional calls from an ambulatory ?  NOTE:  applies to high risk patients requiring additional follow-up. No           Ita Tran, RN    2/20/2023, 09:05 EST

## 2023-02-24 ENCOUNTER — OFFICE VISIT (OUTPATIENT)
Dept: FAMILY MEDICINE CLINIC | Facility: CLINIC | Age: 58
End: 2023-02-24
Payer: MEDICARE

## 2023-02-24 DIAGNOSIS — N12 PYELONEPHRITIS OF RIGHT KIDNEY: Primary | ICD-10-CM

## 2023-02-24 DIAGNOSIS — N20.1 RIGHT URETERAL STONE: ICD-10-CM

## 2023-02-24 PROBLEM — N20.0 KIDNEY STONE: Status: ACTIVE | Noted: 2023-02-24

## 2023-02-24 PROBLEM — S37.30XA BLADDER AND URETHRA INJURY: Status: ACTIVE | Noted: 2023-02-24

## 2023-02-24 PROBLEM — I10 ESSENTIAL HYPERTENSION: Status: ACTIVE | Noted: 2023-02-24

## 2023-02-24 PROBLEM — S37.20XA BLADDER AND URETHRA INJURY: Status: ACTIVE | Noted: 2023-02-24

## 2023-02-24 LAB
BILIRUB BLD-MCNC: NEGATIVE MG/DL
CLARITY, POC: CLEAR
COLOR UR: YELLOW
EXPIRATION DATE: ABNORMAL
GLUCOSE UR STRIP-MCNC: NEGATIVE MG/DL
KETONES UR QL: NEGATIVE
LEUKOCYTE EST, POC: ABNORMAL
Lab: ABNORMAL
NITRITE UR-MCNC: NEGATIVE MG/ML
PH UR: 6 [PH] (ref 5–8)
PROT UR STRIP-MCNC: ABNORMAL MG/DL
RBC # UR STRIP: ABNORMAL /UL
SP GR UR: 1.02 (ref 1–1.03)
UROBILINOGEN UR QL: ABNORMAL

## 2023-02-24 PROCEDURE — 81003 URINALYSIS AUTO W/O SCOPE: CPT | Performed by: NURSE PRACTITIONER

## 2023-02-24 PROCEDURE — 99214 OFFICE O/P EST MOD 30 MIN: CPT | Performed by: NURSE PRACTITIONER

## 2023-02-24 RX ORDER — VALBENAZINE 80 MG/1
CAPSULE ORAL
COMMUNITY
Start: 2023-02-20 | End: 2023-02-24

## 2023-02-24 RX ORDER — PRAZOSIN HYDROCHLORIDE 2 MG/1
CAPSULE ORAL
COMMUNITY
Start: 2023-02-20 | End: 2023-02-24

## 2023-02-24 NOTE — ASSESSMENT & PLAN NOTE
1.8cm right kidney stone.  Right ureter stent placed during hospitalization.  Scheduled for Lithotripsy and possible removal of stent depending on outcome of lithotripsy on 3/10/23 at [facility] with Dr. Caruso.  Encouraged to follow-up with Dr. Caruso.

## 2023-02-24 NOTE — ASSESSMENT & PLAN NOTE
Afebrile; patient reports feels much better.  Admitted to hospital r/t Acute Pyelonephritis on 2/17/23.  Currently taking Ceflex 500mg TID.  Labs:  CBC, CMP.

## 2023-02-24 NOTE — PROGRESS NOTES
Transitional Care Follow Up Visit  Subjective      Urvashi Ornelas is a 57 y.o. female who presents for a transitional care management visit.    Within 48 business hours after discharge our office contacted her via telephone to coordinate her care and needs.      I reviewed and discussed the details of that call along with the discharge summary, hospital problems, inpatient lab results, inpatient diagnostic studies, and consultation reports with Urvashi.     Current outpatient and discharge medications have been reconciled for the patient.  Reviewed by: LALO Washington      Date of TCM Phone Call 2/19/2023   [facility]   Date of Admission 2/17/2023   Date of Discharge 2/19/2023   Discharge Disposition Home or Self Care     Risk for Readmission (LACE) Score: 11 (2/19/2023  6:00 AM)      History of Present Illness   Course During Hospital Stay:  Chest X-ray heart/lungs, CT heart & lungs, CT abdomen/Pelvis showed right 1.8cm stone with hydronephrosis and acute pyelonephritis.     The following portions of the patient's history were reviewed and updated as appropriate: allergies, current medications, past family history, past medical history, past social history, past surgical history and problem list.    Review of Systems   Constitutional: Negative for chills and fever.   Respiratory: Negative for shortness of breath.    Cardiovascular: Negative for chest pain and palpitations.   Gastrointestinal: Negative for abdominal pain and constipation.   Genitourinary: Negative for decreased urine volume, difficulty urinating, dysuria, frequency and urgency.       Objective      Vitals:    02/24/23 1434   BP:    Pulse:    Resp:    Temp: 98.6 °F (37 °C)   SpO2:        Physical Exam  Vitals and nursing note reviewed.   Constitutional:       General: She is not in acute distress.     Appearance: Normal appearance. She is not ill-appearing, toxic-appearing or diaphoretic.   HENT:      Head:  Normocephalic and atraumatic.   Eyes:      Extraocular Movements: Extraocular movements intact.      Conjunctiva/sclera: Conjunctivae normal.      Pupils: Pupils are equal, round, and reactive to light.   Cardiovascular:      Rate and Rhythm: Normal rate and regular rhythm.      Heart sounds: Normal heart sounds. No murmur heard.  Pulmonary:      Effort: Pulmonary effort is normal. No respiratory distress.      Breath sounds: Normal breath sounds.   Abdominal:      General: Bowel sounds are normal. There is no distension.      Palpations: Abdomen is soft. There is no mass.      Tenderness: There is no abdominal tenderness. There is no right CVA tenderness, left CVA tenderness or rebound.   Skin:     General: Skin is warm and dry.   Neurological:      Mental Status: She is alert.   Psychiatric:         Mood and Affect: Mood normal.         Behavior: Behavior normal.         Assessment & Plan   Diagnoses and all orders for this visit:    1. Pyelonephritis of right kidney (Primary)  Assessment & Plan:  Afebrile; patient reports feels much better.  Admitted to hospital r/t Acute Pyelonephritis on 2/17/23.  Currently taking Ceflex 500mg TID.  Labs:  CBC, CMP.      Orders:  -     CBC & Differential; Future  -     Comprehensive Metabolic Panel; Future    2. Right ureteral stone  Assessment & Plan:  1.8cm right kidney stone.  Right ureter stent placed during hospitalization.  Scheduled for Lithotripsy and possible removal of stent depending on outcome of lithotripsy on 3/10/23 at [facility] with Dr. Caruso.  Encouraged to follow-up with Dr. Caruso.    Orders:  -     POC Urinalysis Dipstick, Automated

## 2023-02-26 VITALS
OXYGEN SATURATION: 99 % | WEIGHT: 210 LBS | HEART RATE: 98 BPM | RESPIRATION RATE: 16 BRPM | DIASTOLIC BLOOD PRESSURE: 70 MMHG | SYSTOLIC BLOOD PRESSURE: 100 MMHG | HEIGHT: 67 IN | BODY MASS INDEX: 32.96 KG/M2 | TEMPERATURE: 98.6 F
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
## 2023-02-28 ENCOUNTER — READMISSION MANAGEMENT (OUTPATIENT)
Dept: CALL CENTER | Facility: HOSPITAL | Age: 58
End: 2023-02-28
Payer: MEDICARE

## 2023-02-28 NOTE — OUTREACH NOTE
Medical Week 2 Survey    Flowsheet Row Responses   [facility] patient discharged from? [place]   Does the patient have one of the following disease processes/diagnoses(primary or secondary)? Other   Week 2 attempt successful? No   Unsuccessful attempts Attempt 1          Ivana Simmons Registered Nurse

## 2023-03-04 ENCOUNTER — READMISSION MANAGEMENT (OUTPATIENT)
Dept: CALL CENTER | Facility: HOSPITAL | Age: 58
End: 2023-03-04
Payer: MEDICARE

## 2023-03-04 NOTE — OUTREACH NOTE
Medical Week 2 Survey    Flowsheet Row Responses   [facility] patient discharged from? [place]   Does the patient have one of the following disease processes/diagnoses(primary or secondary)? Other   Week 2 attempt successful? No   Unsuccessful attempts Attempt 2          Mireya FLORIAN - Registered Nurse

## 2023-03-06 ENCOUNTER — TELEPHONE (OUTPATIENT)
Dept: FAMILY MEDICINE CLINIC | Facility: CLINIC | Age: 58
End: 2023-03-06
Payer: MEDICARE

## 2023-03-06 NOTE — TELEPHONE ENCOUNTER
----- Message from LALO Washington sent at 3/3/2023  8:02 AM EST -----  Please give patient the following message:    Your test results have some minor abnormalities that do not require any change in your medical treatment at this time.    However, recommend eating more foods with calcium and protein and decrease use of NSAID (ibuprofen, advil, naproxen, motrin) due to decrease kidney blood level.    Otherwise all other blood levels are within normal limits.

## 2023-03-06 NOTE — TELEPHONE ENCOUNTER
I reached out to the patient through [employer] and sent her results to her and she responded back and said thank you.

## 2023-03-06 NOTE — PROGRESS NOTES
Enter Query Response Below      Query Response:     AGA was present, it was treated, and improved or nearly resolved at time of my last visit with the patient.         If applicable, please update the problem list.     Patient: Urvashi Ornelas        : 1965  Account: [id number]           Admit Date: 2023        How to Respond to this query:       a. Click New Note     b. Answer query within the yellow box.                c. Update the Problem List, if applicable.      If you have any questions about this query contact me at: [email or web address]     Dr. Davidson:     Patient admitted with pyelonephritis of right kidney. Urology physician progress note included acute kidney injury as a diagnosis. Urology physician consultation note included that patient's baseline creatinine was 1.0. Labs during this hospital stay were as follows: creatinine 1.47 on , creatinine 1.31 on , and creatinine 1.21 on . Patient was treated with IV fluids and lab monitoring.     After study, was acute kidney injury clinically supported during this hospital stay?    Yes (please include additional clinical indicators) ________________  No   Other (please specify) _______________  Clinically indeterminable     By submitting this query, we are merely seeking further clarification of documentation to accurately reflect all conditions that you are monitoring, evaluating, treating or that extend the hospitalization or utilize additional resources of care. Please utilize your independent clinical judgment when addressing the question(s) above.     This query and your response, once completed, will be entered into the legal medical record.    Sincerely,  Babar MAY, PMMERCYP-BC, [facility]   Clinical Documentation Integrity Program

## 2023-03-09 RX ORDER — ACETAMINOPHEN 500 MG
500 TABLET ORAL EVERY 6 HOURS PRN
COMMUNITY

## 2023-03-10 ENCOUNTER — ANESTHESIA EVENT (OUTPATIENT)
Dept: PERIOP | Facility: HOSPITAL | Age: 58
End: 2023-03-10
Payer: MEDICARE

## 2023-03-10 ENCOUNTER — ANESTHESIA (OUTPATIENT)
Dept: PERIOP | Facility: HOSPITAL | Age: 58
End: 2023-03-10
Payer: MEDICARE

## 2023-03-10 ENCOUNTER — READMISSION MANAGEMENT (OUTPATIENT)
Dept: CALL CENTER | Facility: HOSPITAL | Age: 58
End: 2023-03-10
Payer: MEDICARE

## 2023-03-10 ENCOUNTER — HOSPITAL ENCOUNTER (OUTPATIENT)
Facility: HOSPITAL | Age: 58
Setting detail: HOSPITAL OUTPATIENT SURGERY
Discharge: HOME | End: 2023-03-10
Attending: SURGERY | Admitting: SURGERY
Payer: MEDICARE

## 2023-03-10 VITALS
HEART RATE: 66 BPM | RESPIRATION RATE: 16 BRPM | OXYGEN SATURATION: 95 % | SYSTOLIC BLOOD PRESSURE: 95 MMHG | TEMPERATURE: 97.6 F | DIASTOLIC BLOOD PRESSURE: 72 MMHG

## 2023-03-10 DIAGNOSIS — N20.0 KIDNEY STONE: Primary | ICD-10-CM

## 2023-03-10 LAB
BACTERIA UR QL AUTO: ABNORMAL /HPF
BILIRUB UR QL STRIP: NEGATIVE
CLARITY UR: ABNORMAL
COLOR UR: YELLOW
GLUCOSE BLDC GLUCOMTR-MCNC: 135 MG/DL (ref 70–130)
GLUCOSE UR STRIP-MCNC: NEGATIVE MG/DL
HGB UR QL STRIP.AUTO: ABNORMAL
HYALINE CASTS UR QL AUTO: ABNORMAL /LPF
KETONES UR QL STRIP: ABNORMAL
LEUKOCYTE ESTERASE UR QL STRIP.AUTO: ABNORMAL
NITRITE UR QL STRIP: POSITIVE
PH UR STRIP.AUTO: 6 [PH] (ref 5–8)
PROT UR QL STRIP: ABNORMAL
RBC # UR STRIP: ABNORMAL /HPF
REF LAB TEST METHOD: ABNORMAL
SP GR UR STRIP: 1.02 (ref 1–1.03)
SQUAMOUS #/AREA URNS HPF: ABNORMAL /HPF
UROBILINOGEN UR QL STRIP: ABNORMAL
WBC # UR STRIP: ABNORMAL /HPF

## 2023-03-10 PROCEDURE — 25010000002 ONDANSETRON PER 1 MG: Performed by: NURSE ANESTHETIST, CERTIFIED REGISTERED

## 2023-03-10 PROCEDURE — 25010000002 CEFTRIAXONE PER 250 MG: Performed by: SURGERY

## 2023-03-10 PROCEDURE — 87086 URINE CULTURE/COLONY COUNT: CPT | Performed by: SURGERY

## 2023-03-10 PROCEDURE — C1769 GUIDE WIRE: HCPCS | Performed by: SURGERY

## 2023-03-10 PROCEDURE — 25010000002 MIDAZOLAM PER 1 MG: Performed by: ANESTHESIOLOGY

## 2023-03-10 PROCEDURE — 82962 GLUCOSE BLOOD TEST: CPT

## 2023-03-10 PROCEDURE — 87186 SC STD MICRODIL/AGAR DIL: CPT | Performed by: SURGERY

## 2023-03-10 PROCEDURE — 25010000002 PROPOFOL 10 MG/ML EMULSION: Performed by: NURSE ANESTHETIST, CERTIFIED REGISTERED

## 2023-03-10 PROCEDURE — 25010000002 FENTANYL CITRATE (PF) 50 MCG/ML SOLUTION: Performed by: ANESTHESIOLOGY

## 2023-03-10 PROCEDURE — 87077 CULTURE AEROBIC IDENTIFY: CPT | Performed by: SURGERY

## 2023-03-10 PROCEDURE — 81001 URINALYSIS AUTO W/SCOPE: CPT | Performed by: SURGERY

## 2023-03-10 PROCEDURE — C1758 CATHETER, URETERAL: HCPCS | Performed by: SURGERY

## 2023-03-10 PROCEDURE — 25010000002 DEXAMETHASONE SODIUM PHOSPHATE 20 MG/5ML SOLUTION: Performed by: NURSE ANESTHETIST, CERTIFIED REGISTERED

## 2023-03-10 RX ORDER — FENTANYL CITRATE 50 UG/ML
50 INJECTION, SOLUTION INTRAMUSCULAR; INTRAVENOUS
Status: DISCONTINUED | OUTPATIENT
Start: 2023-03-10 | End: 2023-03-10 | Stop reason: HOSPADM

## 2023-03-10 RX ORDER — FAMOTIDINE 10 MG/ML
20 INJECTION, SOLUTION INTRAVENOUS ONCE
Status: COMPLETED | OUTPATIENT
Start: 2023-03-10 | End: 2023-03-10

## 2023-03-10 RX ORDER — DROPERIDOL 2.5 MG/ML
0.62 INJECTION, SOLUTION INTRAMUSCULAR; INTRAVENOUS
Status: DISCONTINUED | OUTPATIENT
Start: 2023-03-10 | End: 2023-03-10 | Stop reason: HOSPADM

## 2023-03-10 RX ORDER — SULFAMETHOXAZOLE AND TRIMETHOPRIM 800; 160 MG/1; MG/1
1 TABLET ORAL 2 TIMES DAILY
Qty: 14 TABLET | Refills: 0 | Status: SHIPPED | OUTPATIENT
Start: 2023-03-10 | End: 2023-03-28

## 2023-03-10 RX ORDER — SCOLOPAMINE TRANSDERMAL SYSTEM 1 MG/1
1 PATCH, EXTENDED RELEASE TRANSDERMAL ONCE
Status: DISCONTINUED | OUTPATIENT
Start: 2023-03-10 | End: 2023-03-10 | Stop reason: HOSPADM

## 2023-03-10 RX ORDER — SODIUM CHLORIDE, SODIUM LACTATE, POTASSIUM CHLORIDE, CALCIUM CHLORIDE 600; 310; 30; 20 MG/100ML; MG/100ML; MG/100ML; MG/100ML
9 INJECTION, SOLUTION INTRAVENOUS CONTINUOUS
Status: DISCONTINUED | OUTPATIENT
Start: 2023-03-10 | End: 2023-03-10 | Stop reason: HOSPADM

## 2023-03-10 RX ORDER — HYDROCODONE BITARTRATE AND ACETAMINOPHEN 5; 325 MG/1; MG/1
1 TABLET ORAL EVERY 6 HOURS PRN
Qty: 8 TABLET | Refills: 0 | Status: SHIPPED | OUTPATIENT
Start: 2023-03-10

## 2023-03-10 RX ORDER — TAMSULOSIN HYDROCHLORIDE 0.4 MG/1
1 CAPSULE ORAL DAILY
Qty: 30 CAPSULE | Refills: 0 | Status: SHIPPED | OUTPATIENT
Start: 2023-03-10

## 2023-03-10 RX ORDER — PROPOFOL 10 MG/ML
VIAL (ML) INTRAVENOUS AS NEEDED
Status: DISCONTINUED | OUTPATIENT
Start: 2023-03-10 | End: 2023-03-10 | Stop reason: SURG

## 2023-03-10 RX ORDER — ONDANSETRON 2 MG/ML
4 INJECTION INTRAMUSCULAR; INTRAVENOUS ONCE AS NEEDED
Status: COMPLETED | OUTPATIENT
Start: 2023-03-10 | End: 2023-03-10

## 2023-03-10 RX ORDER — HYDRALAZINE HYDROCHLORIDE 20 MG/ML
5 INJECTION INTRAMUSCULAR; INTRAVENOUS
Status: DISCONTINUED | OUTPATIENT
Start: 2023-03-10 | End: 2023-03-10 | Stop reason: HOSPADM

## 2023-03-10 RX ORDER — PROMETHAZINE HYDROCHLORIDE 25 MG/1
25 TABLET ORAL ONCE AS NEEDED
Status: DISCONTINUED | OUTPATIENT
Start: 2023-03-10 | End: 2023-03-10 | Stop reason: HOSPADM

## 2023-03-10 RX ORDER — HYDROCODONE BITARTRATE AND ACETAMINOPHEN 7.5; 325 MG/1; MG/1
1 TABLET ORAL ONCE AS NEEDED
Status: DISCONTINUED | OUTPATIENT
Start: 2023-03-10 | End: 2023-03-10 | Stop reason: HOSPADM

## 2023-03-10 RX ORDER — LIDOCAINE HYDROCHLORIDE 10 MG/ML
0.5 INJECTION, SOLUTION EPIDURAL; INFILTRATION; INTRACAUDAL; PERINEURAL ONCE AS NEEDED
Status: DISCONTINUED | OUTPATIENT
Start: 2023-03-10 | End: 2023-03-10 | Stop reason: HOSPADM

## 2023-03-10 RX ORDER — LIDOCAINE HYDROCHLORIDE 20 MG/ML
INJECTION, SOLUTION INFILTRATION; PERINEURAL AS NEEDED
Status: DISCONTINUED | OUTPATIENT
Start: 2023-03-10 | End: 2023-03-10 | Stop reason: SURG

## 2023-03-10 RX ORDER — DEXAMETHASONE SODIUM PHOSPHATE 4 MG/ML
INJECTION, SOLUTION INTRA-ARTICULAR; INTRALESIONAL; INTRAMUSCULAR; INTRAVENOUS; SOFT TISSUE AS NEEDED
Status: DISCONTINUED | OUTPATIENT
Start: 2023-03-10 | End: 2023-03-10 | Stop reason: SURG

## 2023-03-10 RX ORDER — EPHEDRINE SULFATE 50 MG/ML
5 INJECTION, SOLUTION INTRAVENOUS ONCE AS NEEDED
Status: DISCONTINUED | OUTPATIENT
Start: 2023-03-10 | End: 2023-03-10 | Stop reason: HOSPADM

## 2023-03-10 RX ORDER — PROMETHAZINE HYDROCHLORIDE 25 MG/1
25 SUPPOSITORY RECTAL ONCE AS NEEDED
Status: DISCONTINUED | OUTPATIENT
Start: 2023-03-10 | End: 2023-03-10 | Stop reason: HOSPADM

## 2023-03-10 RX ORDER — NALOXONE HCL 0.4 MG/ML
0.2 VIAL (ML) INJECTION AS NEEDED
Status: DISCONTINUED | OUTPATIENT
Start: 2023-03-10 | End: 2023-03-10 | Stop reason: HOSPADM

## 2023-03-10 RX ORDER — MIDAZOLAM HYDROCHLORIDE 1 MG/ML
1 INJECTION INTRAMUSCULAR; INTRAVENOUS
Status: COMPLETED | OUTPATIENT
Start: 2023-03-10 | End: 2023-03-10

## 2023-03-10 RX ORDER — SODIUM CHLORIDE 0.9 % (FLUSH) 0.9 %
3 SYRINGE (ML) INJECTION EVERY 12 HOURS SCHEDULED
Status: DISCONTINUED | OUTPATIENT
Start: 2023-03-10 | End: 2023-03-10 | Stop reason: HOSPADM

## 2023-03-10 RX ORDER — ONDANSETRON 2 MG/ML
INJECTION INTRAMUSCULAR; INTRAVENOUS AS NEEDED
Status: DISCONTINUED | OUTPATIENT
Start: 2023-03-10 | End: 2023-03-10 | Stop reason: SURG

## 2023-03-10 RX ORDER — IPRATROPIUM BROMIDE AND ALBUTEROL SULFATE 2.5; .5 MG/3ML; MG/3ML
3 SOLUTION RESPIRATORY (INHALATION) ONCE AS NEEDED
Status: DISCONTINUED | OUTPATIENT
Start: 2023-03-10 | End: 2023-03-10 | Stop reason: HOSPADM

## 2023-03-10 RX ORDER — FLUMAZENIL 0.1 MG/ML
0.2 INJECTION INTRAVENOUS AS NEEDED
Status: DISCONTINUED | OUTPATIENT
Start: 2023-03-10 | End: 2023-03-10 | Stop reason: HOSPADM

## 2023-03-10 RX ORDER — LABETALOL HYDROCHLORIDE 5 MG/ML
5 INJECTION, SOLUTION INTRAVENOUS
Status: DISCONTINUED | OUTPATIENT
Start: 2023-03-10 | End: 2023-03-10 | Stop reason: HOSPADM

## 2023-03-10 RX ORDER — SODIUM CHLORIDE 0.9 % (FLUSH) 0.9 %
3-10 SYRINGE (ML) INJECTION AS NEEDED
Status: DISCONTINUED | OUTPATIENT
Start: 2023-03-10 | End: 2023-03-10 | Stop reason: HOSPADM

## 2023-03-10 RX ORDER — DIPHENHYDRAMINE HYDROCHLORIDE 50 MG/ML
12.5 INJECTION INTRAMUSCULAR; INTRAVENOUS
Status: DISCONTINUED | OUTPATIENT
Start: 2023-03-10 | End: 2023-03-10 | Stop reason: HOSPADM

## 2023-03-10 RX ADMIN — ONDANSETRON 4 MG: 2 INJECTION INTRAMUSCULAR; INTRAVENOUS at 09:14

## 2023-03-10 RX ADMIN — PROPOFOL 200 MG: 10 INJECTION, EMULSION INTRAVENOUS at 07:29

## 2023-03-10 RX ADMIN — SODIUM CHLORIDE, POTASSIUM CHLORIDE, SODIUM LACTATE AND CALCIUM CHLORIDE 9 ML/HR: 600; 310; 30; 20 INJECTION, SOLUTION INTRAVENOUS at 06:22

## 2023-03-10 RX ADMIN — ONDANSETRON 4 MG: 2 INJECTION INTRAMUSCULAR; INTRAVENOUS at 07:36

## 2023-03-10 RX ADMIN — LIDOCAINE HYDROCHLORIDE 100 MG: 20 INJECTION, SOLUTION INFILTRATION; PERINEURAL at 07:29

## 2023-03-10 RX ADMIN — SCOPALAMINE 1 PATCH: 1 PATCH, EXTENDED RELEASE TRANSDERMAL at 06:25

## 2023-03-10 RX ADMIN — FAMOTIDINE 20 MG: 10 INJECTION INTRAVENOUS at 06:25

## 2023-03-10 RX ADMIN — MIDAZOLAM 1 MG: 1 INJECTION INTRAMUSCULAR; INTRAVENOUS at 07:09

## 2023-03-10 RX ADMIN — MIDAZOLAM 1 MG: 1 INJECTION INTRAMUSCULAR; INTRAVENOUS at 06:25

## 2023-03-10 RX ADMIN — CEFTRIAXONE 2 G: 2 INJECTION, POWDER, FOR SOLUTION INTRAMUSCULAR; INTRAVENOUS at 07:35

## 2023-03-10 RX ADMIN — FENTANYL CITRATE 50 MCG: 50 INJECTION, SOLUTION INTRAMUSCULAR; INTRAVENOUS at 06:25

## 2023-03-10 RX ADMIN — DEXAMETHASONE SODIUM PHOSPHATE 8 MG: 4 INJECTION, SOLUTION INTRAMUSCULAR; INTRAVENOUS at 07:29

## 2023-03-10 NOTE — OUTREACH NOTE
Medical Week 3 Survey    Flowsheet Row Responses   [facility] patient discharged from? [place]   Does the patient have one of the following disease processes/diagnoses(primary or secondary)? Other   Week 3 attempt successful? No   Unsuccessful attempts Attempt 1          Laura PAREDES - Registered Nurse

## 2023-03-10 NOTE — ANESTHESIA PROCEDURE NOTES
Airway  Urgency: elective    Date/Time: 3/10/2023 7:33 AM  Airway not difficult    General Information and Staff    Patient location during procedure: OR  Anesthesiologist: Quinton Brannon MD  CRNA/CAA: Amaya Le CRNA    Indications and Patient Condition  Indications for airway management: airway protection    Preoxygenated: yes  MILS not maintained throughout  Mask difficulty assessment: 0 - not attempted    Final Airway Details  Final airway type: supraglottic airway      Successful airway: unique  Size 4     Number of attempts at approach: 1  Assessment: atraumatic intubation    Additional Comments  LMA inserted with ease, assist to SV

## 2023-03-10 NOTE — ANESTHESIA PREPROCEDURE EVALUATION
Anesthesia Evaluation     Patient summary reviewed and Nursing notes reviewed   no history of anesthetic complications:  NPO Solid Status: > 8 hours  NPO Liquid Status: > 8 hours           Airway   Mallampati: II  Dental - normal exam     Pulmonary - normal exam   (+) pneumonia resolved , pleural effusion, pulmonary embolism, asthma,shortness of breath, sleep apnea,   Cardiovascular - normal exam    (+) DVT resolved, hyperlipidemia,       Neuro/Psych  (+) headaches, dizziness/light headedness, numbness, psychiatric history Bipolar, Anxiety, Depression and PTSD,    GI/Hepatic/Renal/Endo    (+) obesity,  GI bleeding , liver disease fatty liver disease, renal disease CRI, thyroid problem hypothyroidism    Musculoskeletal     Abdominal    Substance History      OB/GYN          Other   arthritis,    history of cancer remission                    Anesthesia Plan    ASA 3     general     intravenous induction     Anesthetic plan, risks, benefits, and alternatives have been provided, discussed and informed consent has been obtained with: patient.        CODE STATUS:

## 2023-03-10 NOTE — OP NOTE
Operative Note    3/10/2023    Urvashi Ornelas  57 y.o.  1965  female  [id number]    Attending Surgeon:   Drew Caruso MD    Assistant(s): None    Pre-operative Diagnosis:   1. Right Nephrolithiasis    Post-operative Diagnosis: Same    Procedure:   1. Right ESWL (Staged procedure)    Indication:  Symptomatic right nephrolithiasis, imaging confirmed. Primary Urologist is Dr. Tse.    We discussed the benefits/risks/expectations and the patient desires surgical intervention. Risks include bleeding, infection, urinary tract infection/sepsis, retained stone fragments, damage to adjacent tissues including the genitalia, chronic pain, numbness, incontinence, stroke, heart attack, death, and need for additional procedures.    Findings:   Fluoroscopy demonstrated a radio-opaque stone adjacent to the stent in the renal pelvis, consistent with patient's known stone burden. The previous right ureteral stent is in place and appears to be in good position.    Stones fragmented well. 18 kV at 60 shocks/minute for 3000 shocks.  The rate was increased to 120 shocks/minute at rapidly escalating voltage to 24 kV.    Description of procedure:  After informed consent was obtained, the patient was taken to the OR and general anesthesia was induced. The patient was placed on the lithotripter table in a supine position and placed under general anesthesia. A timeout was performed and all team members were in agreement. Fluoroscopy demonstrated a radio-opaque stone adjacent to the stent in the renal pelvis, consistent with patient's known stone burden. The previous right ureteral stent is in place and appears to be in good position.The patient was prepped and draped.     Right ESWL was then performed. A total of 3000 shocks were administered to the stone at a max energy level 24kV.  The stone fragmented well. The patient tolerated the procedure well and was awakened from anesthesia in the OR. The patient was transferred to the  recovery room in stable condition.    Complications: None    Specimens: None    Estimated Blood Loss: Minimal    Disposition:  To PACU then home  F/u w/ Dr. Tse in 1 week with KUB prior    Drew Caruso MD  [facility]

## 2023-03-10 NOTE — ANESTHESIA POSTPROCEDURE EVALUATION
Patient: Urvashi Ornelas    Procedure Summary     Date: 03/10/23 Room / Location: [facility] OR [address] [place] [address] MAIN OR    Anesthesia Start: 0725 Anesthesia Stop: 0812    Procedure: RIGHT EXTRACORPOREAL SHOCK WAVE LITHOTRIPSY (Right) Diagnosis:     Surgeons: Drew Caruso MD Provider: Quinton Brannon MD    Anesthesia Type: general ASA Status: 3          Anesthesia Type: general    Vitals  Vitals Value Taken Time   BP 92/67 03/10/23 0846   Temp 36.4 °C (97.6 °F) 03/10/23 0845   Pulse 72 03/10/23 0856   Resp 16 03/10/23 0845   SpO2 93 % 03/10/23 0856   Vitals shown include unvalidated device data.        Post Anesthesia Care and Evaluation    Patient location during evaluation: PHASE II  Patient participation: complete - patient participated  Level of consciousness: awake  Pain management: adequate    Airway patency: patent  Anesthetic complications: No anesthetic complications    Cardiovascular status: acceptable  Respiratory status: acceptable  Hydration status: acceptable    Comments: BP 97/67 (BP Location: Left arm, Patient Position: Lying)   Pulse 71   Temp 36.4 °C (97.6 °F) (Oral)   Resp 16   SpO2 97%
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
## 2023-03-12 LAB — BACTERIA SPEC AEROBE CULT: ABNORMAL

## 2023-03-21 DIAGNOSIS — K59.09 CHRONIC CONSTIPATION: ICD-10-CM

## 2023-03-21 RX ORDER — DOCUSATE SODIUM 100 MG/1
CAPSULE, LIQUID FILLED ORAL
Qty: 60 CAPSULE | Refills: 0 | OUTPATIENT
Start: 2023-03-21

## 2023-03-21 NOTE — TELEPHONE ENCOUNTER
Rx Refill Note  Requested Prescriptions     Pending Prescriptions Disp Refills   • docusate sodium (COLACE) 100 MG capsule [Pharmacy Med Name: DOCUSATE SODIUM 100 MG SOFTGEL] 60 capsule 0     Sig: TAKE 1 CAPSULE BY MOUTH TWICE A DAY      Last office visit with prescribing clinician: 2/24/2023   Last telemedicine visit with prescribing clinician: 4/10/2023   Next office visit with prescribing clinician: 4/10/2023                         Would you like a call back once the refill request has been completed: [] Yes [] No    If the office needs to give you a call back, can they leave a voicemail: [] Yes [] No    Saul Darnell MA  03/21/23, 09:07 EDT

## 2023-03-22 DIAGNOSIS — K59.09 CHRONIC CONSTIPATION: ICD-10-CM

## 2023-03-23 DIAGNOSIS — K59.09 CHRONIC CONSTIPATION: ICD-10-CM

## 2023-03-23 RX ORDER — DOCUSATE SODIUM 100 MG/1
CAPSULE, LIQUID FILLED ORAL
Qty: 60 CAPSULE | Refills: 0 | OUTPATIENT
Start: 2023-03-23

## 2023-03-23 RX ORDER — DOCUSATE SODIUM 100 MG/1
100 CAPSULE, LIQUID FILLED ORAL 2 TIMES DAILY
Qty: 60 CAPSULE | Refills: 0 | Status: SHIPPED | OUTPATIENT
Start: 2023-03-23

## 2023-03-23 RX ORDER — LEVOTHYROXINE SODIUM 0.2 MG/1
TABLET ORAL
Qty: 90 TABLET | Refills: 1 | Status: SHIPPED | OUTPATIENT
Start: 2023-03-23

## 2023-03-23 NOTE — TELEPHONE ENCOUNTER
Rx Refill Note  Requested Prescriptions     Pending Prescriptions Disp Refills   • docusate sodium (COLACE) 100 MG capsule [Pharmacy Med Name: DOCUSATE SODIUM 100 MG SOFTGEL] 60 capsule 0     Sig: TAKE 1 CAPSULE BY MOUTH TWICE A DAY      Last office visit with prescribing clinician: 2/24/2023   Last telemedicine visit with prescribing clinician: 4/10/2023   Next office visit with prescribing clinician: 4/10/2023                         Would you like a call back once the refill request has been completed: [] Yes [] No    If the office needs to give you a call back, can they leave a voicemail: [] Yes [] No    Saul Darnell MA  03/23/23, 07:07 EDT

## 2023-03-28 RX ORDER — ONDANSETRON 8 MG/1
TABLET, ORALLY DISINTEGRATING ORAL AS NEEDED
COMMUNITY
Start: 2023-03-14

## 2023-03-28 RX ORDER — PRAZOSIN HYDROCHLORIDE 2 MG/1
CAPSULE ORAL NIGHTLY PRN
COMMUNITY
Start: 2023-03-17

## 2023-03-29 ENCOUNTER — ANESTHESIA (OUTPATIENT)
Dept: PERIOP | Facility: HOSPITAL | Age: 58
End: 2023-03-29
Payer: MEDICARE

## 2023-03-29 ENCOUNTER — HOSPITAL ENCOUNTER (OUTPATIENT)
Facility: HOSPITAL | Age: 58
Setting detail: HOSPITAL OUTPATIENT SURGERY
Discharge: HOME | End: 2023-03-29
Attending: UROLOGY | Admitting: UROLOGY
Payer: MEDICARE

## 2023-03-29 ENCOUNTER — ANESTHESIA EVENT (OUTPATIENT)
Dept: PERIOP | Facility: HOSPITAL | Age: 58
End: 2023-03-29
Payer: MEDICARE

## 2023-03-29 VITALS
TEMPERATURE: 98.6 F | DIASTOLIC BLOOD PRESSURE: 83 MMHG | HEART RATE: 69 BPM | OXYGEN SATURATION: 96 % | HEIGHT: 67 IN | RESPIRATION RATE: 16 BRPM | SYSTOLIC BLOOD PRESSURE: 120 MMHG | WEIGHT: 207.45 LBS | BODY MASS INDEX: 32.56 KG/M2

## 2023-03-29 DIAGNOSIS — N20.0 KIDNEY STONE: Primary | ICD-10-CM

## 2023-03-29 LAB
ALBUMIN SERPL-MCNC: 3.6 G/DL (ref 3.5–5.2)
ALBUMIN/GLOB SERPL: 1.2 G/DL
ALP SERPL-CCNC: 89 U/L (ref 39–117)
ALT SERPL W P-5'-P-CCNC: 17 U/L (ref 1–33)
ANION GAP SERPL CALCULATED.3IONS-SCNC: 11 MMOL/L (ref 5–15)
AST SERPL-CCNC: 16 U/L (ref 1–32)
BILIRUB SERPL-MCNC: 0.3 MG/DL (ref 0–1.2)
BUN SERPL-MCNC: 14 MG/DL (ref 6–20)
BUN/CREAT SERPL: 13.1 (ref 7–25)
CALCIUM SPEC-SCNC: 8.3 MG/DL (ref 8.6–10.5)
CHLORIDE SERPL-SCNC: 107 MMOL/L (ref 98–107)
CO2 SERPL-SCNC: 20 MMOL/L (ref 22–29)
CREAT SERPL-MCNC: 1.07 MG/DL (ref 0.57–1)
DEPRECATED RDW RBC AUTO: 47 FL (ref 37–54)
EGFRCR SERPLBLD CKD-EPI 2021: 61.5 ML/MIN/1.73
ERYTHROCYTE [DISTWIDTH] IN BLOOD BY AUTOMATED COUNT: 14.2 % (ref 12.3–15.4)
GLOBULIN UR ELPH-MCNC: 3 GM/DL
GLUCOSE BLDC GLUCOMTR-MCNC: 94 MG/DL (ref 70–130)
GLUCOSE SERPL-MCNC: 95 MG/DL (ref 65–99)
HCT VFR BLD AUTO: 36.8 % (ref 34–46.6)
HGB BLD-MCNC: 12.1 G/DL (ref 12–15.9)
MCH RBC QN AUTO: 29.7 PG (ref 26.6–33)
MCHC RBC AUTO-ENTMCNC: 32.9 G/DL (ref 31.5–35.7)
MCV RBC AUTO: 90.4 FL (ref 79–97)
PLATELET # BLD AUTO: 160 10*3/MM3 (ref 140–450)
PMV BLD AUTO: 10.3 FL (ref 6–12)
POTASSIUM SERPL-SCNC: 3.6 MMOL/L (ref 3.5–5.2)
PROT SERPL-MCNC: 6.6 G/DL (ref 6–8.5)
QT INTERVAL: 406 MS
RBC # BLD AUTO: 4.07 10*6/MM3 (ref 3.77–5.28)
SODIUM SERPL-SCNC: 138 MMOL/L (ref 136–145)
WBC NRBC COR # BLD: 3.58 10*3/MM3 (ref 3.4–10.8)

## 2023-03-29 PROCEDURE — 80053 COMPREHEN METABOLIC PANEL: CPT | Performed by: UROLOGY

## 2023-03-29 PROCEDURE — 25010000002 FENTANYL CITRATE (PF) 50 MCG/ML SOLUTION: Performed by: ANESTHESIOLOGY

## 2023-03-29 PROCEDURE — 25010000002 DEXAMETHASONE SODIUM PHOSPHATE 20 MG/5ML SOLUTION: Performed by: ANESTHESIOLOGY

## 2023-03-29 PROCEDURE — 82962 GLUCOSE BLOOD TEST: CPT

## 2023-03-29 PROCEDURE — 93005 ELECTROCARDIOGRAM TRACING: CPT | Performed by: ANESTHESIOLOGY

## 2023-03-29 PROCEDURE — 25010000002 ONDANSETRON PER 1 MG: Performed by: ANESTHESIOLOGY

## 2023-03-29 PROCEDURE — 93010 ELECTROCARDIOGRAM REPORT: CPT | Performed by: INTERNAL MEDICINE

## 2023-03-29 PROCEDURE — 85027 COMPLETE CBC AUTOMATED: CPT | Performed by: ANESTHESIOLOGY

## 2023-03-29 PROCEDURE — 25010000002 PROPOFOL 10 MG/ML EMULSION: Performed by: ANESTHESIOLOGY

## 2023-03-29 PROCEDURE — 25010000002 MIDAZOLAM PER 1 MG: Performed by: ANESTHESIOLOGY

## 2023-03-29 RX ORDER — EPHEDRINE SULFATE 50 MG/ML
5 INJECTION, SOLUTION INTRAVENOUS ONCE AS NEEDED
Status: DISCONTINUED | OUTPATIENT
Start: 2023-03-29 | End: 2023-03-29 | Stop reason: HOSPADM

## 2023-03-29 RX ORDER — ONDANSETRON 2 MG/ML
4 INJECTION INTRAMUSCULAR; INTRAVENOUS ONCE AS NEEDED
Status: DISCONTINUED | OUTPATIENT
Start: 2023-03-29 | End: 2023-03-29 | Stop reason: HOSPADM

## 2023-03-29 RX ORDER — HYDRALAZINE HYDROCHLORIDE 20 MG/ML
5 INJECTION INTRAMUSCULAR; INTRAVENOUS
Status: DISCONTINUED | OUTPATIENT
Start: 2023-03-29 | End: 2023-03-29 | Stop reason: HOSPADM

## 2023-03-29 RX ORDER — FENTANYL CITRATE 50 UG/ML
50 INJECTION, SOLUTION INTRAMUSCULAR; INTRAVENOUS
Status: DISCONTINUED | OUTPATIENT
Start: 2023-03-29 | End: 2023-03-29 | Stop reason: HOSPADM

## 2023-03-29 RX ORDER — SODIUM CHLORIDE, SODIUM LACTATE, POTASSIUM CHLORIDE, CALCIUM CHLORIDE 600; 310; 30; 20 MG/100ML; MG/100ML; MG/100ML; MG/100ML
9 INJECTION, SOLUTION INTRAVENOUS CONTINUOUS
Status: DISCONTINUED | OUTPATIENT
Start: 2023-03-29 | End: 2023-03-29 | Stop reason: HOSPADM

## 2023-03-29 RX ORDER — SODIUM CHLORIDE 0.9 % (FLUSH) 0.9 %
3-10 SYRINGE (ML) INJECTION AS NEEDED
Status: DISCONTINUED | OUTPATIENT
Start: 2023-03-29 | End: 2023-03-29 | Stop reason: HOSPADM

## 2023-03-29 RX ORDER — FLUMAZENIL 0.1 MG/ML
0.2 INJECTION INTRAVENOUS AS NEEDED
Status: DISCONTINUED | OUTPATIENT
Start: 2023-03-29 | End: 2023-03-29 | Stop reason: HOSPADM

## 2023-03-29 RX ORDER — PROMETHAZINE HYDROCHLORIDE 25 MG/1
25 TABLET ORAL ONCE AS NEEDED
Status: DISCONTINUED | OUTPATIENT
Start: 2023-03-29 | End: 2023-03-29 | Stop reason: HOSPADM

## 2023-03-29 RX ORDER — MIDAZOLAM HYDROCHLORIDE 1 MG/ML
1 INJECTION INTRAMUSCULAR; INTRAVENOUS
Status: COMPLETED | OUTPATIENT
Start: 2023-03-29 | End: 2023-03-29

## 2023-03-29 RX ORDER — OXYCODONE AND ACETAMINOPHEN 7.5; 325 MG/1; MG/1
1 TABLET ORAL EVERY 4 HOURS PRN
Status: DISCONTINUED | OUTPATIENT
Start: 2023-03-29 | End: 2023-03-29 | Stop reason: HOSPADM

## 2023-03-29 RX ORDER — LIDOCAINE HYDROCHLORIDE 10 MG/ML
0.5 INJECTION, SOLUTION EPIDURAL; INFILTRATION; INTRACAUDAL; PERINEURAL ONCE AS NEEDED
Status: DISCONTINUED | OUTPATIENT
Start: 2023-03-29 | End: 2023-03-29 | Stop reason: HOSPADM

## 2023-03-29 RX ORDER — HYDROMORPHONE HYDROCHLORIDE 1 MG/ML
0.5 INJECTION, SOLUTION INTRAMUSCULAR; INTRAVENOUS; SUBCUTANEOUS
Status: DISCONTINUED | OUTPATIENT
Start: 2023-03-29 | End: 2023-03-29 | Stop reason: HOSPADM

## 2023-03-29 RX ORDER — HYDROCODONE BITARTRATE AND ACETAMINOPHEN 7.5; 325 MG/1; MG/1
1 TABLET ORAL ONCE AS NEEDED
Status: DISCONTINUED | OUTPATIENT
Start: 2023-03-29 | End: 2023-03-29 | Stop reason: HOSPADM

## 2023-03-29 RX ORDER — LIDOCAINE HYDROCHLORIDE 20 MG/ML
INJECTION, SOLUTION INFILTRATION; PERINEURAL AS NEEDED
Status: DISCONTINUED | OUTPATIENT
Start: 2023-03-29 | End: 2023-03-29 | Stop reason: SURG

## 2023-03-29 RX ORDER — DROPERIDOL 2.5 MG/ML
0.62 INJECTION, SOLUTION INTRAMUSCULAR; INTRAVENOUS
Status: DISCONTINUED | OUTPATIENT
Start: 2023-03-29 | End: 2023-03-29 | Stop reason: HOSPADM

## 2023-03-29 RX ORDER — ONDANSETRON 2 MG/ML
INJECTION INTRAMUSCULAR; INTRAVENOUS AS NEEDED
Status: DISCONTINUED | OUTPATIENT
Start: 2023-03-29 | End: 2023-03-29 | Stop reason: SURG

## 2023-03-29 RX ORDER — LABETALOL HYDROCHLORIDE 5 MG/ML
5 INJECTION, SOLUTION INTRAVENOUS
Status: DISCONTINUED | OUTPATIENT
Start: 2023-03-29 | End: 2023-03-29 | Stop reason: HOSPADM

## 2023-03-29 RX ORDER — NALOXONE HCL 0.4 MG/ML
0.2 VIAL (ML) INJECTION AS NEEDED
Status: DISCONTINUED | OUTPATIENT
Start: 2023-03-29 | End: 2023-03-29 | Stop reason: HOSPADM

## 2023-03-29 RX ORDER — DIPHENHYDRAMINE HYDROCHLORIDE 50 MG/ML
12.5 INJECTION INTRAMUSCULAR; INTRAVENOUS
Status: DISCONTINUED | OUTPATIENT
Start: 2023-03-29 | End: 2023-03-29 | Stop reason: HOSPADM

## 2023-03-29 RX ORDER — OXYCODONE AND ACETAMINOPHEN 7.5; 325 MG/1; MG/1
1 TABLET ORAL EVERY 4 HOURS PRN
Qty: 20 TABLET | Refills: 0 | Status: ON HOLD | OUTPATIENT
Start: 2023-03-29 | End: 2023-04-12 | Stop reason: SDUPTHER

## 2023-03-29 RX ORDER — SODIUM CHLORIDE 0.9 % (FLUSH) 0.9 %
3 SYRINGE (ML) INJECTION EVERY 12 HOURS SCHEDULED
Status: DISCONTINUED | OUTPATIENT
Start: 2023-03-29 | End: 2023-03-29 | Stop reason: HOSPADM

## 2023-03-29 RX ORDER — IPRATROPIUM BROMIDE AND ALBUTEROL SULFATE 2.5; .5 MG/3ML; MG/3ML
3 SOLUTION RESPIRATORY (INHALATION) ONCE AS NEEDED
Status: DISCONTINUED | OUTPATIENT
Start: 2023-03-29 | End: 2023-03-29 | Stop reason: HOSPADM

## 2023-03-29 RX ORDER — PROMETHAZINE HYDROCHLORIDE 25 MG/1
25 SUPPOSITORY RECTAL ONCE AS NEEDED
Status: DISCONTINUED | OUTPATIENT
Start: 2023-03-29 | End: 2023-03-29 | Stop reason: HOSPADM

## 2023-03-29 RX ORDER — DEXAMETHASONE SODIUM PHOSPHATE 4 MG/ML
INJECTION, SOLUTION INTRA-ARTICULAR; INTRALESIONAL; INTRAMUSCULAR; INTRAVENOUS; SOFT TISSUE AS NEEDED
Status: DISCONTINUED | OUTPATIENT
Start: 2023-03-29 | End: 2023-03-29 | Stop reason: SURG

## 2023-03-29 RX ORDER — FAMOTIDINE 10 MG/ML
20 INJECTION, SOLUTION INTRAVENOUS ONCE
Status: COMPLETED | OUTPATIENT
Start: 2023-03-29 | End: 2023-03-29

## 2023-03-29 RX ORDER — PROPOFOL 10 MG/ML
VIAL (ML) INTRAVENOUS AS NEEDED
Status: DISCONTINUED | OUTPATIENT
Start: 2023-03-29 | End: 2023-03-29 | Stop reason: SURG

## 2023-03-29 RX ADMIN — FAMOTIDINE 20 MG: 10 INJECTION, SOLUTION INTRAVENOUS at 12:34

## 2023-03-29 RX ADMIN — LIDOCAINE HYDROCHLORIDE 100 MG: 20 INJECTION, SOLUTION INFILTRATION; PERINEURAL at 14:39

## 2023-03-29 RX ADMIN — ONDANSETRON 4 MG: 2 INJECTION INTRAMUSCULAR; INTRAVENOUS at 14:39

## 2023-03-29 RX ADMIN — PROPOFOL 150 MG: 10 INJECTION, EMULSION INTRAVENOUS at 14:39

## 2023-03-29 RX ADMIN — SODIUM CHLORIDE, POTASSIUM CHLORIDE, SODIUM LACTATE AND CALCIUM CHLORIDE 9 ML/HR: 600; 310; 30; 20 INJECTION, SOLUTION INTRAVENOUS at 12:34

## 2023-03-29 RX ADMIN — DEXAMETHASONE SODIUM PHOSPHATE 8 MG: 4 INJECTION, SOLUTION INTRAMUSCULAR; INTRAVENOUS at 14:39

## 2023-03-29 RX ADMIN — MIDAZOLAM 1 MG: 1 INJECTION INTRAMUSCULAR; INTRAVENOUS at 12:34

## 2023-03-29 RX ADMIN — FENTANYL CITRATE 50 MCG: 50 INJECTION, SOLUTION INTRAMUSCULAR; INTRAVENOUS at 13:10

## 2023-03-29 RX ADMIN — MIDAZOLAM 1 MG: 1 INJECTION INTRAMUSCULAR; INTRAVENOUS at 12:45

## 2023-03-29 RX ADMIN — OXYCODONE HYDROCHLORIDE AND ACETAMINOPHEN 1 TABLET: 7.5; 325 TABLET ORAL at 15:59

## 2023-03-29 NOTE — OP NOTE
Operative Report     [facility]    Patient: Urvashi Ornelas  Age:      57 y.o.  :     1965  Sex:      female    Medical Record:  [id number]    Date of Operation/Procedure:  3/29/2023    Pre-operative Diagnosis Code: Right Renal Calculi    Post-Op Diagnosis Codes:     * Renal calculi [N20.0]    Pre-operative Diagnosis Free Text:  Right Renal Calculi         Surgeon(s) and Role:     * Pepito Darnell MD - Primary     Name of Operation/Procedure:  Procedure(s) and Anesthesia Type:     * RIGHT EXTRACORPOREAL SHOCKWAVE LITHOTRIPSY - General    Findings/Complications: Multiple stones right lower pole well fragmented.  Stent left indwelling.    Description of procedure:  The patient was taken to the lithotripsy suite and placed under general anesthesia in supine position on the [employer] SLX-F2 lithotripter table.  Biplanar fluoroscopic imaging was used to identify and target the 15 mm stone in the right lower pole.  ESWL was commenced using slow escalation of power and rate to a peak power level of 7 and a total number of 3000 shocks given.  Intermittent fluoroscopy to confirm proper stone targeting was used throughout the case.  ECG monitoring was performed throughout the case to assure no ventricular ectopy or waveform changes from the lithotripter.  Excellent pulverization was observed.    Specimens:   Order Name Source Comment Collection Info Order Time   CBC (NO DIFF) Arm, Left  Collected By: Fleischer, Alexa, RN 3/29/2023 12:10 PM   COMPREHENSIVE METABOLIC PANEL Arm, Left  Collected By: Fleischer, Alexa, RN 3/29/2023 12:10 PM        Estimated Blood Loss: none    Stent: No stent placed    Pepito Darnell MD  3/29/2023  15:35 EDT

## 2023-03-29 NOTE — ANESTHESIA PROCEDURE NOTES
Airway  Date/Time: 3/29/2023 2:41 PM  Airway not difficult    General Information and Staff    Anesthesiologist: Ky Jefferson MD    Indications and Patient Condition    Preoxygenated: yes      Final Airway Details  Final airway type: supraglottic airway      Successful airway: unique  Size 4

## 2023-03-29 NOTE — ANESTHESIA POSTPROCEDURE EVALUATION
"Patient: Urvashi Ornelas    Procedure Summary     Date: 03/29/23 Room / Location: [facility] OR 03 / [facility] MAIN OR    Anesthesia Start: 1435 Anesthesia Stop: 1528    Procedure: RIGHT EXTRACORPOREAL SHOCKWAVE LITHOTRIPSY (Right) Diagnosis:       Renal calculi      (Right Renal Calculi)    Surgeons: Pepito Darnell MD Provider: Ky Jefferson MD    Anesthesia Type: general ASA Status: 3          Anesthesia Type: general    Vitals  Vitals Value Taken Time   /79 03/29/23 1538   Temp 36.7 °C (98.1 °F) 03/29/23 1520   Pulse 81 03/29/23 1540   Resp 16 03/29/23 1530   SpO2 96 % 03/29/23 1540   Vitals shown include unvalidated device data.        Post Anesthesia Care and Evaluation    Pain management: adequate    Airway patency: patent  Anesthetic complications: No anesthetic complications    Cardiovascular status: acceptable  Respiratory status: acceptable  Hydration status: acceptable    Comments: /83   Pulse 85   Temp 36.7 °C (98.1 °F) (Oral)   Resp 16   Ht 170.2 cm (67\")   Wt 94.1 kg (207 lb 7.3 oz)   SpO2 97%   BMI 32.49 kg/m²         " no
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
## 2023-03-29 NOTE — ANESTHESIA PREPROCEDURE EVALUATION
Anesthesia Evaluation     Patient summary reviewed and Nursing notes reviewed                Airway   Mallampati: II  TM distance: >3 FB  Neck ROM: full  Dental      Pulmonary    (+) pneumonia resolved , a smoker Former, asthma,sleep apnea,   Cardiovascular     ECG reviewed  Rhythm: regular  Rate: normal    (+) hypertension, DVT resolved, hyperlipidemia,       Neuro/Psych  (+) headaches, dizziness/light headedness, numbness, psychiatric history Anxiety, Depression and Bipolar,    GI/Hepatic/Renal/Endo    (+) obesity,   liver disease fatty liver disease, renal disease CRI, diabetes mellitus, thyroid problem hypothyroidism    Musculoskeletal     Abdominal    Substance History - negative use     OB/GYN negative ob/gyn ROS         Other   arthritis,    history of cancer remission                  Anesthesia Plan    ASA 3     general     (GA with LMA    I have reviewed the patient's history with the patient and the chart, including all pertinent laboratory results and imaging. I have explained the risks of anesthesia including but not limited to dental damage, corneal abrasion, nerve injury, MI, stroke, and death. Questions asked and answered. Anesthetic plan discussed with patient and team as indicated. Patient expressed understanding of the above.  )  intravenous induction     Anesthetic plan, risks, benefits, and alternatives have been provided, discussed and informed consent has been obtained with: patient.        CODE STATUS:

## 2023-04-03 ENCOUNTER — HOSPITAL ENCOUNTER (OUTPATIENT)
Dept: GENERAL RADIOLOGY | Facility: HOSPITAL | Age: 58
Discharge: HOME | End: 2023-04-03
Payer: MEDICARE

## 2023-04-03 ENCOUNTER — OFFICE VISIT (OUTPATIENT)
Dept: FAMILY MEDICINE CLINIC | Facility: CLINIC | Age: 58
End: 2023-04-03
Payer: MEDICARE

## 2023-04-03 ENCOUNTER — TELEPHONE (OUTPATIENT)
Dept: FAMILY MEDICINE CLINIC | Facility: CLINIC | Age: 58
End: 2023-04-03

## 2023-04-03 VITALS
DIASTOLIC BLOOD PRESSURE: 78 MMHG | HEART RATE: 129 BPM | OXYGEN SATURATION: 97 % | RESPIRATION RATE: 16 BRPM | TEMPERATURE: 98.3 F | WEIGHT: 213 LBS | HEIGHT: 67 IN | BODY MASS INDEX: 33.43 KG/M2 | SYSTOLIC BLOOD PRESSURE: 122 MMHG

## 2023-04-03 DIAGNOSIS — R00.0 TACHYCARDIA: ICD-10-CM

## 2023-04-03 DIAGNOSIS — R05.9 COUGH, UNSPECIFIED TYPE: ICD-10-CM

## 2023-04-03 DIAGNOSIS — K59.00 CONSTIPATION, UNSPECIFIED CONSTIPATION TYPE: ICD-10-CM

## 2023-04-03 DIAGNOSIS — N20.0 KIDNEY STONE: ICD-10-CM

## 2023-04-03 DIAGNOSIS — R06.02 SOB (SHORTNESS OF BREATH): ICD-10-CM

## 2023-04-03 DIAGNOSIS — R10.9 RIGHT FLANK PAIN: ICD-10-CM

## 2023-04-03 DIAGNOSIS — J06.9 ACUTE URI: Primary | ICD-10-CM

## 2023-04-03 LAB
EXPIRATION DATE: NORMAL
FLUAV AG UPPER RESP QL IA.RAPID: NOT DETECTED
FLUBV AG UPPER RESP QL IA.RAPID: NOT DETECTED
INTERNAL CONTROL: NORMAL
Lab: NORMAL
SARS-COV-2 AG UPPER RESP QL IA.RAPID: NOT DETECTED

## 2023-04-03 PROCEDURE — 74018 RADEX ABDOMEN 1 VIEW: CPT

## 2023-04-03 PROCEDURE — 71046 X-RAY EXAM CHEST 2 VIEWS: CPT

## 2023-04-03 PROCEDURE — 3074F SYST BP LT 130 MM HG: CPT | Performed by: NURSE PRACTITIONER

## 2023-04-03 PROCEDURE — 1160F RVW MEDS BY RX/DR IN RCRD: CPT | Performed by: NURSE PRACTITIONER

## 2023-04-03 PROCEDURE — 3078F DIAST BP <80 MM HG: CPT | Performed by: NURSE PRACTITIONER

## 2023-04-03 PROCEDURE — 99214 OFFICE O/P EST MOD 30 MIN: CPT | Performed by: NURSE PRACTITIONER

## 2023-04-03 PROCEDURE — 87428 SARSCOV & INF VIR A&B AG IA: CPT | Performed by: NURSE PRACTITIONER

## 2023-04-03 PROCEDURE — 1159F MED LIST DOCD IN RCRD: CPT | Performed by: NURSE PRACTITIONER

## 2023-04-03 RX ORDER — BENZONATATE 100 MG/1
100 CAPSULE ORAL 3 TIMES DAILY PRN
Qty: 30 CAPSULE | Refills: 0 | Status: SHIPPED | OUTPATIENT
Start: 2023-04-03 | End: 2023-04-10 | Stop reason: SDUPTHER

## 2023-04-03 RX ORDER — FLUTICASONE PROPIONATE 50 MCG
2 SPRAY, SUSPENSION (ML) NASAL DAILY
Qty: 16 G | Refills: 0 | Status: SHIPPED | OUTPATIENT
Start: 2023-04-03

## 2023-04-03 RX ORDER — GUAIFENESIN 600 MG/1
1200 TABLET, EXTENDED RELEASE ORAL 2 TIMES DAILY
Qty: 40 TABLET | Refills: 0 | Status: SHIPPED | OUTPATIENT
Start: 2023-04-03 | End: 2023-04-13 | Stop reason: SDUPTHER

## 2023-04-03 NOTE — PROGRESS NOTES
Chief Complaint  Cough (Since saturday)    Subjective          Urvashi Ornelas presents to [facility] for  History of Present Illness  She is here with complaint of cough that started on Saturday.  Her cough is productive with yellowish brown mucus.  She also complained of SOB, wheeze, congestion, rhinorrhea, sneezing, appetite change, fatigue and headache.  She denies fever, chills, sinus pain/pressure, sore throat, ear/eye pain/discharge.  She reports history of pneumonia in the past. She has not taken any medication to help with these symptoms.    Constipation - she has history of intermittent constipation and was treated for this in the recent past.  Today, she reports just starting to have constipation.  She was recently treated for constipation with Docusate Sodium and it helped her relieve constipation.  She has not taken any medication to help with constipation to date.  She reports having Docusate Sodium at home from previous treatment.    She also complains of continued right side and right flank abdominal pain.  She has intermittent abdominal pain.  Her pain is a dull ache, pain level 4/10.  She complains of intermittent right flank pain, pain is sharp, pain level 6/10.  She also has an elevated heart rate.  She had recent visit to ED with diagnoses of right kidney stone and pyelonephritis with right ureter stent placement.  Since then, she had ESWL procedure on 3/10/23 and another ESWL procedure was done.  She still has right ureter stent in place.  Her next appointment with Dr. Pepito Darnell, at [facility], is on 4/4/23 to determine next step for treatment of right kidney stone.  Due to her elevated heart rate with abdominal pain, I notified Dr. Pepito Darnell of her symptoms to see if he wanted to see her sooner.  I also informed him that I did not think she had an acute abdomin where I needed to send her to the ED.  I told Dr. Darnell that I was sending her to   "for a chest X-ray r/t SOB, and diminished breath sounds right lobe.  He requested that I also order a KUB on her today.  Order placed for KUB today.      Review of Systems   Constitutional: Positive for appetite change and fatigue. Negative for chills and fever.   HENT: Positive for congestion, rhinorrhea and sneezing. Negative for dental problem, ear discharge, ear pain, postnasal drip, sinus pressure, sinus pain, sore throat and trouble swallowing.    Eyes: Positive for pain. Negative for discharge.        Eyes achy   Respiratory: Positive for shortness of breath and wheezing.    Cardiovascular: Negative for chest pain and palpitations.   Gastrointestinal: Positive for abdominal pain, constipation and nausea. Negative for abdominal distention, blood in stool, diarrhea and vomiting.   Genitourinary: Positive for flank pain and frequency. Negative for decreased urine volume, difficulty urinating, dysuria and hematuria.        Abd pain, dull ache, #4, ongoing intermittent nausea for couple months.     Musculoskeletal:        Intermittent right flank pain, pain is sharp, pain level 6   Neurological: Positive for headaches. Negative for dizziness, syncope and light-headedness.        H/A pain 7,throbbing         Objective   Vital Signs:   /78 (BP Location: Right arm, Patient Position: Sitting, Cuff Size: Adult)   Pulse (!) 129   Temp 98.3 °F (36.8 °C) (Oral)   Resp 16   Ht 170.2 cm (67.01\")   Wt 96.6 kg (213 lb)   SpO2 97%   BMI 33.35 kg/m²     Physical Exam  Vitals and nursing note reviewed.   Constitutional:       General: She is not in acute distress.     Appearance: Normal appearance. She is not ill-appearing, toxic-appearing or diaphoretic.   HENT:      Head: Normocephalic and atraumatic.      Right Ear: Tympanic membrane, ear canal and external ear normal. No tenderness. No middle ear effusion. There is no impacted cerumen.      Left Ear: Tympanic membrane, ear canal and external ear normal. No " tenderness.  No middle ear effusion. There is no impacted cerumen.      Nose: Congestion present. No nasal tenderness, mucosal edema or rhinorrhea.      Right Turbinates: Pale. Not enlarged or swollen.      Left Turbinates: Pale. Not enlarged or swollen.      Right Sinus: No maxillary sinus tenderness or frontal sinus tenderness.      Left Sinus: No maxillary sinus tenderness or frontal sinus tenderness.      Mouth/Throat:      Lips: Pink.      Mouth: Mucous membranes are moist.      Tongue: No lesions. Tongue does not deviate from midline.      Palate: No mass and lesions.      Pharynx: Oropharynx is clear. Uvula midline. No oropharyngeal exudate, posterior oropharyngeal erythema or uvula swelling.   Eyes:      General:         Right eye: No discharge.         Left eye: No discharge.      Conjunctiva/sclera: Conjunctivae normal.      Pupils: Pupils are equal, round, and reactive to light.   Cardiovascular:      Rate and Rhythm: Regular rhythm. Tachycardia present.      Heart sounds: Normal heart sounds. No murmur heard.  Pulmonary:      Effort: Pulmonary effort is normal. No respiratory distress.      Breath sounds: No decreased breath sounds, wheezing, rhonchi or rales.      Comments: Diminished breath sounds RLL  Abdominal:      General: Bowel sounds are normal. There is no distension.      Palpations: Abdomen is soft. There is no mass.      Tenderness: There is abdominal tenderness. There is right CVA tenderness. There is no left CVA tenderness, guarding or rebound.      Comments: Right lateral abdomen tenderness on palpation   Musculoskeletal:      Cervical back: Neck supple.   Lymphadenopathy:      Cervical: No cervical adenopathy.   Skin:     General: Skin is warm and dry.      Findings: No erythema.   Neurological:      General: No focal deficit present.      Mental Status: She is alert.   Psychiatric:         Mood and Affect: Mood normal.        Result Review :     Common labs        2/19/2023    07:50  3/2/2023    11:37 3/29/2023    12:13 3/29/2023    12:14   Common Labs   Glucose 107   95    95     BUN 9   12    14     Creatinine 1.21   1.33    1.07     Sodium 136   141    138     Potassium 4.2   3.7    3.6     Chloride 111   106    107     Calcium 9.0   8.5    8.3     Total Protein  6.5       Albumin  3.6    3.6     Total Bilirubin  0.2    0.3     Alkaline Phosphatase  89    89     AST (SGOT)  16    16     ALT (SGPT)  14    17     WBC 9.24   5.8   3.58      Hemoglobin 10.6   12.6   12.1      Hematocrit 32.1   38.4   36.8      Platelets 233   247   160        Data reviewed: Consultant notes [facility] Surgery Note 3/29/23          Assessment and Plan    Diagnoses and all orders for this visit:    1. Acute URI (Primary)  Comments:  COVID, Infuenza A & B results - Negative  Take Tylenol (OTC) for pain/fever.  Increase water intake.  Orders:  -     guaiFENesin (Mucinex) 600 MG 12 hr tablet; Take 2 tablets by mouth 2 (Two) Times a Day.  Dispense: 40 tablet; Refill: 0  -     fluticasone (FLONASE) 50 MCG/ACT nasal spray; 2 sprays into the nostril(s) as directed by provider Daily.  Dispense: 16 g; Refill: 0  -     Discontinue: benzonatate (Tessalon Perles) 100 MG capsule; Take 1 capsule by mouth 3 (Three) Times a Day As Needed for Cough.  Dispense: 30 capsule; Refill: 0  -     Discontinue: dextromethorphan 15 MG/5ML syrup; Take 10 mL by mouth 3 (Three) Times a Day As Needed for Cough.  Dispense: 118 mL; Refill: 1    2. SOB (shortness of breath)  Comments:  Continue Albuterol HFA PRN - SOB/Wheeze  Order: Chest X-ray  Will call with results of X-ray  Orders:  -     XR Chest PA & Lateral    3. Tachycardia  Comments:  ; Oxygen 97%; Temp 98.3; Right Flank/Side Pain r/t right kidney stone - followed by Dr. RUFINO Darnell. appt. tomorrow.    4. Right flank pain  Comments:  Right side Kidney stone w/stent and recent ESWL.  Order: KUB  Appt. with Dr. Roge Darnell tomorrow r/t right kidney stone.  Orders:  -     XR Abdomen  KUB    5. Kidney stone  Comments:  Right kidney stone followed by Dr. Pepito Darnell  Orders:  -     XR Abdomen KUB    6. Constipation, unspecified constipation type  Assessment & Plan:  C/O beginning Constipation.  Recommend increasing fruit and fiber.  Decrease caffeine, increase water intake.  Increase activity as tolerated.  Start Docusate Sodium BID PRN (prior RX) constipation and titrate to soft stool.  Follow-up if symptoms worsen or do not improve.      7. Cough, unspecified type  -     POCT SARS-CoV-2 Antigen JACQUELINE + Flu    Discussed with patient that if her symptoms worsen, SOB, heart palpitations, chest pain, increased abdominal/flank pain, fever, chills to go immediately to the ED and patient agreed.     Recommended patient follow-up with me after she sees Dr. Pepito Darnell and she agreed.      Follow Up   No follow-ups on file.  Patient was given instructions and counseling regarding her condition or for health maintenance advice. Please see specific information pulled into the AVS if appropriate.

## 2023-04-04 ENCOUNTER — TELEPHONE (OUTPATIENT)
Dept: FAMILY MEDICINE CLINIC | Facility: CLINIC | Age: 58
End: 2023-04-04

## 2023-04-04 NOTE — TELEPHONE ENCOUNTER
Caller: Urvashi Ornelas    Relationship to patient: Self    Best call back number:      Patient is needing: PATIENT WOULD LIKE A CALL BACK WITH THE RESULTS FROM HER CHEST XRAY.

## 2023-04-10 ENCOUNTER — OFFICE VISIT (OUTPATIENT)
Dept: FAMILY MEDICINE CLINIC | Facility: CLINIC | Age: 58
End: 2023-04-10
Payer: MEDICARE

## 2023-04-10 VITALS
BODY MASS INDEX: 34.06 KG/M2 | HEIGHT: 67 IN | SYSTOLIC BLOOD PRESSURE: 120 MMHG | OXYGEN SATURATION: 97 % | WEIGHT: 217 LBS | HEART RATE: 96 BPM | DIASTOLIC BLOOD PRESSURE: 78 MMHG | RESPIRATION RATE: 16 BRPM

## 2023-04-10 DIAGNOSIS — E78.00 HYPERCHOLESTEROLEMIA: ICD-10-CM

## 2023-04-10 DIAGNOSIS — J06.9 ACUTE URI: ICD-10-CM

## 2023-04-10 DIAGNOSIS — Z00.00 MEDICARE ANNUAL WELLNESS VISIT, SUBSEQUENT: Primary | ICD-10-CM

## 2023-04-10 DIAGNOSIS — I10 ESSENTIAL HYPERTENSION: ICD-10-CM

## 2023-04-10 PROCEDURE — G0439 PPPS, SUBSEQ VISIT: HCPCS | Performed by: NURSE PRACTITIONER

## 2023-04-10 PROCEDURE — 3078F DIAST BP <80 MM HG: CPT | Performed by: NURSE PRACTITIONER

## 2023-04-10 PROCEDURE — 3074F SYST BP LT 130 MM HG: CPT | Performed by: NURSE PRACTITIONER

## 2023-04-10 RX ORDER — DEXTROMETHORPHAN POLISTIREX 30 MG/5ML
30 SUSPENSION ORAL 3 TIMES DAILY PRN
COMMUNITY
Start: 2023-04-03 | End: 2023-04-10

## 2023-04-10 RX ORDER — BENZONATATE 100 MG/1
100 CAPSULE ORAL 3 TIMES DAILY PRN
Qty: 30 CAPSULE | Refills: 0 | Status: SHIPPED | OUTPATIENT
Start: 2023-04-10

## 2023-04-10 NOTE — PROGRESS NOTES
The ABCs of the Annual Wellness Visit  Subsequent Medicare Wellness Visit    Subjective      Urvashi Ornelas is a 57 y.o. female who presents for a Subsequent Medicare Wellness Visit.    The following portions of the patient's history were reviewed and   updated as appropriate: allergies, current medications, past family history, past medical history, past social history, past surgical history and problem list.    Compared to one year ago, the patient feels her physical   health is the same.    Compared to one year ago, the patient feels her mental   health is the same.    Recent Hospitalizations:  This patient has had a [facility] admission record on file within the last 365 days.    Current Medical Providers:  Patient Care Team:  Meggan Washington APRN as PCP - General (Nurse Practitioner)  Bakari Mora MD as Consulting Physician (Endocrinology)  Dayo Ashby MD as Surgeon (Otolaryngology)  Yonis Sweeney MD as Consulting Physician (Pulmonary Disease)    Outpatient Medications Prior to Visit   Medication Sig Dispense Refill   • acetaminophen (TYLENOL) 500 MG tablet Take 1 tablet by mouth Every 6 (Six) Hours As Needed for Mild Pain.     • albuterol (PROVENTIL HFA;VENTOLIN HFA) 108 (90 BASE) MCG/ACT inhaler Inhale 2 puffs Every 4 (Four) Hours As Needed for Wheezing.     • azelastine (ASTELIN) 0.1 % nasal spray INSTILL 2 SPRAYS INTO THE NOSTRIL(S) AS DIRECTED 2 (TWO) TIMES A DAY. 30 each 1   • benzonatate (Tessalon Perles) 100 MG capsule Take 1 capsule by mouth 3 (Three) Times a Day As Needed for Cough. 30 capsule 0   • buPROPion XL (WELLBUTRIN XL) 300 MG 24 hr tablet Take 1 tablet by mouth Every Morning.     • busPIRone (BUSPAR) 30 MG tablet Take 1 tablet by mouth 2 (Two) Times a Day As Needed.  0   • dextromethorphan 15 MG/5ML syrup Take 10 mL by mouth 3 (Three) Times a Day As Needed for Cough. 118 mL 1   • docusate sodium (COLACE) 100 MG capsule Take 1 capsule by mouth 2 (Two) Times a Day. 60  capsule 0   • fexofenadine (ALLEGRA) 180 MG tablet Take 1 tablet by mouth Daily.     • fluticasone (FLONASE) 50 MCG/ACT nasal spray 2 sprays into the nostril(s) as directed by provider Daily. 16 g 0   • guaiFENesin (Mucinex) 600 MG 12 hr tablet Take 2 tablets by mouth 2 (Two) Times a Day. 40 tablet 0   • ketotifen (ZADITOR) 0.025 % ophthalmic solution INSTILL 1 DROP EACH EYE 2-3 TIMES DAILY AS NEEDED     • levothyroxine (SYNTHROID, LEVOTHROID) 200 MCG tablet TAKE 1 TABLET BY MOUTH EVERY DAY IN THE MORNING 90 tablet 1   • ondansetron (ZOFRAN) 4 MG tablet Take 1 tablet by mouth Every 8 (Eight) Hours As Needed for Nausea or Vomiting. 30 tablet 0   • ondansetron ODT (ZOFRAN-ODT) 8 MG disintegrating tablet As Needed.     • oxyCODONE-acetaminophen (Percocet) 7.5-325 MG per tablet Take 1 tablet by mouth Every 4 (Four) Hours As Needed for Moderate Pain. 20 tablet 0   • prazosin (MINIPRESS) 2 MG capsule At Night As Needed.     • tamsulosin (FLOMAX) 0.4 MG capsule 24 hr capsule Take 1 capsule by mouth Daily. 30 capsule 0   • Multiple Vitamins-Minerals (MULTIVITAMIN WOMEN 50+) tablet Take 1 tablet/day by mouth. (Patient not taking: Reported on 4/10/2023)     • polyethylene glycol (MIRALAX) 17 GM/SCOOP powder Take 17 g by mouth 2 (Two) Times a Day As Needed (Constipation). (Patient not taking: Reported on 4/10/2023) 510 g 2   • dextromethorphan polistirex ER (DELSYM) 30 MG/5ML Suspension Extended Release oral suspension Take 5 mL by mouth 3 (Three) Times a Day As Needed.     • HYDROcodone-acetaminophen (Norco) 5-325 MG per tablet Take 1 tablet by mouth Every 6 (Six) Hours As Needed for Severe Pain. (Patient not taking: Reported on 4/10/2023) 8 tablet 0     No facility-administered medications prior to visit.       Opioid medication/s are on active medication list.  and I have evaluated her active treatment plan and pain score trends (see table).  There were no vitals filed for this visit.  I have reviewed the chart for  "potential of high risk medication and harmful drug interactions in the elderly.            /78   Pulse 96   Resp 16   Ht 170.2 cm (67.01\")   Wt 98.4 kg (217 lb)   SpO2 97%   BMI 33.98 kg/m²     Aspirin is not on active medication list.  Aspirin use is not indicated based on review of current medical condition/s. Risk of harm outweighs potential benefits.  .    Patient Active Problem List   Diagnosis   • Thyroid cancer   • Asthma   • Acute bronchitis   • Vitamin D deficiency   • Trigger thumb of left hand   • Tardive dyskinesia   • Syrinx of spinal cord   • Schizoaffective disorder   • PTSD (post-traumatic stress disorder)   • Osteopenia   • Hypothyroidism   • Episodic paroxysmal hemicrania, not intractable   • Medicare annual wellness visit, subsequent   • Pneumonia   • Migraines   • Anxiety   • Obesity, Class III, BMI 40-49.9 (morbid obesity)   • Parapneumonic effusion   • Hypokalemia   • CKD (chronic kidney disease) stage 3, GFR 30-59 ml/min (HCC)   • Bipolar disorder (HCC)   • Hypercholesterolemia   • Pulmonary embolism   • Pruritus   • Chronic low back pain   • Rectal bleeding   • Constipation   • Colon polyps   • H/O hypokalemia   • Muscle cramps   • Hematuria   • Shortness of breath on exertion   • Pyelonephritis of right kidney   • Right ureteral stone   • Belching   • Bladder and urethra injury   • Borderline personality disorder   • Essential hypertension   • Kidney stone     Advance Care Planning   Advance Care Planning     Advance Directive is not on file.  ACP discussion was held with the patient during this visit. Patient does not have an advance directive, information provided.     Objective    Vitals:    04/10/23 1345   BP: 120/78   Pulse: 96   Resp: 16   SpO2: 97%   Weight: 98.4 kg (217 lb)   Height: 170.2 cm (67.01\")     Estimated body mass index is 33.98 kg/m² as calculated from the following:    Height as of this encounter: 170.2 cm (67.01\").    Weight as of this encounter: 98.4 kg " (217 lb).    BMI is >= 30 and <35. (Class 1 Obesity). The following options were offered after discussion;: exercise counseling/recommendations and nutrition counseling/recommendations      Does the patient have evidence of cognitive impairment?   No            HEALTH RISK ASSESSMENT    Smoking Status:  Social History     Tobacco Use   Smoking Status Former   • Packs/day: 0.50   • Years: 18.00   • Pack years: 9.00   • Types: Cigarettes   • Start date: 6/10/1995   • Quit date: 2010   • Years since quittin.2   • Passive exposure: Never   Smokeless Tobacco Never   Tobacco Comments    I smoked from  -  then from [id number]     Alcohol Consumption:  Social History     Substance and Sexual Activity   Alcohol Use Never     Fall Risk Screen:    MEGA Fall Risk Assessment has not been completed.    Depression Screening:  PHQ-2/PHQ-9 Depression Screening 4/10/2023   Little Interest or Pleasure in Doing Things 0-->not at all   Feeling Down, Depressed or Hopeless 0-->not at all   Trouble Falling or Staying Asleep, or Sleeping Too Much -   Feeling Tired or Having Little Energy -   Poor Appetite or Overeating -   Feeling Bad about Yourself - or that You are a Failure or Have Let Yourself or Your Family Down -   Trouble Concentrating on Things, Such as Reading the Newspaper or Watching Television -   Moving or Speaking So Slowly that Other People Could Have Noticed? Or the Opposite - Being So Fidgety -   Thoughts that You Would be Better Off Dead or of Hurting Yourself in Some Way -   PHQ-9: Brief Depression Severity Measure Score 0   If You Checked Off Any Problems, How Difficult Have These Problems Made It For You to Do Your Work, Take Care of Things at Home, or Get Along with Other People? -       Health Habits and Functional and Cognitive Screening:  Functional & Cognitive Status 4/10/2023   Do you have difficulty preparing food and eating? No   Do you have difficulty bathing yourself, getting dressed or  grooming yourself? No   Do you have difficulty using the toilet? No   Do you have difficulty moving around from place to place? No   Do you have trouble with steps or getting out of a bed or a chair? No   Current Diet Unhealthy Diet   Dental Exam Up to date   Eye Exam Not up to date   Exercise (times per week) 0 times per week   Current Exercises Include -   Current Exercise Activities Include -   Do you need help using the phone?  No   Are you deaf or do you have serious difficulty hearing?  No   Do you need help with transportation? No   Do you need help shopping? No   Do you need help preparing meals?  No   Do you need help with housework?  No   Do you need help with laundry? No   Do you need help taking your medications? No   Do you need help managing money? No   Do you ever drive or ride in a car without wearing a seat belt? No   Have you felt unusual stress, anger or loneliness in the last month? No   Who do you live with? Other   If you need help, do you have trouble finding someone available to you? No   Have you been bothered in the last four weeks by sexual problems? No   Do you have difficulty concentrating, remembering or making decisions? No       Age-appropriate Screening Schedule:  Refer to the list below for future screening recommendations based on patient's age, sex and/or medical conditions. Orders for these recommended tests are listed in the plan section. The patient has been provided with a written plan.    Health Maintenance   Topic Date Due   • ZOSTER VACCINE (1 of 2) Never done   • DXA SCAN  11/17/2019   • ANNUAL WELLNESS VISIT  01/05/2023   • COLORECTAL CANCER SCREENING  03/14/2023   • INFLUENZA VACCINE  08/01/2023   • LIPID PANEL  01/10/2024   • MAMMOGRAM  03/21/2024   • TDAP/TD VACCINES (2 - Td or Tdap) 05/20/2029   • COVID-19 Vaccine  Completed   • Pneumococcal Vaccine 0-64  Completed   • HEPATITIS C SCREENING  Discontinued   • PAP SMEAR  Discontinued                  CMS Preventative  Services Quick Reference  Risk Factors Identified During Encounter:    Depression/Dysphoria: She is currently going to [facility].  Hearing Problem: She has hearing aids, but seldom wears them.  Immunizations Discussed/Encouraged: Shingrix  Polypharmacy: Medication List reviewed and Medications are appropriate for patient.  Vision Screening Recommended.  She has a regular eye doctor that she sees and she will make an appointment within the next couple months.    The above risks/problems have been discussed with the patient.  Pertinent information has been shared with the patient in the After Visit Summary.    There are no diagnoses linked to this encounter.       [facility] is planning for surgery on Wednesday to have right kidney stone/ureter stone removed.  She is unsure as to what procedure is going to be used to remove kidney stones.     Follow Up:   Next Medicare Wellness visit to be scheduled in 1 year.      An After Visit Summary and PPPS were made available to the patient.

## 2023-04-12 ENCOUNTER — ANESTHESIA (OUTPATIENT)
Dept: PERIOP | Facility: HOSPITAL | Age: 58
End: 2023-04-12
Payer: MEDICARE

## 2023-04-12 ENCOUNTER — HOSPITAL ENCOUNTER (OUTPATIENT)
Facility: HOSPITAL | Age: 58
Setting detail: HOSPITAL OUTPATIENT SURGERY
Discharge: HOME | End: 2023-04-12
Attending: UROLOGY | Admitting: UROLOGY
Payer: MEDICARE

## 2023-04-12 ENCOUNTER — ANESTHESIA EVENT (OUTPATIENT)
Dept: PERIOP | Facility: HOSPITAL | Age: 58
End: 2023-04-12
Payer: MEDICARE

## 2023-04-12 ENCOUNTER — APPOINTMENT (OUTPATIENT)
Dept: GENERAL RADIOLOGY | Facility: HOSPITAL | Age: 58
End: 2023-04-12
Payer: MEDICARE

## 2023-04-12 VITALS
SYSTOLIC BLOOD PRESSURE: 126 MMHG | BODY MASS INDEX: 33.99 KG/M2 | OXYGEN SATURATION: 98 % | HEIGHT: 67 IN | RESPIRATION RATE: 16 BRPM | DIASTOLIC BLOOD PRESSURE: 84 MMHG | TEMPERATURE: 98.6 F | HEART RATE: 76 BPM

## 2023-04-12 DIAGNOSIS — N20.0 KIDNEY STONES: ICD-10-CM

## 2023-04-12 DIAGNOSIS — N20.1 RIGHT URETERAL STONE: Primary | ICD-10-CM

## 2023-04-12 DIAGNOSIS — N20.0 KIDNEY STONE: ICD-10-CM

## 2023-04-12 LAB
GLUCOSE BLDC GLUCOMTR-MCNC: 102 MG/DL (ref 70–130)
GLUCOSE BLDC GLUCOMTR-MCNC: 84 MG/DL (ref 70–130)

## 2023-04-12 PROCEDURE — 25010000002 CEFAZOLIN IN DEXTROSE 2-4 GM/100ML-% SOLUTION: Performed by: UROLOGY

## 2023-04-12 PROCEDURE — 76000 FLUOROSCOPY <1 HR PHYS/QHP: CPT

## 2023-04-12 PROCEDURE — 74018 RADEX ABDOMEN 1 VIEW: CPT

## 2023-04-12 PROCEDURE — 25010000002 MIDAZOLAM PER 1 MG: Performed by: STUDENT IN AN ORGANIZED HEALTH CARE EDUCATION/TRAINING PROGRAM

## 2023-04-12 PROCEDURE — 25010000002 PROPOFOL 10 MG/ML EMULSION: Performed by: NURSE ANESTHETIST, CERTIFIED REGISTERED

## 2023-04-12 PROCEDURE — C1769 GUIDE WIRE: HCPCS | Performed by: UROLOGY

## 2023-04-12 PROCEDURE — 82365 CALCULUS SPECTROSCOPY: CPT | Performed by: UROLOGY

## 2023-04-12 PROCEDURE — 82962 GLUCOSE BLOOD TEST: CPT

## 2023-04-12 PROCEDURE — 25010000002 DEXAMETHASONE SODIUM PHOSPHATE 20 MG/5ML SOLUTION: Performed by: ANESTHESIOLOGY

## 2023-04-12 PROCEDURE — 25010000002 ONDANSETRON PER 1 MG: Performed by: ANESTHESIOLOGY

## 2023-04-12 PROCEDURE — C1894 INTRO/SHEATH, NON-LASER: HCPCS | Performed by: UROLOGY

## 2023-04-12 RX ORDER — CEPHALEXIN 500 MG/1
500 CAPSULE ORAL 2 TIMES DAILY
Qty: 6 CAPSULE | Refills: 0 | Status: SHIPPED | OUTPATIENT
Start: 2023-04-12 | End: 2023-04-15

## 2023-04-12 RX ORDER — DEXAMETHASONE SODIUM PHOSPHATE 4 MG/ML
INJECTION, SOLUTION INTRA-ARTICULAR; INTRALESIONAL; INTRAMUSCULAR; INTRAVENOUS; SOFT TISSUE AS NEEDED
Status: DISCONTINUED | OUTPATIENT
Start: 2023-04-12 | End: 2023-04-12 | Stop reason: SURG

## 2023-04-12 RX ORDER — CEFAZOLIN SODIUM 2 G/100ML
2 INJECTION, SOLUTION INTRAVENOUS
Status: DISCONTINUED | OUTPATIENT
Start: 2023-04-13 | End: 2023-04-12

## 2023-04-12 RX ORDER — PROPOFOL 10 MG/ML
VIAL (ML) INTRAVENOUS AS NEEDED
Status: DISCONTINUED | OUTPATIENT
Start: 2023-04-12 | End: 2023-04-12 | Stop reason: SURG

## 2023-04-12 RX ORDER — SODIUM CHLORIDE 0.9 % (FLUSH) 0.9 %
3-10 SYRINGE (ML) INJECTION AS NEEDED
Status: DISCONTINUED | OUTPATIENT
Start: 2023-04-12 | End: 2023-04-12 | Stop reason: HOSPADM

## 2023-04-12 RX ORDER — LIDOCAINE HYDROCHLORIDE 20 MG/ML
INJECTION, SOLUTION INFILTRATION; PERINEURAL AS NEEDED
Status: DISCONTINUED | OUTPATIENT
Start: 2023-04-12 | End: 2023-04-12 | Stop reason: SURG

## 2023-04-12 RX ORDER — OXYCODONE AND ACETAMINOPHEN 7.5; 325 MG/1; MG/1
1 TABLET ORAL EVERY 6 HOURS PRN
Qty: 6 TABLET | Refills: 0 | Status: SHIPPED | OUTPATIENT
Start: 2023-04-12

## 2023-04-12 RX ORDER — SODIUM CHLORIDE 0.9 % (FLUSH) 0.9 %
3 SYRINGE (ML) INJECTION EVERY 12 HOURS SCHEDULED
Status: DISCONTINUED | OUTPATIENT
Start: 2023-04-12 | End: 2023-04-12 | Stop reason: HOSPADM

## 2023-04-12 RX ORDER — MIDAZOLAM HYDROCHLORIDE 1 MG/ML
1 INJECTION INTRAMUSCULAR; INTRAVENOUS
Status: DISCONTINUED | OUTPATIENT
Start: 2023-04-12 | End: 2023-04-12 | Stop reason: HOSPADM

## 2023-04-12 RX ORDER — OXYCODONE AND ACETAMINOPHEN 7.5; 325 MG/1; MG/1
1 TABLET ORAL ONCE AS NEEDED
Status: COMPLETED | OUTPATIENT
Start: 2023-04-12 | End: 2023-04-12

## 2023-04-12 RX ORDER — ONDANSETRON 2 MG/ML
INJECTION INTRAMUSCULAR; INTRAVENOUS AS NEEDED
Status: DISCONTINUED | OUTPATIENT
Start: 2023-04-12 | End: 2023-04-12 | Stop reason: SURG

## 2023-04-12 RX ORDER — SODIUM CHLORIDE, SODIUM LACTATE, POTASSIUM CHLORIDE, CALCIUM CHLORIDE 600; 310; 30; 20 MG/100ML; MG/100ML; MG/100ML; MG/100ML
INJECTION, SOLUTION INTRAVENOUS CONTINUOUS PRN
Status: DISCONTINUED | OUTPATIENT
Start: 2023-04-12 | End: 2023-04-12 | Stop reason: SURG

## 2023-04-12 RX ORDER — LIDOCAINE HYDROCHLORIDE 10 MG/ML
0.5 INJECTION, SOLUTION EPIDURAL; INFILTRATION; INTRACAUDAL; PERINEURAL ONCE AS NEEDED
Status: DISCONTINUED | OUTPATIENT
Start: 2023-04-12 | End: 2023-04-12 | Stop reason: HOSPADM

## 2023-04-12 RX ORDER — CEFAZOLIN SODIUM 2 G/100ML
2 INJECTION, SOLUTION INTRAVENOUS ONCE
Status: COMPLETED | OUTPATIENT
Start: 2023-04-12 | End: 2023-04-12

## 2023-04-12 RX ORDER — SODIUM CHLORIDE, SODIUM LACTATE, POTASSIUM CHLORIDE, CALCIUM CHLORIDE 600; 310; 30; 20 MG/100ML; MG/100ML; MG/100ML; MG/100ML
9 INJECTION, SOLUTION INTRAVENOUS CONTINUOUS
Status: DISCONTINUED | OUTPATIENT
Start: 2023-04-12 | End: 2023-04-12 | Stop reason: HOSPADM

## 2023-04-12 RX ADMIN — SODIUM CHLORIDE, POTASSIUM CHLORIDE, SODIUM LACTATE AND CALCIUM CHLORIDE 9 ML/HR: 600; 310; 30; 20 INJECTION, SOLUTION INTRAVENOUS at 15:10

## 2023-04-12 RX ADMIN — MIDAZOLAM 1 MG: 1 INJECTION INTRAMUSCULAR; INTRAVENOUS at 15:10

## 2023-04-12 RX ADMIN — LIDOCAINE HYDROCHLORIDE 60 MG: 20 INJECTION, SOLUTION INFILTRATION; PERINEURAL at 17:26

## 2023-04-12 RX ADMIN — PROPOFOL 200 MG: 10 INJECTION, EMULSION INTRAVENOUS at 17:26

## 2023-04-12 RX ADMIN — ONDANSETRON 4 MG: 2 INJECTION INTRAMUSCULAR; INTRAVENOUS at 17:38

## 2023-04-12 RX ADMIN — DEXAMETHASONE SODIUM PHOSPHATE 8 MG: 4 INJECTION, SOLUTION INTRAMUSCULAR; INTRAVENOUS at 17:35

## 2023-04-12 RX ADMIN — SODIUM CHLORIDE, POTASSIUM CHLORIDE, SODIUM LACTATE AND CALCIUM CHLORIDE: 600; 310; 30; 20 INJECTION, SOLUTION INTRAVENOUS at 17:23

## 2023-04-12 RX ADMIN — CEFAZOLIN SODIUM 2 G: 2 INJECTION, SOLUTION INTRAVENOUS at 17:02

## 2023-04-12 RX ADMIN — OXYCODONE HYDROCHLORIDE AND ACETAMINOPHEN 1 TABLET: 7.5; 325 TABLET ORAL at 18:29

## 2023-04-12 NOTE — ANESTHESIA PREPROCEDURE EVALUATION
Anesthesia Evaluation     Patient summary reviewed and Nursing notes reviewed   no history of anesthetic complications:  NPO Solid Status: > 8 hours  NPO Liquid Status: > 2 hours           Airway   Mallampati: II  TM distance: >3 FB  Neck ROM: full  Dental      Pulmonary    (+) asthma,sleep apnea,   Cardiovascular     ECG reviewed    (+) hypertension, hyperlipidemia,       Neuro/Psych  GI/Hepatic/Renal/Endo    (+) obesity,   liver disease fatty liver disease, renal disease CRI, diabetes mellitus, thyroid problem hypothyroidism    Musculoskeletal     Abdominal    Substance History      OB/GYN          Other                        Anesthesia Plan    ASA 3     general     intravenous induction     Anesthetic plan, risks, benefits, and alternatives have been provided, discussed and informed consent has been obtained with: patient.        CODE STATUS:

## 2023-04-12 NOTE — ANESTHESIA PROCEDURE NOTES
Airway  Urgency: elective    Date/Time: 4/12/2023 5:28 PM  Airway not difficult    General Information and Staff    Patient location during procedure: OR  CRNA/CAA: Mandeep Campbell CRNA    Indications and Patient Condition  Indications for airway management: airway protection    Preoxygenated: yes  MILS maintained throughout  Mask difficulty assessment: 1 - vent by mask    Final Airway Details  Final airway type: supraglottic airway      Successful airway: classic  Size 4     Number of attempts at approach: 1  Assessment: lips, teeth, and gum same as pre-op and atraumatic intubation
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
## 2023-04-12 NOTE — OP NOTE
Operative Report     [facility] OR    Patient: Urvashi Ornelas  Age:      57 y.o.  :     1965  Sex:      female    Medical Record:  [id number]    Date of Operation/Procedure:  2023    Pre-operative Diagnosis:   Right ureteral and renal stones    Post-operative Diagnosis:  Same    Surgeon:  Pepito Davidson MD    Name of Operation/Procedure:      Cystoscopy, right ureteroscopy with laser lithotripsy and basket extraction of stones  Right stent exchange    Findings/Complications:      No ureteral stones, multiple small stone fragments in several calyces  No complications    Description of procedure:     The patient was taken to the OR and placed under GA in lithotomy position.  The genitalia were prepped and draped in sterile fashion.  The 21 Fr cystoscope was introduced and pan-cystoscopy was performed using the 30 degree lens.  No tumors or stones were seen.    The previously placed right ureteral stent was grasped and removed to the meatus.  A Sensor wire was passed through it into the kidney without difficulty and the old stent was removed.  The wire was tagged to the drapes.  The rigid ureteroscope was passed into the bladder and then the ureter - no stone was encountered in the ureter.    I then advanced a 2nd wire into the right kidney and removed the rigid scope.  I advanced a ureteroscopic access sheath into the right kidney.  The flexible ureteroscope was then passed into the kidney and nephroscopy was performed.  The lowest calyx had multiple small stone fragments as well as 5-6 fragments > 4 mm - these were all fragmented into dust with a 200 micron laser fiber, and fragments > 2 mm were then systematically retrieved using a stone basket - stones sent for analysis.  Additional nephroscopy was then performed - no other sizeable fragments were seen but a large number of small < 2 mm fragments were noted in the renal pelvis and multiple calyces.  When no additional stones were seen, I  slowly removed the uscope and no more stones seen in the renal pelvis or ureter.    I then elected to remove the sheath and did not replace a stent, as she has had one for 2 months total already.    Awakened and taken to RR in good condition, no complications.    Estimated Blood Loss: minimal    Specimens:  Stone fragments    Fluids/Drains:  none    Pepito Davidson MD  4/12/2023  16:51 EDT

## 2023-04-12 NOTE — ANESTHESIA POSTPROCEDURE EVALUATION
Patient: Urvashi Ornelas    Procedure Summary     Date: 04/12/23 Room / Location: [facility] OR  / [facility] MAIN OR    Anesthesia Start: 1721 Anesthesia Stop: 1806    Procedure: CYSTOSCOPY, RIGHT URETEROSCOPY LASER LITHOTRIPSY, STONE BASKET EXTRACTION, RIGHT STENT REMOVAL (Right) Diagnosis:     Surgeons: Pepito Davidson MD Provider: Jeremiah Valera MD    Anesthesia Type: general ASA Status: 3          Anesthesia Type: general    Vitals  Vitals Value Taken Time   /82 04/12/23 1833   Temp 37 °C (98.6 °F) 04/12/23 1811   Pulse 77 04/12/23 1835   Resp 11 04/12/23 1833   SpO2 95 % 04/12/23 1835   Vitals shown include unvalidated device data.        Post Anesthesia Care and Evaluation    Patient location during evaluation: bedside  Patient participation: complete - patient participated  Level of consciousness: awake  Pain management: adequate    Airway patency: patent  Anesthetic complications: No anesthetic complications    Cardiovascular status: acceptable  Respiratory status: acceptable  Hydration status: acceptable

## 2023-04-13 DIAGNOSIS — J06.9 ACUTE URI: ICD-10-CM

## 2023-04-13 RX ORDER — GUAIFENESIN 600 MG/1
1200 TABLET, EXTENDED RELEASE ORAL 2 TIMES DAILY
Qty: 40 TABLET | Refills: 0 | Status: SHIPPED | OUTPATIENT
Start: 2023-04-13 | End: 2023-04-14 | Stop reason: SDUPTHER

## 2023-04-13 NOTE — ASSESSMENT & PLAN NOTE
C/O beginning Constipation.  Recommend increasing fruit and fiber.  Decrease caffeine, increase water intake.  Increase activity as tolerated.  Start Docusate Sodium BID PRN (prior RX) constipation and titrate to soft stool.  Follow-up if symptoms worsen or do not improve.

## 2023-04-13 NOTE — TELEPHONE ENCOUNTER
Caller: Urvashi Ornelas    Relationship: Self    Best call back number: [phone]    Requested Prescriptions:   Requested Prescriptions     Pending Prescriptions Disp Refills   • guaiFENesin (Mucinex) 600 MG 12 hr tablet 40 tablet 0     Sig: Take 2 tablets by mouth 2 (Two) Times a Day.        Pharmacy where request should be sent: [facility]/PHARMACY #[id number] [address] [phone] [facility] [phone] FX     Last office visit with prescribing clinician: 4/10/2023   Last telemedicine visit with prescribing clinician: 4/20/2023   Next office visit with prescribing clinician: 7/10/2023     Additional details provided by patient:     Does the patient have less than a 3 day supply:  [x] Yes  [] No        Irene Yañez Rep   04/13/23 10:40 EDT            Pt resting in bed with eyes closed; no signs of distress noted; sitter at bedside       Gladys Cox, [facility]  01/10/21 9527

## 2023-04-14 DIAGNOSIS — J06.9 ACUTE URI: ICD-10-CM

## 2023-04-14 RX ORDER — GUAIFENESIN 600 MG/1
1200 TABLET, EXTENDED RELEASE ORAL 2 TIMES DAILY
Qty: 40 TABLET | Refills: 0 | Status: SHIPPED | OUTPATIENT
Start: 2023-04-14

## 2023-04-17 DIAGNOSIS — K59.09 CHRONIC CONSTIPATION: ICD-10-CM

## 2023-04-17 LAB
CALCIUM OXALATE DIHYDRATE MFR STONE IR: 20 %
COLOR STONE: NORMAL
COM MFR STONE: 70 %
COMPN STONE: NORMAL
HYDROXYAPATITE: 10 %
LABORATORY COMMENT REPORT: NORMAL
LABORATORY COMMENT REPORT: NORMAL
Lab: NORMAL
Lab: NORMAL
PHOTO: NORMAL
SIZE STONE: NORMAL MM
SPEC SOURCE SUBJ: NORMAL
STONE ANALYSIS-IMP: NORMAL
WT STONE: 12 MG

## 2023-04-17 RX ORDER — DOCUSATE SODIUM 100 MG/1
100 CAPSULE, LIQUID FILLED ORAL 2 TIMES DAILY PRN
Qty: 60 CAPSULE | Refills: 2 | Status: SHIPPED | OUTPATIENT
Start: 2023-04-17

## 2023-04-17 NOTE — TELEPHONE ENCOUNTER
Rx Refill Note  Requested Prescriptions     Pending Prescriptions Disp Refills   • docusate sodium (COLACE) 100 MG capsule 60 capsule 0     Sig: Take 1 capsule by mouth 2 (Two) Times a Day.      Last office visit with prescribing clinician: 4/10/2023   Last telemedicine visit with prescribing clinician: 4/20/2023   Next office visit with prescribing clinician: 7/10/2023                         Would you like a call back once the refill request has been completed: [] Yes [] No    If the office needs to give you a call back, can they leave a voicemail: [] Yes [] No    Saul Darnell MA  04/17/23, 16:39 EDT

## 2023-04-17 NOTE — TELEPHONE ENCOUNTER
Caller: Urvashi Ornelas    Relationship: Self    Best call back number:[phone]    Requested Prescriptions:   Requested Prescriptions     Pending Prescriptions Disp Refills   • docusate sodium (COLACE) 100 MG capsule 60 capsule 0     Sig: Take 1 capsule by mouth 2 (Two) Times a Day.        Pharmacy where request should be sent: [facility]/PHARMACY #[id number] [address] [phone] [facility] [phone]      Last office visit with prescribing clinician: 4/10/2023   Last telemedicine visit with prescribing clinician: 4/20/2023   Next office visit with prescribing clinician: 7/10/2023     Additional details provided by patient:    Does the patient have less than a 3 day supply:  [x] Yes  [] No    Would you like a call back once the refill request has been completed: [] Yes [] No    If the office needs to give you a call back, can they leave a voicemail: [] Yes [] No    Irene Perez Rep   04/17/23 15:56 EDT

## 2023-04-30 NOTE — TELEPHONE ENCOUNTER
A user error has taken place: encounter opened in error, closed for administrative reasons.    
Statement Selected

## 2023-07-23 PROBLEM — E66.811 CLASS 1 OBESITY DUE TO EXCESS CALORIES WITH BODY MASS INDEX (BMI) OF 34.0 TO 34.9 IN ADULT: Status: ACTIVE | Noted: 2023-07-23

## 2023-07-23 PROBLEM — E66.09 CLASS 1 OBESITY DUE TO EXCESS CALORIES WITH BODY MASS INDEX (BMI) OF 34.0 TO 34.9 IN ADULT: Status: ACTIVE | Noted: 2023-07-23

## 2023-07-23 PROBLEM — E66.01 OBESITY, CLASS III, BMI 40-49.9 (MORBID OBESITY): Status: RESOLVED | Noted: 2020-07-12 | Resolved: 2023-07-23

## 2023-07-24 ENCOUNTER — OFFICE VISIT (OUTPATIENT)
Dept: FAMILY MEDICINE CLINIC | Facility: CLINIC | Age: 58
End: 2023-07-24
Payer: MEDICARE

## 2023-07-24 VITALS
OXYGEN SATURATION: 97 % | SYSTOLIC BLOOD PRESSURE: 132 MMHG | BODY MASS INDEX: 38.3 KG/M2 | DIASTOLIC BLOOD PRESSURE: 84 MMHG | WEIGHT: 244 LBS | RESPIRATION RATE: 16 BRPM | HEART RATE: 77 BPM | HEIGHT: 67 IN

## 2023-07-24 DIAGNOSIS — M25.522 PAIN IN LEFT ELBOW: ICD-10-CM

## 2023-07-24 DIAGNOSIS — S60.222D CONTUSION OF LEFT HAND, SUBSEQUENT ENCOUNTER: Primary | ICD-10-CM

## 2023-07-24 DIAGNOSIS — S80.02XD CONTUSION OF LEFT KNEE, SUBSEQUENT ENCOUNTER: ICD-10-CM

## 2023-07-24 PROCEDURE — 99213 OFFICE O/P EST LOW 20 MIN: CPT | Performed by: NURSE PRACTITIONER

## 2023-07-24 PROCEDURE — 3075F SYST BP GE 130 - 139MM HG: CPT | Performed by: NURSE PRACTITIONER

## 2023-07-24 PROCEDURE — 3079F DIAST BP 80-89 MM HG: CPT | Performed by: NURSE PRACTITIONER

## 2023-07-24 NOTE — PROGRESS NOTES
Chief Complaint  Fall (Left hand pain & Left knee pain)    Subjective          Urvashi Ornelas presents to [facility] for  History of Present Illness  She is here with complaint of falling out in front of [employer] about 10 days ago. She reports walking up to [employer] [place] and did not realize there was a parking bumper in parking lot and tripped on it.  She fell on her left hand and both knees.  Couple hours after injury she started hurting so went to Urgent Care.     Left Hand - she has throbbing/stabbing feeling in right wrist and 1st and 2nd fingers.  Her pain level is 5/10 and pain is constant with fluctuating degree of pain.   She is currently wearing a wrist brace on left hand given to her by Urgent Care and it helps to relieve pain.   She takes Diclofenac 75mg BID and Tylenol in middle of day for pain control.  She reports medication will take the edge off but will not take pain away.    Left Knee - she reports falling forward onto both knees but weight was mostly on left knee when fell.  She reports tenderness over front of left knee joint.  She reports having left knee pain in back of knee joint that just started on Friday this past week.  She reports pain level 4/10 and is constant.  Her pain feels like stabbing pain that worsens as the day progresses.    Right Knee - she denies pain in right knee.  She reports falling with weight mostly on left side.    She reports having CT Abd/Pelvis by [facility] recently.  She followed up with [facility] to discussed CT scan results and was told had 3mm stone in left kidney and 4mm stone in right kidney.  [facility] recommended she follow-up in 6 months to recheck kidney stones and she agreed.  She denies urinary signs/symptoms today.          Review of Systems   Respiratory:  Negative for shortness of breath.    Cardiovascular:  Negative for chest pain and palpitations.   Genitourinary:  Negative for difficulty urinating and  "hematuria.   Musculoskeletal:  Positive for gait problem and joint swelling.        Left knee pain and swelling in front of knee and pain and bruising in back of knee joint; left wrist/hand/elbow pain with bruising on posterior hand relating to fall.       Objective   Vital Signs:   /84 (BP Location: Left arm, Patient Position: Sitting, Cuff Size: Adult)   Pulse 77   Resp 16   Ht 170.2 cm (67.01\")   Wt 111 kg (244 lb)   SpO2 97%   BMI 38.21 kg/m²     Physical Exam  Vitals and nursing note reviewed.   Constitutional:       General: She is not in acute distress.     Appearance: Normal appearance. She is not ill-appearing.   HENT:      Head: Normocephalic and atraumatic.   Eyes:      Conjunctiva/sclera: Conjunctivae normal.   Cardiovascular:      Rate and Rhythm: Normal rate and regular rhythm.      Pulses: Normal pulses.      Heart sounds: Normal heart sounds. No murmur heard.  Pulmonary:      Effort: Pulmonary effort is normal. No respiratory distress.      Breath sounds: Normal breath sounds.   Musculoskeletal:      Left elbow: No swelling, deformity, effusion or lacerations. Tenderness present in lateral epicondyle.      Left wrist: Tenderness present. No swelling, deformity, effusion, bony tenderness or snuff box tenderness. Normal range of motion.      Left hand: Bony tenderness present. No swelling or tenderness. Normal range of motion. Normal strength. Normal sensation. Normal pulse.        Arms:       Left knee: Swelling, effusion and ecchymosis present. No lacerations. Decreased range of motion. Tenderness present.        Legs:       Comments: Tenderness on palpation over metacarpal heads 2,3,4.  Normal ROM & strength; c/o tenderness with flexion and extension.    Tenderness on palpation over left lateral epidondyle and proximal and distal to elbow joint.  Normal ROM; Tenderness with ROM.     Neurological:      Mental Status: She is alert.      Result Review :   The following data was reviewed by: " Meggan Washington, APRN on 07/24/2023:  CMP          3/29/2023    12:14 4/20/2023    10:51 7/11/2023    13:07   CMP   Glucose 95  96  95    BUN 14  23  16    Creatinine 1.07  1.04  1.10    EGFR 61.5      Sodium 138  139  141    Potassium 3.6  4.7  4.3    Chloride 107  104  104    Calcium 8.3  9.2  9.4    Total Protein  6.6  6.8    Total Protein 6.6      Albumin 3.6  3.9  3.9    Globulin  2.7  2.9    Globulin 3.0      Total Bilirubin 0.3  0.2  0.3    Alkaline Phosphatase 89  98  99    AST (SGOT) 16  17  18    ALT (SGPT) 17  14  19    Albumin/Globulin Ratio 1.2      BUN/Creatinine Ratio 13.1  22.1  14.5    Anion Gap 11.0        CBC w/diff          3/29/2023    12:13 4/20/2023    10:51 7/11/2023    13:07   CBC w/Diff   WBC 3.58  4.84  6.27    RBC 4.07  4.33  5.09    Hemoglobin 12.1  12.4  14.1    Hematocrit 36.8  39.9  43.7    MCV 90.4  92.1  85.9    MCH 29.7  28.6  27.7    MCHC 32.9  31.1  32.3    RDW 14.2  14.4  12.1    Platelets 160  212  228    Neutrophil Rel %  51.0  52.9    Lymphocyte Rel %  33.5  32.1    Monocyte Rel %  12.0  10.8    Eosinophil Rel %  2.5  3.3    Basophil Rel %  0.8  0.6      Data reviewed : Urgent Care Note dated 7/13/23           Assessment and Plan    Diagnoses and all orders for this visit:    1. Contusion of left hand, subsequent encounter (Primary)  Comments:  Continue Diclofenac/Tylenol PRN pain  Rest; Elevate; Apply ice PRN pain  Continue left wrist guard, continue moving fingers while in brace to maintain mobility.    2. Contusion of left knee, subsequent encounter  Comments:  Continue Diclofenac/Tylenol PRN pain  Rest; Apply ice PRN pain  Elevate BLE when sitting    3. Pain in left elbow  Comments:  Left elbow joint pain w/pain distal/proximal to joint post fall.  Continue Diclofenac/Tylenol PRN pain  Rest; Elevate; Apply ice PRN pain      Images done at Urgent Care:  Left Hand Xray dated 7/13/23 - showed mild osteoarthritic degenerative changes.  Left Knee Xray dated 7/13/23 - moderate  degenerative spurring, minimal to mild effusion.      Follow Up   Return in about 1 month (around 8/24/2023) for Recheck - left hand/knee pain from fall.  Patient was given instructions and counseling regarding her condition or for health maintenance advice. Please see specific information pulled into the AVS if appropriate.

## 2023-08-16 ENCOUNTER — OFFICE VISIT (OUTPATIENT)
Dept: FAMILY MEDICINE CLINIC | Facility: CLINIC | Age: 58
End: 2023-08-16
Payer: MEDICARE

## 2023-08-16 VITALS
HEART RATE: 90 BPM | OXYGEN SATURATION: 96 % | SYSTOLIC BLOOD PRESSURE: 144 MMHG | DIASTOLIC BLOOD PRESSURE: 88 MMHG | HEIGHT: 67 IN | RESPIRATION RATE: 16 BRPM | BODY MASS INDEX: 39.55 KG/M2 | WEIGHT: 252 LBS

## 2023-08-16 DIAGNOSIS — K59.00 CONSTIPATION, UNSPECIFIED CONSTIPATION TYPE: ICD-10-CM

## 2023-08-16 DIAGNOSIS — S00.96XD: ICD-10-CM

## 2023-08-16 DIAGNOSIS — N30.00 ACUTE CYSTITIS WITHOUT HEMATURIA: ICD-10-CM

## 2023-08-16 DIAGNOSIS — J06.9 ACUTE URI: Primary | ICD-10-CM
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
## 2023-08-16 RX ORDER — GUAIFENESIN 600 MG/1
1200 TABLET, EXTENDED RELEASE ORAL 2 TIMES DAILY
Qty: 40 TABLET | Refills: 0 | Status: SHIPPED | OUTPATIENT
Start: 2023-08-16

## 2023-08-16 RX ORDER — ARIPIPRAZOLE 2 MG/1
2 TABLET ORAL DAILY
COMMUNITY
Start: 2023-07-27

## 2023-08-16 RX ORDER — MULTIPLE VITAMINS W/ MINERALS TAB 9MG-400MCG
1 TAB ORAL DAILY
COMMUNITY
Start: 2023-08-12

## 2023-08-16 NOTE — PROGRESS NOTES
"Chief Complaint  Insect Bite (In head and wants to follow up on kidney infection )    Subjective          Urvashi Ornelas presents to [facility] for  History of Present Illness  She is here with complaint that yesterday she was stung in the head by a yellow-jacket.  She went to urgent care received Benadryl 25mg PO and Epinephrine injection and was told to go to ED.  She went to [facility] ED and they gave her IV steroid and Famotidine.  [facility] ED prescribed EpiPen, steroid and Famotidine.  Today, she is feeling headache, scratchy/ thick feeling in throat, and ear fullness.  She denies SOB, fever, chills, facial/mouth/throat swelling, ear pain, wheezing, nausea or vomiting.    She also reports going to [facility] on 8/12/23 for UTI symptoms and was given Macrobid BID for 7 days.  Recommended she follow-up with [facility] regarding recent UTI.  She has been following with [facility] for about 2 years for chronic UTI's, Pyelonephritis, kidney stones and bladder stimulator.  [facility] instructed patient to follow-up with them for any urinary tract symptoms and she agreed to call them and make an appointment.    Review of Systems   Constitutional:  Negative for chills and fever.   HENT:  Positive for postnasal drip and sneezing. Negative for congestion, ear pain, facial swelling, rhinorrhea, sinus pressure, sinus pain, sore throat and trouble swallowing.         Right ear fullness, no pain, \"throat feels thick\" but no pain, no trouble swallowing   Eyes:  Negative for pain and discharge.   Respiratory:  Positive for shortness of breath. Negative for wheezing.         SOB sometimes depending on weather/severe heat, not due to throat.   Cardiovascular:  Negative for chest pain, palpitations and leg swelling.   Gastrointestinal:  Positive for constipation. Negative for diarrhea, nausea and vomiting.        Nausea for about 1 week since before bee sting, but worsened since bee sting " "  Genitourinary:  Negative for difficulty urinating, dysuria, frequency and hematuria.   Skin:  Negative for color change, rash and wound.   Neurological:  Positive for dizziness, light-headedness and headaches.     Objective   Vital Signs:   /88 (BP Location: Left arm, Patient Position: Sitting, Cuff Size: Adult)   Pulse 90   Resp 16   Ht 170.2 cm (67.01\")   Wt 114 kg (252 lb)   SpO2 96%   BMI 39.46 kg/m²     Physical Exam  Vitals and nursing note reviewed.   Constitutional:       General: She is not in acute distress.     Appearance: Normal appearance. She is not ill-appearing.   HENT:      Head: Normocephalic and atraumatic.      Comments: No sign of erythema or swelling on head, face or throat.     Right Ear: Ear canal and external ear normal. No tenderness. A middle ear effusion is present. There is no impacted cerumen. No mastoid tenderness. Tympanic membrane is not injected, perforated, erythematous or bulging.      Left Ear: Ear canal and external ear normal. No tenderness. A middle ear effusion is present. There is no impacted cerumen. No mastoid tenderness. Tympanic membrane is not injected, perforated, erythematous or bulging.      Nose: Nose normal.      Right Turbinates: Not enlarged, swollen or pale.      Left Turbinates: Not enlarged, swollen or pale.      Right Sinus: No maxillary sinus tenderness or frontal sinus tenderness.      Left Sinus: No maxillary sinus tenderness or frontal sinus tenderness.      Comments: Tenderness on palpation over frontal lobe.     Mouth/Throat:      Mouth: Mucous membranes are dry.      Tongue: No lesions. Tongue does not deviate from midline.      Palate: No mass and lesions.      Pharynx: Oropharynx is clear. Uvula midline. No pharyngeal swelling, oropharyngeal exudate, posterior oropharyngeal erythema or uvula swelling.   Eyes:      Extraocular Movements: Extraocular movements intact.      Conjunctiva/sclera: Conjunctivae normal.      Pupils: Pupils are " equal, round, and reactive to light.   Cardiovascular:      Rate and Rhythm: Normal rate and regular rhythm.      Heart sounds: Normal heart sounds. No murmur heard.  Pulmonary:      Effort: Pulmonary effort is normal. No respiratory distress.      Breath sounds: Normal breath sounds. No wheezing, rhonchi or rales.   Abdominal:      General: Bowel sounds are normal. There is no distension.      Palpations: Abdomen is soft. There is no mass.      Tenderness: There is no abdominal tenderness. There is no right CVA tenderness, left CVA tenderness, guarding or rebound.   Musculoskeletal:      Cervical back: Neck supple.   Lymphadenopathy:      Cervical: No cervical adenopathy.   Skin:     General: Skin is warm and dry.      Findings: No erythema.   Neurological:      General: No focal deficit present.      Mental Status: She is alert and oriented to person, place, and time.   Psychiatric:         Mood and Affect: Mood normal.         Behavior: Behavior normal.         Thought Content: Thought content normal.         Judgment: Judgment normal.      Result Review :   The following data was reviewed by: LALO Washington on 08/16/2023:    Data reviewed : [facility] 8/15/23 for Insect Bite and [facility] ED on 8/12/23 for UTI, unable to see [facility] ED visit for Insect Bite.            Assessment and Plan    Diagnoses and all orders for this visit:    1. Acute URI (Primary)  Comments:  Continue (OTC) Allegra  RX: Mucinex take with 8oz water  Tylenol (OTC) PRN pain  Warm compress over ears to help w/ear fullness  Orders:  -     guaiFENesin (Mucinex) 600 MG 12 hr tablet; Take 2 tablets by mouth 2 (Two) Times a Day.  Dispense: 40 tablet; Refill: 0    2. Constipation, unspecified constipation type  Comments:  Start docusate sodium, increase/decrease daily for soft stool.  Increase water intake, decrease caffeine (tea, coffee, soda).  Increase food high in fiber.  Assessment & Plan:  Colace and Fiber increase water  intake      3. Insect bite of head with local reaction, subsequent encounter  Comments:  C/O H/A pain in frontal lobe s/p yellow jacket sting in head.  Take Tylenol (OTC) PRN pain.  Cont Steroid, Benadryl and Famotidine as prescribed.    4. Acute cystitis without hematuria  Comments:  RX for Macrobid for UTI from [facility] ED on 8/12/23, cont med  She is followed by [facility] and recommended she call and make f/u appt with them and she agreed.    Strongly encouraged to eat while taking medication.  Encouraged to drink at least 64 oz water daily and decrease caffeine intake.  Take Mucinex with 8oz water.  Discussed importance of going to ED if her symptoms worsen or become life threatening.I spent 30 minutes caring for Urvashi on this date of service. This time includes time spent by me in the following activities:preparing for the visit, reviewing tests, performing a medically appropriate examination and/or evaluation , counseling and educating the patient/family/caregiver, ordering medications, tests, or procedures, and documenting information in the medical record  Follow Up   Return in about 3 months (around 11/16/2023) for Next scheduled follow up.  Patient was given instructions and counseling regarding her condition or for health maintenance advice. Please see specific information pulled into the AVS if appropriate.

## 2023-08-22 DIAGNOSIS — K59.09 CHRONIC CONSTIPATION: ICD-10-CM

## 2023-08-22 RX ORDER — DOCUSATE SODIUM 100 MG/1
100 CAPSULE, LIQUID FILLED ORAL 2 TIMES DAILY PRN
Qty: 60 CAPSULE | Refills: 2 | Status: SHIPPED | OUTPATIENT
Start: 2023-08-22

## 2023-08-22 NOTE — TELEPHONE ENCOUNTER
Caller: Urvashi Ornelas    Relationship: Self    Best call back number: [phone]     Requested Prescriptions:   Requested Prescriptions     Pending Prescriptions Disp Refills    docusate sodium (COLACE) 100 MG capsule 60 capsule 2     Sig: Take 1 capsule by mouth 2 (Two) Times a Day As Needed for Constipation.        Pharmacy where request should be sent: [facility]/PHARMACY #[id number] [address] [phone] [facility] [phone]      Last office visit with prescribing clinician: 8/16/2023   Last telemedicine visit with prescribing clinician: Visit date not found   Next office visit with prescribing clinician: 11/21/2023       Does the patient have less than a 3 day supply:  [] Yes  [x] No    Would you like a call back once the refill request has been completed: [] Yes [x] No    If the office needs to give you a call back, can they leave a voicemail: [] Yes [x] No    Irene Ramey Rep   08/22/23 08:51 EDT

## 2023-09-06 ENCOUNTER — DOCUMENTATION (OUTPATIENT)
Dept: PHYSICAL THERAPY | Facility: CLINIC | Age: 58
End: 2023-09-06
Payer: MEDICARE

## 2023-09-06 DIAGNOSIS — G89.29 CHRONIC RIGHT SHOULDER PAIN: Primary | ICD-10-CM

## 2023-09-06 DIAGNOSIS — M75.41 IMPINGEMENT SYNDROME OF RIGHT SHOULDER: ICD-10-CM

## 2023-09-06 DIAGNOSIS — M25.511 CHRONIC RIGHT SHOULDER PAIN: Primary | ICD-10-CM

## 2023-09-06 DIAGNOSIS — M67.911 TENDINOPATHY OF ROTATOR CUFF, RIGHT: ICD-10-CM

## 2023-09-06 NOTE — PROGRESS NOTES
Discharge Summary  Discharge Summary from Physical Therapy Report  [address], [address], [place] [address]    Patient Information  Urvashi Ornelas  1965    Dates  PT visit: 10/18/2022 - 11/02/2022  Number of Visits: 5     Discharge Status of Patient: See Final Note dated 11/02/2023    Goals: Not Met    Visit Diagnoses:    ICD-10-CM ICD-9-CM   1. Chronic right shoulder pain  M25.511 719.41    G89.29 338.29   2. Impingement syndrome of right shoulder  M75.41 726.2   3. Tendinopathy of rotator cuff, right  M67.911 727.9       Discharge Plan: Continue with current home exercise program as instructed    Comments - return to referring physician if necessary    Date of Discharge 09/06/2023        Hyacinth Lutz PT, DPT, OCS  Physical Therapist  [place] #[id number]  Electronically Signed by: Hyacinth Lutz PT, 09/06/23, 1:12 PM EDT

## 2023-09-27 ENCOUNTER — TELEPHONE (OUTPATIENT)
Dept: FAMILY MEDICINE CLINIC | Facility: CLINIC | Age: 58
End: 2023-09-27
Payer: MEDICARE

## 2023-09-27 NOTE — TELEPHONE ENCOUNTER
PT called and stated that the orthopedic originally told her that she doesn't need a referral for their office, so they made an appointment for her, but when she arrived for the appointment, they wouldn't see her because they didn't have a referral. PT is asking for a referral to be completed as soon as possible because she is in pain.    The fax number she provided is [phone].

## 2023-09-28 ENCOUNTER — TELEPHONE (OUTPATIENT)
Dept: FAMILY MEDICINE CLINIC | Facility: CLINIC | Age: 58
End: 2023-09-28
Payer: MEDICARE

## 2023-09-28 DIAGNOSIS — M25.562 LEFT KNEE PAIN, UNSPECIFIED CHRONICITY: Primary | ICD-10-CM

## 2023-09-28 NOTE — TELEPHONE ENCOUNTER
Caller: Urvashi Ornelas    Relationship: Self    Best call back number: [phone]     What is the medical concern/diagnosis: LEFT KNEE PAIN, FOR 2 WEEKS     What specialty or service is being requested: ORTHOPEDICS     What is the provider, practice or medical service name: DR CHACON     What is the office phone number: [phone], [phone]    Any additional details: KNEE PAIN ABOUT 2 WEEKS AGO FROM BEING PULLED BY HER DOG. HAS AN APPOINTMENT 9/29/23

## 2023-09-29 ENCOUNTER — OFFICE VISIT (OUTPATIENT)
Dept: ORTHOPEDIC SURGERY | Facility: CLINIC | Age: 58
End: 2023-09-29
Payer: MEDICARE

## 2023-09-29 VITALS — BODY MASS INDEX: 39.54 KG/M2 | WEIGHT: 251.9 LBS | TEMPERATURE: 96.8 F | HEIGHT: 67 IN

## 2023-09-29 DIAGNOSIS — M17.12 ARTHRITIS OF LEFT KNEE: Primary | ICD-10-CM

## 2023-09-29 RX ORDER — LIDOCAINE HYDROCHLORIDE 10 MG/ML
1 INJECTION, SOLUTION EPIDURAL; INFILTRATION; INTRACAUDAL; PERINEURAL
Status: COMPLETED | OUTPATIENT
Start: 2023-09-29 | End: 2023-09-29

## 2023-09-29 RX ORDER — METHYLPREDNISOLONE ACETATE 80 MG/ML
80 INJECTION, SUSPENSION INTRA-ARTICULAR; INTRALESIONAL; INTRAMUSCULAR; SOFT TISSUE
Status: COMPLETED | OUTPATIENT
Start: 2023-09-29 | End: 2023-09-29

## 2023-09-29 RX ADMIN — LIDOCAINE HYDROCHLORIDE 1 ML: 10 INJECTION, SOLUTION EPIDURAL; INFILTRATION; INTRACAUDAL; PERINEURAL at 10:55

## 2023-09-29 RX ADMIN — METHYLPREDNISOLONE ACETATE 80 MG: 80 INJECTION, SUSPENSION INTRA-ARTICULAR; INTRALESIONAL; INTRAMUSCULAR; SOFT TISSUE at 10:55

## 2023-09-29 NOTE — TELEPHONE ENCOUNTER
I just forward the message of patients response. Thank you for getting all the information. It makes it easier for me to send the referral. Thanks!
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
## 2023-10-02 NOTE — TELEPHONE ENCOUNTER
Called patient and informed she stated that she has already had an appointment with  and she has othe appointments now that may need refferals due to her switching insurance companies...like ent and cardio

## 2023-11-06 ENCOUNTER — TELEPHONE (OUTPATIENT)
Dept: ENDOCRINOLOGY | Age: 58
End: 2023-11-06

## 2023-11-06 NOTE — TELEPHONE ENCOUNTER
Called pt in regards to the message left and to get her lab readings. She had her labs done on 11/30/23, TSH 5.170. T4 9.1, AND T3 26. Should she pt come in earlier for an appointment or what should be her next steps?    Thank you

## 2023-11-06 NOTE — TELEPHONE ENCOUNTER
Hub staff attempted to follow warm transfer process and was unsuccessful     Caller: Urvashi Ornelas    Relationship to patient: Self    Best call back number: [phone]    Patient is needing: PTS TSH LEVEL WAS TOO HIGH AND [address] [place] [address] RECOMMENDED THEY REACH OUT TO US TO SEE IF SHE SHOULD COME IN EARLIER THAN HER DEC APPT

## 2023-11-16 ENCOUNTER — OFFICE VISIT (OUTPATIENT)
Dept: ENDOCRINOLOGY | Age: 58
End: 2023-11-16
Payer: MEDICARE

## 2023-11-16 VITALS
TEMPERATURE: 96.8 F | SYSTOLIC BLOOD PRESSURE: 114 MMHG | DIASTOLIC BLOOD PRESSURE: 70 MMHG | HEART RATE: 112 BPM | BODY MASS INDEX: 41.53 KG/M2 | WEIGHT: 264.6 LBS | OXYGEN SATURATION: 99 % | HEIGHT: 67 IN

## 2023-11-16 DIAGNOSIS — Z85.850 HISTORY OF THYROID CANCER: ICD-10-CM

## 2023-11-16 DIAGNOSIS — E89.0 POSTSURGICAL HYPOTHYROIDISM: Primary | ICD-10-CM

## 2023-11-16 PROCEDURE — 3078F DIAST BP <80 MM HG: CPT | Performed by: NURSE PRACTITIONER

## 2023-11-16 PROCEDURE — 99214 OFFICE O/P EST MOD 30 MIN: CPT | Performed by: NURSE PRACTITIONER

## 2023-11-16 PROCEDURE — 3074F SYST BP LT 130 MM HG: CPT | Performed by: NURSE PRACTITIONER

## 2023-11-16 RX ORDER — EPINEPHRINE 0.3 MG/.3ML
INJECTION SUBCUTANEOUS
COMMUNITY
Start: 2023-08-16

## 2023-11-16 RX ORDER — BUPROPION HYDROCHLORIDE 150 MG/1
1 TABLET ORAL
COMMUNITY
Start: 2023-11-01 | End: 2023-12-30

## 2023-11-16 RX ORDER — LEVOTHYROXINE SODIUM 112 MCG
224 TABLET ORAL
Qty: 180 TABLET | Refills: 0 | Status: SHIPPED | OUTPATIENT
Start: 2023-11-16

## 2023-11-16 RX ORDER — LURASIDONE HYDROCHLORIDE 20 MG/1
1 TABLET, FILM COATED ORAL
COMMUNITY
Start: 2023-10-04 | End: 2023-12-30

## 2023-11-16 NOTE — PROGRESS NOTES
"Chief Complaint  Hypothyroidism (Patient has been feeling fatigue and irritable, has lab results with her.)    Subjective        Urvashi Ornelas presents to [facility] ENDOCRINOLOGY  History of Present Illness    Last seen 12/2022 for the management of metastatic papillary thyroid cancer status post total thyroidectomy in 2016 and radioactive iodine ablation. She underwent radioactive iodine ablation along with whole-body scans, with the last one in 2017 which did not show any residual disease. She has been released from 's office.   Still following with Dr Ashby- going next week     Has also had a right inferior parathyroidectomy for hyperparathyroidism and elevated calcium levels.     11/2023: IMPRESSION:   No evidence of recurrent or metastatic disease.     Follows with pyschiatrist \" emotions are all over the place \"    Weight gain of 50-60 pounds over the year    TSH 5.1 10/30/2023    She is currently on levothyroxine 200mcg daily, she takes it in the morning without other medications       Objective   Vital Signs:  /70   Pulse 112   Temp 96.8 °F (36 °C) (Temporal)   Ht 170.2 cm (67\")   Wt 120 kg (264 lb 9.6 oz)   SpO2 99%   BMI 41.44 kg/m²   Estimated body mass index is 41.44 kg/m² as calculated from the following:    Height as of this encounter: 170.2 cm (67\").    Weight as of this encounter: 120 kg (264 lb 9.6 oz).               Physical Exam  Vitals reviewed.   Constitutional:       General: She is not in acute distress.  HENT:      Head: Normocephalic and atraumatic.   Cardiovascular:      Rate and Rhythm: Normal rate.   Pulmonary:      Effort: Pulmonary effort is normal. No respiratory distress.   Musculoskeletal:         General: No signs of injury. Normal range of motion.      Cervical back: Normal range of motion and neck supple.   Skin:     General: Skin is warm and dry.   Neurological:      Mental Status: She is alert and oriented to person, place, and time. " Mental status is at baseline.   Psychiatric:         Mood and Affect: Mood normal.         Behavior: Behavior normal.         Thought Content: Thought content normal.         Judgment: Judgment normal.        Result Review :  The following data was reviewed by: LALO Corley on 11/16/2023:  Common labs          3/29/2023    12:13 3/29/2023    12:14 4/20/2023    10:51 7/11/2023    13:07   Common Labs   Glucose  95  96  95    BUN  14  23  16    Creatinine  1.07  1.04  1.10    Sodium  138  139  141    Potassium  3.6  4.7  4.3    Chloride  107  104  104    Calcium  8.3  9.2  9.4    Total Protein   6.6  6.8    Albumin  3.6  3.9  3.9    Total Bilirubin  0.3  0.2  0.3    Alkaline Phosphatase  89  98  99    AST (SGOT)  16  17  18    ALT (SGPT)  17  14  19    WBC 3.58   4.84  6.27    Hemoglobin 12.1   12.4  14.1    Hematocrit 36.8   39.9  43.7    Platelets 160   212  228    Total Cholesterol   218  270    Triglycerides   169  143    HDL Cholesterol   52  63    LDL Cholesterol    136  181                   Assessment and Plan   Diagnoses and all orders for this visit:    1. Postsurgical hypothyroidism (Primary)  -     TSH; Future  -     T4, Free; Future  -     T3, Free; Future  -     Thyroglobulin Antibody and Thyroglobulin, PROSPER or LC/MS-MS; Future    2. History of thyroid cancer  -     TSH; Future  -     T4, Free; Future  -     T3, Free; Future  -     Thyroglobulin Antibody and Thyroglobulin, PROSPER or LC/MS-MS; Future    Other orders  -     Synthroid 112 MCG tablet; Take 2 tablets by mouth Every Morning. Take on an empty stomach with just water 30 min to an hour before any other medications, food or drink  Dispense: 180 tablet; Refill: 0             Follow Up   Return in about 3 months (around 2/16/2024).    TSH not at goal  Increase t4 dose to 224mcg daily and change to brand name  Repeat labs and follow up in 3m   Do not take T4 dose with other medications, take with water only and wait 60 minutes before having any  other medications, food or drinks     Patient was given instructions and counseling regarding her condition or for health maintenance advice. Please see specific information pulled into the AVS if appropriate.       LALO Corley

## 2023-11-17 DIAGNOSIS — K59.09 CHRONIC CONSTIPATION: ICD-10-CM

## 2023-11-18 RX ORDER — DOCUSATE SODIUM 100 MG/1
CAPSULE, LIQUID FILLED ORAL
Qty: 60 CAPSULE | Refills: 6 | Status: SHIPPED | OUTPATIENT
Start: 2023-11-18

## 2023-11-29 ENCOUNTER — OFFICE VISIT (OUTPATIENT)
Dept: FAMILY MEDICINE CLINIC | Facility: CLINIC | Age: 58
End: 2023-11-29
Payer: MEDICARE

## 2023-11-29 DIAGNOSIS — E03.9 HYPOTHYROIDISM, UNSPECIFIED TYPE: ICD-10-CM

## 2023-11-29 DIAGNOSIS — G43.909 MIGRAINE WITHOUT STATUS MIGRAINOSUS, NOT INTRACTABLE, UNSPECIFIED MIGRAINE TYPE: ICD-10-CM

## 2023-11-29 DIAGNOSIS — I10 ESSENTIAL HYPERTENSION: Primary | ICD-10-CM

## 2023-11-29 PROCEDURE — 99213 OFFICE O/P EST LOW 20 MIN: CPT | Performed by: NURSE PRACTITIONER

## 2023-11-29 PROCEDURE — 1159F MED LIST DOCD IN RCRD: CPT | Performed by: NURSE PRACTITIONER

## 2023-11-29 PROCEDURE — 3077F SYST BP >= 140 MM HG: CPT | Performed by: NURSE PRACTITIONER

## 2023-11-29 PROCEDURE — 3080F DIAST BP >= 90 MM HG: CPT | Performed by: NURSE PRACTITIONER

## 2023-11-29 PROCEDURE — 1160F RVW MEDS BY RX/DR IN RCRD: CPT | Performed by: NURSE PRACTITIONER

## 2023-11-29 RX ORDER — LISINOPRIL 10 MG/1
10 TABLET ORAL DAILY
Qty: 90 TABLET | Refills: 0 | Status: SHIPPED | OUTPATIENT
Start: 2023-11-29

## 2023-11-29 NOTE — PROGRESS NOTES
"Chief Complaint  Hypertension    Subjective          Urvashi Ornelas presents to [facility] for  History of Present Illness  She is here to follow-up on Hypertension:    Hypertension - she reports her blood pressures have been running 140-198/120-140's for the past 3 months.  She reports being on blood pressure medication years ago but does not recall name of blood pressure medications.    Migraines - she reports having migraines since 13 years old.  Daily migraines have been going for 6 months, but over the past 3-4 months, her migraines have been really bad.  She reports migraines wake her up.  When she woke up with headache last month (10/24/23) she checked her blood pressure and it was 192/140's.  She reports over the past year she gained 50-60lbs.  She denies eating sweets.  She eats carbs but does not eat a lot.  She is followed by Neurology for migraines.    Hypothyroidism - she is currently taking Synthroid 224mcg daily.  She reports recent thyroid scan was normal.  She is followed by Endocrinology.    Kidney Stone - she had recent kidney stone and just past 4mm kidney stone early-mid November, 2023.  She still has left kidney stone but not bothering her today.  She is followed by [facility] for chronic kidney stones.    Review of Systems   Respiratory:  Negative for shortness of breath and wheezing.    Cardiovascular:  Negative for chest pain, palpitations and leg swelling.   Genitourinary:  Negative for hematuria.   Neurological:  Positive for headaches. Negative for dizziness and light-headedness.        High blood pressure with headaches.         Objective   Vital Signs:   BP (!) 164/110 (BP Location: Left arm, Patient Position: Sitting, Cuff Size: Adult) Comment: re-checked  Pulse 61   Resp 16   Ht 170.2 cm (67.01\")   Wt 123 kg (272 lb)   SpO2 97%   BMI 42.59 kg/m²           Physical Exam  Vitals and nursing note reviewed.   Constitutional:       General: She is " not in acute distress.     Appearance: Normal appearance.   HENT:      Head: Normocephalic and atraumatic.   Eyes:      Conjunctiva/sclera: Conjunctivae normal.   Cardiovascular:      Rate and Rhythm: Normal rate and regular rhythm.      Heart sounds: Normal heart sounds. No murmur heard.  Pulmonary:      Effort: Pulmonary effort is normal. No respiratory distress.      Breath sounds: Normal breath sounds. No wheezing.   Musculoskeletal:      Right lower leg: No edema.      Left lower leg: No edema.   Skin:     General: Skin is warm and dry.      Findings: No erythema.   Neurological:      General: No focal deficit present.      Mental Status: She is alert.   Psychiatric:         Mood and Affect: Mood normal.        Result Review :   The following data was reviewed by: LALO Washington on 11/29/2023:  CMP          3/29/2023    12:14 4/20/2023    10:51 7/11/2023    13:07   CMP   Glucose 95  96  95    BUN 14  23  16    Creatinine 1.07  1.04  1.10    EGFR 61.5      Sodium 138  139  141    Potassium 3.6  4.7  4.3    Chloride 107  104  104    Calcium 8.3  9.2  9.4    Total Protein  6.6  6.8    Total Protein 6.6      Albumin 3.6  3.9  3.9    Globulin  2.7  2.9    Globulin 3.0      Total Bilirubin 0.3  0.2  0.3    Alkaline Phosphatase 89  98  99    AST (SGOT) 16  17  18    ALT (SGPT) 17  14  19    Albumin/Globulin Ratio 1.2      BUN/Creatinine Ratio 13.1  22.1  14.5    Anion Gap 11.0        Data reviewed : Consultant notes LALO Corley, Endocrinology OV 11/16/23.           Assessment and Plan    Diagnoses and all orders for this visit:    1. Essential hypertension (Primary)  Assessment & Plan:  Hypertension is elevated.  She is not currently taking BP medication.  Decrease table salt and foods high in salt.  Weight loss.  Increase activity daily.  Start Lisinopril 10mg daily.  Discussed SE of medication.  Blood pressure will be reassessed in 1 week.      Orders:  -     lisinopril (PRINIVIL,ZESTRIL) 10 MG  tablet; Take 1 tablet by mouth Daily.  Dispense: 90 tablet; Refill: 0    2. Migraine without status migrainosus, not intractable, unspecified migraine type  Assessment & Plan:  Uncontrolled possibly due to uncontrolled blood pressure.  Followed by Neurology.      3. Hypothyroidism, unspecified type  Assessment & Plan:  Followed by Endocrinology.          Follow Up   Return in about 1 week (around 12/6/2023) for Follow-up Blood Pressure - new medication.  Patient was given instructions and counseling regarding her condition or for health maintenance advice. Please see specific information pulled into the AVS if appropriate.

## 2023-12-03 VITALS
HEIGHT: 67 IN | DIASTOLIC BLOOD PRESSURE: 110 MMHG | RESPIRATION RATE: 16 BRPM | SYSTOLIC BLOOD PRESSURE: 164 MMHG | HEART RATE: 61 BPM | OXYGEN SATURATION: 97 % | BODY MASS INDEX: 42.69 KG/M2 | WEIGHT: 272 LBS

## 2023-12-04 NOTE — ASSESSMENT & PLAN NOTE
Hypertension is elevated.  She is not currently taking BP medication.  Decrease table salt and foods high in salt.  Weight loss.  Increase activity daily.  Start Lisinopril 10mg daily.  Discussed SE of medication.  Blood pressure will be reassessed in 1 week.

## 2023-12-06 ENCOUNTER — OFFICE VISIT (OUTPATIENT)
Dept: FAMILY MEDICINE CLINIC | Facility: CLINIC | Age: 58
End: 2023-12-06
Payer: MEDICARE

## 2023-12-06 VITALS
HEIGHT: 67 IN | BODY MASS INDEX: 43.63 KG/M2 | RESPIRATION RATE: 16 BRPM | WEIGHT: 278 LBS | TEMPERATURE: 98.6 F | SYSTOLIC BLOOD PRESSURE: 158 MMHG | HEART RATE: 96 BPM | OXYGEN SATURATION: 96 % | DIASTOLIC BLOOD PRESSURE: 110 MMHG

## 2023-12-06 DIAGNOSIS — I10 ESSENTIAL HYPERTENSION: Primary | ICD-10-CM

## 2023-12-06 PROCEDURE — 3077F SYST BP >= 140 MM HG: CPT | Performed by: NURSE PRACTITIONER

## 2023-12-06 PROCEDURE — 3080F DIAST BP >= 90 MM HG: CPT | Performed by: NURSE PRACTITIONER

## 2023-12-06 PROCEDURE — 99213 OFFICE O/P EST LOW 20 MIN: CPT | Performed by: NURSE PRACTITIONER

## 2023-12-06 RX ORDER — LISINOPRIL 20 MG/1
20 TABLET ORAL DAILY
Start: 2023-12-06 | End: 2023-12-09

## 2023-12-06 NOTE — PROGRESS NOTES
"Chief Complaint  Hypertension (No headaches since been on new med lisinopril  )    Subjective          Urvashi Ornelas presents to [facility] for  History of Present Illness  She is here to follow-up on Hypertension.  She takes her Lisinopril 10mg every night.  She denies unusual SOB, CP, HP, lightheadedness, dizziness and headache.    Review of Systems      Objective   Vital Signs:   BP (!) 158/110 (BP Location: Left arm, Patient Position: Sitting, Cuff Size: Adult)   Pulse 96   Temp 98.6 °F (37 °C)   Resp 16   Ht 170.2 cm (67.01\")   Wt 126 kg (278 lb)   SpO2 96%   BMI 43.53 kg/m²           Physical Exam  Vitals and nursing note reviewed.   Constitutional:       Appearance: Normal appearance.   Cardiovascular:      Rate and Rhythm: Normal rate and regular rhythm.      Heart sounds: Normal heart sounds. No murmur heard.  Pulmonary:      Effort: Pulmonary effort is normal. No respiratory distress.      Breath sounds: Normal breath sounds.   Skin:     General: Skin is warm and dry.   Neurological:      Mental Status: She is alert.        Result Review :   The following data was reviewed by: LALO Washington on 12/06/2023:  CMP          3/29/2023    12:14 4/20/2023    10:51 7/11/2023    13:07   CMP   Glucose 95  96  95    BUN 14  23  16    Creatinine 1.07  1.04  1.10    EGFR 61.5      Sodium 138  139  141    Potassium 3.6  4.7  4.3    Chloride 107  104  104    Calcium 8.3  9.2  9.4    Total Protein  6.6  6.8    Total Protein 6.6      Albumin 3.6  3.9  3.9    Globulin  2.7  2.9    Globulin 3.0      Total Bilirubin 0.3  0.2  0.3    Alkaline Phosphatase 89  98  99    AST (SGOT) 16  17  18    ALT (SGPT) 17  14  19    Albumin/Globulin Ratio 1.2      BUN/Creatinine Ratio 13.1  22.1  14.5    Anion Gap 11.0                  Assessment and Plan    Diagnoses and all orders for this visit:    1. Essential hypertension (Primary)  Assessment & Plan:  Hypertension is elevated.  Continue " current treatment plan.  Decrease table salt and foods high in salt.  Weight loss.  Increase activity daily.  Increase Lisinopril from 10mg to 20mg daily.  Blood pressure will be reassessed in 2 weeks.      Orders:  -     Discontinue: lisinopril (PRINIVIL,ZESTRIL) 20 MG tablet; Take 1 tablet by mouth Daily.        Follow Up   Return in about 2 weeks (around 12/20/2023) for Follow-up Blood Pressure - medication change.  Patient was given instructions and counseling regarding her condition or for health maintenance advice. Please see specific information pulled into the AVS if appropriate.

## 2023-12-06 NOTE — ASSESSMENT & PLAN NOTE
Hypertension is elevated.  Continue current treatment plan.  Decrease table salt and foods high in salt.  Weight loss.  Increase activity daily.  Increase Lisinopril from 10mg to 20mg daily.  Blood pressure will be reassessed in 2 weeks.

## 2023-12-09 ENCOUNTER — TELEPHONE (OUTPATIENT)
Dept: FAMILY MEDICINE CLINIC | Facility: CLINIC | Age: 58
End: 2023-12-09
Payer: MEDICARE

## 2023-12-09 DIAGNOSIS — L29.9 GENERALIZED PRURITUS: ICD-10-CM

## 2023-12-09 DIAGNOSIS — I10 ESSENTIAL HYPERTENSION: Primary | ICD-10-CM

## 2023-12-09 RX ORDER — HYDROXYZINE HYDROCHLORIDE 25 MG/1
25 TABLET, FILM COATED ORAL EVERY 6 HOURS PRN
Qty: 40 TABLET | Refills: 0 | Status: SHIPPED | OUTPATIENT
Start: 2023-12-09

## 2023-12-09 RX ORDER — AMLODIPINE BESYLATE 5 MG/1
5 TABLET ORAL DAILY
Qty: 90 TABLET | Refills: 0 | Status: SHIPPED | OUTPATIENT
Start: 2023-12-09

## 2023-12-09 NOTE — TELEPHONE ENCOUNTER
Patient called and said on Wednesday, Lisinopril was increased from 10mg to 20mg daily and she took the new dose of medication Wednesday night.  She indicated 30-45 minutes after taking the increased dose of Lisinopril, Wednesday night she started itching.  She stopped using a soap she sometimes uses to see if that was causing the itch and continued taking Lisinopril and her itching has not stopped.  She denies having physical rash. She confirmed she is just itching.  Advised patient that I would send Hydroxyzine 25mg Q6H to help with itching and Amlodipine to take the place of Lisinopril to her pharmacy.  Advised patient to stop taking Lisinopril due to itching.  I listed Lisinopril on her allergy list.

## 2023-12-18 ENCOUNTER — OFFICE VISIT (OUTPATIENT)
Dept: FAMILY MEDICINE CLINIC | Facility: CLINIC | Age: 58
End: 2023-12-18
Payer: MEDICARE

## 2023-12-18 VITALS
WEIGHT: 263 LBS | SYSTOLIC BLOOD PRESSURE: 132 MMHG | OXYGEN SATURATION: 97 % | HEART RATE: 82 BPM | HEIGHT: 67 IN | DIASTOLIC BLOOD PRESSURE: 90 MMHG | BODY MASS INDEX: 41.28 KG/M2

## 2023-12-18 DIAGNOSIS — I10 ESSENTIAL HYPERTENSION: Primary | ICD-10-CM

## 2023-12-18 PROCEDURE — 3075F SYST BP GE 130 - 139MM HG: CPT | Performed by: NURSE PRACTITIONER

## 2023-12-18 PROCEDURE — 99213 OFFICE O/P EST LOW 20 MIN: CPT | Performed by: NURSE PRACTITIONER

## 2023-12-18 PROCEDURE — 3080F DIAST BP >= 90 MM HG: CPT | Performed by: NURSE PRACTITIONER

## 2023-12-18 NOTE — PROGRESS NOTES
"Chief Complaint  Hypertension (2 week follow up )    Subjective          Urvashi Ornelas presents to [facility] for  History of Present Illness  She is here to follow-up on hypertension.  She was switched from Lisinopril to Amlodipine 5mg daily due to feeling itchy after starting Lisinopril.  She has been taking Amlodipine without side effects.  She denies SOB, CP, and HP.  She brought her BP log in and BP log showed BP ranging from 118-136/77-93.    Review of Systems   Respiratory:  Negative for shortness of breath.    Cardiovascular:  Negative for chest pain, palpitations and leg swelling.   Neurological:  Negative for dizziness and light-headedness.         Objective   Vital Signs:   /90 (BP Location: Right arm, Patient Position: Sitting, Cuff Size: Adult) Comment: re-checked  Pulse 82   Ht 170.2 cm (67.01\")   Wt 119 kg (263 lb)   SpO2 97%   BMI 41.18 kg/m²           Physical Exam  Vitals and nursing note reviewed.   Constitutional:       General: She is not in acute distress.     Appearance: Normal appearance. She is not ill-appearing.   HENT:      Head: Normocephalic and atraumatic.   Eyes:      Conjunctiva/sclera: Conjunctivae normal.   Cardiovascular:      Rate and Rhythm: Normal rate and regular rhythm.      Pulses: Normal pulses.      Heart sounds: Normal heart sounds. No murmur heard.  Pulmonary:      Effort: Pulmonary effort is normal. No respiratory distress.      Breath sounds: Normal breath sounds. No wheezing.   Musculoskeletal:      Right lower leg: No edema.      Left lower leg: No edema.   Skin:     General: Skin is warm and dry.   Neurological:      Mental Status: She is alert.   Psychiatric:         Mood and Affect: Mood normal.        Result Review :                 Assessment and Plan    Diagnoses and all orders for this visit:    1. Essential hypertension (Primary)  Assessment & Plan:  Hypertension is stable on Amlodipine 5mg daily.  Continue current " treatment plan.  Decrease table salt and foods high in salt.  Weight loss.  Increase activity daily.  Continue checking home BP 3 times weekly and bring BP log to next visit.  Blood pressure will be re-assessed in 3 months.            Follow Up   Return in about 3 months (around 3/18/2024) for Next scheduled follow up - HTN, HLD.  Patient was given instructions and counseling regarding her condition or for health maintenance advice. Please see specific information pulled into the AVS if appropriate.

## 2023-12-29 NOTE — ASSESSMENT & PLAN NOTE
Hypertension is stable on Amlodipine 5mg daily.  Continue current treatment plan.  Decrease table salt and foods high in salt.  Weight loss.  Increase activity daily.  Continue checking home BP 3 times weekly and bring BP log to next visit.  Blood pressure will be re-assessed in 3 months.

## 2024-01-11 DIAGNOSIS — I10 ESSENTIAL HYPERTENSION: ICD-10-CM

## 2024-01-11 RX ORDER — AMLODIPINE BESYLATE 5 MG/1
5 TABLET ORAL DAILY
Qty: 90 TABLET | Refills: 0 | Status: SHIPPED | OUTPATIENT
Start: 2024-01-11

## 2024-01-12 ENCOUNTER — TELEPHONE (OUTPATIENT)
Dept: FAMILY MEDICINE CLINIC | Facility: CLINIC | Age: 59
End: 2024-01-12
Payer: MEDICARE

## 2024-01-12 NOTE — TELEPHONE ENCOUNTER
Caller: Urvashi Ornelas    Relationship: Self    Best call back number: [phone] OK TO LEAVE MESSAGE     Which medication are you concerned about: amLODIPine (NORVASC) 5 MG tablet     Who prescribed you this medication: COBY ENG     When did you start taking this medication: FEW WEEKS AGO     What are your concerns: NOTICE LAST NIGHT HAVING SWELLING AT BOTTOM OF LEGS AND NEAR ANKLES       per pt chest has been hurting on and off for 3 days, per pt its feels like chest is burning, also burning in back of throat, per pt it worse tonight, and right arm pain.

## 2024-01-12 NOTE — TELEPHONE ENCOUNTER
Called patient regarding her leg swelling.  She indicated that her sock leaves an indent and when she presses below the top of her sock, her skin presses in and comes back out when her finger is released.  Advised patient if skin not swollen where it leaves a dent in skin would recommend she stay on medication until she sees me on 3/19/24 and patient agreed.  Advised if worsens, to send me a message in [employer] and she agreed.

## 2024-01-31 ENCOUNTER — OFFICE VISIT (OUTPATIENT)
Dept: FAMILY MEDICINE CLINIC | Facility: CLINIC | Age: 59
End: 2024-01-31
Payer: MEDICARE

## 2024-01-31 VITALS
HEIGHT: 67 IN | WEIGHT: 263 LBS | TEMPERATURE: 98.2 F | OXYGEN SATURATION: 98 % | HEART RATE: 93 BPM | BODY MASS INDEX: 41.28 KG/M2 | DIASTOLIC BLOOD PRESSURE: 80 MMHG | SYSTOLIC BLOOD PRESSURE: 118 MMHG

## 2024-01-31 DIAGNOSIS — Z12.11 ENCOUNTER FOR SCREENING COLONOSCOPY: ICD-10-CM

## 2024-01-31 DIAGNOSIS — R05.8 DRY COUGH: Primary | ICD-10-CM

## 2024-01-31 RX ORDER — FEXOFENADINE HCL 180 MG/1
TABLET ORAL
COMMUNITY
Start: 2024-01-27

## 2024-01-31 RX ORDER — BENZONATATE 100 MG/1
200 CAPSULE ORAL 3 TIMES DAILY PRN
Qty: 30 CAPSULE | Refills: 0 | Status: SHIPPED | OUTPATIENT
Start: 2024-01-31

## 2024-01-31 RX ORDER — BUPROPION HYDROCHLORIDE 150 MG/1
150 TABLET ORAL EVERY MORNING
COMMUNITY
Start: 2024-01-21

## 2024-01-31 NOTE — PROGRESS NOTES
"Subjective          Urvashi Ornelas presents to [facility] for Cough (Ongoing since mid-December.  No other symptoms.)   History of Present Illness    She is here with complaint of dry cough:    She is here with complaint of dry intermittent cough since mid-December.  She went to Urgent Care was tested for COVID/Influenza, test was negative and diagnosed with common cold and was told to take OTC medication.   She reports cough seems to be more often, cough is dry, feels like tickle in throat, itchy watery eyes/sneezing (have all the time due to allergies), SOB (just before, during and after coughing).  She denies fever, chills, body aches, weight loss, congestion, ear pain/drainage, PND, facial pain/pressure, chest pain, and heart palpations.    Hematuria - she went to [facility] due to blood in urine and [facility] did CT Abd/Pelvis and [facility] found spot on right lung and told patient to return for another CT scan to see if any changes.  In prior CT Abdomen/Pelvis dated 8/2023 it shows \"There is eventration of the right hemidiaphragm with mild right basilar subsegmental atelectasis. High density linear material along the right posterior lower lobe may represent findings of prior wedge resection. Heart size is normal. No pericardial or pleural effusion.\"  She does have a history of right lung wedge resection.      Objective   Vital Signs:   /80   Pulse 93   Temp 98.2 °F (36.8 °C) (Oral)   Ht 170.2 cm (67\")   Wt 119 kg (263 lb)   SpO2 98%   BMI 41.19 kg/m²           Physical Exam  Vitals and nursing note reviewed.   Constitutional:       General: She is not in acute distress.     Appearance: Normal appearance. She is well-developed. She is not ill-appearing.   HENT:      Head: Normocephalic and atraumatic.      Salivary Glands: Right salivary gland is not diffusely enlarged or tender. Left salivary gland is not diffusely enlarged or tender.      Right Ear: " Hearing, tympanic membrane, ear canal and external ear normal. There is no impacted cerumen.      Left Ear: Hearing, tympanic membrane, ear canal and external ear normal. There is no impacted cerumen.      Nose: Nose normal. No congestion or rhinorrhea.      Right Nostril: No occlusion.      Left Nostril: No occlusion.      Right Sinus: No maxillary sinus tenderness or frontal sinus tenderness.      Left Sinus: No maxillary sinus tenderness or frontal sinus tenderness.      Mouth/Throat:      Mouth: Mucous membranes are moist.      Tongue: No lesions. Tongue does not deviate from midline.      Palate: No mass and lesions.      Pharynx: Oropharynx is clear. Uvula midline.   Eyes:      Conjunctiva/sclera: Conjunctivae normal.   Cardiovascular:      Rate and Rhythm: Normal rate and regular rhythm.      Heart sounds: Normal heart sounds. No murmur heard.  Pulmonary:      Effort: Pulmonary effort is normal. No respiratory distress.      Breath sounds: Normal breath sounds. No wheezing or rales.   Musculoskeletal:      Cervical back: Neck supple.   Lymphadenopathy:      Cervical: No cervical adenopathy.   Skin:     General: Skin is warm and dry.      Findings: No erythema.   Neurological:      Mental Status: She is alert.        Result Review :                   Assessment and Plan    Diagnoses and all orders for this visit:    1. Dry cough (Primary)  -     benzonatate (Tessalon Perles) 100 MG capsule; Take 2 capsules by mouth 3 (Three) Times a Day As Needed for Cough.  Dispense: 30 capsule; Refill: 0  -     dextromethorphan 15 MG/5ML syrup; Take 10 mL by mouth 2 (Two) Times a Day As Needed for Cough.  Dispense: 118 mL; Refill: 1    2. Encounter for screening colonoscopy  -     Ambulatory Referral to Gastroenterology        Follow Up   Return if symptoms worsen or fail to improve.  Patient was given instructions and counseling regarding her condition or for health maintenance advice. Please see specific information pulled  into the AVS if appropriate.

## 2024-02-12 DIAGNOSIS — E89.0 POSTSURGICAL HYPOTHYROIDISM: Primary | ICD-10-CM

## 2024-02-12 RX ORDER — LEVOTHYROXINE SODIUM 112 MCG
224 TABLET ORAL
Qty: 180 TABLET | Refills: 0 | Status: SHIPPED | OUTPATIENT
Start: 2024-02-12

## 2024-02-15 ENCOUNTER — TELEPHONE (OUTPATIENT)
Dept: FAMILY MEDICINE CLINIC | Facility: CLINIC | Age: 59
End: 2024-02-15
Payer: MEDICARE

## 2024-02-19 ENCOUNTER — OFFICE VISIT (OUTPATIENT)
Dept: FAMILY MEDICINE CLINIC | Facility: CLINIC | Age: 59
End: 2024-02-19
Payer: MEDICARE

## 2024-02-19 VITALS
WEIGHT: 268 LBS | HEART RATE: 91 BPM | DIASTOLIC BLOOD PRESSURE: 98 MMHG | BODY MASS INDEX: 42.06 KG/M2 | HEIGHT: 67 IN | OXYGEN SATURATION: 97 % | SYSTOLIC BLOOD PRESSURE: 142 MMHG

## 2024-02-19 DIAGNOSIS — R11.2 NAUSEA AND VOMITING, UNSPECIFIED VOMITING TYPE: ICD-10-CM

## 2024-02-19 DIAGNOSIS — K85.90 ACUTE PANCREATITIS, UNSPECIFIED COMPLICATION STATUS, UNSPECIFIED PANCREATITIS TYPE: Primary | ICD-10-CM

## 2024-02-19 LAB
ALBUMIN SERPL-MCNC: 4 G/DL (ref 3.5–5.2)
ALBUMIN/GLOB SERPL: 1.5 G/DL
ALP SERPL-CCNC: 111 U/L (ref 39–117)
ALT SERPL-CCNC: 22 U/L (ref 1–33)
AST SERPL-CCNC: 19 U/L (ref 1–32)
BILIRUB SERPL-MCNC: 0.2 MG/DL (ref 0–1.2)
BUN SERPL-MCNC: 11 MG/DL (ref 6–20)
BUN/CREAT SERPL: 9.6 (ref 7–25)
CALCIUM SERPL-MCNC: 8.9 MG/DL (ref 8.6–10.5)
CHLORIDE SERPL-SCNC: 103 MMOL/L (ref 98–107)
CO2 SERPL-SCNC: 25 MMOL/L (ref 22–29)
CREAT SERPL-MCNC: 1.15 MG/DL (ref 0.57–1)
EGFRCR SERPLBLD CKD-EPI 2021: 56 ML/MIN/1.73
GLOBULIN SER CALC-MCNC: 2.6 GM/DL
GLUCOSE SERPL-MCNC: 97 MG/DL (ref 65–99)
LIPASE SERPL-CCNC: 39 U/L (ref 13–60)
POTASSIUM SERPL-SCNC: 4.1 MMOL/L (ref 3.5–5.2)
PROT SERPL-MCNC: 6.6 G/DL (ref 6–8.5)
SODIUM SERPL-SCNC: 140 MMOL/L (ref 136–145)

## 2024-02-19 PROCEDURE — 3080F DIAST BP >= 90 MM HG: CPT | Performed by: NURSE PRACTITIONER

## 2024-02-19 PROCEDURE — 99213 OFFICE O/P EST LOW 20 MIN: CPT | Performed by: NURSE PRACTITIONER

## 2024-02-19 PROCEDURE — 3077F SYST BP >= 140 MM HG: CPT | Performed by: NURSE PRACTITIONER

## 2024-02-19 RX ORDER — PROMETHAZINE HYDROCHLORIDE 6.25 MG/5ML
12.5 SYRUP ORAL 3 TIMES DAILY PRN
Qty: 300 ML | Refills: 0 | Status: SHIPPED | OUTPATIENT
Start: 2024-02-19

## 2024-02-19 RX ORDER — ONDANSETRON 4 MG/1
4 TABLET, ORALLY DISINTEGRATING ORAL EVERY 8 HOURS PRN
COMMUNITY
Start: 2024-02-18

## 2024-02-19 NOTE — PROGRESS NOTES
"Subjective          Urvashi Ornelas presents to [facility] for Hospital Follow Up Visit   History of Present Illness    She is here to follow-up on [facility] hospitalization for Acute Pancreatitis.  She presented to [facility] as a 58-year-old history of CKD and asthma but denies any other chronic medical history presenting with acute onset right lower quadrant abdominal pain for the last 3 hours associated with some nausea but no vomiting. She was just sitting on the couch when this occurred. Denies history of fevers or recent illness trauma to the abdomen or dysuria/hematuria. She still has her appendix but has had her gallbladder removed. Still having normal regular bowel movements. Felt well up until this started occurring about 3 hours  ago.    Today, she is her to follow-up on Acute Pancreatitis and reports being in a lot of pain and has been throwing up.  She has thrown up 4 times yesterday and threw up twice today.  She reports having upper abdominal pain that radiates to her mid center back.  Her pain feels like stabbing pain that is constant with pain level 9/10.  She report Zofran will help a little to take edge off of nausea and vomiting.   She takes Tylenlol but it does not help her pain.  She ate soup yesterday.  Today she has not eaten anything at all.  She drank 1/2 cup black coffee and 1 glass of water today.  She reports having normal bowel movement and urinating without difficulty.  She denies any other concerns today.    Review of Systems       Objective   Vital Signs:   /98 (BP Location: Right arm, Patient Position: Sitting, Cuff Size: Adult)   Pulse 91   Ht 170.2 cm (67.01\")   Wt 122 kg (268 lb)   SpO2 97%   BMI 41.96 kg/m²           Physical Exam  Vitals and nursing note reviewed.   Constitutional:       General: She is not in acute distress.     Appearance: Normal appearance.   HENT:      Head: Normocephalic and atraumatic.   Cardiovascular: "      Rate and Rhythm: Normal rate and regular rhythm.      Heart sounds: Normal heart sounds. No murmur heard.  Pulmonary:      Effort: Pulmonary effort is normal. No respiratory distress.      Breath sounds: Normal breath sounds. No wheezing.   Abdominal:      General: Bowel sounds are normal. There is no distension.      Palpations: Abdomen is soft. There is no mass.      Tenderness: There is abdominal tenderness. There is no guarding or rebound.      Comments: Generalized abdominal tenderness on palpation with increased tenderness on palpation over upper abdomen.   Skin:     General: Skin is warm and dry.      Findings: No erythema.   Neurological:      Mental Status: She is alert.   Psychiatric:         Mood and Affect: Mood normal.        Result Review :                 Assessment and Plan    Diagnoses and all orders for this visit:    1. Acute pancreatitis, unspecified complication status, unspecified pancreatitis type (Primary)  -     Ambulatory Referral to Gastroenterology  -     promethazine (PHENERGAN) 6.25 MG/5ML syrup; Take 10 mL by mouth 3 (Three) Times a Day As Needed for Nausea or Vomiting.  Dispense: 300 mL; Refill: 0  -     Comprehensive Metabolic Panel  -     Lipase    2. Nausea and vomiting, unspecified vomiting type  -     promethazine (PHENERGAN) 6.25 MG/5ML syrup; Take 10 mL by mouth 3 (Three) Times a Day As Needed for Nausea or Vomiting.  Dispense: 300 mL; Refill: 0      Referral placed to GI specialist and was able to get patient in to see LALO Taylor on Wednesday, 2/2824, at 1:00 pm, which is the soonest they can see her.        Follow Up   Return in about 2 weeks (around 3/4/2024) for Next scheduled follow up - Acute Pancreatitis.  Patient was given instructions and counseling regarding her condition or for health maintenance advice. Please see specific information pulled into the AVS if appropriate.

## 2024-02-20 ENCOUNTER — TELEPHONE (OUTPATIENT)
Dept: FAMILY MEDICINE CLINIC | Facility: CLINIC | Age: 59
End: 2024-02-20
Payer: MEDICARE

## 2024-02-20 NOTE — TELEPHONE ENCOUNTER
Caller: Urvashi Ornelas    Relationship: Self    Best call back number: [phone]     What medication are you requesting: PAIN MEDICATION     What are your current symptoms: PAIN FROM PANCREATITIS      If a prescription is needed, what is your preferred pharmacy and phone number: [id number] - [place], [place] - [address] [facility] [address] - [phone]  - [phone]      Additional notes:BEEN TAKING OTC TYLENOL AND STATES IT IS NOT HELPING WITH THE PAIN AT ALL PLEASE CALL AND ADVISE

## 2024-02-21 NOTE — TELEPHONE ENCOUNTER
Called and left message on patient's mobile phone to go to the Emergency Room if having increasing abdominal pain as I cannot prescribed narcotic to mask what could be a serious abdominal issue.  Advised if her pain has not worsened from previous visit, advised patient to continue taking Tylenol for pain and to decrease fried, fast, fatty foods to help minimize pain.  If patient had any questions, patient can call office to discuss or make an appointment to be seen.    Also tried calling patient on her home phone listed and phone number was disconnected.

## 2024-02-27 ENCOUNTER — TELEPHONE (OUTPATIENT)
Dept: ENDOCRINOLOGY | Age: 59
End: 2024-02-27

## 2024-02-27 ENCOUNTER — OFFICE VISIT (OUTPATIENT)
Dept: ENDOCRINOLOGY | Age: 59
End: 2024-02-27
Payer: MEDICARE

## 2024-02-27 VITALS
HEIGHT: 67 IN | TEMPERATURE: 97 F | WEIGHT: 266.8 LBS | DIASTOLIC BLOOD PRESSURE: 80 MMHG | BODY MASS INDEX: 41.88 KG/M2 | HEART RATE: 96 BPM | SYSTOLIC BLOOD PRESSURE: 120 MMHG | OXYGEN SATURATION: 98 %

## 2024-02-27 DIAGNOSIS — Z85.850 HISTORY OF THYROID CANCER: ICD-10-CM

## 2024-02-27 DIAGNOSIS — E89.0 POSTSURGICAL HYPOTHYROIDISM: Primary | ICD-10-CM

## 2024-02-27 PROCEDURE — 3079F DIAST BP 80-89 MM HG: CPT | Performed by: NURSE PRACTITIONER

## 2024-02-27 PROCEDURE — 3074F SYST BP LT 130 MM HG: CPT | Performed by: NURSE PRACTITIONER

## 2024-02-27 PROCEDURE — 99214 OFFICE O/P EST MOD 30 MIN: CPT | Performed by: NURSE PRACTITIONER

## 2024-02-27 RX ORDER — LEVOTHYROXINE SODIUM 125 MCG
250 TABLET ORAL DAILY
Qty: 180 TABLET | Refills: 0 | Status: SHIPPED | OUTPATIENT
Start: 2024-02-27

## 2024-02-27 NOTE — Clinical Note
Should we keep her tsh lower Pmh: metastatic papillary thyroid cancer status post total thyroidectomy in 2016 and radioactive iodine ablation. She underwent radioactive iodine ablation along with whole-body scans, with the last one in 2017 which did not show any residual disease

## 2024-02-27 NOTE — TELEPHONE ENCOUNTER
Caller: Urvashi Ornelas    Relationship: Self    Best call back number: [phone]    Requested Prescriptions:  Synthroid 125 MCG tablet  Requested Prescriptions      No prescriptions requested or ordered in this encounter        Pharmacy where request should be sent:  [id number] - [place], [place] - [address] - [phone]  - [phone] FX  [address] [place] [address]  Phone: [phone]  Fax: [phone]       Last office visit with prescribing clinician: 2/27/2024   Last telemedicine visit with prescribing clinician: Visit date not found   Next office visit with prescribing clinician: 8/27/2024     Additional details provided by patient: PT IS TO TAKE 2 TABLETS ONCE A DAY. PLEASE LEAVE VOICEMAIL ONCE MEDICATION IS CALLED IN STATED SHE HAS ISSUES WITH RECEIVING PHONE CALLS BUT SHE CAN GET VOICEMAILS    Does the patient have less than a 3 day supply:  [x] Yes  [] No    Would you like a call back once the refill request has been completed: [x] Yes [] No    If the office needs to give you a call back, can they leave a voicemail: [x] Yes [] No    Irene Bowens Rep   02/27/24 15:17 EST

## 2024-02-27 NOTE — TELEPHONE ENCOUNTER
Called and left patient a message to call back.      OR HUB MAY RELAY     Notify patient:  Change Synthroid to 250mcg daily ( 2 tablets of 125mcg daily)  Arrange labs in 3m please

## 2024-02-27 NOTE — PROGRESS NOTES
"Chief Complaint  Hypothyroidism (Does not have any refills on her medications. )    Subjective        Urvashi Ornelas presents to [facility] END[facility]CR[facility]  History of Present Illness    Pmh s/f: metastatic papillary thyroid cancer status post total thyroidectomy in 2016 and radioactive iodine ablation. She underwent radioactive iodine ablation along with whole-body scans, with the last one in 2017 which did not show any residual disease. She has been released from 's office.   Still following with Dr Ashby- last exam Fall 2023  right inferior parathyroidectomy for hyperparathyroidism     11/2023: Increase t4 dose to 224mcg daily and change to brand name   \"Emotions are crazy\" still following with therapist   Currently dealing with pancreatitis   Otherwise no complaints or concerns     Objective   Vital Signs:  /80   Pulse 96   Temp 97 °F (36.1 °C) (Temporal)   Ht 170.2 cm (67.01\")   Wt 121 kg (266 lb 12.8 oz)   SpO2 98%   BMI 41.77 kg/m²   Estimated body mass index is 41.77 kg/m² as calculated from the following:    Height as of this encounter: 170.2 cm (67.01\").    Weight as of this encounter: 121 kg (266 lb 12.8 oz).           Physical Exam  Vitals reviewed.   Constitutional:       General: She is not in acute distress.  HENT:      Head: Normocephalic and atraumatic.   Cardiovascular:      Rate and Rhythm: Normal rate.   Pulmonary:      Effort: Pulmonary effort is normal. No respiratory distress.   Musculoskeletal:         General: No signs of injury. Normal range of motion.      Cervical back: Normal range of motion and neck supple.   Skin:     General: Skin is warm and dry.   Neurological:      Mental Status: She is alert and oriented to person, place, and time. Mental status is at baseline.   Psychiatric:         Mood and Affect: Mood normal.         Behavior: Behavior normal.         Thought Content: Thought content normal.         Judgment: Judgment normal.        Result " Review :    The following data was reviewed by: LALO Corley on 02/27/2024:  Common labs          4/20/2023    10:51 7/11/2023    13:07 2/19/2024    11:16   Common Labs   Glucose 96  95  97    BUN 23  16  11    Creatinine 1.04  1.10  1.15    Sodium 139  141  140    Potassium 4.7  4.3  4.1    Chloride 104  104  103    Calcium 9.2  9.4  8.9    Total Protein 6.6  6.8  6.6    Albumin 3.9  3.9  4.0    Total Bilirubin 0.2  0.3  0.2    Alkaline Phosphatase 98  99  111    AST (SGOT) 17  18  19    ALT (SGPT) 14  19  22    WBC 4.84  6.27     Hemoglobin 12.4  14.1     Hematocrit 39.9  43.7     Platelets 212  228     Total Cholesterol 218  270     Triglycerides 169  143     HDL Cholesterol 52  63     LDL Cholesterol  136  181                    Assessment and Plan     Diagnoses and all orders for this visit:    1. Postsurgical hypothyroidism (Primary)  -     TSH; Future  -     T4, Free; Future  -     T3, Free; Future    2. History of thyroid cancer    Other orders  -     Synthroid 125 MCG tablet; Take 2 tablets by mouth Daily. Brand Medically Necessary  Dispense: 180 tablet; Refill: 0             Follow Up     Return in about 6 months (around 8/27/2024).    Tsh goal 0.1 to 0.5   Change t4 dose to 250mcg daily  Repeat labs in 3m    Patient was given instructions and counseling regarding her condition or for health maintenance advice. Please see specific information pulled into the AVS if appropriate.     LALO Corley

## 2024-02-27 NOTE — Clinical Note
Notify patient: Change Synthroid to 250mcg daily ( 2 tablets of 125mcg daily) Arrange labs in 3m please

## 2024-02-28 ENCOUNTER — PREP FOR SURGERY (OUTPATIENT)
Dept: SURGERY | Facility: SURGERY CENTER | Age: 59
End: 2024-02-28
Payer: MEDICARE

## 2024-02-28 ENCOUNTER — PRIOR AUTHORIZATION (OUTPATIENT)
Dept: ENDOCRINOLOGY | Age: 59
End: 2024-02-28
Payer: MEDICARE

## 2024-02-28 ENCOUNTER — OFFICE VISIT (OUTPATIENT)
Dept: GASTROENTEROLOGY | Facility: CLINIC | Age: 59
End: 2024-02-28
Payer: MEDICARE

## 2024-02-28 VITALS
OXYGEN SATURATION: 96 % | SYSTOLIC BLOOD PRESSURE: 124 MMHG | WEIGHT: 267.9 LBS | HEART RATE: 81 BPM | TEMPERATURE: 98.4 F | HEIGHT: 67 IN | DIASTOLIC BLOOD PRESSURE: 78 MMHG | BODY MASS INDEX: 42.05 KG/M2

## 2024-02-28 DIAGNOSIS — K63.5 HYPERPLASTIC COLONIC POLYP, UNSPECIFIED PART OF COLON: ICD-10-CM

## 2024-02-28 DIAGNOSIS — Z12.11 COLON CANCER SCREENING: ICD-10-CM

## 2024-02-28 DIAGNOSIS — R14.2 BELCHING: ICD-10-CM

## 2024-02-28 DIAGNOSIS — R19.7 DIARRHEA, UNSPECIFIED TYPE: ICD-10-CM

## 2024-02-28 DIAGNOSIS — R14.2 BELCHING: Chronic | ICD-10-CM

## 2024-02-28 DIAGNOSIS — R10.13 EPIGASTRIC PAIN: Chronic | ICD-10-CM

## 2024-02-28 DIAGNOSIS — R74.8 ELEVATED LIPASE: Chronic | ICD-10-CM

## 2024-02-28 DIAGNOSIS — K85.90 ACUTE PANCREATITIS, UNSPECIFIED COMPLICATION STATUS, UNSPECIFIED PANCREATITIS TYPE: Primary | Chronic | ICD-10-CM

## 2024-02-28 DIAGNOSIS — R10.13 EPIGASTRIC PAIN: Primary | ICD-10-CM

## 2024-02-28 DIAGNOSIS — K59.00 CONSTIPATION, UNSPECIFIED CONSTIPATION TYPE: ICD-10-CM

## 2024-02-28 DIAGNOSIS — R10.33 PERIUMBILICAL ABDOMINAL PAIN: ICD-10-CM

## 2024-02-28 PROCEDURE — 3078F DIAST BP <80 MM HG: CPT | Performed by: NURSE PRACTITIONER

## 2024-02-28 PROCEDURE — 1160F RVW MEDS BY RX/DR IN RCRD: CPT | Performed by: NURSE PRACTITIONER

## 2024-02-28 PROCEDURE — 3074F SYST BP LT 130 MM HG: CPT | Performed by: NURSE PRACTITIONER

## 2024-02-28 PROCEDURE — 1159F MED LIST DOCD IN RCRD: CPT | Performed by: NURSE PRACTITIONER

## 2024-02-28 PROCEDURE — 99214 OFFICE O/P EST MOD 30 MIN: CPT | Performed by: NURSE PRACTITIONER

## 2024-02-28 RX ORDER — SODIUM CHLORIDE 0.9 % (FLUSH) 0.9 %
10 SYRINGE (ML) INJECTION AS NEEDED
OUTPATIENT
Start: 2024-02-28

## 2024-02-28 RX ORDER — ONDANSETRON 4 MG/1
4 TABLET, ORALLY DISINTEGRATING ORAL EVERY 8 HOURS PRN
Qty: 60 TABLET | Refills: 2 | Status: SHIPPED | OUTPATIENT
Start: 2024-02-28

## 2024-02-28 RX ORDER — SODIUM CHLORIDE, SODIUM LACTATE, POTASSIUM CHLORIDE, CALCIUM CHLORIDE 600; 310; 30; 20 MG/100ML; MG/100ML; MG/100ML; MG/100ML
30 INJECTION, SOLUTION INTRAVENOUS CONTINUOUS PRN
OUTPATIENT
Start: 2024-02-28

## 2024-02-28 RX ORDER — PANTOPRAZOLE SODIUM 40 MG/1
40 TABLET, DELAYED RELEASE ORAL DAILY
Qty: 90 TABLET | Refills: 1 | Status: SHIPPED | OUTPATIENT
Start: 2024-02-28

## 2024-02-28 RX ORDER — SODIUM CHLORIDE 0.9 % (FLUSH) 0.9 %
3 SYRINGE (ML) INJECTION EVERY 12 HOURS SCHEDULED
OUTPATIENT
Start: 2024-02-28

## 2024-02-28 NOTE — PATIENT INSTRUCTIONS
For further evaluation of epigastric pain, nausea, vomiting, and belching we will schedule an EGD.     For GERD, we recommend avoiding eating 3-4 hours before bedtime, eating smaller more frequent meals, and avoiding any known food triggers including spicy foods, tomatoes and tomato-based sauces, chocolate, coffee/tea, citrus fruits, carbonated  beverages and alcohol.     2.   We will start you on pantoprazole 40 mg once daily for epigastric pain and suspected GERD. You will take this once daily.     3.  For colon cancer screening we will schedule a colonoscopy.     4.  For constipation, continue Colace.     5.  Due to your history of pancreatitis we recommend following a low fat diet indefinitely.     6.   Plan for office follow up 4 weeks after procedures to discuss results and reassess symptoms.     7.  For nausea and vomiting we have sent in refills for Zofran.  He may take 1 tablet every 8 hours as needed for symptom management.

## 2024-02-28 NOTE — PROGRESS NOTES
"Chief Complaint   Patient presents with    Pancreatitis    Abdominal Pain         History of Present Illness  58-year-old female presents the office a for evaluation of acute pancreatitis.  Lipase level on 2/17/2024 was barely elevated at 58.  CT of the abdomen pelvis on 2/19/2024 revealed findings of previous cholecystectomy, otherwise unremarkable. She reports having one episode of pancreatitis about 25 years ago. She tries not to eat high fat foods and avoids greasy foods. She was seen and evaluated in the ER at [facility] on 2/19/2024.     She continues to report pain in her epigastric area particularly when she eats. She can have accompanying emesis and/or diarrhea. She has not had any diarrhea since Friday. She reports this discomfort occurs daily. She reports excessive belching. She has never tried to eliminate dairy. She will have nausea at times. She is out of both Zofran and Phenergan    She takes Colace twice daily for constipation. Diarrhea did not start until she went to the ER on 2/19/2024. She reports having a BM yesterday-stool was not \"normal but was not diarrhea\". She has not yet had a BM today. Usually the Colace provides her with a regular BM. She     Last colonoscopy was performed on 3/14/2022 revealing 1 hyperplastic colon polyp in the setting of a poor bowel prep.  Patient was recommended undergo her next colonoscopy at a 1 to 2-year interval due to poor bowel prep.    She has a history of an abdominal hernia repair.        Result Review :       CT Outside Films (02/19/2024 13:46)   LIPASE (02/17/2024 18:35)   COMPREHENSIVE METABOLIC PANEL (02/17/2024 18:35)   CT Abdomen Pelvis With Contrast (02/17/2024 19:32)   COLONOSCOPY (03/14/2022 14:15)   Tissue Pathology Exam (03/14/2022 14:48)   Op Note by Lisa Pandya MD (03/14/2022 13:37)   Vital Signs:   /78   Pulse 81   Temp 98.4 °F (36.9 °C)   Ht 170.2 cm (67\")   Wt 122 kg (267 lb 14.4 oz)   SpO2 96%   BMI 41.96 kg/m²     Body " mass index is 41.96 kg/m².     Physical Exam  Vitals reviewed.   Constitutional:       Appearance: Normal appearance.   HENT:      Head: Normocephalic.      Nose: Nose normal.      Mouth/Throat:      Mouth: Mucous membranes are moist.   Eyes:      General: No scleral icterus.     Extraocular Movements: Extraocular movements intact.   Cardiovascular:      Rate and Rhythm: Normal rate and regular rhythm.      Pulses: Normal pulses.      Heart sounds: Normal heart sounds. No murmur heard.  Pulmonary:      Effort: Pulmonary effort is normal. No respiratory distress.      Breath sounds: Normal breath sounds.   Abdominal:      General: Abdomen is flat. Bowel sounds are normal. There is no distension.      Palpations: Abdomen is soft. There is no mass.      Tenderness: There is abdominal tenderness. There is no guarding.      Comments: Epigastric tenderness on palpation   Musculoskeletal:         General: Normal range of motion.      Cervical back: Normal range of motion and neck supple.   Skin:     General: Skin is warm and dry.   Neurological:      General: No focal deficit present.      Mental Status: She is alert and oriented to person, place, and time.   Psychiatric:         Mood and Affect: Mood normal.         Behavior: Behavior normal.         Thought Content: Thought content normal.         Judgment: Judgment normal.       Assessment and Plan    Diagnoses and all orders for this visit:    1. Acute pancreatitis, unspecified complication status, unspecified pancreatitis type (Primary)    2. Epigastric pain    3. Belching    4. Elevated lipase    5. Hyperplastic colonic polyp, unspecified part of colon    6. Colon cancer screening    7. Constipation, unspecified constipation type    Other orders  -     pantoprazole (PROTONIX) 40 MG EC tablet; Take 1 tablet by mouth Daily.  Dispense: 90 tablet; Refill: 1  -     ondansetron ODT (ZOFRAN-ODT) 4 MG disintegrating tablet; Place 1 tablet on the tongue Every 8 (Eight) Hours  As Needed for Nausea or Vomiting.  Dispense: 60 tablet; Refill: 2           Patient Instructions   For further evaluation of epigastric pain, nausea, vomiting, and belching we will schedule an EGD.     For GERD, we recommend avoiding eating 3-4 hours before bedtime, eating smaller more frequent meals, and avoiding any known food triggers including spicy foods, tomatoes and tomato-based sauces, chocolate, coffee/tea, citrus fruits, carbonated  beverages and alcohol.     2.   We will start you on pantoprazole 40 mg once daily for epigastric pain and suspected GERD. You will take this once daily.     3.  For colon cancer screening we will schedule a colonoscopy.     4.  For constipation, continue Colace.     5.  Due to your history of pancreatitis we recommend following a low fat diet indefinitely.     6.   Plan for office follow up 4 weeks after procedures to discuss results and reassess symptoms.     7.  For nausea and vomiting we have sent in refills for Zofran.  He may take 1 tablet every 8 hours as needed for symptom management.      Discussion:  For further evaluation of epigastric pain, nausea, vomiting, and belching we will schedule EGD.  Will also start the patient on pantoprazole 40 mg once daily for suspected GERD.  Antireflux precautions encouraged.  We have sent in refills for Zofran for the patient to use for management of nausea and vomiting until EGD can be completed.    For colon cancer screening, we will schedule a colonoscopy.  Patient to continue Colace for constipation.  Diarrhea seems to have resolved.    Patient does have a history of pancreatitis 25 years ago.  There was no CT evidence of pancreatitis on her most recent CT scan and her lipase was only very mildly elevated.  1 could argue that this was not a pancreatitis flare.  We did recommend that she follow a low-fat diet indefinitely.  Plan for office follow-up 4 weeks after procedures to discuss results and reassess symptoms.  Patient  verbalized understanding of above plan of care and is in agreement.  All questions answered and support provided.  EMR Dragon/Transcription Disclaimer:  This document has been Dictated utilizing Dragon dictation.

## 2024-03-05 ENCOUNTER — TELEPHONE (OUTPATIENT)
Dept: GASTROENTEROLOGY | Facility: CLINIC | Age: 59
End: 2024-03-05
Payer: MEDICARE

## 2024-03-05 ENCOUNTER — OFFICE VISIT (OUTPATIENT)
Dept: FAMILY MEDICINE CLINIC | Facility: CLINIC | Age: 59
End: 2024-03-05
Payer: MEDICARE

## 2024-03-05 VITALS
WEIGHT: 268.8 LBS | HEART RATE: 85 BPM | SYSTOLIC BLOOD PRESSURE: 180 MMHG | OXYGEN SATURATION: 96 % | DIASTOLIC BLOOD PRESSURE: 100 MMHG | BODY MASS INDEX: 42.19 KG/M2 | HEIGHT: 67 IN

## 2024-03-05 DIAGNOSIS — I10 ESSENTIAL HYPERTENSION: Primary | Chronic | ICD-10-CM

## 2024-03-05 DIAGNOSIS — K85.90 ACUTE PANCREATITIS, UNSPECIFIED COMPLICATION STATUS, UNSPECIFIED PANCREATITIS TYPE: ICD-10-CM

## 2024-03-05 DIAGNOSIS — K59.00 CONSTIPATION, UNSPECIFIED CONSTIPATION TYPE: ICD-10-CM

## 2024-03-05 RX ORDER — FAMOTIDINE 40 MG/1
TABLET, FILM COATED ORAL
Qty: 180 TABLET | Refills: 3 | Status: SHIPPED | OUTPATIENT
Start: 2024-03-05

## 2024-03-05 RX ORDER — AMLODIPINE BESYLATE 10 MG/1
10 TABLET ORAL DAILY
Qty: 90 TABLET | Refills: 1 | Status: SHIPPED | OUTPATIENT
Start: 2024-03-05

## 2024-03-05 NOTE — PROGRESS NOTES
"Subjective          Urvashi Ornelas presents to [facility] for Acute pancreatitis, unspecified complication status, unspec (1 month follow up )   History of Present Illness    She is here to follow-up on Hypertension and Acute Pancreatitis:    Hypertension - she is taking Amlodpine 5mg nightly and takes medication every night.  She denies CP, SOB, heart palpitations and syncope.  She reports her home BP readings have been well over 140's/90's.  She denies smoking.    Acute Pancreatitis.  She continues with feeling of nausea and epigastric pain.  She denies vomiting and diarrhea.  She has constipation.  She report having brown, regular formed stool composed of balls pressed together into a log.   She has been eating soups (tomato, chicken noodle and bean and moy soup).  She ate [email or web address] pizza and tolerated well.  She reports drinking water throughout the day but has milk with meals.  She ate at [employer] the other night and had meatloaf with mashed potatoes and green beans with sweet tea.   Discussed that mental health medications can cause constipation.  Discussed importance of staying hydrated, increasing fiber intake and increasing activity daily to help with constipation.      Review of Systems       Objective   Vital Signs:   /100 (BP Location: Left arm, Patient Position: Sitting, Cuff Size: Adult) Comment: re-checked  Pulse 85   Ht 170.2 cm (67.01\")   Wt 122 kg (268 lb 12.8 oz)   SpO2 96%   BMI 42.09 kg/m²           Physical Exam  Vitals and nursing note reviewed.   Constitutional:       General: She is not in acute distress.     Appearance: Normal appearance. She is not ill-appearing.   HENT:      Head: Normocephalic and atraumatic.   Cardiovascular:      Rate and Rhythm: Normal rate and regular rhythm.      Heart sounds: Normal heart sounds. No murmur heard.  Pulmonary:      Effort: Pulmonary effort is normal. No respiratory distress.      Breath sounds: Normal " breath sounds. No wheezing or rales.   Abdominal:      General: There is no distension.      Palpations: There is no mass.      Tenderness: There is abdominal tenderness in the epigastric area. There is no guarding or rebound.      Comments: Mild tenderness on palpation over epigastric area   Skin:     General: Skin is warm and dry.      Findings: No erythema.   Neurological:      Mental Status: She is alert.   Psychiatric:         Mood and Affect: Mood normal.        Result Review :                 Assessment and Plan    Diagnoses and all orders for this visit:    1. Essential hypertension (Primary)  Assessment & Plan:  Hypertension is uncontrolled on Amlodipine 5mg daily.  Decrease table salt and foods high in salt.  Weight loss.  Increase activity daily.  Increase Amlodipine from 5mg to 10mg daily.  Blood pressure will be re-assessed in 1 month.      Orders:  -     amLODIPine (NORVASC) 10 MG tablet; Take 1 tablet by mouth Daily.  Dispense: 90 tablet; Refill: 1    2. Acute pancreatitis, unspecified complication status, unspecified pancreatitis type  Assessment & Plan:  Discussed importance of dietary changes (low fried/fast/fatty food and low sugar).  Increase water intake, decrease caffeine (soda, tea, coffee).  Follow-up with GI specialist for evaluation and treatment.        3. Constipation, unspecified constipation type  Assessment & Plan:  Continue Colace 1-2 times daily PRN.  Increase fiber intake daily.  Increase water, decrease caffeine (soda, coffee, tea).  Increase activity as tolerated daily.  Followed by GI specialist.          Follow Up   Return in about 1 month (around 4/5/2024).  Patient was given instructions and counseling regarding her condition or for health maintenance advice. Please see specific information pulled into the AVS if appropriate.

## 2024-03-05 NOTE — TELEPHONE ENCOUNTER
Patient left a Voice Message stating that she cannot take Protonix anymore, due to intense constant Headache that she is having since starting this medication. Would like an alternative.    Also has questions regarding the 2 days Bowel Prep.

## 2024-03-08 ENCOUNTER — TELEPHONE (OUTPATIENT)
Dept: ENDOCRINOLOGY | Age: 59
End: 2024-03-08
Payer: MEDICARE

## 2024-03-08 NOTE — TELEPHONE ENCOUNTER
Caller: Urvashi Ornelas    Relationship: Self    Best call back number: [phone]    Requested Prescriptions:     Synthroid 125 MCG tablet     Requested Prescriptions      No prescriptions requested or ordered in this encounter        Pharmacy where request should be sent:      Last office visit with prescribing clinician: 2/27/2024   Last telemedicine visit with prescribing clinician: Visit date not found   Next office visit with prescribing clinician: 8/27/2024     Additional details provided by patient: PT NEEDS A REFILL ON HER SYNTHROID. PT WILL NEED THIS REFILLED BEFORE SHE GOES TO [place]. PT WILL BE MOVING APRIL 19.     Does the patient have less than a 3 day supply:  [] Yes  [x] No    Would you like a call back once the refill request has been completed: [x] Yes [] No    If the office needs to give you a call back, can they leave a voicemail: [x] Yes [] No    Irene Morales Rep   03/08/24 15:51 EST

## 2024-03-08 NOTE — TELEPHONE ENCOUNTER
Hub staff attempted to follow warm transfer process and was unsuccessful     Caller: Urvashi Ornelas    Relationship to patient: Self    Best call back number: [phone]    Patient is needing: PT WAS RETURNING A CALL BACK TO RESCHEDULE HER LABS FOR APRIL AND GET HER MEDICATIONS REFILLED BEFORE SHE MOVES. PLEASE CALL THE PT BACK.

## 2024-03-08 NOTE — TELEPHONE ENCOUNTER
Called and left patient a message  to reschedule lab appt. Also see what medications she is needing refilled.      OR HUB MAY RELAY MESSAGE IF NEEDED.

## 2024-03-08 NOTE — TELEPHONE ENCOUNTER
Hub staff attempted to follow warm transfer process and was unsuccessful     Caller: Urvashi Ornelas    Relationship to patient: Self    Best call back number: [phone]    Patient is needing: PATIENT CALLED BACK UNABLE TO WARM TRANSFER. PLEASE ADVISE

## 2024-03-08 NOTE — TELEPHONE ENCOUNTER
Hub staff attempted to follow warm transfer process and was unsuccessful     Caller: Urvashi Ornelas    Relationship to patient: Self    Best call back number: [phone]    Patient is needing: PT WAS CALLING BACK TO SCHEDULE HER LAB APPT. PLEASE CALL THIS PT BACK.

## 2024-03-08 NOTE — TELEPHONE ENCOUNTER
Patient called stating she is moving to [place] on 4/19. She is asking if we can move her blood work to the beginning of April and get refills so she has medicine while looking for a doctor closer to her new home.       Are you ok with this?

## 2024-03-08 NOTE — TELEPHONE ENCOUNTER
Hub staff attempted to follow warm transfer process and was unsuccessful     Caller: Urvashi Ornelas    Relationship to patient: Self    Best call back number: [phone]    Patient is needing: PREVIOUS ENCOUNTER. CALLING OFFICE BACK

## 2024-03-10 NOTE — ASSESSMENT & PLAN NOTE
Hypertension is uncontrolled on Amlodipine 5mg daily.  Decrease table salt and foods high in salt.  Weight loss.  Increase activity daily.  Increase Amlodipine from 5mg to 10mg daily.  Blood pressure will be re-assessed in 1 month.

## 2024-03-10 NOTE — ASSESSMENT & PLAN NOTE
Discussed importance of dietary changes (low fried/fast/fatty food and low sugar).  Increase water intake, decrease caffeine (soda, tea, coffee).  Follow-up with GI specialist for evaluation and treatment.

## 2024-03-10 NOTE — ASSESSMENT & PLAN NOTE
Continue Colace 1-2 times daily PRN.  Increase fiber intake daily.  Increase water, decrease caffeine (soda, coffee, tea).  Increase activity as tolerated daily.  Followed by GI specialist.

## 2024-03-11 RX ORDER — LEVOTHYROXINE SODIUM 125 MCG
250 TABLET ORAL DAILY
Qty: 180 TABLET | Refills: 0 | Status: SHIPPED | OUTPATIENT
Start: 2024-03-11

## 2024-03-11 NOTE — TELEPHONE ENCOUNTER
Hub staff attempted to follow warm transfer process and was unsuccessful     Caller: Urvashi Ornelas    Relationship to patient: Self    Best call back number: [phone] (Mobile)    Patient is needing: PT IS CALLING BACK REGARDING HER QUESTION LEFT ON CLINICAL VM. SHE SAID A NEW PRESCRIPTION WAS SENT IN, BUT HER QUESTIONS ON BOWEL PREP WERE NEVER ANSWERED.     PT IS CONCERNED THIS 2 DAY PREP WILL NOT ALLOW HER TO BE SUFFICIENTLY CLEANED OUT FOR THE PROCEDURE WITH DR MARRUFO. SHE HAS HAD THIS ISSUE BEFORE. PLEASE CALL HER ASAP.

## 2024-04-02 DIAGNOSIS — E89.0 POSTSURGICAL HYPOTHYROIDISM: ICD-10-CM

## 2024-04-03 LAB
T3FREE SERPL-MCNC: 2.8 PG/ML (ref 2–4.4)
T4 FREE SERPL-MCNC: 1.77 NG/DL (ref 0.93–1.7)
TSH SERPL DL<=0.005 MIU/L-ACNC: 0.23 UIU/ML (ref 0.27–4.2)

## 2024-04-12 ENCOUNTER — OFFICE VISIT (OUTPATIENT)
Dept: FAMILY MEDICINE CLINIC | Facility: CLINIC | Age: 59
End: 2024-04-12
Payer: MEDICARE

## 2024-04-12 VITALS
OXYGEN SATURATION: 95 % | WEIGHT: 271.8 LBS | HEIGHT: 67 IN | BODY MASS INDEX: 42.66 KG/M2 | SYSTOLIC BLOOD PRESSURE: 124 MMHG | HEART RATE: 86 BPM | DIASTOLIC BLOOD PRESSURE: 88 MMHG

## 2024-04-12 DIAGNOSIS — N18.31 STAGE 3A CHRONIC KIDNEY DISEASE: ICD-10-CM

## 2024-04-12 DIAGNOSIS — F43.10 PTSD (POST-TRAUMATIC STRESS DISORDER): ICD-10-CM

## 2024-04-12 DIAGNOSIS — Z85.850 HISTORY OF THYROID CANCER: ICD-10-CM

## 2024-04-12 DIAGNOSIS — F60.3 BORDERLINE PERSONALITY DISORDER: ICD-10-CM

## 2024-04-12 DIAGNOSIS — Z00.00 HEALTH CARE MAINTENANCE: ICD-10-CM

## 2024-04-12 DIAGNOSIS — E03.9 HYPOTHYROIDISM, UNSPECIFIED TYPE: ICD-10-CM

## 2024-04-12 DIAGNOSIS — Z00.00 ENCOUNTER FOR SUBSEQUENT ANNUAL WELLNESS VISIT IN MEDICARE PATIENT: Primary | ICD-10-CM

## 2024-04-12 DIAGNOSIS — E78.00 HYPERCHOLESTEROLEMIA: ICD-10-CM

## 2024-04-12 DIAGNOSIS — I10 ESSENTIAL HYPERTENSION: Chronic | ICD-10-CM

## 2024-04-12 DIAGNOSIS — F25.9 SCHIZOAFFECTIVE DISORDER, UNSPECIFIED TYPE: ICD-10-CM

## 2024-04-12 PROBLEM — Z12.11 COLON CANCER SCREENING: Status: RESOLVED | Noted: 2024-02-28 | Resolved: 2024-04-12

## 2024-04-12 PROBLEM — R31.9 HEMATURIA: Status: RESOLVED | Noted: 2023-02-05 | Resolved: 2024-04-12

## 2024-04-12 PROBLEM — S37.30XA BLADDER AND URETHRA INJURY: Status: RESOLVED | Noted: 2023-02-24 | Resolved: 2024-04-12

## 2024-04-12 PROBLEM — R10.33 PERIUMBILICAL ABDOMINAL PAIN: Status: RESOLVED | Noted: 2024-02-28 | Resolved: 2024-04-12

## 2024-04-12 PROBLEM — N20.1 RIGHT URETERAL STONE: Status: RESOLVED | Noted: 2023-02-18 | Resolved: 2024-04-12

## 2024-04-12 PROBLEM — N20.0 KIDNEY STONE: Status: RESOLVED | Noted: 2023-02-24 | Resolved: 2024-04-12

## 2024-04-12 PROBLEM — R19.7 DIARRHEA: Status: RESOLVED | Noted: 2024-02-28 | Resolved: 2024-04-12

## 2024-04-12 PROBLEM — K62.5 RECTAL BLEEDING: Status: RESOLVED | Noted: 2022-03-03 | Resolved: 2024-04-12

## 2024-04-12 PROBLEM — Z86.39 H/O HYPOKALEMIA: Status: RESOLVED | Noted: 2023-01-10 | Resolved: 2024-04-12

## 2024-04-12 PROBLEM — L29.9 PRURITUS: Status: RESOLVED | Noted: 2021-08-11 | Resolved: 2024-04-12

## 2024-04-12 PROBLEM — S37.20XA BLADDER AND URETHRA INJURY: Status: RESOLVED | Noted: 2023-02-24 | Resolved: 2024-04-12

## 2024-04-12 PROBLEM — N12 PYELONEPHRITIS OF RIGHT KIDNEY: Status: RESOLVED | Noted: 2023-02-17 | Resolved: 2024-04-12

## 2024-04-12 PROBLEM — K63.5 COLON POLYPS: Status: RESOLVED | Noted: 2022-03-14 | Resolved: 2024-04-12

## 2024-04-12 RX ORDER — AMLODIPINE BESYLATE 10 MG/1
10 TABLET ORAL DAILY
Qty: 90 TABLET | Refills: 1 | Status: SHIPPED | OUTPATIENT
Start: 2024-04-12

## 2024-04-12 NOTE — PROGRESS NOTES
The ABCs of the Annual Wellness Visit  Subsequent Medicare Wellness Visit    Subjective      Urvashi Ornelas is a 58 y.o. female who presents for a Subsequent Medicare Wellness Visit.    The following portions of the patient's history were reviewed and   updated as appropriate: allergies, current medications, past family history, past medical history, past social history, past surgical history, and problem list.    Compared to one year ago, the patient feels her physical   health is the same.    Compared to one year ago, the patient feels her mental   health is worse.  She is followed closely by Psychiatry and Therapist.    Recent Hospitalizations:  She was not admitted to the hospital during the last year.       Current Medical Providers:  Patient Care Team:  Meggan Washington APRN as PCP - General (Nurse Practitioner)  Dayo Ashby MD as Surgeon (Otolaryngology)  Yonis Sweeney MD as Consulting Physician (Pulmonary Disease)  Laurel Morris APRN as Nurse Practitioner (Gastroenterology)  Karyna Hernandez APRN as Nurse Practitioner (Endocrinology)    Outpatient Medications Prior to Visit   Medication Sig Dispense Refill    acetaminophen (TYLENOL) 500 MG tablet Take 1 tablet by mouth Every 6 (Six) Hours As Needed for Mild Pain.      Ascorbic Acid (Vitamin C) 500 MG chewable tablet Chew 1 capsule Daily.      azelastine (ASTELIN) 0.1 % nasal spray INSTILL 2 SPRAYS INTO THE NOSTRIL(S) AS DIRECTED 2 (TWO) TIMES A DAY. 30 each 1    buPROPion XL (WELLBUTRIN XL) 150 MG 24 hr tablet Take 1 tablet by mouth Every Morning.      busPIRone (BUSPAR) 30 MG tablet Take 0.5 tablets by mouth 2 (Two) Times a Day As Needed.  0    coenzyme Q10 100 MG capsule Take 1 capsule by mouth Daily.      docusate sodium (COLACE) 100 MG capsule TAKE 1 CAPSULE BY MOUTH TWICE A DAY AS NEEDED FOR CONSTIPATION 60 capsule 6    EPINEPHrine (EPIPEN) 0.3 MG/0.3ML solution auto-injector injection 1 ML INTRAMUSCULAR ZZZADHOC      famotidine (PEPCID)  40 MG tablet May take 1 tablet up to twice daily as needed for epigastric pain 180 tablet 3    fexofenadine (ALLEGRA) 180 MG tablet       fluticasone (FLONASE) 50 MCG/ACT nasal spray 2 sprays into the nostril(s) as directed by provider Daily. 16 g 0    hydrOXYzine (ATARAX) 25 MG tablet Take 1 tablet by mouth Every 6 (Six) Hours As Needed for Itching. 40 tablet 0    ketotifen (ZADITOR) 0.025 % ophthalmic solution INSTILL 1 DROP EACH EYE 2-3 TIMES DAILY AS NEEDED      Omega-3 Fatty Acids (fish oil) 1000 MG capsule capsule Take  by mouth Daily With Breakfast.      ondansetron ODT (ZOFRAN-ODT) 4 MG disintegrating tablet Place 1 tablet on the tongue Every 8 (Eight) Hours As Needed for Nausea or Vomiting. 60 tablet 2    prazosin (MINIPRESS) 2 MG capsule At Night As Needed.      promethazine (PHENERGAN) 6.25 MG/5ML syrup Take 10 mL by mouth 3 (Three) Times a Day As Needed for Nausea or Vomiting. 300 mL 0    Synthroid 125 MCG tablet Take 2 tablets by mouth Daily. Brand Medically Necessary 180 tablet 0    tamsulosin (FLOMAX) 0.4 MG capsule 24 hr capsule Take 1 capsule by mouth Daily.      topiramate (Topamax) 50 MG tablet 1.5 tablets.      Valbenazine Tosylate (INGREZZA PO) Take 80 mg by mouth.      amLODIPine (NORVASC) 10 MG tablet Take 1 tablet by mouth Daily. 90 tablet 1    Lurasidone HCl (LATUDA) 20 MG tablet tablet Take 1 tablet by mouth.      benzonatate (Tessalon Perles) 100 MG capsule Take 2 capsules by mouth 3 (Three) Times a Day As Needed for Cough. (Patient not taking: Reported on 4/12/2024) 30 capsule 0    dextromethorphan 15 MG/5ML syrup Take 10 mL by mouth 2 (Two) Times a Day As Needed for Cough. (Patient not taking: Reported on 4/12/2024) 118 mL 1     No facility-administered medications prior to visit.       No opioid medication identified on active medication list. I have reviewed chart for other potential  high risk medication/s and harmful drug interactions in the elderly.        Aspirin is not on active  "medication list.  Aspirin use is not indicated based on review of current medical condition/s. Risk of harm outweighs potential benefits.  .    Patient Active Problem List   Diagnosis    Asthma    Vitamin D deficiency    Trigger thumb of left hand    Tardive dyskinesia    Syrinx of spinal cord    Schizoaffective disorder    PTSD (post-traumatic stress disorder)    Osteopenia    Hypothyroidism    Episodic paroxysmal hemicrania, not intractable    Medicare annual wellness visit, subsequent    Pneumonia    Migraines    Anxiety    Parapneumonic effusion    Hypokalemia    CKD (chronic kidney disease) stage 3, GFR 30-59 ml/min    Bipolar disorder    Hypercholesterolemia    Chronic low back pain    Constipation    Muscle cramps    Shortness of breath on exertion    Belching    Borderline personality disorder    Essential hypertension    Class 1 obesity due to excess calories with body mass index (BMI) of 34.0 to 34.9 in adult    Chronic daily headache    Chronic kidney disease    History of thyroid cancer    Primary hyperparathyroidism     Advance Care Planning   Advance Care Planning     Advance Directive is on file.  ACP discussion was held with the patient during this visit. Patient has an advance directive in EMR which is still valid.      Objective    Vitals:    04/12/24 1106 04/12/24 1209   BP:  124/88  Comment: re-checked   BP Location:  Left arm   Patient Position:  Sitting   Cuff Size:  Adult   Pulse: 86    SpO2: 95%    Weight: 123 kg (271 lb 12.8 oz)    Height: 170.2 cm (67.01\")      Estimated body mass index is 42.56 kg/m² as calculated from the following:    Height as of this encounter: 170.2 cm (67.01\").    Weight as of this encounter: 123 kg (271 lb 12.8 oz).     Labs (CBC, CMP, U/A) from Dora Dempsey MD, Nephrology dated 4/9/24 reviewed today.      Does the patient have evidence of cognitive impairment?   No            HEALTH RISK ASSESSMENT    Smoking Status:  Social History     Tobacco Use   Smoking " Status Former    Current packs/day: 0.00    Average packs/day: 0.5 packs/day for 18.0 years (9.0 ttl pk-yrs)    Types: Cigarettes    Start date: 6/10/1995    Quit date: 2010    Years since quittin.2    Passive exposure: Never   Smokeless Tobacco Never   Tobacco Comments    I smoked from  -  then from [id number]     Alcohol Consumption:  Social History     Substance and Sexual Activity   Alcohol Use Never     Fall Risk Screen:    ANDREAADI Fall Risk Assessment was completed, and patient is at MODERATE risk for falls. Assessment completed on:2024    Depression Screenin/12/2024    11:11 AM   PHQ-2/PHQ-9 Depression Screening   Little Interest or Pleasure in Doing Things 3-->nearly every day   Feeling Down, Depressed or Hopeless 3-->nearly every day   Trouble Falling or Staying Asleep, or Sleeping Too Much 1-->several days   Feeling Tired or Having Little Energy 1-->several days   Poor Appetite or Overeating 1-->several days   Feeling Bad about Yourself - or that You are a Failure or Have Let Yourself or Your Family Down 1-->several days   Trouble Concentrating on Things, Such as Reading the Newspaper or Watching Television 1-->several days   Moving or Speaking So Slowly that Other People Could Have Noticed? Or the Opposite - Being So Fidgety 0-->not at all   Thoughts that You Would be Better Off Dead or of Hurting Yourself in Some Way 0-->not at all   PHQ-9: Brief Depression Severity Measure Score 11   If You Checked Off Any Problems, How Difficult Have These Problems Made It For You to Do Your Work, Take Care of Things at Home, or Get Along with Other People? somewhat difficult       Health Habits and Functional and Cognitive Screenin/12/2024    11:15 AM   Functional & Cognitive Status   Do you have difficulty preparing food and eating? No   Do you have difficulty bathing yourself, getting dressed or grooming yourself? No   Do you have difficulty using the toilet? No   Do you have  difficulty moving around from place to place? No   Do you have trouble with steps or getting out of a bed or a chair? No   Current Diet Limited Junk Food   Dental Exam Up to date   Eye Exam Up to date   Exercise (times per week) 7 times per week   Current Exercises Include Walking   Do you need help using the phone?  No   Are you deaf or do you have serious difficulty hearing?  Yes   Do you need help to go to places out of walking distance? No   Do you need help shopping? No   Do you need help preparing meals?  No   Do you need help with housework?  No   Do you need help with laundry? No   Do you need help taking your medications? No   Do you need help managing money? No   Do you ever drive or ride in a car without wearing a seat belt? No   Have you felt unusual stress, anger or loneliness in the last month? Yes   Who do you live with? Alone   If you need help, do you have trouble finding someone available to you? No   Have you been bothered in the last four weeks by sexual problems? No   Do you have difficulty concentrating, remembering or making decisions? Yes       Age-appropriate Screening Schedule:  Refer to the list below for future screening recommendations based on patient's age, sex and/or medical conditions. Orders for these recommended tests are listed in the plan section. The patient has been provided with a written plan.    Health Maintenance   Topic Date Due    DXA SCAN  11/17/2019    COLORECTAL CANCER SCREENING  03/14/2023    COVID-19 Vaccine (5 - 2023-24 season) 09/01/2023    ANNUAL WELLNESS VISIT  04/10/2024    BMI FOLLOWUP  07/10/2024    LIPID PANEL  07/11/2024    INFLUENZA VACCINE  08/01/2024    MAMMOGRAM  07/21/2025    TDAP/TD VACCINES (3 - Td or Tdap) 08/15/2033    Pneumococcal Vaccine 0-64  Completed    ZOSTER VACCINE  Completed    HEPATITIS C SCREENING  Discontinued    PAP SMEAR  Discontinued                  CMS Preventative Services Quick Reference  Risk Factors Identified During  Encounter:    Depression/Dysphoria:  She is followed by Psychiatry at [facility]; she last saw her Psychiatrist yesterday ; she has been seeing Therapist weekly since she is going to be moving soon.   Recommended she discuss with Therapist referral to someone in the area where she is going to be living to minimize disruption of treatment.  Fall Risk-High or Moderate: Discussed Fall Prevention in the home  Hearing Problem:  she has bilateral hearing aids but does not wear them because they irritate her ears.  Immunizations Discussed/Encouraged: COVID19.  She declined COVID vaccine today.  Inadequate Social Support, Isolation, Loneliness, Lack of Transportation, Financial Difficulties, or Caregiver Stress: She reports will have increased social support once she moves.  Polypharmacy: Medication List reviewed and Medications are appropriate for patient.  Dental Screening Recommended - parital upper/lower dentures.  She has dental cleanings once per year, last visit a couple months ago.  Vision Screening Recommended - her next vision screening is coming due in the next couple months.  She has vision screening every year.  She will make appointment to have eyes checked once she relocates to her new residence in [place].    The above risks/problems have been discussed with the patient.  Pertinent information has been shared with the patient in the After Visit Summary.    Diagnoses and all orders for this visit:    1. Encounter for subsequent annual wellness visit in Medicare patient (Primary)    2. Essential hypertension  Assessment & Plan:  Hypertension is controlled on Amlodipine 10mg daily.  Continue current treatment plan.  Decrease table salt and foods high in salt.  Weight loss.  Increase activity daily.  Blood pressure will be re-assessed by new PCP in [place] as she is relocating in the next 10 days.    Orders:  -     amLODIPine (NORVASC) 10 MG tablet; Take 1 tablet by mouth Daily.  Dispense:  "90 tablet; Refill: 1    3. Hypercholesterolemia  Assessment & Plan:  Lipid abnormalities are elevated on prior labs.  She is taking Omega 3 daily due to having myalgia with statins.  Strongly encouraged lifestyle changes.  Continue current treatment plan.  Will re-assess lipids today.        4. Hypothyroidism, unspecified type  Assessment & Plan:  Followed by Endocrinology.      5. History of thyroid cancer    6. Stage 3a chronic kidney disease  Assessment & Plan:  Stable; Cr 1.12, GFR 58 on labs from 4/9/24.  Followed by Nephrology.      7. Borderline personality disorder  Assessment & Plan:  Stable.  She denies SI/HI.  She is able to tell me \"National Suicide Hotline is 988.\"  She is followed closely by [facility] Psychiatrist and Therapist.  Encouraged to continue following closely with [facility] and to call Hotline 988, PRN.  Encourage to get connected with new Psychiatrist/Therapist near her new residence and she agreed.      8. Schizoaffective disorder, unspecified type    9. PTSD (post-traumatic stress disorder)    10. Health care maintenance  Comments:  Use sunscreen w/sun expsure.  Working smoke/Carbon Monoxide detectors in home.  Wear seatbelt.  Annual Vision/Dental exam.  Increase activity 30min/day x 5 days      Patient was scheduled for colon cancer screening on 3/19/24 but provider cancelled appointment.  Encouraged patient to call and reschedule appointment and she agreed.  Patient stated she is going to be moving to [place] in the next 10 days and will make an appointment for colonoscopy once she gets settled in from move.      Follow Up:   Next Medicare Wellness visit to be scheduled in 1 year.      An After Visit Summary and PPPS were made available to the patient.            "

## 2024-04-13 PROBLEM — Z85.850 HISTORY OF THYROID CANCER: Status: ACTIVE | Noted: 2023-07-31

## 2024-04-13 PROBLEM — E03.9 HYPOTHYROIDISM: Status: ACTIVE | Noted: 2019-12-18

## 2024-04-13 PROBLEM — E03.9 HYPOTHYROIDISM: Status: RESOLVED | Noted: 2019-12-18 | Resolved: 2024-04-13

## 2024-04-13 PROBLEM — I26.99 PULMONARY EMBOLISM: Status: RESOLVED | Noted: 2020-12-04 | Resolved: 2024-04-13

## 2024-04-13 PROBLEM — R10.13 EPIGASTRIC PAIN: Status: RESOLVED | Noted: 2024-02-28 | Resolved: 2024-04-13

## 2024-04-13 PROBLEM — K85.90 ACUTE PANCREATITIS: Status: RESOLVED | Noted: 2024-02-19 | Resolved: 2024-04-13

## 2024-04-13 PROBLEM — J20.9 ACUTE BRONCHITIS: Status: RESOLVED | Noted: 2019-09-25 | Resolved: 2024-04-13

## 2024-04-13 PROBLEM — N18.9 CHRONIC KIDNEY DISEASE: Status: ACTIVE | Noted: 2024-04-13

## 2024-04-13 NOTE — ASSESSMENT & PLAN NOTE
Hypertension is controlled on Amlodipine 10mg daily.  Continue current treatment plan.  Decrease table salt and foods high in salt.  Weight loss.  Increase activity daily.  Blood pressure will be re-assessed by new PCP in [facility] as she is relocating in the next 10 days.

## 2024-04-13 NOTE — ASSESSMENT & PLAN NOTE
"Stable.  She denies SI/HI.  She is able to tell me \"National Suicide Hotline is 988.\"  She is followed closely by [facility] Psychiatrist and Therapist.  Encouraged to continue following closely with [facility] and to call Hotline 988, PRN.  Encourage to get connected with new Psychiatrist/Therapist near her new residence and she agreed.  "

## 2024-04-13 NOTE — ASSESSMENT & PLAN NOTE
Lipid abnormalities are elevated on prior labs.  She is taking Omega 3 daily due to having myalgia with statins.  Strongly encouraged lifestyle changes.  Continue current treatment plan.  Will re-assess lipids today.

## 2024-04-17 DIAGNOSIS — K59.09 CHRONIC CONSTIPATION: ICD-10-CM

## 2024-04-17 RX ORDER — DOCUSATE SODIUM 100 MG/1
100 CAPSULE, LIQUID FILLED ORAL 2 TIMES DAILY PRN
Qty: 120 CAPSULE | Refills: 1 | Status: SHIPPED | OUTPATIENT
Start: 2024-04-17

## 2024-04-17 NOTE — TELEPHONE ENCOUNTER
Rx Refill Note  Requested Prescriptions     Pending Prescriptions Disp Refills    docusate sodium (COLACE) 100 MG capsule 60 capsule 6      Last office visit with prescribing clinician: 4/12/2024       Saúl Iqbal MA  04/17/24, 08:22 EDT

## 2024-04-17 NOTE — TELEPHONE ENCOUNTER
Caller: Urvashi Ornelas    Relationship: Self    Best call back number: [phone]    Requested Prescriptions:   Requested Prescriptions     Pending Prescriptions Disp Refills    docusate sodium (COLACE) 100 MG capsule 60 capsule 6        Pharmacy where request should be sent: [id number] [place], [place] - [address] AT [facility] & [facility] [phone] [facility] [phone] FX     Last office visit with prescribing clinician: 4/12/2024   Last telemedicine visit with prescribing clinician: Visit date not found   Next office visit with prescribing clinician: Visit date not found     Additional details provided by patient: PATIENT STATES SHE HAS 5 DAYS LEFT ON HER CURRENT PRESCRIPTION, SHE IS GETTING READY TO MOVE AND IS REQUESTING A REFILL ON THE MEDICATION TO LAST HER UNTIL SHE IS ABLE TO FIND A NEW DOCTOR.    Would you like a call back once the refill request has been completed: [x] Yes [] No    If the office needs to give you a call back, can they leave a voicemail: [x] Yes [] No    Waleska Corbin, PCT   04/17/24 08:14 EDT

## 2024-05-09 DIAGNOSIS — E89.0 POSTSURGICAL HYPOTHYROIDISM: ICD-10-CM

## 2024-05-09 RX ORDER — LEVOTHYROXINE SODIUM 112 MCG
TABLET ORAL
Qty: 180 TABLET | Refills: 0 | OUTPATIENT
Start: 2024-05-09

## 2024-05-16 NOTE — TELEPHONE ENCOUNTER
01/02/19        Kd Queen  [address]      Dear Kassandra Stoner,    Our records indicate that you have outstanding lab work and or testing that was ordered for you and has not yet been completed:  Orders Placed This Encounter PLEASE CALL PATIENT. SHE DID EXERCISES AT HOME AND HER ARM IS HURTING HER AND SHE DOESN'T KNOW HOW LONG YOU WANT HER TO DO THEM OR IF SHE CONTINUE   electronic

## 2024-06-05 ENCOUNTER — DOCUMENTATION (OUTPATIENT)
Dept: FAMILY MEDICINE CLINIC | Facility: CLINIC | Age: 59
End: 2024-06-05
Payer: MEDICARE

## 2024-06-05 NOTE — PROGRESS NOTES
SUCCESSFULLY FAXED RECORD REQUEST FROM [facility] [employer] DIS[employer]MIN[employer]

## 2024-07-09 ENCOUNTER — TELEPHONE (OUTPATIENT)
Dept: ENDOCRINOLOGY | Age: 59
End: 2024-07-09
Payer: MEDICARE

## 2024-07-09 RX ORDER — LEVOTHYROXINE SODIUM 125 MCG
250 TABLET ORAL DAILY
Qty: 180 TABLET | Refills: 0 | Status: SHIPPED | OUTPATIENT
Start: 2024-07-09

## 2024-07-09 NOTE — TELEPHONE ENCOUNTER
Caller: Urvashi Ornelas    Relationship: Self    Best call back number: [phone]     Requested Prescriptions:   Requested Prescriptions      No prescriptions requested or ordered in this encounter    SYNTHROID    Pharmacy where request should be sent:    PT WOULD LIKE PHYSICAL COPY OF RX ORDER TO PICKUP AT OFFICE.   Last office visit with prescribing clinician: 2/27/2024     Additional details provided by patient: PT MOVING OUT OF STATE--[facility] OFFICE HAS SIGNIFIGANT GAP IN AVAILABILITY. PT ASKS THAT OFFICE PROVIDE LAST RX TO AVOID GAPS IN TREATMENT. OKAY TO LEAVE VM.    Does the patient have less than a 3 day supply:  [x] Yes  [] No    Would you like a call back once the refill request has been completed: [x] Yes [] No    If the office needs to give you a call back, can they leave a voicemail: [x] Yes [] No    Irene Sultana Rep   07/09/24 14:01 EDT

## 2024-07-09 NOTE — TELEPHONE ENCOUNTER
Called and spoke with pt. Informed pt the Rx was printed and is ready to be picked up. Pt stated she will be by tomorrow to  the Rx. Informed pt that we open at 8:00 and close at 4:30. Pt voiced understanding and had no further questions or concerns.

## 2024-11-12 RX ORDER — LEVOTHYROXINE SODIUM 125 MCG
250 TABLET ORAL DAILY
Qty: 180 TABLET | Refills: 0 | OUTPATIENT
Start: 2024-11-12

## 2024-11-12 NOTE — TELEPHONE ENCOUNTER
Rx Refill Note  Requested Prescriptions     Pending Prescriptions Disp Refills    Synthroid 125 MCG tablet [Pharmacy Med Name: SYNTHROID 0.125MG (125MCG) TABLETS] 180 tablet 0     Sig: TAKE 2 TABLETS BY MOUTH EVERY DAY      Last office visit with prescribing clinician: Visit date not found   Last telemedicine visit with prescribing clinician: Visit date not found   Next office visit with prescribing clinician: Visit date not found                         Would you like a call back once the refill request has been completed: [] Yes [] No    If the office needs to give you a call back, can they leave a voicemail: [] Yes [] No    Blessing Crandall  11/12/24, 16:02 EST

## (undated) DEVICE — SENSR O2 OXIMAX FNGR A/ 18IN NONSTR

## (undated) DEVICE — NITINOL WIRE WITH HYDROPHILIC TIP: Brand: SENSOR

## (undated) DEVICE — TRAP,MUCUS SPECIMEN, 80CC: Brand: MEDLINE

## (undated) DEVICE — NITINOL STONE RETRIEVAL BASKET: Brand: ZERO TIP

## (undated) DEVICE — THE SINGLE USE ETRAP – POLYP TRAP IS USED FOR SUCTION RETRIEVAL OF ENDOSCOPICALLY REMOVED POLYPS.: Brand: ETRAP

## (undated) DEVICE — GLV SURG BIOGEL LTX PF 8

## (undated) DEVICE — TIDISHIELD UROLOGY DRAIN BAGS FROSTY VINYL STERILE FITS SIEMENS UROSKOP ACCESS 20 PER CASE: Brand: TIDISHIELD

## (undated) DEVICE — SHEATH URETRL ACC NAVIGATOR 13/15F 36CM 5PK

## (undated) DEVICE — SNAR POLYP CAPTIVATOR RND STFF 2.4 240CM 10MM 1P/U

## (undated) DEVICE — ADAPT CLN BIOGUARD AIR/H2O DISP

## (undated) DEVICE — BAG,DRAINAGE,4L,A/R TOWER,LL,SLIDE TAP: Brand: MEDLINE

## (undated) DEVICE — FIBR LASR FLEXIVAPULSE242 HOLMIUM FLT/TP 1P/U

## (undated) DEVICE — CATH FOL SIMPLYLATEX SILELAST 16F 17IN

## (undated) DEVICE — GLV SURG BIOGEL ECLPS LTX PF 7.5

## (undated) DEVICE — PRT BIOP SEALS

## (undated) DEVICE — SYR LUERLOK 20CC BX/50

## (undated) DEVICE — GOWN ,SIRUS,NONREINFORCED SMALL: Brand: MEDLINE

## (undated) DEVICE — CATH URETRL FLXITP POLLACK STD 5F 70CM

## (undated) DEVICE — ADAPT SWVL FIBROPTIC BRONCH

## (undated) DEVICE — KT ORCA ORCAPOD DISP STRL

## (undated) DEVICE — VITAL SIGNS™ JACKSON-REES CIRCUITS: Brand: VITAL SIGNS™

## (undated) DEVICE — MSK AIRWY LARYNG LMA PILOT SZ4

## (undated) DEVICE — LN SMPL O2 NASL/ORL SMART/CAPNOLINE PLS A/

## (undated) DEVICE — CANN O2 ETCO2 FITS ALL CONN CO2 SMPL A/ 7IN DISP LF

## (undated) DEVICE — TUBING, SUCTION, 1/4" X 10', STRAIGHT: Brand: MEDLINE

## (undated) DEVICE — LOU CYSTO: Brand: MEDLINE INDUSTRIES, INC.

## (undated) DEVICE — SINGLE USE BIOPSY VALVE MAJ-210: Brand: SINGLE USE BIOPSY VALVE (STERILE)

## (undated) DEVICE — SINGLE USE SUCTION VALVE MAJ-209: Brand: SINGLE USE SUCTION VALVE (STERILE)

## (undated) DEVICE — PK URETSCP 40

## (undated) DEVICE — FRCP BX RADJAW4 PULM WO NDL STD1.8X2 100